# Patient Record
Sex: FEMALE | Race: WHITE | Employment: UNEMPLOYED
[De-identification: names, ages, dates, MRNs, and addresses within clinical notes are randomized per-mention and may not be internally consistent; named-entity substitution may affect disease eponyms.]

---

## 2017-09-05 ENCOUNTER — RECORDS - HEALTHEAST (OUTPATIENT)
Dept: ADMINISTRATIVE | Facility: OTHER | Age: 52
End: 2017-09-05

## 2018-02-05 ENCOUNTER — TRANSFERRED RECORDS (OUTPATIENT)
Dept: HEALTH INFORMATION MANAGEMENT | Facility: CLINIC | Age: 53
End: 2018-02-05

## 2018-03-28 ENCOUNTER — TRANSFERRED RECORDS (OUTPATIENT)
Dept: HEALTH INFORMATION MANAGEMENT | Facility: CLINIC | Age: 53
End: 2018-03-28

## 2018-10-18 ENCOUNTER — TRANSFERRED RECORDS (OUTPATIENT)
Dept: HEALTH INFORMATION MANAGEMENT | Facility: CLINIC | Age: 53
End: 2018-10-18

## 2019-01-07 ENCOUNTER — TRANSFERRED RECORDS (OUTPATIENT)
Dept: HEALTH INFORMATION MANAGEMENT | Facility: CLINIC | Age: 54
End: 2019-01-07

## 2019-01-25 ENCOUNTER — TRANSFERRED RECORDS (OUTPATIENT)
Dept: HEALTH INFORMATION MANAGEMENT | Facility: CLINIC | Age: 54
End: 2019-01-25

## 2019-02-23 ENCOUNTER — TRANSFERRED RECORDS (OUTPATIENT)
Dept: HEALTH INFORMATION MANAGEMENT | Facility: CLINIC | Age: 54
End: 2019-02-23

## 2019-03-04 ENCOUNTER — TRANSFERRED RECORDS (OUTPATIENT)
Dept: HEALTH INFORMATION MANAGEMENT | Facility: CLINIC | Age: 54
End: 2019-03-04

## 2019-03-07 ENCOUNTER — TRANSFERRED RECORDS (OUTPATIENT)
Dept: HEALTH INFORMATION MANAGEMENT | Facility: CLINIC | Age: 54
End: 2019-03-07

## 2019-04-24 ENCOUNTER — TRANSFERRED RECORDS (OUTPATIENT)
Dept: HEALTH INFORMATION MANAGEMENT | Facility: CLINIC | Age: 54
End: 2019-04-24

## 2019-06-04 ENCOUNTER — TRANSFERRED RECORDS (OUTPATIENT)
Dept: HEALTH INFORMATION MANAGEMENT | Facility: CLINIC | Age: 54
End: 2019-06-04

## 2019-06-10 ENCOUNTER — TRANSFERRED RECORDS (OUTPATIENT)
Dept: HEALTH INFORMATION MANAGEMENT | Facility: CLINIC | Age: 54
End: 2019-06-10

## 2019-08-05 ENCOUNTER — TRANSFERRED RECORDS (OUTPATIENT)
Dept: HEALTH INFORMATION MANAGEMENT | Facility: CLINIC | Age: 54
End: 2019-08-05

## 2019-09-09 ENCOUNTER — TRANSFERRED RECORDS (OUTPATIENT)
Dept: HEALTH INFORMATION MANAGEMENT | Facility: CLINIC | Age: 54
End: 2019-09-09

## 2019-10-04 ENCOUNTER — TRANSFERRED RECORDS (OUTPATIENT)
Dept: HEALTH INFORMATION MANAGEMENT | Facility: CLINIC | Age: 54
End: 2019-10-04

## 2019-10-10 ENCOUNTER — TRANSFERRED RECORDS (OUTPATIENT)
Dept: HEALTH INFORMATION MANAGEMENT | Facility: CLINIC | Age: 54
End: 2019-10-10

## 2019-10-10 ENCOUNTER — MEDICAL CORRESPONDENCE (OUTPATIENT)
Dept: HEALTH INFORMATION MANAGEMENT | Facility: CLINIC | Age: 54
End: 2019-10-10

## 2019-10-11 ENCOUNTER — TRANSFERRED RECORDS (OUTPATIENT)
Dept: HEALTH INFORMATION MANAGEMENT | Facility: CLINIC | Age: 54
End: 2019-10-11

## 2019-10-17 ENCOUNTER — TRANSFERRED RECORDS (OUTPATIENT)
Dept: HEALTH INFORMATION MANAGEMENT | Facility: CLINIC | Age: 54
End: 2019-10-17

## 2019-10-17 LAB
ALT SERPL-CCNC: 69 U/L (ref 0–55)
AST SERPL-CCNC: 54 U/L (ref 10–40)
CREAT SERPL-MCNC: 0.59 MG/DL (ref 0.55–1.02)
GFR SERPL CREATININE-BSD FRML MDRD: >60 ML/MIN/1.73M2
GLUCOSE SERPL-MCNC: 261 MG/DL (ref 70–100)
POTASSIUM SERPL-SCNC: 4 MMOL/L (ref 3.5–5.1)

## 2019-10-18 ENCOUNTER — TRANSFERRED RECORDS (OUTPATIENT)
Dept: HEALTH INFORMATION MANAGEMENT | Facility: CLINIC | Age: 54
End: 2019-10-18

## 2019-10-18 LAB
ALT SERPL-CCNC: 56 U/L (ref 0–55)
AST SERPL-CCNC: 57 U/L (ref 10–40)

## 2019-10-22 ENCOUNTER — REFERRAL (OUTPATIENT)
Dept: TRANSPLANT | Facility: CLINIC | Age: 54
End: 2019-10-22

## 2019-10-22 DIAGNOSIS — C22.0 HEPATOCELLULAR CARCINOMA (H): ICD-10-CM

## 2019-10-22 DIAGNOSIS — K74.60 CIRRHOSIS (H): ICD-10-CM

## 2019-10-22 NOTE — LETTER
November 18, 2019    Dania Albert  [address] [place] [id number]      Dear Dania,    Thank you for your interest in the Transplant Center at [facility], [facility]. We look forward to being a part of your care team and assisting you through the transplant process.    As we discussed, your transplant coordinator is Huy Anders Jr. (Liver).  You may call your coordinator at any time with questions or concerns call [phone].    Please complete the following.    1. Fill out and return the enclosed forms    Authorization for Electronic Communication    Authorization to Discuss Protected Health Information    Authorization for Release of Protected Health Information    2. Sign up for:    [facility], access to your electronic medical record (see enclosed pamphlet)    [employer], a transplant education website    You can use these tools to learn more about your transplant, communicate with your care team, and track your medical details    Sincerely,  Solid Organ Transplant  [facility], [facility]    cc: Micaela Gould (PCP); Robles Hernandez MD

## 2019-10-22 NOTE — LETTER
LIVER TRANSPLANT  EVALUATION SCHEDULE    Patient:   Dania Albert  MR#:    [id number]  Coordinator:  Luís       [phone]  :     Kayli                 [phone]  Location:    [facility]  Date(s):    December 9, 2019 & December 10, 2019      December 11, 2019 & December 17, 2019 January 31, 2020  This is your evaluation schedule, please follow dates and times.  You will   receive reminder phone calls for other tests, but please follow this schedule  only!  If you have any questions about dates and times, please call us on  number listed above.  Thank you, Transplant Services   *NO FOOD or DRINK AFTER MIDNIGHT*  (You may only have small amounts of water)    Day/Date:    Monday, December 9, 2019  Time Location Activity   7:30 AM Imaging and Lab testing  ([facility] floor [facility] [place] ) Blood tests    7:45 AM Imaging and Lab testing  ([address] [place] [address] and [facility] [place] ) Chest X-ray    8:00 AM Imaging and Lab testing  ([address] [place] [address] and [facility] [place] ) Liver Ultrasound with dopplers=NO FOOD or DRINK 8 HOURS BEFORE TEST!!!   9:30 AM-11:30 AM Transplant Services  ([facility] floor [facility] [place]) Pre-transplant class   12:00 PM Imaging and Lab Testing  ([address] [place] [address] and [facility] [place] ) Dexa Scan HOLD Calcium pills & supplements 48 hours before test!   1:00 PM [facility]   ([address] [place] [address] and [facility] [place]) Pulmonary Function Tests Please do not wear any deodorant, perfume, or scented products.             Day/Date:    Tuesday, December 10, 2019  Time Location Activity   9:00 AM Imaging and Lab testing  ([facility] floor [facility] [place] ) EKG   9:30 AM Transplant Services  ([address] [place] [address] and [facility] [place]) Appointment with Dr. Mejía,  Transplant Surgeon   10:00 AM Medicine Specialties  ([address] [place] [address] and [facility] [place]) Appointment with Dr. Bergman,  Hepatologist   11:00 AM Transplant Services  ([address] [place] [address]  and Surgery Center) Appointment with Jaimee Murrell  Registered Dietitian   12:00 AM Transplant Services  (3rd floor [facility]) Appointment with Trish Knight             Day/Date:    Wednesday, December 11, 2019  Time Location Activity   7:00 AM [facility] Waiting Room  (2nd [facility]) Bone Scan Injection   8:00 AM [facility] Waiting Room  ([address] [place] [address]) Dobutamine Stress Echo = SEE ENCLOSED INSTRUCTIONS for TEST!   10:00 AM [facility] Waiting Room  (2nd [facility]) Bone Scan         Day/Date:    Tuesday, December 17, 2019  Time Location Activity   9:00 AM [facility]  (3rd floor [facility]) Appointment with Dr. Taylor,  Cardiology           Day/Date:    Friday, January 31, 2020  Time Location Activity   1:00 PM [facility]   (3rd floor [facility]) Appointment with Dr. Hicks,  Pulmonary                 Day/Date:    Every Thursday *OPTIONAL*  Time Location Activity   12:00 PM-1:30 PM Liver Transplant Support Group  Hospital Station 7B  Room 7-120 Every Thursday *OPTIONAL*     *IF ON LINE CHECK IN IS NOT DONE, YOU WILL NEED TO CHECK IN 15 MINUTES EARLIER THEN THE FIRST SCHEDULED APPOINTMENT*

## 2019-10-23 PROBLEM — B18.2 CHRONIC HEPATITIS C WITHOUT HEPATIC COMA (H): Status: ACTIVE | Noted: 2019-10-23

## 2019-10-23 PROBLEM — C22.0 HCC (HEPATOCELLULAR CARCINOMA) (H): Status: ACTIVE | Noted: 2019-10-23

## 2019-10-23 PROBLEM — K74.60 CIRRHOSIS OF LIVER WITH ASCITES (H): Status: ACTIVE | Noted: 2019-10-23

## 2019-10-23 PROBLEM — R18.8 CIRRHOSIS OF LIVER WITH ASCITES (H): Status: ACTIVE | Noted: 2019-10-23

## 2019-10-25 ENCOUNTER — TRANSFERRED RECORDS (OUTPATIENT)
Dept: HEALTH INFORMATION MANAGEMENT | Facility: CLINIC | Age: 54
End: 2019-10-25

## 2019-10-25 LAB
ALT SERPL-CCNC: 23 U/L (ref 0–55)
AST SERPL-CCNC: 29 U/L (ref 10–40)
CREAT SERPL-MCNC: 0.53 MG/DL (ref 0.55–1.02)
GFR SERPL CREATININE-BSD FRML MDRD: >60 ML/MIN/1.73M2
GLUCOSE SERPL-MCNC: 209 MG/DL (ref 70–100)
INR PPP: 1.2 (ref 0.9–1.1)

## 2019-10-26 ENCOUNTER — TRANSFERRED RECORDS (OUTPATIENT)
Dept: HEALTH INFORMATION MANAGEMENT | Facility: CLINIC | Age: 54
End: 2019-10-26

## 2019-11-11 ENCOUNTER — TRANSFERRED RECORDS (OUTPATIENT)
Dept: HEALTH INFORMATION MANAGEMENT | Facility: CLINIC | Age: 54
End: 2019-11-11

## 2019-11-13 VITALS — WEIGHT: 176 LBS | HEIGHT: 62 IN | BODY MASS INDEX: 32.39 KG/M2

## 2019-11-13 ASSESSMENT — MIFFLIN-ST. JEOR: SCORE: 1346.58

## 2019-11-13 NOTE — TELEPHONE ENCOUNTER
Patient was asked the following questions during liver intake call.     Referring Provider: Dr. Robles Metz  Referring Diagnosis: Chronic Liver Disease/HCV/Liver Cancer   PCP: Dr. Micaela Gould     1)Do you know why you have liver disease: Yes             If Alcoholic Cirrhosis is present when was your last drink: No             Have you ever been through treatment for alcohol: No  2) Presence of Ascites: Yes Paracentesis: No  3) Presence of Hepatic Encephalopathy: No Medications:No  4) History of GI Bleeding: Yes, History of Esophageal Varices Banding.   5) EGD: Yes at [facility] in [facility]   6) Colonoscopy: Yes at [facility].   7) MELD Score:  No Current INR  8) Insurance information: [place] Medicaid       Policy gutierres: Self      Subscriber/policy/ID number: [id number]      Group Number:     Referral intake process completed.  Patient is aware that after financial approval is received, medical records will be requested.   Patient confirmed for a callback from transplant coordinator on 11/20/2019.  Tentative evaluation date TBD.    Confirmed coordinator will discuss evaluation process in more detail at the time of their call.   Patient is aware of the need to arrange age appropriate cancer screening, vaccinations, and dental care.  Reminded patient to complete questionnaire, complete medical records release, and review packet prior to evaluation visit .  Assessed patient for special needs (ie--wheelchair, assistance, guardian, and ):  No  Patient instructed to call [phone] with questions.     MELANIA Morales, LPN   Solid Organ Transplant

## 2019-11-22 ENCOUNTER — TRANSFERRED RECORDS (OUTPATIENT)
Dept: HEALTH INFORMATION MANAGEMENT | Facility: CLINIC | Age: 54
End: 2019-11-22

## 2019-11-22 ENCOUNTER — DOCUMENTATION ONLY (OUTPATIENT)
Dept: TRANSPLANT | Facility: CLINIC | Age: 54
End: 2019-11-22

## 2019-11-25 NOTE — TELEPHONE ENCOUNTER
54yo with hx HCV cirrhosis and HCC referred for transplant evaluation by Dr. Metz at [facility]. Pt previously followed by Yadira Graf NP at [facility], but, in order to be closer to home, transferred management of her liver disease to Dr. Cast in [place], [place] (notes in CE). Pt's HCV treated successfully with Sovaldi in 2/2017. HCC dx 6/2019 and treated with chemoembolization and microwave ablation in 10/2019 at [facility]. Alcohol abuse noted in [facility] medical history however pt denies any hx of alcohol abuse. Referral indicates occasional drinking, pt reports last drink spring 2019.  Pt denies any current or hx drug use. Pt does smoke (~7cigaretts/day) and has a known hx COPD (seen by pulmonology in 4/2018, note in CE). Pt lives with her  who is supportive and involved. She has been unable to work for many months due to her health.     MELD: 7, labs done 10/4/19 at [facility]  Imaging: done at [facility], see CE, reviewed at tumor conference 11/22/19, pt to follow up with [facility] regarding f/u imaging  Ascites: none, no diuretics   HE: none, not on lactulose or Xifaxan  GIB: no hx  EGD: hx EV, last EGD 10/28/19 at [facility], see CE  Colonoscopy: last 10/2018 at [facility], see CE    Medical and Surgical Hx: Snap Shot updated  *Pt to schedule mammogram and pap smear with PCP    Plan:   Transplant evaluation with Dr. Bergman week of 12/9/19. Pt instructed to bring support person to all visits.

## 2019-11-27 NOTE — TELEPHONE ENCOUNTER
RECORDS RECEIVED FROM: Internal   DATE RECEIVED: 1.31.2020   NOTES STATUS DETAILS   OFFICE NOTE from referring provider Internal 11.25.19 Dr. Bergman   OFFICE NOTE from other specialist Internal 1.25.19 Dr. Gould   DISCHARGE SUMMARY from hospital N/A    DISCHARGE REPORT from the ER Care Everywhere 1.25.19 [facility]   MEDICATION LIST Internal    IMAGING  (NEED IMAGES AND REPORTS)     CT SCAN In process 10.25.19 CT chest- HP  8.5.19 ct chest -hp  3.28.18 ct chest-hp   CHEST XRAY (CXR) In process 3.7.19- xr chest -hp  1.30.18 [facility]   TESTS     PULMONARY FUNCTION TESTING (PFT) In process 2.5.18 [facility]      11.27.19 MJ 6:33 AM  Called [facility] for images to be sent. Requested PFT from [facility].    Action 1.24.20 sv   Action Taken Received CT 10.25.19  Request sent to [facility] [phone] for  CT- 8/5/19, 3/28/18  CXR- 2.23.19  Request sent to [facility] for  CXR- 3/7/19  Request sent to [facility] for  PFT- 2.5.18        Action 1.24.20 sv   Action Taken Received PFT from [facility]   Received CXR 3.7.19 from [facility]         Action 1.29.20 sv   Action Taken Record received from [facility]

## 2019-11-29 NOTE — TELEPHONE ENCOUNTER
RECORDS RECEIVED FROM: Internal   DATE RECEIVED: 12-17-19   NOTES STATUS DETAILS   OFFICE NOTE from referring provider    Internal SOT   OFFICE NOTE from other cardiologist    N/A    DISCHARGE SUMMARY from hospital    N/A    DISCHARGE REPORT from the ER   Care Everywhere 10-25-19 [facility]   OPERATIVE REPORT    N/A    MEDICATION LIST   Internal    LABS     BMP   Care Everywhere 10-25-19   CBC   Care Everywhere 2-23-19   CMP   Care Everywhere 10-4-19   Lipids   Care Everywhere 3-4-19   TSH   Care Everywhere 3-4-19   DIAGNOSTIC PROCEDURES     EKG   In process 10-26-19   Monitor Reports   N/A    IMAGING (DISC & REPORT)      Echo   In process Scheduled prior to appt 12-17-19   Stress Tests   In process Scheduled prior to appt 12-17-19   Cath   N/A    MRI/MRA   N/A    CT/CTA   N/A      Action    Action Taken 11-29: Requested from :    EKG Strips   10-26-19, 10-10-19, 10-8-19, 10-18-18      11-29: Received and sent to scanning

## 2019-12-06 ENCOUNTER — TRANSFERRED RECORDS (OUTPATIENT)
Dept: HEALTH INFORMATION MANAGEMENT | Facility: CLINIC | Age: 54
End: 2019-12-06

## 2019-12-08 NOTE — PROGRESS NOTES
[facility] Liver Transplant Evaluation    Requesting Provider and Health System: Yadira Graf NP , Rodolfo Cast MD [facility]  Liver Disease: HV    A/P  54 year old female with HCV cirrhosis and HCC. MELD 7ABO O  CIRRHOSIS.2/2 HCV. Excellent synthetic function  HCV treated and obtained SVR 2017    THROMBOCYTOPENIA. 2/2 cirrhosis. Plt ~ 50  HCC. S/p MWA with recent MRI showing no residual disease  VARICEAL SCREENING. UTD EGD 10/29/19 grade 1 varices    DIARRHEA Chronic Suggested Imodium. PRescribed Lomotil    DM On insulin    SOCIAL SUPPORT.  here today and seems very supportive, engaged.  FUNCTIONAL STATUS.Good  LIVER TRANSPLANT CANDIDACY.   She is a good candidate. Issues  DM  Level of understanding.        Kayli Bergman MD  Hepatology/Liver Transplantation  Medical Director, Liver Transplantation  [facility]  ========================================================================    Subjective:  53 y M with HCV cirrhosis and HCC. First diagnosed with liver disease in a couple years ago. She saw her doctor and had some testing done. She was diagnosed with Hepatitis C at that time.     She feels ok. She has chronic diarrhea for years. Never has taken anything for it. Goes away when she has taken narcotic pain medication. Sometimes loses continence    Has occasional twinges of pain in her RUQ. They don't last. No NV.    HCV  S/p treatment with Sovaldi 2017    HCC  Dx 6/2019 3.1x1.9x2.8 cm  CE and MWA at [facility] 10/2019  MRI 11/22/19 no active HCC    Ascites no  HE no    EGD 10/28/19 grade 1 varices  COLONOSCOPY 12/8/16 due 2026  KIDNEY FUNCTION normal  BONE DENSITY DEXA pending    Portal vein assessment: patent on US   Diabetes: yes  Abdominal surgery: yes    HBV neg core ancelmo and s Ag    MELD-Na score: 7 at 12/9/2019  9:12 AM  MELD score: 7 at 12/9/2019  9:12 AM  Calculated from:  Serum Creatinine: 0.62 mg/dL (Rounded to 1 mg/dL) at 12/9/2019  9:12 AM  Serum Sodium:  137 mmol/L at 12/9/2019  9:12 AM  Total Bilirubin: 0.9 mg/dL (Rounded to 1 mg/dL) at 12/9/2019  9:12 AM  INR(ratio): 1.12 at 12/9/2019  9:12 AM  Age: 54 years  Lab Test 12/09/19  0912   WBC 2.8*   RBC 3.83   HGB 13.2   HCT 39.0   *   MCH 34.5*   MCHC 33.8   RDW 14.2   PLT 49*   AFP 3.9    PAST MEDICAL HISTORY  Asuncion  R hernia repair  ORIF L and R wrists  Bowel obstruction no details known   Chronic diarrhea not on any medication  Botox shot in kidney for urinary frequency about 2018  SOCIAL HISTORY    Lives with   Currently is not working. 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 Worked in medical equipment assembly.  Smoking: Smokes 8-10 cig/d for 25 years  Alcohol  FAMILY HISTORY  M CAD, glaucoma, DM  F CAD  Bro kidney disease  DM in multiple sisters    ROS 10 point ROS neg other than the symptoms noted above in the HPI.    EXAM  140/84 16 82 96% 176 pounds BMI 32  Gen: No acute distress  Head: Normocephalic atraumatic  Eyes: Sclera anicteric  Neck: No cervical lymphadenopathy  Respiratory: Clear to auscultation bilaterally, no overt wheezing or rales  CV: RRR   Abdomen:  Soft, nontender, nondistended, normal bowel sounds  Extrem: No edema  Skin: No jaundice, spider angiomata or palmar erythema.  No rashes.   Neuro: Alert and oriented. No asterixis.    MSK: Grossly Intact  Psych: Normal speech, normal affect    Current Outpatient Medications   Medication     acetaminophen (TYLENOL) 500 MG tablet     albuterol (PROAIR HFA) 108 (90 Base) MCG/ACT inhaler     benzonatate (TESSALON) 100 MG capsule     blood glucose monitoring (ONE TOUCH ULTRA MINI) meter device kit     HYDROXYZINE HCL PO     insulin glargine (LANTUS SOLOSTAR) 100 UNIT/ML pen     Omega-3 Fatty Acids (OMEGA-3 FISH OIL PO)     OneTouch Delica Lancets 33G MISC     order for DME     OXYCODONE HCL PO     pantoprazole (PROTONIX) 40 MG EC tablet     QUEtiapine (SEROQUEL) 50 MG tablet     sertraline (ZOLOFT) 100 MG tablet      TRULICITY 0.75 MG/0.5ML pen     vitamin D2 (ERGOCALCIFEROL) 38816 units (1250 mcg) capsule     No current facility-administered medications for this visit.

## 2019-12-09 ENCOUNTER — TELEPHONE (OUTPATIENT)
Dept: GASTROENTEROLOGY | Facility: CLINIC | Age: 54
End: 2019-12-09

## 2019-12-09 ENCOUNTER — OFFICE VISIT (OUTPATIENT)
Dept: TRANSPLANT | Facility: CLINIC | Age: 54
End: 2019-12-09
Attending: INTERNAL MEDICINE
Payer: MEDICAID

## 2019-12-09 ENCOUNTER — ANCILLARY PROCEDURE (OUTPATIENT)
Dept: BONE DENSITY | Facility: CLINIC | Age: 54
End: 2019-12-09
Attending: INTERNAL MEDICINE
Payer: MEDICAID

## 2019-12-09 ENCOUNTER — ANCILLARY PROCEDURE (OUTPATIENT)
Dept: GENERAL RADIOLOGY | Facility: CLINIC | Age: 54
End: 2019-12-09
Attending: INTERNAL MEDICINE
Payer: MEDICAID

## 2019-12-09 ENCOUNTER — DOCUMENTATION ONLY (OUTPATIENT)
Dept: CARE COORDINATION | Facility: CLINIC | Age: 54
End: 2019-12-09

## 2019-12-09 DIAGNOSIS — C22.0 HEPATOCELLULAR CARCINOMA (H): ICD-10-CM

## 2019-12-09 DIAGNOSIS — K74.60 CIRRHOSIS OF LIVER (H): Primary | ICD-10-CM

## 2019-12-09 DIAGNOSIS — K74.60 CIRRHOSIS OF LIVER WITH ASCITES (H): Primary | ICD-10-CM

## 2019-12-09 DIAGNOSIS — K70.31 ALCOHOLIC CIRRHOSIS OF LIVER WITH ASCITES (H): ICD-10-CM

## 2019-12-09 DIAGNOSIS — K74.60 CIRRHOSIS (H): ICD-10-CM

## 2019-12-09 DIAGNOSIS — R18.8 CIRRHOSIS OF LIVER WITH ASCITES (H): Primary | ICD-10-CM

## 2019-12-09 LAB
ABO + RH BLD: NORMAL
AFP SERPL-MCNC: 4 UG/L (ref 0–8)
ALBUMIN SERPL-MCNC: 3.5 G/DL (ref 3.4–5)
ALBUMIN UR-MCNC: NEGATIVE MG/DL
ALP SERPL-CCNC: 172 U/L (ref 40–150)
ALT SERPL W P-5'-P-CCNC: 20 U/L (ref 0–50)
ANION GAP SERPL CALCULATED.3IONS-SCNC: 6 MMOL/L (ref 3–14)
APPEARANCE UR: CLEAR
AST SERPL W P-5'-P-CCNC: 16 U/L (ref 0–45)
BILIRUB DIRECT SERPL-MCNC: 0.2 MG/DL (ref 0–0.2)
BILIRUB SERPL-MCNC: 0.9 MG/DL (ref 0.2–1.3)
BILIRUB UR QL STRIP: NEGATIVE
BLD GP AB SCN SERPL QL: NORMAL
BLOOD BANK CMNT PATIENT-IMP: NORMAL
BUN SERPL-MCNC: 6 MG/DL (ref 7–30)
CALCIUM SERPL-MCNC: 9 MG/DL (ref 8.5–10.1)
CHLORIDE SERPL-SCNC: 104 MMOL/L (ref 94–109)
CMV IGG SERPL QL IA: >8 AI (ref 0–0.8)
CO2 SERPL-SCNC: 28 MMOL/L (ref 20–32)
COLOR UR AUTO: YELLOW
CREAT SERPL-MCNC: 0.62 MG/DL (ref 0.52–1.04)
DEPRECATED CALCIDIOL+CALCIFEROL SERPL-MC: 20 UG/L (ref 20–75)
EBV VCA IGG SER QL IA: 6.3 AI (ref 0–0.8)
ERYTHROCYTE [DISTWIDTH] IN BLOOD BY AUTOMATED COUNT: 14.2 % (ref 10–15)
GFR SERPL CREATININE-BSD FRML MDRD: >90 ML/MIN/{1.73_M2}
GLUCOSE SERPL-MCNC: 149 MG/DL (ref 70–99)
GLUCOSE UR STRIP-MCNC: NEGATIVE MG/DL
HBA1C MFR BLD: 7.5 % (ref 0–5.6)
HBV CORE AB SERPL QL IA: NONREACTIVE
HBV SURFACE AG SERPL QL IA: NONREACTIVE
HCG SERPL QL: NEGATIVE
HCT VFR BLD AUTO: 39 % (ref 35–47)
HGB BLD-MCNC: 13.2 G/DL (ref 11.7–15.7)
HGB UR QL STRIP: NEGATIVE
HIV 1+2 AB+HIV1 P24 AG SERPL QL IA: NONREACTIVE
INR PPP: 1.12 (ref 0.86–1.14)
KETONES UR STRIP-MCNC: NEGATIVE MG/DL
LEUKOCYTE ESTERASE UR QL STRIP: NEGATIVE
MCH RBC QN AUTO: 34.5 PG (ref 26.5–33)
MCHC RBC AUTO-ENTMCNC: 33.8 G/DL (ref 31.5–36.5)
MCV RBC AUTO: 102 FL (ref 78–100)
NITRATE UR QL: NEGATIVE
PH UR STRIP: 7 PH (ref 5–7)
PLATELET # BLD AUTO: 49 10E9/L (ref 150–450)
POTASSIUM SERPL-SCNC: 3.6 MMOL/L (ref 3.4–5.3)
PROT SERPL-MCNC: 8 G/DL (ref 6.8–8.8)
RBC # BLD AUTO: 3.83 10E12/L (ref 3.8–5.2)
SODIUM SERPL-SCNC: 137 MMOL/L (ref 133–144)
SOURCE: ABNORMAL
SP GR UR STRIP: 1.01 (ref 1–1.03)
SPECIMEN EXP DATE BLD: NORMAL
SPECIMEN EXP DATE BLD: NORMAL
T PALLIDUM AB SER QL: NONREACTIVE
TSH SERPL DL<=0.005 MIU/L-ACNC: 3.41 MU/L (ref 0.4–4)
UROBILINOGEN UR STRIP-MCNC: 4 MG/DL (ref 0–2)
WBC # BLD AUTO: 2.8 10E9/L (ref 4–11)

## 2019-12-09 PROCEDURE — 87340 HEPATITIS B SURFACE AG IA: CPT | Performed by: INTERNAL MEDICINE

## 2019-12-09 PROCEDURE — 80321 ALCOHOLS BIOMARKERS 1OR 2: CPT | Performed by: INTERNAL MEDICINE

## 2019-12-09 PROCEDURE — 86850 RBC ANTIBODY SCREEN: CPT | Performed by: INTERNAL MEDICINE

## 2019-12-09 PROCEDURE — 86481 TB AG RESPONSE T-CELL SUSP: CPT | Performed by: INTERNAL MEDICINE

## 2019-12-09 PROCEDURE — 86704 HEP B CORE ANTIBODY TOTAL: CPT | Performed by: INTERNAL MEDICINE

## 2019-12-09 PROCEDURE — 84443 ASSAY THYROID STIM HORMONE: CPT | Performed by: INTERNAL MEDICINE

## 2019-12-09 PROCEDURE — 86665 EPSTEIN-BARR CAPSID VCA: CPT | Performed by: INTERNAL MEDICINE

## 2019-12-09 PROCEDURE — 85610 PROTHROMBIN TIME: CPT | Performed by: INTERNAL MEDICINE

## 2019-12-09 PROCEDURE — 86900 BLOOD TYPING SEROLOGIC ABO: CPT | Performed by: INTERNAL MEDICINE

## 2019-12-09 PROCEDURE — 36415 COLL VENOUS BLD VENIPUNCTURE: CPT | Performed by: INTERNAL MEDICINE

## 2019-12-09 PROCEDURE — 80307 DRUG TEST PRSMV CHEM ANLYZR: CPT | Performed by: INTERNAL MEDICINE

## 2019-12-09 PROCEDURE — 86901 BLOOD TYPING SEROLOGIC RH(D): CPT | Performed by: INTERNAL MEDICINE

## 2019-12-09 PROCEDURE — 86886 COOMBS TEST INDIRECT TITER: CPT | Performed by: INTERNAL MEDICINE

## 2019-12-09 PROCEDURE — 84703 CHORIONIC GONADOTROPIN ASSAY: CPT | Performed by: INTERNAL MEDICINE

## 2019-12-09 PROCEDURE — 86644 CMV ANTIBODY: CPT | Performed by: INTERNAL MEDICINE

## 2019-12-09 PROCEDURE — 81003 URINALYSIS AUTO W/O SCOPE: CPT | Performed by: INTERNAL MEDICINE

## 2019-12-09 PROCEDURE — 80048 BASIC METABOLIC PNL TOTAL CA: CPT | Performed by: INTERNAL MEDICINE

## 2019-12-09 PROCEDURE — 82306 VITAMIN D 25 HYDROXY: CPT | Performed by: INTERNAL MEDICINE

## 2019-12-09 PROCEDURE — 85027 COMPLETE CBC AUTOMATED: CPT | Performed by: INTERNAL MEDICINE

## 2019-12-09 PROCEDURE — 80076 HEPATIC FUNCTION PANEL: CPT | Performed by: INTERNAL MEDICINE

## 2019-12-09 PROCEDURE — 86780 TREPONEMA PALLIDUM: CPT | Performed by: INTERNAL MEDICINE

## 2019-12-09 PROCEDURE — 40000866 ZZHCL STATISTIC HIV 1/2 ANTIGEN/ANTIBODY PRETRANSPLANT ONLY: Performed by: INTERNAL MEDICINE

## 2019-12-09 PROCEDURE — 82105 ALPHA-FETOPROTEIN SERUM: CPT | Performed by: INTERNAL MEDICINE

## 2019-12-09 PROCEDURE — 83036 HEMOGLOBIN GLYCOSYLATED A1C: CPT | Performed by: INTERNAL MEDICINE

## 2019-12-09 NOTE — TELEPHONE ENCOUNTER
DATE:  12/9/2019   TIME OF RECEIPT FROM LAB:  1050  LAB TEST:  Platelets  LAB VALUE:  49  RESULTS GIVEN WITH READ-BACK TO (PROVIDER):  Samina Dawn LPN for Dr. eBrgman    TIME LAB VALUE REPORTED TO PROVIDER:   1050

## 2019-12-09 NOTE — LETTER
12/9/2019       RE: Dania Albert  [address] [address]     Dear Colleague,    Thank you for referring your patient, Dania Albert, to the [facility] SOLID ORGAN TRANSPLANT at [facility]. Please see a copy of my visit note below.    Liver Transplant Evaluation/Teaching    TEACHING TOPICS: Evaluation Process, Evaluation Items, Diagnostic Studies, Consultation, Chemical Dependency Policy, CD Eval, Donor Source, Liver Allocation, MELD System, UNOS, Waiting List, Follow up while on transplant list, Follow up after transplantation, Infection and Rejection, Immunosuppression , Medication Teaching, Lab Recording after transplant, Laboratory Frequency after transplant , Consent for evaluation and One year survival rates  INSTRUCTIONAL MATERIAL USED/GIVEN: Liver Transplant Handbook, MELD Booklet, Donor Booklet, Web Sites Options, Verbal instructions, Multiple Listing Brochure , Consent for evaluation signed, One year survival rates and SRTR (Scientific Registry) Data  Person(s) involved in teaching: patient her    Asks Questions: YES  Eager to Learn: YES  Cooperative: YES  Receptive (willing/able to accept information): YES  Reason for the appointment, diagnosis and treatment plan:YES  Knowledge of proper use of medications and conditions for which they are ordered (with special attention to potential side effects or drug interactions): YES  Which situations necessitate calling provider and whom to contact:YES  Other: NA  Teaching Concerns Addressed   Comments: Patient and her  attended transplant class. Asked excellent questions. Eval consent signed.   Proper use and care of (medical equip, care aids, etc.):YES  Nutritional needs and diet plan: YES  Pain management techniques: YES  Wound Care: YES  How and/when to access community resources: YES  Patient is aware of and agrees to required commitment to post-op care and long term follow-up: YES  Patient has name  and phone number of transplant coordinator.   Time Spent face-to-face teachin minutes.  Yancy Melton RN  Liver Transplant Coordinator         Again, thank you for allowing me to participate in the care of your patient.      Sincerely,    Transplant Class

## 2019-12-09 NOTE — PROGRESS NOTES
Liver Transplant Evaluation/Teaching    TEACHING TOPICS: Evaluation Process, Evaluation Items, Diagnostic Studies, Consultation, Chemical Dependency Policy, CD Eval, Donor Source, Liver Allocation, MELD System, UNOS, Waiting List, Follow up while on transplant list, Follow up after transplantation, Infection and Rejection, Immunosuppression , Medication Teaching, Lab Recording after transplant, Laboratory Frequency after transplant , Consent for evaluation and One year survival rates  INSTRUCTIONAL MATERIAL USED/GIVEN: Liver Transplant Handbook, MELD Booklet, Donor Booklet, Web Sites Options, Verbal instructions, Multiple Listing Brochure , Consent for evaluation signed, One year survival rates and SRTR (Scientific Registry) Data  Person(s) involved in teaching: patient her    Asks Questions: YES  Eager to Learn: YES  Cooperative: YES  Receptive (willing/able to accept information): YES  Reason for the appointment, diagnosis and treatment plan:YES  Knowledge of proper use of medications and conditions for which they are ordered (with special attention to potential side effects or drug interactions): YES  Which situations necessitate calling provider and whom to contact:YES  Other: NA  Teaching Concerns Addressed   Comments: Patient and her  attended transplant class. Asked excellent questions. Eval consent signed.   Proper use and care of (medical equip, care aids, etc.):YES  Nutritional needs and diet plan: YES  Pain management techniques: YES  Wound Care: YES  How and/when to access community resources: YES  Patient is aware of and agrees to required commitment to post-op care and long term follow-up: YES  Patient has name and phone number of transplant coordinator.   Time Spent face-to-face teachin minutes.  Yancy Melton RN  Liver Transplant Coordinator

## 2019-12-10 ENCOUNTER — COMMITTEE REVIEW (OUTPATIENT)
Dept: TRANSPLANT | Facility: CLINIC | Age: 54
End: 2019-12-10

## 2019-12-10 ENCOUNTER — OFFICE VISIT (OUTPATIENT)
Dept: GASTROENTEROLOGY | Facility: CLINIC | Age: 54
End: 2019-12-10
Attending: INTERNAL MEDICINE
Payer: MEDICAID

## 2019-12-10 ENCOUNTER — ALLIED HEALTH/NURSE VISIT (OUTPATIENT)
Dept: TRANSPLANT | Facility: CLINIC | Age: 54
End: 2019-12-10
Attending: INTERNAL MEDICINE
Payer: MEDICAID

## 2019-12-10 ENCOUNTER — OFFICE VISIT (OUTPATIENT)
Dept: TRANSPLANT | Facility: CLINIC | Age: 54
End: 2019-12-10
Attending: TRANSPLANT SURGERY
Payer: MEDICAID

## 2019-12-10 VITALS
TEMPERATURE: 97.6 F | RESPIRATION RATE: 16 BRPM | BODY MASS INDEX: 33.36 KG/M2 | DIASTOLIC BLOOD PRESSURE: 84 MMHG | WEIGHT: 176.7 LBS | HEIGHT: 61 IN | SYSTOLIC BLOOD PRESSURE: 140 MMHG | HEART RATE: 82 BPM

## 2019-12-10 VITALS — HEART RATE: 82 BPM | SYSTOLIC BLOOD PRESSURE: 140 MMHG | DIASTOLIC BLOOD PRESSURE: 84 MMHG

## 2019-12-10 DIAGNOSIS — K74.60 CIRRHOSIS (H): ICD-10-CM

## 2019-12-10 DIAGNOSIS — B18.2 HEPATITIS C VIRUS CARRIER STATE (H): ICD-10-CM

## 2019-12-10 DIAGNOSIS — C22.0 HEPATOCELLULAR CARCINOMA (H): ICD-10-CM

## 2019-12-10 DIAGNOSIS — K76.6 PORTAL HYPERTENSION (H): ICD-10-CM

## 2019-12-10 DIAGNOSIS — K59.1 FUNCTIONAL DIARRHEA: Primary | ICD-10-CM

## 2019-12-10 DIAGNOSIS — K74.60 CIRRHOSIS OF LIVER WITH ASCITES, UNSPECIFIED HEPATIC CIRRHOSIS TYPE (H): ICD-10-CM

## 2019-12-10 DIAGNOSIS — K74.60 CIRRHOSIS OF LIVER (H): Primary | ICD-10-CM

## 2019-12-10 DIAGNOSIS — R18.8 CIRRHOSIS OF LIVER WITH ASCITES, UNSPECIFIED HEPATIC CIRRHOSIS TYPE (H): ICD-10-CM

## 2019-12-10 DIAGNOSIS — Z76.82 AWAITING ORGAN TRANSPLANT STATUS: Primary | ICD-10-CM

## 2019-12-10 LAB
DLCOCOR-%PRED-PRE: 67 %
DLCOCOR-PRE: 12.96 ML/MIN/MMHG
DLCOUNC-%PRED-PRE: 67 %
DLCOUNC-PRE: 12.87 ML/MIN/MMHG
DLCOUNC-PRED: 19.18 ML/MIN/MMHG
ERV-%PRED-PRE: 102 %
ERV-PRE: 0.55 L
ERV-PRED: 0.53 L
ETHYL GLUCURONIDE UR QL: NEGATIVE
EXPTIME-PRE: 5.83 SEC
FEF2575-%PRED-PRE: 138 %
FEF2575-PRE: 3.25 L/SEC
FEF2575-PRED: 2.35 L/SEC
FEFMAX-%PRED-PRE: 99 %
FEFMAX-PRE: 6.16 L/SEC
FEFMAX-PRED: 6.19 L/SEC
FEV1-%PRED-PRE: 92 %
FEV1-PRE: 2.26 L
FEV1FEV6-PRE: 88 %
FEV1FEV6-PRED: 81 %
FEV1FVC-PRE: 88 %
FEV1FVC-PRED: 80 %
FEV1SVC-PRE: 88 %
FEV1SVC-PRED: 79 %
FIFMAX-PRE: 3.14 L/SEC
FRCPLETH-%PRED-PRE: 72 %
FRCPLETH-PRE: 1.87 L
FRCPLETH-PRED: 2.58 L
FVC-%PRED-PRE: 83 %
FVC-PRE: 2.55 L
FVC-PRED: 3.06 L
GAMMA INTERFERON BACKGROUND BLD IA-ACNC: 0.05 IU/ML
IC-%PRED-PRE: 77 %
IC-PRE: 2 L
IC-PRED: 2.57 L
M TB IFN-G BLD-IMP: NEGATIVE
M TB IFN-G CD4+ BCKGRND COR BLD-ACNC: 9.19 IU/ML
MITOGEN IGNF BCKGRD COR BLD-ACNC: 0 IU/ML
MITOGEN IGNF BCKGRD COR BLD-ACNC: 0 IU/ML
RVPLETH-%PRED-PRE: 75 %
RVPLETH-PRE: 1.32 L
RVPLETH-PRED: 1.75 L
TLCPLETH-%PRED-PRE: 84 %
TLCPLETH-PRE: 3.87 L
TLCPLETH-PRED: 4.6 L
VA-%PRED-PRE: 82 %
VA-PRE: 3.65 L
VC-%PRED-PRE: 82 %
VC-PRE: 2.55 L
VC-PRED: 3.1 L

## 2019-12-10 PROCEDURE — G0463 HOSPITAL OUTPT CLINIC VISIT: HCPCS | Mod: ZF

## 2019-12-10 PROCEDURE — G0463 HOSPITAL OUTPT CLINIC VISIT: HCPCS | Mod: 27

## 2019-12-10 RX ORDER — ACETAMINOPHEN 500 MG
500 TABLET ORAL EVERY 8 HOURS PRN
COMMUNITY
Start: 2019-10-12

## 2019-12-10 RX ORDER — ALBUTEROL SULFATE 90 UG/1
2 AEROSOL, METERED RESPIRATORY (INHALATION) EVERY 6 HOURS PRN
COMMUNITY
Start: 2018-01-29

## 2019-12-10 RX ORDER — LANCETS 33 GAUGE
EACH MISCELLANEOUS
COMMUNITY
Start: 2017-08-15

## 2019-12-10 RX ORDER — SERTRALINE HYDROCHLORIDE 100 MG/1
200 TABLET, FILM COATED ORAL DAILY
COMMUNITY
Start: 2018-01-29

## 2019-12-10 RX ORDER — BENZONATATE 100 MG/1
CAPSULE ORAL
Refills: 2 | Status: ON HOLD | COMMUNITY
Start: 2019-10-06 | End: 2020-01-01

## 2019-12-10 RX ORDER — DIPHENOXYLATE HCL/ATROPINE 2.5-.025MG
1 TABLET ORAL 4 TIMES DAILY PRN
Qty: 120 TABLET | Refills: 0 | Status: ON HOLD | OUTPATIENT
Start: 2019-12-10 | End: 2020-01-01

## 2019-12-10 RX ORDER — PANTOPRAZOLE SODIUM 40 MG/1
TABLET, DELAYED RELEASE ORAL
COMMUNITY
Start: 2018-01-29

## 2019-12-10 RX ORDER — QUETIAPINE FUMARATE 50 MG/1
50 TABLET, FILM COATED ORAL AT BEDTIME
Refills: 0 | COMMUNITY
Start: 2019-10-29

## 2019-12-10 RX ORDER — BLOOD-GLUCOSE METER
KIT MISCELLANEOUS
COMMUNITY
Start: 2019-08-26

## 2019-12-10 RX ORDER — ERGOCALCIFEROL 1.25 MG/1
CAPSULE, LIQUID FILLED ORAL
Refills: 0 | Status: ON HOLD | COMMUNITY
Start: 2019-11-11 | End: 2020-01-01

## 2019-12-10 RX ORDER — DULAGLUTIDE 0.75 MG/.5ML
INJECTION, SOLUTION SUBCUTANEOUS
Refills: 2 | COMMUNITY
Start: 2019-10-30

## 2019-12-10 ASSESSMENT — MIFFLIN-ST. JEOR: SCORE: 1333.33

## 2019-12-10 ASSESSMENT — PAIN SCALES - GENERAL: PAINLEVEL: NO PAIN (0)

## 2019-12-10 NOTE — LETTER
12/10/2019       RE: Dania Albert  [address] [facility] [address]     Dear Colleague,    Thank you for referring your patient, Dania Albert, to the [facility] HEPATOLOGY at [facility]. Please see a copy of my visit note below.    [facility] Liver Transplant Evaluation    Requesting Provider and Health System: Yadira Graf NP , Rodolfo Cast MD [facility]  Liver Disease: HV    A/P  54 year old female with HCV cirrhosis and HCC. MELD 7ABO O  CIRRHOSIS.2/2 HCV. Excellent synthetic function  HCV treated and obtained SVR 2017    THROMBOCYTOPENIA. 2/2 cirrhosis. Plt ~ 50  HCC. S/p MWA with recent MRI showing no residual disease  VARICEAL SCREENING. UTD EGD 10/29/19 grade 1 varices    DIARRHEA Chronic Suggested Imodium. PRescribed Lomotil    DM On insulin    SOCIAL SUPPORT.  here today and seems very supportive, engaged.  FUNCTIONAL STATUS.Good  LIVER TRANSPLANT CANDIDACY.   She is a good candidate. Issues  DM  Level of understanding.        Kayli Bergman MD  Hepatology/Liver Transplantation  Medical Director, Liver Transplantation  [facility]  ========================================================================    Subjective:  53 y M with HCV cirrhosis and HCC. First diagnosed with liver disease in a couple years ago. She saw her doctor and had some testing done. She was diagnosed with Hepatitis C at that time.     She feels ok. She has chronic diarrhea for years. Never has taken anything for it. Goes away when she has taken narcotic pain medication. Sometimes loses continence    Has occasional twinges of pain in her RUQ. They don't last. No NV.    HCV  S/p treatment with Sovaldi 2017    HCC  Dx 6/2019 3.1x1.9x2.8 cm  CE and MWA at [facility] 10/2019  MRI 11/22/19 no active HCC    Ascites no  HE no    EGD 10/28/19 grade 1 varices  COLONOSCOPY 12/8/16 due 2026  KIDNEY FUNCTION normal  BONE DENSITY DEXA pending    Portal vein assessment:  patent on US   Diabetes: yes  Abdominal surgery: yes    HBV neg core ancelmo and s Ag    MELD-Na score: 7 at 12/9/2019  9:12 AM  MELD score: 7 at 12/9/2019  9:12 AM  Calculated from:  Serum Creatinine: 0.62 mg/dL (Rounded to 1 mg/dL) at 12/9/2019  9:12 AM  Serum Sodium: 137 mmol/L at 12/9/2019  9:12 AM  Total Bilirubin: 0.9 mg/dL (Rounded to 1 mg/dL) at 12/9/2019  9:12 AM  INR(ratio): 1.12 at 12/9/2019  9:12 AM  Age: 54 years  Lab Test 12/09/19  0912   WBC 2.8*   RBC 3.83   HGB 13.2   HCT 39.0   *   MCH 34.5*   MCHC 33.8   RDW 14.2   PLT 49*   AFP 3.9    PAST MEDICAL HISTORY  Asuncion  R hernia repair  ORIF L and R wrists  Bowel obstruction no details known   Chronic diarrhea not on any medication  Botox shot in kidney for urinary frequency about 2018  SOCIAL HISTORY    Lives with   Currently is not working. 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 Worked in medical equipment assembly.  Smoking: Smokes 8-10 cig/d for 25 years  Alcohol  FAMILY HISTORY  M CAD, glaucoma, DM  F CAD  Bro kidney disease  DM in multiple sisters    ROS 10 point ROS neg other than the symptoms noted above in the HPI.    EXAM  140/84 16 82 96% 176 pounds BMI 32  Gen: No acute distress  Head: Normocephalic atraumatic  Eyes: Sclera anicteric  Neck: No cervical lymphadenopathy  Respiratory: Clear to auscultation bilaterally, no overt wheezing or rales  CV: RRR   Abdomen:  Soft, nontender, nondistended, normal bowel sounds  Extrem: No edema  Skin: No jaundice, spider angiomata or palmar erythema.  No rashes.   Neuro: Alert and oriented. No asterixis.    MSK: Grossly Intact  Psych: Normal speech, normal affect    Current Outpatient Medications   Medication     acetaminophen (TYLENOL) 500 MG tablet     albuterol (PROAIR HFA) 108 (90 Base) MCG/ACT inhaler     benzonatate (TESSALON) 100 MG capsule     blood glucose monitoring (ONE TOUCH ULTRA MINI) meter device kit     HYDROXYZINE HCL PO     insulin glargine (LANTUS SOLOSTAR) 100  UNIT/ML pen     Omega-3 Fatty Acids (OMEGA-3 FISH OIL PO)     OneTouch Delica Lancets 33G MISC     order for DME     OXYCODONE HCL PO     pantoprazole (PROTONIX) 40 MG EC tablet     QUEtiapine (SEROQUEL) 50 MG tablet     sertraline (ZOLOFT) 100 MG tablet     TRULICITY 0.75 MG/0.5ML pen     vitamin D2 (ERGOCALCIFEROL) 38591 units (1250 mcg) capsule     No current facility-administered medications for this visit.              Again, thank you for allowing me to participate in the care of your patient.      Sincerely,    Kayli Bergman MD

## 2019-12-10 NOTE — PROGRESS NOTES
HPI      ROS      Physical Exam    Assessment and Plan:  1. liver transplant evaluation - patient is a good candidate overall. Benefits and surgical risks of a liver transplantation were discussed.  2.  End stage liver disease due to  Hepatitis C    Surgical evaluation:  1. Portal Vein:Patent  2. Hepatic Artery: Open previous chemoembolization  3. TIPS: absent  4. Previous Abdominal Surgery: Yes ? Cholecystectomy  5. Hepatocellular Carcinoma: Yes- chemoembolized   6. Ascites: Present - minimal  7. Costal Angle: narrow  8. Portopulmonary Hypertension: absent  9. Hepatopulmonary Syndrome: absent  10. Cardiac Evaluation: needs stress echocardiogram  11. Nutritional Status: Good  12. Diabetes: yes, on insulin  13.Hypertension ok  14. Smoker:yes, needs to stop  115: Fraility index: no      Recommendations:   1. Complete evaluation and list with out delay  2.  Needs a full cardiac evaluation  3.  Needs pulmonary function tests in view of smoking history as well as previous pulmonary history. HAS impressive clubbing        Patients overall evaluation will be discussed at the Liver Transplant selection committee meeting with a final recommendation on the patients suitability for transplant to be made at that time.    Consult Full  Details:  Dania Albert was seen in consultation at the request of Dr. Yadira Graf  for evaluation as a potential liver transplant recipient.    Reason for Visit:  Dania Albert is a 54 year old year old female with cirrohsis of the liver due to hepatitis C, who presents for liver transplant evaluation.    HPI:  Presenting complaint: Liver cancer    Patient was diagnosed to have hepatitis C several years ago and received full treatment for that and has SVR.  Recently she was noted to have hepatocellular carcinoma.  She has received liver directed ablative treatment.  She has no variceal bleeding or ascites but does give history of encephalopathy  She is diabetic.  Does not give any history  of heart disease.  She does give history of pulmonary disease and will need further evaluation for that.        Past Medical History:   Diagnosis Date     Alcohol abuse      Chronic hepatitis C without hepatic coma (H) 10/23/2019    SVR     Cirrhosis of liver with ascites (H) 10/23/2019     COPD (chronic obstructive pulmonary disease) (H)      Depressive disorder      Diabetes (H)     Type 1 DM, Takes Insulin      Emphysema lung (H)      H/O esophageal varices      HCC (hepatocellular carcinoma) (H) 10/23/2019     History of blood transfusion     [facility] in 1983     ERICKA (obstructive sleep apnea)      Past Surgical History:   Procedure Laterality Date     ABDOMEN SURGERY      Gallbladder Removed      COLONOSCOPY      [facility] [place]     CYSTOSCOPY, INJECT BOTOX      detrusor injection     GI SURGERY      Upper Endoscopy at [facility]      HERNIA REPAIR      Patient doesnt remember if mesh was used. [facility]      OPEN REDUCTION INTERNAL FIXATION WRIST Bilateral      Past Surgical History:   Procedure Laterality Date     ABDOMEN SURGERY      Gallbladder Removed      COLONOSCOPY      [facility] [place]     CYSTOSCOPY, INJECT BOTOX      detrusor injection     GI SURGERY      Upper Endoscopy at [facility]      HERNIA REPAIR      Patient doesnt remember if mesh was used. [facility]      OPEN REDUCTION INTERNAL FIXATION WRIST Bilateral      No family history on file.  Allergies   Allergen Reactions     Bee Venom Other (See Comments) and Swelling     Hand swelled up badly.       Hydrocodone Other (See Comments)     nausea     Nickel Rash     Wool Fiber Hives and Rash     Prior to Admission medications    Medication Sig Start Date End Date Taking? Authorizing Provider   acetaminophen (TYLENOL) 500 MG tablet Take 1,000 mg by mouth 10/12/19  Yes Reported, Patient   albuterol (PROAIR HFA) 108 (90 Base) MCG/ACT inhaler INHALE 2 PUFFS 4 TIMES DAILY 1/29/18  Yes Reported, Patient   benzonatate (TESSALON) 100 MG capsule  TAKE 1 CAPSULE BY MOUTH 3 TIMES DAILY AS NEEDED FOR COUGH 10/6/19  Yes Reported, Patient   blood glucose monitoring (ONE TOUCH ULTRA MINI) meter device kit Use as directed to test glucose three times daily. Pharmacy dispense brand based on insurance.  Indications: Diabetes 8/26/19  Yes Reported, Patient   insulin glargine (LANTUS SOLOSTAR) 100 UNIT/ML pen Inject 38 Units Subcutaneous 1/29/18  Yes Reported, Patient   OneTouch Delica Lancets 33G MISC Change lancet one time daily as directed. 8/15/17  Yes Reported, Patient   pantoprazole (PROTONIX) 40 MG EC tablet TAKE ONE TABLET BY MOUTH EVERY DAY 1/29/18  Yes Reported, Patient   QUEtiapine (SEROQUEL) 50 MG tablet Take 50 mg by mouth At Bedtime 10/29/19  Yes Reported, Patient   sertraline (ZOLOFT) 100 MG tablet TAKE 2 TABLETS BY MOUTH EVERY DAY 1/29/18  Yes Reported, Patient   TRULICITY 0.75 MG/0.5ML pen INJECT 0.5 ml's SUBCUTANEOUSLY ONCE WEEKLY 10/30/19  Yes Reported, Patient   vitamin D2 (ERGOCALCIFEROL) 46153 units (1250 mcg) capsule TAKE ONE CAPSULE BY MOUTH ONCE every WEEK 11/11/19  Yes Reported, Patient   diphenoxylate-atropine (LOMOTIL) 2.5-0.025 MG tablet Take 1 tablet by mouth 4 times daily as needed for diarrhea 12/10/19   Kayli Bergman MD   HYDROXYZINE HCL PO Take 10 mg by mouth every 4 hours as needed for itching    Reported, Patient   Omega-3 Fatty Acids (OMEGA-3 FISH OIL PO)     Reported, Patient   order for DME Abdominal Binder - small  Patient not taking: Reported on 12/10/2019 11/11/15   Domenic Faye MD   OXYCODONE HCL PO Take 5 mg by mouth every 4 hours as needed    Reported, Patient       Previous Transplant Hx: No    Cardiovascular Hx:       h/o Cardiac Issues: No       Exercise Tolerance: shortness of breath with exertion.    Potential Donor(s): No    ROS:    REVIEW OF SYSTEMS (check box if normal)  [x]                GENERAL  [x]                  PULMONARY [x]                 GENITOURINARY  [x]                 CNS         "         [x]                  CARDIAC  [x]                  ENDOCRINE  [x]                 EARS,NOSE,THROAT [x]                  GASTROINTESTINAL [x]                  NEUROLOGIC    [x]                 MUSCLOSKELTAL  [x]                   HEMATOLOGY    Examination:     Vitals:  BP (!) 140/84   Pulse 82   Temp 97.6  F (36.4  C) (Oral)   Resp 16   Ht 1.557 m (5' 1.28\")   Wt 80.2 kg (176 lb 11.2 oz)   BMI 33.08 kg/m      GENERAL APPEARANCE: alert and no distress; clubbing present   EYES: PERRL  HENT: mouth without ulcers or lesions  NECK: supple, no adenopathy  RESP: lungs clear to auscultation - no rales, rhonchi or wheezes  CV: regular rhythm, normal rate, no rub   ABDOMEN:  soft, nontender, the costal angle is narrow there is a scar on the right subcostal area which is about 4 cm the patient thinks is from a previous cholecystectomy no HSM or masses and bowel sounds normal  MS: extremities normal- no gross deformities noted, no evidence of inflammation in joints, no muscle tenderness  SKIN: no rash  NEURO: Normal strength and tone, sensory exam grossly normal, mentation intact and speech normal  PSYCH: mentation appears normal. and affect normal/bright      Results:   Recent Results (from the past 168 hour(s))   UA reflex to Microscopic and Culture    Collection Time: 12/09/19  9:08 AM   Result Value Ref Range    Color Urine Yellow     Appearance Urine Clear     Glucose Urine Negative NEG^Negative mg/dL    Bilirubin Urine Negative NEG^Negative    Ketones Urine Negative NEG^Negative mg/dL    Specific Gravity Urine 1.015 1.003 - 1.035    Blood Urine Negative NEG^Negative    pH Urine 7.0 5.0 - 7.0 pH    Protein Albumin Urine Negative NEG^Negative mg/dL    Urobilinogen mg/dL 4.0 (H) 0.0 - 2.0 mg/dL    Nitrite Urine Negative NEG^Negative    Leukocyte Esterase Urine Negative NEG^Negative    Source Midstream Urine    Hemoglobin A1c    Collection Time: 12/09/19  9:12 AM   Result Value Ref Range    Hemoglobin A1C 7.5 " (H) 0 - 5.6 %   Treponema Abs w Reflex to RPR and Titer    Collection Time: 12/09/19  9:12 AM   Result Value Ref Range    Treponema Antibodies Nonreactive NR^Nonreactive   HIV Antigen Antibody Combo Pretransplant    Collection Time: 12/09/19  9:12 AM   Result Value Ref Range    HIV Antigen Antibody Combo Pretransplant Nonreactive NR^Nonreactive   Hepatitis B surface antigen    Collection Time: 12/09/19  9:12 AM   Result Value Ref Range    Hep B Surface Agn Nonreactive NR^Nonreactive   Hepatitis B core antibody    Collection Time: 12/09/19  9:12 AM   Result Value Ref Range    Hepatitis B Core Joaquina Nonreactive NR^Nonreactive   EBV Capsid Antibody IgG    Collection Time: 12/09/19  9:12 AM   Result Value Ref Range    EBV Capsid Antibody IgG 6.3 (H) 0.0 - 0.8 AI   CMV Antibody IgG    Collection Time: 12/09/19  9:12 AM   Result Value Ref Range    CMV Antibody IgG >8.0 (H) 0.0 - 0.8 AI   HCG qualitative (female only)    Collection Time: 12/09/19  9:12 AM   Result Value Ref Range    HCG Qualitative Serum Negative NEG^Negative   CBC with platelets    Collection Time: 12/09/19  9:12 AM   Result Value Ref Range    WBC 2.8 (L) 4.0 - 11.0 10e9/L    RBC Count 3.83 3.8 - 5.2 10e12/L    Hemoglobin 13.2 11.7 - 15.7 g/dL    Hematocrit 39.0 35.0 - 47.0 %     (H) 78 - 100 fl    MCH 34.5 (H) 26.5 - 33.0 pg    MCHC 33.8 31.5 - 36.5 g/dL    RDW 14.2 10.0 - 15.0 %    Platelet Count 49 (LL) 150 - 450 10e9/L   INR    Collection Time: 12/09/19  9:12 AM   Result Value Ref Range    INR 1.12 0.86 - 1.14   Vitamin D Deficiency    Collection Time: 12/09/19  9:12 AM   Result Value Ref Range    Vitamin D Deficiency screening 20 20 - 75 ug/L   TSH with free T4 reflex    Collection Time: 12/09/19  9:12 AM   Result Value Ref Range    TSH 3.41 0.40 - 4.00 mU/L   AFP tumor marker    Collection Time: 12/09/19  9:12 AM   Result Value Ref Range    Alpha Fetoprotein 4.0 0 - 8 ug/L   Hepatic panel    Collection Time: 12/09/19  9:12 AM   Result Value Ref  Range    Bilirubin Direct 0.2 0.0 - 0.2 mg/dL    Bilirubin Total 0.9 0.2 - 1.3 mg/dL    Albumin 3.5 3.4 - 5.0 g/dL    Protein Total 8.0 6.8 - 8.8 g/dL    Alkaline Phosphatase 172 (H) 40 - 150 U/L    ALT 20 0 - 50 U/L    AST 16 0 - 45 U/L   Basic metabolic panel    Collection Time: 12/09/19  9:12 AM   Result Value Ref Range    Sodium 137 133 - 144 mmol/L    Potassium 3.6 3.4 - 5.3 mmol/L    Chloride 104 94 - 109 mmol/L    Carbon Dioxide 28 20 - 32 mmol/L    Anion Gap 6 3 - 14 mmol/L    Glucose 149 (H) 70 - 99 mg/dL    Urea Nitrogen 6 (L) 7 - 30 mg/dL    Creatinine 0.62 0.52 - 1.04 mg/dL    GFR Estimate >90 >60 mL/min/[1.73_m2]    GFR Estimate If Black >90 >60 mL/min/[1.73_m2]    Calcium 9.0 8.5 - 10.1 mg/dL   ABO/Rh type and screen    Collection Time: 12/09/19  9:14 AM   Result Value Ref Range    ABO O     RH(D) Pos     Antibody Screen Neg     Test Valid Only At          [facility]    Specimen Expires 12/12/2019    ABO type [[id number]]    Collection Time: 12/09/19  9:17 AM   Result Value Ref Range    ABO O     RH(D) Pos     Specimen Expires 12/12/2019    General PFT Lab (Please always keep checked)    Collection Time: 12/09/19 11:28 AM   Result Value Ref Range    FVC-Pred 3.06 L    FVC-Pre 2.55 L    FVC-%Pred-Pre 83 %    FEV1-Pre 2.26 L    FEV1-%Pred-Pre 92 %    FEV1FVC-Pred 80 %    FEV1FVC-Pre 88 %    FEFMax-Pred 6.19 L/sec    FEFMax-Pre 6.16 L/sec    FEFMax-%Pred-Pre 99 %    FEF2575-Pred 2.35 L/sec    FEF2575-Pre 3.25 L/sec    ADJ8537-%Pred-Pre 138 %    ExpTime-Pre 5.83 sec    FIFMax-Pre 3.14 L/sec    VC-Pred 3.10 L    VC-Pre 2.55 L    VC-%Pred-Pre 82 %    IC-Pred 2.57 L    IC-Pre 2.00 L    IC-%Pred-Pre 77 %    ERV-Pred 0.53 L    ERV-Pre 0.55 L    ERV-%Pred-Pre 102 %    FEV1FEV6-Pred 81 %    FEV1FEV6-Pre 88 %    FRCPleth-Pred 2.58 L    FRCPleth-Pre 1.87 L    FRCPleth-%Pred-Pre 72 %    RVPleth-Pred 1.75 L    RVPleth-Pre 1.32 L    RVPleth-%Pred-Pre 75 %    TLCPleth-Pred  4.60 L    TLCPleth-Pre 3.87 L    TLCPleth-%Pred-Pre 84 %    DLCOunc-Pred 19.18 ml/min/mmHg    DLCOunc-Pre 12.87 ml/min/mmHg    DLCOunc-%Pred-Pre 67 %    DLCOcor-Pre 12.96 ml/min/mmHg    DLCOcor-%Pred-Pre 67 %    VA-Pre 3.65 L    VA-%Pred-Pre 82 %    FEV1SVC-Pred 79 %    FEV1SVC-Pre 88 %   EKG 12-lead, tracing only [EKG1]    Collection Time: 12/10/19  9:20 AM   Result Value Ref Range    Interpretation ECG Click View Image link to view waveform and result      I had a long discussion with the patient regarding liver transplantation which included but was not limited to  the following points:    1. Liver transplant selection committee process.  2. The federal rules for cadaveric waiting list, the size and blood type matching of the organ. The availability of living-related donor transplantation.  3. The types of donors: brain death donors, non-heart beating donors, partial liver grafts: splits and living donor grafts  4. Extended criteria  Donors (older age, steasosis) and the increased  risk of primary non-function using the extended criteria donors  5. The CDC high risk donors,  Risk of donor transmitted infections and donor transmitted malignancy  6. The liver transplant operation and the associated risks and technical complications which can include intraoperative death, post operative death,  Primary non-function, bleeding requiring re-operations, arterial and biliary complications, bowel perforations, and intra abdominal abscess. Some of these complicaitons may require a second operation.  7. The postoperative course, the ICU stay and risk of postoperative complications which can include sepsis, MI, stroke, brain injury, pneumonia, pleural effusions, and renal dysfunction.  8. The current 1 year and 5 year graft and patient survivals.  9. The need for life long immunosuppressive therapy and the side effects of these medications, including the possibility of toxicity, opportunistic infections, risk of cancer  including lymphoma, and the possibility of rejection even if the patient is taking the medication exactly as prescribed.  10. The need for compliance with medications and follow-up visits in the clinic and thereafter.  11. The patient and family understand these risks and wish to proceed to transplantation       I spent ..60.....minutes with the patient and more than 50% of the time was spend in direct face to face counseling.  \CC  ARMANDO ARANGO A    Copy to patient     [id number] [facility] [id number]

## 2019-12-10 NOTE — COMMITTEE REVIEW
Abdominal Committee Review Note     Evaluation Date: 12/9/2019  Committee Review Date: 12/10/2019    Organ being evaluated for: Liver    Transplant Phase: Evaluation  Transplant Status: Active    Transplant Coordinator: Huy Anders Jr.  Transplant Surgeon:   Fly Mejía    Referring Physician: Robles Metz    Primary Diagnosis: Primary Liver Malignancy: Hepatoma (HCC) and Cirrhosis  Secondary Diagnosis: Cirrhosis: Type C    Committee Review Members:  Hepatology Kash Ramírez MD   Nutrition Jaimee Murrell, RD   Pharmacy Mike Gallegos, [facility]    - Clinical Trish Knight, [facility]   Transplant Samina Wells, ANJANA, Ernst Tavares MD, Jr Huy Anders, ANJANA, Kayli Bergman MD, Erlinda Issa, APRN CNP, Eduarda Owens, ANJANA, Danyelle Guerrero MD, Yancy Melton, ANJANA, Warren Jamison MD, Conrad Hartmann MD   Transplant Hepatology  Ernst Tavares MD       Transplant Eligibility: Cirrhosis with MELD, ETOH, HCV, HCC    Committee Review Decision: Needs Re-presentation    Relative Contraindications: Other, re-discuss pending evaluation completion    Absolute Contraindications: None    Committee Chair aKyli Bergman MD verbally attested to the committee's decision.    Committee Discussion Details:     - neuropsych testing  - oncology  - re-discuss pending evaluation

## 2019-12-10 NOTE — LETTER
12/10/2019      RE: Dania Albert  [address]  [facility] [address]       HPI      ROS      Physical Exam    Assessment and Plan:  1. liver transplant evaluation - patient is a good candidate overall. Benefits and surgical risks of a liver transplantation were discussed.  2.  End stage liver disease due to  Hepatitis C    Surgical evaluation:  1. Portal Vein:Patent  2. Hepatic Artery: Open previous chemoembolization  3. TIPS: absent  4. Previous Abdominal Surgery: Yes ? Cholecystectomy  5. Hepatocellular Carcinoma: Yes- chemoembolized   6. Ascites: Present - minimal  7. Costal Angle: narrow  8. Portopulmonary Hypertension: absent  9. Hepatopulmonary Syndrome: absent  10. Cardiac Evaluation: needs stress echocardiogram  11. Nutritional Status: Good  12. Diabetes: yes, on insulin  13.Hypertension ok  14. Smoker:yes, needs to stop  115: Fraility index: no      Recommendations:   1. Complete evaluation and list with out delay  2.  Needs a full cardiac evaluation  3.  Needs pulmonary function tests in view of smoking history as well as previous pulmonary history. HAS impressive clubbing        Patients overall evaluation will be discussed at the Liver Transplant selection committee meeting with a final recommendation on the patients suitability for transplant to be made at that time.    Consult Full  Details:  Dania Albert was seen in consultation at the request of Dr. Yadira Graf  for evaluation as a potential liver transplant recipient.    Reason for Visit:  Dania Albert is a 54 year old year old female with cirrohsis of the liver due to hepatitis C, who presents for liver transplant evaluation.    HPI:  Presenting complaint: Liver cancer    Patient was diagnosed to have hepatitis C several years ago and received full treatment for that and has SVR.  Recently she was noted to have hepatocellular carcinoma.  She has received liver directed ablative treatment.  She has no variceal bleeding or ascites but does  give history of encephalopathy  She is diabetic.  Does not give any history of heart disease.  She does give history of pulmonary disease and will need further evaluation for that.        Past Medical History:   Diagnosis Date     Alcohol abuse      Chronic hepatitis C without hepatic coma (H) 10/23/2019    SVR     Cirrhosis of liver with ascites (H) 10/23/2019     COPD (chronic obstructive pulmonary disease) (H)      Depressive disorder      Diabetes (H)     Type 1 DM, Takes Insulin      Emphysema lung (H)      H/O esophageal varices      HCC (hepatocellular carcinoma) (H) 10/23/2019     History of blood transfusion     [facility]     ERICKA (obstructive sleep apnea)      Past Surgical History:   Procedure Laterality Date     ABDOMEN SURGERY      Gallbladder Removed      COLONOSCOPY      [place]     CYSTOSCOPY, INJECT BOTOX      detrusor injection     GI SURGERY      Upper Endoscopy at [facility]      HERNIA REPAIR      Patient doesnt remember if mesh was used. [facility]      OPEN REDUCTION INTERNAL FIXATION WRIST Bilateral      Past Surgical History:   Procedure Laterality Date     ABDOMEN SURGERY      Gallbladder Removed      COLONOSCOPY      [place]     CYSTOSCOPY, INJECT BOTOX      detrusor injection     GI SURGERY      Upper Endoscopy at [facility]      HERNIA REPAIR      Patient doesnt remember if mesh was used. [facility]      OPEN REDUCTION INTERNAL FIXATION WRIST Bilateral      No family history on file.  Allergies   Allergen Reactions     Bee Venom Other (See Comments) and Swelling     Hand swelled up badly.       Hydrocodone Other (See Comments)     nausea     Nickel Rash     Wool Fiber Hives and Rash     Prior to Admission medications    Medication Sig Start Date End Date Taking? Authorizing Provider   acetaminophen (TYLENOL) 500 MG tablet Take 1,000 mg by mouth 10/12/19  Yes Reported, Patient   albuterol (PROAIR HFA) 108 (90 Base) MCG/ACT inhaler INHALE 2 PUFFS 4 TIMES  DAILY 1/29/18  Yes Reported, Patient   benzonatate (TESSALON) 100 MG capsule TAKE 1 CAPSULE BY MOUTH 3 TIMES DAILY AS NEEDED FOR COUGH 10/6/19  Yes Reported, Patient   blood glucose monitoring (ONE TOUCH ULTRA MINI) meter device kit Use as directed to test glucose three times daily. Pharmacy dispense brand based on insurance.  Indications: Diabetes 8/26/19  Yes Reported, Patient   insulin glargine (LANTUS SOLOSTAR) 100 UNIT/ML pen Inject 38 Units Subcutaneous 1/29/18  Yes Reported, Patient   OneTouch Delica Lancets 33G MISC Change lancet one time daily as directed. 8/15/17  Yes Reported, Patient   pantoprazole (PROTONIX) 40 MG EC tablet TAKE ONE TABLET BY MOUTH EVERY DAY 1/29/18  Yes Reported, Patient   QUEtiapine (SEROQUEL) 50 MG tablet Take 50 mg by mouth At Bedtime 10/29/19  Yes Reported, Patient   sertraline (ZOLOFT) 100 MG tablet TAKE 2 TABLETS BY MOUTH EVERY DAY 1/29/18  Yes Reported, Patient   TRULICITY 0.75 MG/0.5ML pen INJECT 0.5 ml's SUBCUTANEOUSLY ONCE WEEKLY 10/30/19  Yes Reported, Patient   vitamin D2 (ERGOCALCIFEROL) 14741 units (1250 mcg) capsule TAKE ONE CAPSULE BY MOUTH ONCE every WEEK 11/11/19  Yes Reported, Patient   diphenoxylate-atropine (LOMOTIL) 2.5-0.025 MG tablet Take 1 tablet by mouth 4 times daily as needed for diarrhea 12/10/19   Kayli Bergman MD   HYDROXYZINE HCL PO Take 10 mg by mouth every 4 hours as needed for itching    Reported, Patient   Omega-3 Fatty Acids (OMEGA-3 FISH OIL PO)     Reported, Patient   order for DME Abdominal Binder - small  Patient not taking: Reported on 12/10/2019 11/11/15   Domenic Fyae MD   OXYCODONE HCL PO Take 5 mg by mouth every 4 hours as needed    Reported, Patient       Previous Transplant Hx: No    Cardiovascular Hx:       h/o Cardiac Issues: No       Exercise Tolerance: shortness of breath with exertion.    Potential Donor(s): No    ROS:    REVIEW OF SYSTEMS (check box if normal)  [x]                GENERAL  [x]                  " PULMONARY [x]                 GENITOURINARY  [x]                 CNS                 [x]                  CARDIAC  [x]                  ENDOCRINE  [x]                 EARS,NOSE,THROAT [x]                  GASTROINTESTINAL [x]                  NEUROLOGIC    [x]                 MUSCLOSKELTAL  [x]                   HEMATOLOGY    Examination:     Vitals:  BP (!) 140/84   Pulse 82   Temp 97.6  F (36.4  C) (Oral)   Resp 16   Ht 1.557 m (5' 1.28\")   Wt 80.2 kg (176 lb 11.2 oz)   BMI 33.08 kg/m       GENERAL APPEARANCE: alert and no distress; clubbing present   EYES: PERRL  HENT: mouth without ulcers or lesions  NECK: supple, no adenopathy  RESP: lungs clear to auscultation - no rales, rhonchi or wheezes  CV: regular rhythm, normal rate, no rub   ABDOMEN:  soft, nontender, the costal angle is narrow there is a scar on the right subcostal area which is about 4 cm the patient thinks is from a previous cholecystectomy no HSM or masses and bowel sounds normal  MS: extremities normal- no gross deformities noted, no evidence of inflammation in joints, no muscle tenderness  SKIN: no rash  NEURO: Normal strength and tone, sensory exam grossly normal, mentation intact and speech normal  PSYCH: mentation appears normal. and affect normal/bright      Results:   Recent Results (from the past 168 hour(s))   UA reflex to Microscopic and Culture    Collection Time: 12/09/19  9:08 AM   Result Value Ref Range    Color Urine Yellow     Appearance Urine Clear     Glucose Urine Negative NEG^Negative mg/dL    Bilirubin Urine Negative NEG^Negative    Ketones Urine Negative NEG^Negative mg/dL    Specific Gravity Urine 1.015 1.003 - 1.035    Blood Urine Negative NEG^Negative    pH Urine 7.0 5.0 - 7.0 pH    Protein Albumin Urine Negative NEG^Negative mg/dL    Urobilinogen mg/dL 4.0 (H) 0.0 - 2.0 mg/dL    Nitrite Urine Negative NEG^Negative    Leukocyte Esterase Urine Negative NEG^Negative    Source Midstream Urine    Hemoglobin A1c    " Collection Time: 12/09/19  9:12 AM   Result Value Ref Range    Hemoglobin A1C 7.5 (H) 0 - 5.6 %   Treponema Abs w Reflex to RPR and Titer    Collection Time: 12/09/19  9:12 AM   Result Value Ref Range    Treponema Antibodies Nonreactive NR^Nonreactive   HIV Antigen Antibody Combo Pretransplant    Collection Time: 12/09/19  9:12 AM   Result Value Ref Range    HIV Antigen Antibody Combo Pretransplant Nonreactive NR^Nonreactive   Hepatitis B surface antigen    Collection Time: 12/09/19  9:12 AM   Result Value Ref Range    Hep B Surface Agn Nonreactive NR^Nonreactive   Hepatitis B core antibody    Collection Time: 12/09/19  9:12 AM   Result Value Ref Range    Hepatitis B Core Joaquina Nonreactive NR^Nonreactive   EBV Capsid Antibody IgG    Collection Time: 12/09/19  9:12 AM   Result Value Ref Range    EBV Capsid Antibody IgG 6.3 (H) 0.0 - 0.8 AI   CMV Antibody IgG    Collection Time: 12/09/19  9:12 AM   Result Value Ref Range    CMV Antibody IgG >8.0 (H) 0.0 - 0.8 AI   HCG qualitative (female only)    Collection Time: 12/09/19  9:12 AM   Result Value Ref Range    HCG Qualitative Serum Negative NEG^Negative   CBC with platelets    Collection Time: 12/09/19  9:12 AM   Result Value Ref Range    WBC 2.8 (L) 4.0 - 11.0 10e9/L    RBC Count 3.83 3.8 - 5.2 10e12/L    Hemoglobin 13.2 11.7 - 15.7 g/dL    Hematocrit 39.0 35.0 - 47.0 %     (H) 78 - 100 fl    MCH 34.5 (H) 26.5 - 33.0 pg    MCHC 33.8 31.5 - 36.5 g/dL    RDW 14.2 10.0 - 15.0 %    Platelet Count 49 (LL) 150 - 450 10e9/L   INR    Collection Time: 12/09/19  9:12 AM   Result Value Ref Range    INR 1.12 0.86 - 1.14   Vitamin D Deficiency    Collection Time: 12/09/19  9:12 AM   Result Value Ref Range    Vitamin D Deficiency screening 20 20 - 75 ug/L   TSH with free T4 reflex    Collection Time: 12/09/19  9:12 AM   Result Value Ref Range    TSH 3.41 0.40 - 4.00 mU/L   AFP tumor marker    Collection Time: 12/09/19  9:12 AM   Result Value Ref Range    Alpha Fetoprotein 4.0 0  - 8 ug/L   Hepatic panel    Collection Time: 12/09/19  9:12 AM   Result Value Ref Range    Bilirubin Direct 0.2 0.0 - 0.2 mg/dL    Bilirubin Total 0.9 0.2 - 1.3 mg/dL    Albumin 3.5 3.4 - 5.0 g/dL    Protein Total 8.0 6.8 - 8.8 g/dL    Alkaline Phosphatase 172 (H) 40 - 150 U/L    ALT 20 0 - 50 U/L    AST 16 0 - 45 U/L   Basic metabolic panel    Collection Time: 12/09/19  9:12 AM   Result Value Ref Range    Sodium 137 133 - 144 mmol/L    Potassium 3.6 3.4 - 5.3 mmol/L    Chloride 104 94 - 109 mmol/L    Carbon Dioxide 28 20 - 32 mmol/L    Anion Gap 6 3 - 14 mmol/L    Glucose 149 (H) 70 - 99 mg/dL    Urea Nitrogen 6 (L) 7 - 30 mg/dL    Creatinine 0.62 0.52 - 1.04 mg/dL    GFR Estimate >90 >60 mL/min/[1.73_m2]    GFR Estimate If Black >90 >60 mL/min/[1.73_m2]    Calcium 9.0 8.5 - 10.1 mg/dL   ABO/Rh type and screen    Collection Time: 12/09/19  9:14 AM   Result Value Ref Range    ABO O     RH(D) Pos     Antibody Screen Neg     Test Valid Only At          [facility]    Specimen Expires 12/12/2019    ABO type [SIL2618]    Collection Time: 12/09/19  9:17 AM   Result Value Ref Range    ABO O     RH(D) Pos     Specimen Expires 12/12/2019    General PFT Lab (Please always keep checked)    Collection Time: 12/09/19 11:28 AM   Result Value Ref Range    FVC-Pred 3.06 L    FVC-Pre 2.55 L    FVC-%Pred-Pre 83 %    FEV1-Pre 2.26 L    FEV1-%Pred-Pre 92 %    FEV1FVC-Pred 80 %    FEV1FVC-Pre 88 %    FEFMax-Pred 6.19 L/sec    FEFMax-Pre 6.16 L/sec    FEFMax-%Pred-Pre 99 %    FEF2575-Pred 2.35 L/sec    FEF2575-Pre 3.25 L/sec    AEH8176-%Pred-Pre 138 %    ExpTime-Pre 5.83 sec    FIFMax-Pre 3.14 L/sec    VC-Pred 3.10 L    VC-Pre 2.55 L    VC-%Pred-Pre 82 %    IC-Pred 2.57 L    IC-Pre 2.00 L    IC-%Pred-Pre 77 %    ERV-Pred 0.53 L    ERV-Pre 0.55 L    ERV-%Pred-Pre 102 %    FEV1FEV6-Pred 81 %    FEV1FEV6-Pre 88 %    FRCPleth-Pred 2.58 L    FRCPleth-Pre 1.87 L    FRCPleth-%Pred-Pre 72 %     RVPleth-Pred 1.75 L    RVPleth-Pre 1.32 L    RVPleth-%Pred-Pre 75 %    TLCPleth-Pred 4.60 L    TLCPleth-Pre 3.87 L    TLCPleth-%Pred-Pre 84 %    DLCOunc-Pred 19.18 ml/min/mmHg    DLCOunc-Pre 12.87 ml/min/mmHg    DLCOunc-%Pred-Pre 67 %    DLCOcor-Pre 12.96 ml/min/mmHg    DLCOcor-%Pred-Pre 67 %    VA-Pre 3.65 L    VA-%Pred-Pre 82 %    FEV1SVC-Pred 79 %    FEV1SVC-Pre 88 %   EKG 12-lead, tracing only [EKG1]    Collection Time: 12/10/19  9:20 AM   Result Value Ref Range    Interpretation ECG Click View Image link to view waveform and result      I had a long discussion with the patient regarding liver transplantation which included but was not limited to  the following points:    1. Liver transplant selection committee process.  2. The federal rules for cadaveric waiting list, the size and blood type matching of the organ. The availability of living-related donor transplantation.  3. The types of donors: brain death donors, non-heart beating donors, partial liver grafts: splits and living donor grafts  4. Extended criteria  Donors (older age, steasosis) and the increased  risk of primary non-function using the extended criteria donors  5. The CDC high risk donors,  Risk of donor transmitted infections and donor transmitted malignancy  6. The liver transplant operation and the associated risks and technical complications which can include intraoperative death, post operative death,  Primary non-function, bleeding requiring re-operations, arterial and biliary complications, bowel perforations, and intra abdominal abscess. Some of these complicaitons may require a second operation.  7. The postoperative course, the ICU stay and risk of postoperative complications which can include sepsis, MI, stroke, brain injury, pneumonia, pleural effusions, and renal dysfunction.  8. The current 1 year and 5 year graft and patient survivals.  9. The need for life long immunosuppressive therapy and the side effects of these medications,  including the possibility of toxicity, opportunistic infections, risk of cancer including lymphoma, and the possibility of rejection even if the patient is taking the medication exactly as prescribed.  10. The need for compliance with medications and follow-up visits in the clinic and thereafter.  11. The patient and family understand these risks and wish to proceed to transplantation       I spent ..60.....minutes with the patient and more than 50% of the time was spend in direct face to face counseling.    Fly Mejía MD        ARMANDO ARANGO A    Copy to patient     [address] [facility] [address]

## 2019-12-10 NOTE — NURSING NOTE
Chief Complaint   Patient presents with     Transplant Evaluation     Liver eval     Blood pressure (!) 140/84, pulse 82.    Cuco Damon CMA on 12/10/2019 at 9:46 AM

## 2019-12-10 NOTE — LETTER
12/10/2019       RE: Dania Albert  [address] [facility] [address]     Dear Colleague,    Thank you for referring your patient, Dania Albert, to the [facility] SOLID ORGAN TRANSPLANT at [facility]. Please see a copy of my visit note below.    HPI      ROS      Physical Exam    Assessment and Plan:  1. liver transplant evaluation - patient is a good candidate overall. Benefits and surgical risks of a liver transplantation were discussed.  2.  End stage liver disease due to  Hepatitis C    Surgical evaluation:  1. Portal Vein:Patent  2. Hepatic Artery: Open previous chemoembolization  3. TIPS: absent  4. Previous Abdominal Surgery: Yes ? Cholecystectomy  5. Hepatocellular Carcinoma: Yes- chemoembolized   6. Ascites: Present - minimal  7. Costal Angle: narrow  8. Portopulmonary Hypertension: absent  9. Hepatopulmonary Syndrome: absent  10. Cardiac Evaluation: needs stress echocardiogram  11. Nutritional Status: Good  12. Diabetes: yes, on insulin  13.Hypertension ok  14. Smoker:yes, needs to stop  115: Fraility index: no      Recommendations:   1. Complete evaluation and list with out delay  2.  Needs a full cardiac evaluation  3.  Needs pulmonary function tests in view of smoking history as well as previous pulmonary history. HAS impressive clubbing        Patients overall evaluation will be discussed at the Liver Transplant selection committee meeting with a final recommendation on the patients suitability for transplant to be made at that time.    Consult Full  Details:  Dania Albert was seen in consultation at the request of Dr. Yadira Graf  for evaluation as a potential liver transplant recipient.    Reason for Visit:  Dania Albert is a 54 year old year old female with cirrohsis of the liver due to hepatitis C, who presents for liver transplant evaluation.    HPI:  Presenting complaint: Liver cancer    Patient was diagnosed to have hepatitis C several years ago and  received full treatment for that and has SVR.  Recently she was noted to have hepatocellular carcinoma.  She has received liver directed ablative treatment.  She has no variceal bleeding or ascites but does give history of encephalopathy  She is diabetic.  Does not give any history of heart disease.  She does give history of pulmonary disease and will need further evaluation for that.        Past Medical History:   Diagnosis Date     Alcohol abuse      Chronic hepatitis C without hepatic coma (H) 10/23/2019    SVR     Cirrhosis of liver with ascites (H) 10/23/2019     COPD (chronic obstructive pulmonary disease) (H)      Depressive disorder      Diabetes (H)     Type 1 DM, Takes Insulin      Emphysema lung (H)      H/O esophageal varices      HCC (hepatocellular carcinoma) (H) 10/23/2019     History of blood transfusion     [facility] in 1983     ERICKA (obstructive sleep apnea)      Past Surgical History:   Procedure Laterality Date     ABDOMEN SURGERY      Gallbladder Removed      COLONOSCOPY      [facility] [place]     CYSTOSCOPY, INJECT BOTOX      detrusor injection     GI SURGERY      Upper Endoscopy at [facility]      HERNIA REPAIR      Patient doesnt remember if mesh was used. [facility]      OPEN REDUCTION INTERNAL FIXATION WRIST Bilateral      Past Surgical History:   Procedure Laterality Date     ABDOMEN SURGERY      Gallbladder Removed      COLONOSCOPY      [facility] [place]     CYSTOSCOPY, INJECT BOTOX      detrusor injection     GI SURGERY      Upper Endoscopy at [facility]      HERNIA REPAIR      Patient doesnt remember if mesh was used. [facility]      OPEN REDUCTION INTERNAL FIXATION WRIST Bilateral      No family history on file.  Allergies   Allergen Reactions     Bee Venom Other (See Comments) and Swelling     Hand swelled up badly.       Hydrocodone Other (See Comments)     nausea     Nickel Rash     Wool Fiber Hives and Rash     Prior to Admission medications    Medication Sig Start Date End  Date Taking? Authorizing Provider   acetaminophen (TYLENOL) 500 MG tablet Take 1,000 mg by mouth 10/12/19  Yes Reported, Patient   albuterol (PROAIR HFA) 108 (90 Base) MCG/ACT inhaler INHALE 2 PUFFS 4 TIMES DAILY 1/29/18  Yes Reported, Patient   benzonatate (TESSALON) 100 MG capsule TAKE 1 CAPSULE BY MOUTH 3 TIMES DAILY AS NEEDED FOR COUGH 10/6/19  Yes Reported, Patient   blood glucose monitoring (ONE TOUCH ULTRA MINI) meter device kit Use as directed to test glucose three times daily. Pharmacy dispense brand based on insurance.  Indications: Diabetes 8/26/19  Yes Reported, Patient   insulin glargine (LANTUS SOLOSTAR) 100 UNIT/ML pen Inject 38 Units Subcutaneous 1/29/18  Yes Reported, Patient   OneTouch Delica Lancets 33G MISC Change lancet one time daily as directed. 8/15/17  Yes Reported, Patient   pantoprazole (PROTONIX) 40 MG EC tablet TAKE ONE TABLET BY MOUTH EVERY DAY 1/29/18  Yes Reported, Patient   QUEtiapine (SEROQUEL) 50 MG tablet Take 50 mg by mouth At Bedtime 10/29/19  Yes Reported, Patient   sertraline (ZOLOFT) 100 MG tablet TAKE 2 TABLETS BY MOUTH EVERY DAY 1/29/18  Yes Reported, Patient   TRULICITY 0.75 MG/0.5ML pen INJECT 0.5 ml's SUBCUTANEOUSLY ONCE WEEKLY 10/30/19  Yes Reported, Patient   vitamin D2 (ERGOCALCIFEROL) 41414 units (1250 mcg) capsule TAKE ONE CAPSULE BY MOUTH ONCE every WEEK 11/11/19  Yes Reported, Patient   diphenoxylate-atropine (LOMOTIL) 2.5-0.025 MG tablet Take 1 tablet by mouth 4 times daily as needed for diarrhea 12/10/19   Kayli Bergman MD   HYDROXYZINE HCL PO Take 10 mg by mouth every 4 hours as needed for itching    Reported, Patient   Omega-3 Fatty Acids (OMEGA-3 FISH OIL PO)     Reported, Patient   order for DME Abdominal Binder - small  Patient not taking: Reported on 12/10/2019 11/11/15   Domenic Faye MD   OXYCODONE HCL PO Take 5 mg by mouth every 4 hours as needed    Reported, Patient       Previous Transplant Hx: No    Cardiovascular Hx:        "h/o Cardiac Issues: No       Exercise Tolerance: shortness of breath with exertion.    Potential Donor(s): No    ROS:    REVIEW OF SYSTEMS (check box if normal)  [x]                GENERAL  [x]                  PULMONARY [x]                 GENITOURINARY  [x]                 CNS                 [x]                  CARDIAC  [x]                  ENDOCRINE  [x]                 EARS,NOSE,THROAT [x]                  GASTROINTESTINAL [x]                  NEUROLOGIC    [x]                 MUSCLOSKELTAL  [x]                   HEMATOLOGY    Examination:     Vitals:  BP (!) 140/84   Pulse 82   Temp 97.6  F (36.4  C) (Oral)   Resp 16   Ht 1.557 m (5' 1.28\")   Wt 80.2 kg (176 lb 11.2 oz)   BMI 33.08 kg/m       GENERAL APPEARANCE: alert and no distress; clubbing present   EYES: PERRL  HENT: mouth without ulcers or lesions  NECK: supple, no adenopathy  RESP: lungs clear to auscultation - no rales, rhonchi or wheezes  CV: regular rhythm, normal rate, no rub   ABDOMEN:  soft, nontender, the costal angle is narrow there is a scar on the right subcostal area which is about 4 cm the patient thinks is from a previous cholecystectomy no HSM or masses and bowel sounds normal  MS: extremities normal- no gross deformities noted, no evidence of inflammation in joints, no muscle tenderness  SKIN: no rash  NEURO: Normal strength and tone, sensory exam grossly normal, mentation intact and speech normal  PSYCH: mentation appears normal. and affect normal/bright      Results:   Recent Results (from the past 168 hour(s))   UA reflex to Microscopic and Culture    Collection Time: 12/09/19  9:08 AM   Result Value Ref Range    Color Urine Yellow     Appearance Urine Clear     Glucose Urine Negative NEG^Negative mg/dL    Bilirubin Urine Negative NEG^Negative    Ketones Urine Negative NEG^Negative mg/dL    Specific Gravity Urine 1.015 1.003 - 1.035    Blood Urine Negative NEG^Negative    pH Urine 7.0 5.0 - 7.0 pH    Protein Albumin Urine " Negative NEG^Negative mg/dL    Urobilinogen mg/dL 4.0 (H) 0.0 - 2.0 mg/dL    Nitrite Urine Negative NEG^Negative    Leukocyte Esterase Urine Negative NEG^Negative    Source Midstream Urine    Hemoglobin A1c    Collection Time: 12/09/19  9:12 AM   Result Value Ref Range    Hemoglobin A1C 7.5 (H) 0 - 5.6 %   Treponema Abs w Reflex to RPR and Titer    Collection Time: 12/09/19  9:12 AM   Result Value Ref Range    Treponema Antibodies Nonreactive NR^Nonreactive   HIV Antigen Antibody Combo Pretransplant    Collection Time: 12/09/19  9:12 AM   Result Value Ref Range    HIV Antigen Antibody Combo Pretransplant Nonreactive NR^Nonreactive   Hepatitis B surface antigen    Collection Time: 12/09/19  9:12 AM   Result Value Ref Range    Hep B Surface Agn Nonreactive NR^Nonreactive   Hepatitis B core antibody    Collection Time: 12/09/19  9:12 AM   Result Value Ref Range    Hepatitis B Core Joaquina Nonreactive NR^Nonreactive   EBV Capsid Antibody IgG    Collection Time: 12/09/19  9:12 AM   Result Value Ref Range    EBV Capsid Antibody IgG 6.3 (H) 0.0 - 0.8 AI   CMV Antibody IgG    Collection Time: 12/09/19  9:12 AM   Result Value Ref Range    CMV Antibody IgG >8.0 (H) 0.0 - 0.8 AI   HCG qualitative (female only)    Collection Time: 12/09/19  9:12 AM   Result Value Ref Range    HCG Qualitative Serum Negative NEG^Negative   CBC with platelets    Collection Time: 12/09/19  9:12 AM   Result Value Ref Range    WBC 2.8 (L) 4.0 - 11.0 10e9/L    RBC Count 3.83 3.8 - 5.2 10e12/L    Hemoglobin 13.2 11.7 - 15.7 g/dL    Hematocrit 39.0 35.0 - 47.0 %     (H) 78 - 100 fl    MCH 34.5 (H) 26.5 - 33.0 pg    MCHC 33.8 31.5 - 36.5 g/dL    RDW 14.2 10.0 - 15.0 %    Platelet Count 49 (LL) 150 - 450 10e9/L   INR    Collection Time: 12/09/19  9:12 AM   Result Value Ref Range    INR 1.12 0.86 - 1.14   Vitamin D Deficiency    Collection Time: 12/09/19  9:12 AM   Result Value Ref Range    Vitamin D Deficiency screening 20 20 - 75 ug/L   TSH with free  T4 reflex    Collection Time: 12/09/19  9:12 AM   Result Value Ref Range    TSH 3.41 0.40 - 4.00 mU/L   AFP tumor marker    Collection Time: 12/09/19  9:12 AM   Result Value Ref Range    Alpha Fetoprotein 4.0 0 - 8 ug/L   Hepatic panel    Collection Time: 12/09/19  9:12 AM   Result Value Ref Range    Bilirubin Direct 0.2 0.0 - 0.2 mg/dL    Bilirubin Total 0.9 0.2 - 1.3 mg/dL    Albumin 3.5 3.4 - 5.0 g/dL    Protein Total 8.0 6.8 - 8.8 g/dL    Alkaline Phosphatase 172 (H) 40 - 150 U/L    ALT 20 0 - 50 U/L    AST 16 0 - 45 U/L   Basic metabolic panel    Collection Time: 12/09/19  9:12 AM   Result Value Ref Range    Sodium 137 133 - 144 mmol/L    Potassium 3.6 3.4 - 5.3 mmol/L    Chloride 104 94 - 109 mmol/L    Carbon Dioxide 28 20 - 32 mmol/L    Anion Gap 6 3 - 14 mmol/L    Glucose 149 (H) 70 - 99 mg/dL    Urea Nitrogen 6 (L) 7 - 30 mg/dL    Creatinine 0.62 0.52 - 1.04 mg/dL    GFR Estimate >90 >60 mL/min/[1.73_m2]    GFR Estimate If Black >90 >60 mL/min/[1.73_m2]    Calcium 9.0 8.5 - 10.1 mg/dL   ABO/Rh type and screen    Collection Time: 12/09/19  9:14 AM   Result Value Ref Range    ABO O     RH(D) Pos     Antibody Screen Neg     Test Valid Only At          [facility]    Specimen Expires 12/12/2019    ABO type [CPV5334]    Collection Time: 12/09/19  9:17 AM   Result Value Ref Range    ABO O     RH(D) Pos     Specimen Expires 12/12/2019    General PFT Lab (Please always keep checked)    Collection Time: 12/09/19 11:28 AM   Result Value Ref Range    FVC-Pred 3.06 L    FVC-Pre 2.55 L    FVC-%Pred-Pre 83 %    FEV1-Pre 2.26 L    FEV1-%Pred-Pre 92 %    FEV1FVC-Pred 80 %    FEV1FVC-Pre 88 %    FEFMax-Pred 6.19 L/sec    FEFMax-Pre 6.16 L/sec    FEFMax-%Pred-Pre 99 %    FEF2575-Pred 2.35 L/sec    FEF2575-Pre 3.25 L/sec    ENJ9638-%Pred-Pre 138 %    ExpTime-Pre 5.83 sec    FIFMax-Pre 3.14 L/sec    VC-Pred 3.10 L    VC-Pre 2.55 L    VC-%Pred-Pre 82 %    IC-Pred 2.57 L    IC-Pre  2.00 L    IC-%Pred-Pre 77 %    ERV-Pred 0.53 L    ERV-Pre 0.55 L    ERV-%Pred-Pre 102 %    FEV1FEV6-Pred 81 %    FEV1FEV6-Pre 88 %    FRCPleth-Pred 2.58 L    FRCPleth-Pre 1.87 L    FRCPleth-%Pred-Pre 72 %    RVPleth-Pred 1.75 L    RVPleth-Pre 1.32 L    RVPleth-%Pred-Pre 75 %    TLCPleth-Pred 4.60 L    TLCPleth-Pre 3.87 L    TLCPleth-%Pred-Pre 84 %    DLCOunc-Pred 19.18 ml/min/mmHg    DLCOunc-Pre 12.87 ml/min/mmHg    DLCOunc-%Pred-Pre 67 %    DLCOcor-Pre 12.96 ml/min/mmHg    DLCOcor-%Pred-Pre 67 %    VA-Pre 3.65 L    VA-%Pred-Pre 82 %    FEV1SVC-Pred 79 %    FEV1SVC-Pre 88 %   EKG 12-lead, tracing only [EKG1]    Collection Time: 12/10/19  9:20 AM   Result Value Ref Range    Interpretation ECG Click View Image link to view waveform and result      I had a long discussion with the patient regarding liver transplantation which included but was not limited to  the following points:    1. Liver transplant selection committee process.  2. The federal rules for cadaveric waiting list, the size and blood type matching of the organ. The availability of living-related donor transplantation.  3. The types of donors: brain death donors, non-heart beating donors, partial liver grafts: splits and living donor grafts  4. Extended criteria  Donors (older age, steasosis) and the increased  risk of primary non-function using the extended criteria donors  5. The CDC high risk donors,  Risk of donor transmitted infections and donor transmitted malignancy  6. The liver transplant operation and the associated risks and technical complications which can include intraoperative death, post operative death,  Primary non-function, bleeding requiring re-operations, arterial and biliary complications, bowel perforations, and intra abdominal abscess. Some of these complicaitons may require a second operation.  7. The postoperative course, the ICU stay and risk of postoperative complications which can include sepsis, MI, stroke, brain injury,  pneumonia, pleural effusions, and renal dysfunction.  8. The current 1 year and 5 year graft and patient survivals.  9. The need for life long immunosuppressive therapy and the side effects of these medications, including the possibility of toxicity, opportunistic infections, risk of cancer including lymphoma, and the possibility of rejection even if the patient is taking the medication exactly as prescribed.  10. The need for compliance with medications and follow-up visits in the clinic and thereafter.  11. The patient and family understand these risks and wish to proceed to transplantation       I spent ..60.....minutes with the patient and more than 50% of the time was spend in direct face to face counseling.  \CC  ARMANDO ARANGO A    Copy to patient     [address] [place] [id number]          Again, thank you for allowing me to participate in the care of your patient.      Sincerely,    Fly Mejía MD

## 2019-12-10 NOTE — PROGRESS NOTES
Psychosocial Assessment for Liver Transplant Evaluation  Dania Albert was seen in the Transplant Center as part of her evaluation as a potential liver transplant recipient.   She was accompanied by her  Zena.  Living Situation: Dania and her  Zena live together in a house in [place], [place].  Dania is managing her own medications but does report missing occasional doses.  She is independent with her personal cares, ambulation and driving, but reports balance issues and falls resulting in wrist fractures in the past year.  [place], [place] is approximately two hours away from the transplant center.  We discussed the recommendation to stay locally for up to thirty days post discharge from the hospital at the time of transplantation, and the need for a caregiver to accompany patient.  We also discussed local lodging options, including the [address] and [facility].   The Caregiver Agreement for Liver Transplant was presented and discussed with patient and her .  They are driving back and forth for Dania's appointments this week.  Education/Employment:  Dania completed the eleventh grade.  She has not worked since the end of 2016 due to her health.  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    Financial /Income: Dania does not currently have a source of income.  Her  worked full-time as a .  He does not have PTO, and missing work is a financial hardship.  We did discuss applying for SSDI benefits, though if approved this could affect their eligibility for [place] Medicaid.  I encouraged Dania and her  to discuss this further with their county worker.  Health Insurance:  [place] Medicaid.  We reviewed income and asset guidelines for this program.  This writer talked with Dania and her  about the financial risks of transplant, particularly about the high cost of transplant related medications and  "the importance of maintaining adequate health insurance coverage.  Family/Social Support: Dania and her  Zena have been  for twenty-five years, and their marriage is stable.  Zena is demonstrating his support of Dania today.  Dania and Zena have two adult children.  Jhony lives in [place], [place] and Mary lives in [place], [place].    Dania's parents are .  She has two living sisters, Dee and Samina, who both live in [place], [place].  This writer stressed the importance of having a stable and involved support network before and after transplant.  Provided Dania and her   with education about the relationship between a stable support system and better surgical and post-transplant outcomes compared to patients with a limited support system.    Chemical Dependency:  Dania currently smokes four to five cigarettes per day, down from one pack per day.  She understands our team's recommendation to quit smoking.  Dania denies any history of pain medication abuse, or illicit drug use.  She does not currently consume alcohol, but does report drinking a glass of wine three months ago to \"bring her red blood cells up.\"  I reminded her of our team's recommendation for complete abstinence.  Dania denies any history of heavy alcohol consumption, and her  denies ever being concerned about her consumption.   She has no history of treatment for drugs or alcohol, and no legal history associated with alcohol use.  Mental Health: Dania reports having a diagnosis of depression for which she is prescribed zoloft by her primary care provider.  She denies any history of suicidal ideation, or hospitalization for mental health treatment.   PHQ-9 score today: 20 (denies suicidal ideation)  CHUCK-7 score today: 20  Adjustment to Illness:  Dania has cirrhosis caused by hepatitis C.  She also has liver cancer.  Dania is diabetic and reports only testing her blood sugars once weekly, even though " medical records recommend she test twice daily.  She has experienced a decline in her health, and has lost two of her sisters in the couple of years.     This writer provided Dania and her  with supportive counseling throughout this interview.  This writer also encouraged Dania and her  to attend the liver transplant support group for additional support and encouragement.   Impression/Recommendations:   Dania and her  Zena verbalize understanding the psychosocial risks of transplant and teaching provided during this evaluation.  I am concerned about Dania's comprehension and memory.  I would recommend neuropsychological testing as part of her liver transplant evaluation.    Dania scored high on both the PHQ-9 and CHUCK-7 today.  I will follow up with her to discuss scheduling establishing care with a mental health provider.   Dania and her  have limited finances.  Dania's health insurance is adequatefor transplant.  The Caregiver Agreement for Liver Transplantation was discussed with Dania and her .  Zena would likely be her primary care giver, though he does not have paid time off available to him.    This writer will remain available to assist patient throughout the evaluation process and will follow patient through transplant if she is listed.  It was a pleasure to evaluate this patient for liver transplant.   Teaching completed during assessment:  1.     Housing and relocation needs post transplant.  2.     Caregiver needs post transplant.  3.     Financial issues related to transplant.  4.     Risks of alcohol use post transplant.  5.     Common psychosocial stressors pre/post transplant.          6.     Liver Transplant support group availability.          7.     Advanced Health Care Directive--[place] form and education provided             Psychosocial Risks of Transplant Reviewed:  1.     Increased stress related to your emotional, family, social, employment, or    financial situation.  2.     Affect on work and/or disability benefits.  3.     Affect on future health and life insurance.  4.     Transplant outcome expectations may not be met.  5.     Mental Health risks: anxiety, depression, PTSD, guilt, grief and chronic fatigue.     INOCENTE Rivera

## 2019-12-10 NOTE — NURSING NOTE
"Chief Complaint   Patient presents with     Transplant Evaluation     liver eval       Blood pressure (!) 140/84, pulse 82, temperature 97.6  F (36.4  C), temperature source Oral, resp. rate 16, height 1.557 m (5' 1.28\"), weight 80.2 kg (176 lb 11.2 oz).    Cuco Damon CMA on 12/10/2019 at 9:41 AM    "

## 2019-12-10 NOTE — PROGRESS NOTES
Outpatient MNT: Liver Transplant Evaluation    Current BMI: 33.1 (HT 61.25 in,  lbs/80 kg)  BMI is within criteria of <40 for liver transplant     Time Spent: 15 minutes  Visit Type: Initial  Referring Physician: Kym  Pt accompanied by: her      Medical dx associated with RD referral  - Cirrhosis     History of previous txp: none     Nutrition Assessment  Pt, , or pt's step daughter cook at home. She reports chronic poor appetite, typically eating 1 meal/day.     Vitamins, Supplements, Pertinent Meds: vit D, probiotic   Herbal Medicines/Supplements: none     Diet Recall  Breakfast None    Lunch May snack on cereal, occasional chips, fruit   Dinner Spaghetti, baked pork chops with potato, out to eat 3-4x/week (Citizen of [place] food, burgers, pasta, etc)   Snacks Cereal    Beverages Coffee, tea, 2-3 diet soda/day, water    Dining out 3-4x/week (more lately than typical)     Physical Activity  No routine activity      Anthropometrics  Height:   61.25 in   BMI:    33.1    Weight Status:Obesity Grade I BMI 30-34.9   Weight:  176 lbs            IBW (lb): 106  % IBW: 166    Wt Hx: Pt reports ankle/leg edema in addition to ascites. Overall stable weight, however.     Adj/dosing BW: 124 lbs/56 kg       Malnutrition  % Intake: No decreased intake noted  % Weight Loss: None noted  Subcutaneous Fat Loss: None  Muscle Loss: None  Fluid Accumulation/Edema: Mild/Moderate  Malnutrition Diagnosis: Patient does not meet two of the above criteria necessary for diagnosing malnutrition     Estimated Nutrition Needs  Energy  3317-4701     (25-30 kcal/kg for maintenance)   Protein  45-56+    (0.8-1+ g/kg for maintenance)           Fluid  1 ml/kcal or per MD        Micronutrient   Na+: <2000 mg/day            Nutrition Diagnosis  Excessive Na+ intake r/t food and nutrition related knowledge deficit AEB diet recall reveals high Na+ foods.    Food and nutrition related knowledge deficit r/t pre liver transplant eval AEB  pt verbalized not hearing pre/post transplant diet guidelines.    Nutrition Intervention  Nutrition education provided:  Discussed sodium intake (low sodium foods and drinks, seasoning food without salt and tips for low sodium diet). Reviewed daily goal <2000 mg/day and although she doesn't add salt to foods, eating out contributes to higher sodium intake than she may realize.     Reviewed adequate protein intake. Encouraged receiving protein from both animal and plant based sources.     Reviewed post txp diet guidelines in brief (will review in further detail post txp):  (1) Review of proper food safety measures d/t immunosuppressant therapy post-op and increased risk for food-borne illness    (2) Avoid the following post txp d/t risk for rejection, unknown effects on the organs, and/or potential interactions with immunosuppressants:  - Herbal, Chinese, holistic, chiropractic, natural, alternative medicines and supplements  - Detoxes and cleanses  - Weight loss pills  - Protein powders or other products with extracts or herbs (ie green tea extract)    (3) Med regimen and possible side effects    Patient Understanding: Pt verbalized understanding of education provided.  Expected Compliance: Good  Follow-Up Plans: PRN     Nutrition Goals  1. Limit Na+ <2000mg/day  2. Pt to verbalize understanding of 3 aspects of post txp education provided    Provided pt with contact info.   Jaimee Murrell RD, LD  Pgr [phone]

## 2019-12-11 ENCOUNTER — HOSPITAL ENCOUNTER (OUTPATIENT)
Dept: NUCLEAR MEDICINE | Facility: CLINIC | Age: 54
Setting detail: NUCLEAR MEDICINE
End: 2019-12-11
Attending: INTERNAL MEDICINE
Payer: MEDICAID

## 2019-12-11 ENCOUNTER — HOSPITAL ENCOUNTER (OUTPATIENT)
Dept: CARDIOLOGY | Facility: CLINIC | Age: 54
Discharge: HOME | End: 2019-12-11
Attending: INTERNAL MEDICINE | Admitting: INTERNAL MEDICINE
Payer: MEDICAID

## 2019-12-11 DIAGNOSIS — K74.60 CIRRHOSIS (H): ICD-10-CM

## 2019-12-11 DIAGNOSIS — C22.0 HEPATOCELLULAR CARCINOMA (H): ICD-10-CM

## 2019-12-11 LAB — BLD GP AB SCN TITR SERPL: NORMAL {TITER}

## 2019-12-11 PROCEDURE — 93016 CV STRESS TEST SUPVJ ONLY: CPT | Performed by: INTERNAL MEDICINE

## 2019-12-11 PROCEDURE — 93321 DOPPLER ECHO F-UP/LMTD STD: CPT | Mod: 26 | Performed by: INTERNAL MEDICINE

## 2019-12-11 PROCEDURE — 93350 STRESS TTE ONLY: CPT | Mod: 26 | Performed by: INTERNAL MEDICINE

## 2019-12-11 PROCEDURE — 25500064 ZZH RX 255 OP 636: Performed by: INTERNAL MEDICINE

## 2019-12-11 PROCEDURE — 78306 BONE IMAGING WHOLE BODY: CPT

## 2019-12-11 PROCEDURE — 93325 DOPPLER ECHO COLOR FLOW MAPG: CPT | Mod: 26 | Performed by: INTERNAL MEDICINE

## 2019-12-11 PROCEDURE — 40000264 ECHO STRESS ECHOCARDIOGRAM

## 2019-12-11 PROCEDURE — 25000125 ZZHC RX 250: Performed by: INTERNAL MEDICINE

## 2019-12-11 PROCEDURE — 93018 CV STRESS TEST I&R ONLY: CPT | Performed by: INTERNAL MEDICINE

## 2019-12-11 PROCEDURE — 78399 UNLISTED MUSCSKEL PX DX NUC: CPT

## 2019-12-11 PROCEDURE — 34300033 ZZH RX 343: Performed by: INTERNAL MEDICINE

## 2019-12-11 PROCEDURE — 25000128 H RX IP 250 OP 636: Performed by: INTERNAL MEDICINE

## 2019-12-11 PROCEDURE — A9503 TC99M MEDRONATE: HCPCS | Performed by: INTERNAL MEDICINE

## 2019-12-11 RX ORDER — TC 99M MEDRONATE 20 MG/10ML
25 INJECTION, POWDER, LYOPHILIZED, FOR SOLUTION INTRAVENOUS ONCE
Status: COMPLETED | OUTPATIENT
Start: 2019-12-11 | End: 2019-12-11

## 2019-12-11 RX ORDER — METOPROLOL TARTRATE 1 MG/ML
1-20 INJECTION, SOLUTION INTRAVENOUS
Status: DISCONTINUED | OUTPATIENT
Start: 2019-12-11 | End: 2019-12-11 | Stop reason: HOSPADM

## 2019-12-11 RX ORDER — DOBUTAMINE HYDROCHLORIDE 200 MG/100ML
10-50 INJECTION INTRAVENOUS CONTINUOUS
Status: DISCONTINUED | OUTPATIENT
Start: 2019-12-11 | End: 2019-12-11 | Stop reason: HOSPADM

## 2019-12-11 RX ORDER — ATROPINE SULFATE 0.4 MG/ML
.2-2 AMPUL (ML) INJECTION
Status: DISCONTINUED | OUTPATIENT
Start: 2019-12-11 | End: 2019-12-12 | Stop reason: HOSPADM

## 2019-12-11 RX ADMIN — HUMAN ALBUMIN MICROSPHERES AND PERFLUTREN 9 ML: 10; .22 INJECTION, SOLUTION INTRAVENOUS at 08:36

## 2019-12-11 RX ADMIN — DOBUTAMINE HYDROCHLORIDE 10 MCG/KG/MIN: 200 INJECTION INTRAVENOUS at 08:24

## 2019-12-11 RX ADMIN — TC 99M MEDRONATE 22.3 MCI.: 20 INJECTION, POWDER, LYOPHILIZED, FOR SOLUTION INTRAVENOUS at 07:51

## 2019-12-11 RX ADMIN — METOPROLOL TARTRATE 2 MG: 1 INJECTION, SOLUTION INTRAVENOUS at 08:36

## 2019-12-11 NOTE — PROGRESS NOTES
Pt here for dobutamine stress test.  Test, meds and side effects reviewed with patient.  Achieved target HR at 30 mcg Dobutamine.  Gave a total of 2 mg IV Metoprolol to bring HR back to baseline.  Post monitoring complete and VSS.  Pt escorted out to the gold waiting room - left IV in for NM bone scan.

## 2019-12-12 LAB
INTERPRETATION ECG - MUSE: NORMAL
PETH BLD-MCNC: NEGATIVE NG/ML

## 2019-12-16 NOTE — TELEPHONE ENCOUNTER
ONCOLOGY INTAKE: Records Information      APPT INFORMATION:  Referring provider:  Dr. Kayli Bergman  Referring provider s clinic:  [facility]  Reason for visit/diagnosis:  Hepatitis C virus carrier state (H) [B18.2];Cirrhosis of liver with ascites, unspecified hepatic cirrhosis type (H) [K74.60, R18.8];Portal hypertension (H) [K76.6];Hepatocellular carcinoma (H) [C22.0]  Has patient been notified of appointment date and time?: Yes    RECORDS INFORMATION:  Were the records received with the referral (via [employer])? No,Internal Referral    Has patient been seen for any external appt for this diagnosis? No    If yes, where? NA    Has patient had any imaging or procedures outside of [place]  [facility] for this condition? No      If Yes, where? NA    ADDITIONAL INFORMATION:  Patient did not want to be added to the waitlist.

## 2019-12-17 ENCOUNTER — OFFICE VISIT (OUTPATIENT)
Dept: CARDIOLOGY | Facility: CLINIC | Age: 54
End: 2019-12-17
Attending: INTERNAL MEDICINE
Payer: MEDICAID

## 2019-12-17 ENCOUNTER — PRE VISIT (OUTPATIENT)
Dept: CARDIOLOGY | Facility: CLINIC | Age: 54
End: 2019-12-17

## 2019-12-17 VITALS
HEART RATE: 83 BPM | WEIGHT: 174 LBS | DIASTOLIC BLOOD PRESSURE: 72 MMHG | OXYGEN SATURATION: 94 % | BODY MASS INDEX: 32.85 KG/M2 | HEIGHT: 61 IN | SYSTOLIC BLOOD PRESSURE: 106 MMHG

## 2019-12-17 DIAGNOSIS — Z01.818 PREOPERATIVE EXAMINATION: Primary | ICD-10-CM

## 2019-12-17 DIAGNOSIS — B18.2 CHRONIC HEPATITIS C WITHOUT HEPATIC COMA (H): ICD-10-CM

## 2019-12-17 DIAGNOSIS — C22.0 HCC (HEPATOCELLULAR CARCINOMA) (H): ICD-10-CM

## 2019-12-17 DIAGNOSIS — Z79.4 TYPE 2 DIABETES MELLITUS WITHOUT COMPLICATION, WITH LONG-TERM CURRENT USE OF INSULIN (H): ICD-10-CM

## 2019-12-17 DIAGNOSIS — E11.9 TYPE 2 DIABETES MELLITUS WITHOUT COMPLICATION, WITH LONG-TERM CURRENT USE OF INSULIN (H): ICD-10-CM

## 2019-12-17 PROCEDURE — 99204 OFFICE O/P NEW MOD 45 MIN: CPT | Mod: ZP | Performed by: INTERNAL MEDICINE

## 2019-12-17 PROCEDURE — G0463 HOSPITAL OUTPT CLINIC VISIT: HCPCS | Mod: ZF

## 2019-12-17 ASSESSMENT — PAIN SCALES - GENERAL: PAINLEVEL: NO PAIN (0)

## 2019-12-17 ASSESSMENT — MIFFLIN-ST. JEOR: SCORE: 1316.64

## 2019-12-17 NOTE — PATIENT INSTRUCTIONS
"You were seen today in the Cardiovascular Clinic at the [facility].     Cardiology Providers you saw during your visit: Dalton Taylor MD     Diagnosis:   Encounter Diagnosis   Name Primary?     Cirrhosis (H)         Results: Discussed with you today Stress test     Please feel free to call me with any questions or concerns.       Loni Mensah LPN, LPN     Questions: [phone].   First press #1 for the [facility] for \"Medical Questions\" to reach us Cardiology Nurses.     [phone].   First press #1 for the [facility] and then press #1     On Call Cardiologist for after hours or on weekends: [phone]   option #4 and ask to speak to the on-call Cardiologist.          If you need a medication refill please contact your pharmacy.  Please allow 3 business days for your refill to be completed.  ________________________________________________________________________________________________________________________________        "

## 2019-12-17 NOTE — NURSING NOTE
Chief Complaint   Patient presents with     New Patient     present for cardiac clearance for pre-liver     Vitals were taken and medications were reconciled.     Elo Cool CMA    9:19 AM

## 2019-12-17 NOTE — LETTER
12/17/2019      RE: Dania Albert  [address] [facility] [address]       Dear Colleague,    Thank you for the opportunity to participate in the care of your patient, Dania Albert, at the [facility]. Please see a copy of my visit note below.    I am delighted to see Dania Albert in consultation.The encounter diagnosis was Cirrhosis (H).   As you know, the patient is a 54 year old  female. She   has a past medical history of Alcohol abuse, Chronic hepatitis C without hepatic coma (H) (10/23/2019), Cirrhosis of liver with ascites (H) (10/23/2019), COPD (chronic obstructive pulmonary disease) (H), Depressive disorder, Diabetes (H), Emphysema lung (H), H/O esophageal varices, HCC (hepatocellular carcinoma) (H) (10/23/2019), History of blood transfusion, and ERICKA (obstructive sleep apnea)..    On this visit, the patient states that she has good exercise tolerance.  The patient denies chest pressure/discomfort, palpitations, near-syncope, syncope, orthopnea, paroxysmal nocturnal dyspnea, lower extermity edema and shortness of breath.    The patient's cardiovascular risk factors include smoker, positive family history and diabetes mellitus.    The following portions of the patient's history were reviewed and updated as appropriate: allergies, current medications, past family history, past medical history, past social history, past surgical history, and the problem list.    PMH: The patient's past medical history includes:    Past Medical History:   Diagnosis Date     Alcohol abuse      Chronic hepatitis C without hepatic coma (H) 10/23/2019    SVR     Cirrhosis of liver with ascites (H) 10/23/2019     COPD (chronic obstructive pulmonary disease) (H)      Depressive disorder      Diabetes (H)     Type 1 DM, Takes Insulin      Emphysema lung (H)      H/O esophageal varices      HCC (hepatocellular carcinoma) (H) 10/23/2019     History of blood  transfusion     [facility] in 1983     ERICKA (obstructive sleep apnea)       Past Surgical History:   Procedure Laterality Date     ABDOMEN SURGERY      Gallbladder Removed      COLONOSCOPY      [place]     CYSTOSCOPY, INJECT BOTOX      detrusor injection     GI SURGERY      Upper Endoscopy at [facility]      HERNIA REPAIR      Patient doesnt remember if mesh was used. [facility]      OPEN REDUCTION INTERNAL FIXATION WRIST Bilateral        The patient's medications as of the current encounter are:     Current Outpatient Medications   Medication Sig Dispense Refill     acetaminophen (TYLENOL) 500 MG tablet Take 1,000 mg by mouth       albuterol (PROAIR HFA) 108 (90 Base) MCG/ACT inhaler INHALE 2 PUFFS 4 TIMES DAILY       blood glucose monitoring (ONE TOUCH ULTRA MINI) meter device kit Use as directed to test glucose three times daily. Pharmacy dispense brand based on insurance.  Indications: Diabetes       insulin glargine (LANTUS SOLOSTAR) 100 UNIT/ML pen Inject 38 Units Subcutaneous       pantoprazole (PROTONIX) 40 MG EC tablet TAKE ONE TABLET BY MOUTH EVERY DAY       QUEtiapine (SEROQUEL) 50 MG tablet Take 50 mg by mouth At Bedtime  0     sertraline (ZOLOFT) 100 MG tablet TAKE 2 TABLETS BY MOUTH EVERY DAY       TRULICITY 0.75 MG/0.5ML pen INJECT 0.5 ml's SUBCUTANEOUSLY ONCE WEEKLY  2     benzonatate (TESSALON) 100 MG capsule TAKE 1 CAPSULE BY MOUTH 3 TIMES DAILY AS NEEDED FOR COUGH  2     diphenoxylate-atropine (LOMOTIL) 2.5-0.025 MG tablet Take 1 tablet by mouth 4 times daily as needed for diarrhea (Patient not taking: Reported on 12/17/2019) 120 tablet 0     HYDROXYZINE HCL PO Take 10 mg by mouth every 4 hours as needed for itching       Omega-3 Fatty Acids (OMEGA-3 FISH OIL PO)        OneTouch Delica Lancets 33G MISC Change lancet one time daily as directed.       order for DME Abdominal Binder - small (Patient not taking: Reported on 12/10/2019) 1 each 1     OXYCODONE HCL PO Take 5 mg by mouth  every 4 hours as needed       vitamin D2 (ERGOCALCIFEROL) 63408 units (1250 mcg) capsule TAKE ONE CAPSULE BY MOUTH ONCE every WEEK  0       Labs:     Orders Only on 12/10/2019   Component Date Value Ref Range Status     Interpretation ECG 12/10/2019 Click View Image link to view waveform and result   Final       Allergies:    Allergies   Allergen Reactions     Bee Venom Other (See Comments) and Swelling     Hand swelled up badly.       Hydrocodone Other (See Comments)     nausea     Nickel Rash     Wool Fiber Hives and Rash       Family History:   Family History   Problem Relation Age of Onset     Coronary Artery Disease Mother      Coronary Artery Disease Father      No Known Problems Brother      Meniere's disease Sister      Diabetes Sister      No Known Problems Son      No Known Problems Daughter      Diabetes Sister      Liver Disease Sister      Chronic Obstructive Pulmonary Disease Sister        Psychosocial history:  reports that she has been smoking cigarettes. She has a 12.00 pack-year smoking history. She has never used smokeless tobacco. She reports previous alcohol use. She reports previous drug use.    Review of systems: negative for, irregular heart beat, exertional chest pain or pressure, paroxysmal nocturnal dyspnea, dyspnea on exertion, orthopnea, lower extremity edema, syncope or near-syncope, claudication and exercise intolerance    In addition,   General: No change in weight, sleep or appetite.  Normal energy.  No fever or chills  Eyes: Negative for vision changes or eye problems  ENT: No problems with ears, nose or throat.  No difficulty swallowing.  Resp: No coughing, wheezing or shortness of breath  GI: No nausea, vomiting,  heartburn, abdominal pain, diarrhea, constipation or change in bowel habits. Liver cirrhosis and hepatocellular carcinoma. Occasional ascites  : No urinary frequency or dysuria, bladder or kidney problems  Musculoskeletal: No significant muscle or joint  "pains  Neurologic: No headaches, numbness, tingling, weakness, problems with balance or coordination  Psychiatric: No problems with anxiety, depression or mental health  Heme/immune/allergy: No history of bleeding or clotting problems.    Endocrine: No history of thyroid disease, diabetes or other endocrine disorders  Skin: No rashes,worrisome lesions or skin problems  Vascular:  No claudication, lifestyle limiting or otherwise; no ischemic rest pain; no non-healing ulcers. No weakness, No loss of sensation        Physical examination  Vitals: /72 (BP Location: Left arm, Patient Position: Chair, Cuff Size: Adult Regular)   Pulse 83   Ht 1.549 m (5' 1\")   Wt 78.9 kg (174 lb)   SpO2 94%   BMI 32.88 kg/m     BMI= Body mass index is 32.88 kg/m .    In general, the patient is a pleasant female in no apparent distress.    HEENT: Normiocephalic and atraumatic.  PERRLA.  EOMI.  Nares clear.  Pharynx without erythema or exudate.  Dentition intact.    Neck: No adenopathy.  No thyromegaly. Carotids +2/2 bilaterally without bruits.  No jugular venous distension.   Heart:  The PMI is in the 5th ICS in the midclavicular line. There is no heave. Regular rate and rhythm. Normal S1, S2 splits physiologically. No murmur, rub, click, or gallop.    Lungs: Clear to asculation.  No ronchi, wheezes, rales.  No dullness to percussion.   Abdomen: Soft, nontender, nondistended. No organomegaly. No AAA.  No bruits.   Extremities: No clubbing, cyanosis, or edema. The pulses were intact bilaterally.   Neurological: The neurological examination reveal a patient who was oriented to person, place, and time.  The remainder of the examination was nonfocal. No asterixis    Cardiac tests include:    1. Dobutamine echo - normal EF, no induced wall motion defects, no pulmonary hypertension    Assessment and Plan    1. preop liver cirrhosis - normal EF and normal stress test  - patient in a low risk group for cardiac events from surgery  - no " further testing indicated    2. DM - on insulin    3. Smoking - recommended cessation      The patient is to return prn. The patient understood the treatment plan as outlined above.  There were no barriers to learning. Patient accompanied by sister.    Please do not hesitate to contact me if you have any questions/concerns.     Sincerely,     Boilvar Taylor MD

## 2019-12-17 NOTE — PROGRESS NOTES
I am delighted to see Dania Albert in consultation.The encounter diagnosis was Cirrhosis (H).   As you know, the patient is a 54 year old  female. She   has a past medical history of Alcohol abuse, Chronic hepatitis C without hepatic coma (H) (10/23/2019), Cirrhosis of liver with ascites (H) (10/23/2019), COPD (chronic obstructive pulmonary disease) (H), Depressive disorder, Diabetes (H), Emphysema lung (H), H/O esophageal varices, HCC (hepatocellular carcinoma) (H) (10/23/2019), History of blood transfusion, and ERICKA (obstructive sleep apnea)..    On this visit, the patient states that she has good exercise tolerance.  The patient denies chest pressure/discomfort, palpitations, near-syncope, syncope, orthopnea, paroxysmal nocturnal dyspnea, lower extermity edema and shortness of breath.    The patient's cardiovascular risk factors include smoker, positive family history and diabetes mellitus.    The following portions of the patient's history were reviewed and updated as appropriate: allergies, current medications, past family history, past medical history, past social history, past surgical history, and the problem list.    PMH: The patient's past medical history includes:    Past Medical History:   Diagnosis Date     Alcohol abuse      Chronic hepatitis C without hepatic coma (H) 10/23/2019    SVR     Cirrhosis of liver with ascites (H) 10/23/2019     COPD (chronic obstructive pulmonary disease) (H)      Depressive disorder      Diabetes (H)     Type 1 DM, Takes Insulin      Emphysema lung (H)      H/O esophageal varices      HCC (hepatocellular carcinoma) (H) 10/23/2019     History of blood transfusion     [facility] in 1983     ERICKA (obstructive sleep apnea)       Past Surgical History:   Procedure Laterality Date     ABDOMEN SURGERY      Gallbladder Removed      COLONOSCOPY      [place]     CYSTOSCOPY, INJECT BOTOX      detrusor injection     GI SURGERY      Upper Endoscopy at [facility]       HERNIA REPAIR      Patient doesnt remember if mesh was used. [facility]      OPEN REDUCTION INTERNAL FIXATION WRIST Bilateral        The patient's medications as of the current encounter are:     Current Outpatient Medications   Medication Sig Dispense Refill     acetaminophen (TYLENOL) 500 MG tablet Take 1,000 mg by mouth       albuterol (PROAIR HFA) 108 (90 Base) MCG/ACT inhaler INHALE 2 PUFFS 4 TIMES DAILY       blood glucose monitoring (ONE TOUCH ULTRA MINI) meter device kit Use as directed to test glucose three times daily. Pharmacy dispense brand based on insurance.  Indications: Diabetes       insulin glargine (LANTUS SOLOSTAR) 100 UNIT/ML pen Inject 38 Units Subcutaneous       pantoprazole (PROTONIX) 40 MG EC tablet TAKE ONE TABLET BY MOUTH EVERY DAY       QUEtiapine (SEROQUEL) 50 MG tablet Take 50 mg by mouth At Bedtime  0     sertraline (ZOLOFT) 100 MG tablet TAKE 2 TABLETS BY MOUTH EVERY DAY       TRULICITY 0.75 MG/0.5ML pen INJECT 0.5 ml's SUBCUTANEOUSLY ONCE WEEKLY  2     benzonatate (TESSALON) 100 MG capsule TAKE 1 CAPSULE BY MOUTH 3 TIMES DAILY AS NEEDED FOR COUGH  2     diphenoxylate-atropine (LOMOTIL) 2.5-0.025 MG tablet Take 1 tablet by mouth 4 times daily as needed for diarrhea (Patient not taking: Reported on 12/17/2019) 120 tablet 0     HYDROXYZINE HCL PO Take 10 mg by mouth every 4 hours as needed for itching       Omega-3 Fatty Acids (OMEGA-3 FISH OIL PO)        OneTouch Delica Lancets 33G MISC Change lancet one time daily as directed.       order for DME Abdominal Binder - small (Patient not taking: Reported on 12/10/2019) 1 each 1     OXYCODONE HCL PO Take 5 mg by mouth every 4 hours as needed       vitamin D2 (ERGOCALCIFEROL) 14830 units (1250 mcg) capsule TAKE ONE CAPSULE BY MOUTH ONCE every WEEK  0       Labs:     Orders Only on 12/10/2019   Component Date Value Ref Range Status     Interpretation ECG 12/10/2019 Click View Image link to view waveform and result   Final        Allergies:    Allergies   Allergen Reactions     Bee Venom Other (See Comments) and Swelling     Hand swelled up badly.       Hydrocodone Other (See Comments)     nausea     Nickel Rash     Wool Fiber Hives and Rash       Family History:   Family History   Problem Relation Age of Onset     Coronary Artery Disease Mother      Coronary Artery Disease Father      No Known Problems Brother      Meniere's disease Sister      Diabetes Sister      No Known Problems Son      No Known Problems Daughter      Diabetes Sister      Liver Disease Sister      Chronic Obstructive Pulmonary Disease Sister        Psychosocial history:  reports that she has been smoking cigarettes. She has a 12.00 pack-year smoking history. She has never used smokeless tobacco. She reports previous alcohol use. She reports previous drug use.    Review of systems: negative for, irregular heart beat, exertional chest pain or pressure, paroxysmal nocturnal dyspnea, dyspnea on exertion, orthopnea, lower extremity edema, syncope or near-syncope, claudication and exercise intolerance    In addition,   General: No change in weight, sleep or appetite.  Normal energy.  No fever or chills  Eyes: Negative for vision changes or eye problems  ENT: No problems with ears, nose or throat.  No difficulty swallowing.  Resp: No coughing, wheezing or shortness of breath  GI: No nausea, vomiting,  heartburn, abdominal pain, diarrhea, constipation or change in bowel habits. Liver cirrhosis and hepatocellular carcinoma. Occasional ascites  : No urinary frequency or dysuria, bladder or kidney problems  Musculoskeletal: No significant muscle or joint pains  Neurologic: No headaches, numbness, tingling, weakness, problems with balance or coordination  Psychiatric: No problems with anxiety, depression or mental health  Heme/immune/allergy: No history of bleeding or clotting problems.    Endocrine: No history of thyroid disease, diabetes or other endocrine  "disorders  Skin: No rashes,worrisome lesions or skin problems  Vascular:  No claudication, lifestyle limiting or otherwise; no ischemic rest pain; no non-healing ulcers. No weakness, No loss of sensation        Physical examination  Vitals: /72 (BP Location: Left arm, Patient Position: Chair, Cuff Size: Adult Regular)   Pulse 83   Ht 1.549 m (5' 1\")   Wt 78.9 kg (174 lb)   SpO2 94%   BMI 32.88 kg/m    BMI= Body mass index is 32.88 kg/m .    In general, the patient is a pleasant female in no apparent distress.    HEENT: Normiocephalic and atraumatic.  PERRLA.  EOMI.  Nares clear.  Pharynx without erythema or exudate.  Dentition intact.    Neck: No adenopathy.  No thyromegaly. Carotids +2/2 bilaterally without bruits.  No jugular venous distension.   Heart:  The PMI is in the 5th ICS in the midclavicular line. There is no heave. Regular rate and rhythm. Normal S1, S2 splits physiologically. No murmur, rub, click, or gallop.    Lungs: Clear to asculation.  No ronchi, wheezes, rales.  No dullness to percussion.   Abdomen: Soft, nontender, nondistended. No organomegaly. No AAA.  No bruits.   Extremities: No clubbing, cyanosis, or edema. The pulses were intact bilaterally.   Neurological: The neurological examination reveal a patient who was oriented to person, place, and time.  The remainder of the examination was nonfocal. No asterixis    Cardiac tests include:    1. Dobutamine echo - normal EF, no induced wall motion defects, no pulmonary hypertension    Assessment and Plan    1. preop liver cirrhosis - normal EF and normal stress test  - patient in a low risk group for cardiac events from surgery  - no further testing indicated    2. DM - on insulin    3. Smoking - recommended cessation      The patient is to return prn. The patient understood the treatment plan as outlined above.  There were no barriers to learning. Patient accompanied by sister.      Bolivar Taylor MD     "

## 2019-12-17 NOTE — TELEPHONE ENCOUNTER
RECORDS STATUS - ALL OTHER DIAGNOSIS      RECORDS RECEIVED FROM: [facility] - Internal Referral   DATE RECEIVED: 12/17/19   NOTES STATUS DETAILS   OFFICE NOTE from referring provider Kayli Mcelroy MD   OFFICE NOTE from medical oncologist     DISCHARGE SUMMARY from hospital CE 10/10/19, 1/6/17   DISCHARGE REPORT from the ER CE 10/25/19, 10/17/19, 2/23/19, 8/18/18, 1/5/18   OPERATIVE REPORT  10/28/19: EGD   MEDICATION LIST [facility] 12/10/19   CLINICAL TRIAL TREATMENTS TO DATE     LABS     PATHOLOGY REPORTS [facility] 3/7/19   ANYTHING RELATED TO DIAGNOSIS Epic 12/11/19   GENONOMIC TESTING     TYPE:     IMAGING (NEED IMAGES & REPORT)     XR PACS 12/9/19, 10/10/19   NM Bone Scan PACS 12/11/19   CT SCANS PACS 10/25/19, 10/17/19, 10/11/19   MRI PACS 11/22/19, 6/10/19   MAMMO     ULTRASOUND PACS 10/10/19   PET

## 2019-12-17 NOTE — TELEPHONE ENCOUNTER
Rescheduled to 1/17/2020 to Dr. Atkins from 1/9/2020 with Dr. Childress per Pt request.    Please see pre-visit on 1/9/2020 with Dr. Atkins

## 2019-12-23 ENCOUNTER — TRANSFERRED RECORDS (OUTPATIENT)
Dept: HEALTH INFORMATION MANAGEMENT | Facility: CLINIC | Age: 54
End: 2019-12-23

## 2019-12-24 ENCOUNTER — TELEPHONE (OUTPATIENT)
Dept: ONCOLOGY | Facility: CLINIC | Age: 54
End: 2019-12-24

## 2019-12-24 DIAGNOSIS — C22.0 HCC (HEPATOCELLULAR CARCINOMA) (H): Primary | ICD-10-CM

## 2019-12-24 DIAGNOSIS — M85.9 LOW BONE DENSITY: ICD-10-CM

## 2019-12-24 NOTE — TELEPHONE ENCOUNTER
Tobacco Treatment Program at the [facility] attempted to reach Ms. Albert on 12/24/2019 regarding the tobacco cessation program to help Ms. Albert to quit smoking. We will attempt to reach Ms. Albert another time.     [facility] Morales [facility]  Tobacco Treatment Specialist  PH: [phone]

## 2020-01-01 ENCOUNTER — APPOINTMENT (OUTPATIENT)
Dept: CT IMAGING | Facility: CLINIC | Age: 55
End: 2020-01-01
Attending: EMERGENCY MEDICINE
Payer: MEDICAID

## 2020-01-01 ENCOUNTER — VIRTUAL VISIT (OUTPATIENT)
Dept: ONCOLOGY | Facility: CLINIC | Age: 55
End: 2020-01-01
Payer: MEDICAID

## 2020-01-01 ENCOUNTER — TELEPHONE (OUTPATIENT)
Dept: ONCOLOGY | Facility: CLINIC | Age: 55
End: 2020-01-01

## 2020-01-01 ENCOUNTER — DOCUMENTATION ONLY (OUTPATIENT)
Dept: TRANSPLANT | Facility: CLINIC | Age: 55
End: 2020-01-01

## 2020-01-01 ENCOUNTER — APPOINTMENT (OUTPATIENT)
Dept: INTERVENTIONAL RADIOLOGY/VASCULAR | Facility: CLINIC | Age: 55
End: 2020-01-01
Attending: RADIOLOGY
Payer: MEDICAID

## 2020-01-01 ENCOUNTER — PATIENT OUTREACH (OUTPATIENT)
Dept: CARE COORDINATION | Facility: CLINIC | Age: 55
End: 2020-01-01

## 2020-01-01 ENCOUNTER — ANESTHESIA EVENT (OUTPATIENT)
Dept: SURGERY | Facility: CLINIC | Age: 55
End: 2020-01-01
Payer: MEDICAID

## 2020-01-01 ENCOUNTER — DOCUMENTATION ONLY (OUTPATIENT)
Dept: OTHER | Facility: CLINIC | Age: 55
End: 2020-01-01

## 2020-01-01 ENCOUNTER — PATIENT OUTREACH (OUTPATIENT)
Dept: ONCOLOGY | Facility: CLINIC | Age: 55
End: 2020-01-01

## 2020-01-01 ENCOUNTER — INFUSION THERAPY VISIT (OUTPATIENT)
Dept: ONCOLOGY | Facility: CLINIC | Age: 55
End: 2020-01-01
Attending: INTERNAL MEDICINE
Payer: MEDICAID

## 2020-01-01 ENCOUNTER — APPOINTMENT (OUTPATIENT)
Dept: LAB | Facility: CLINIC | Age: 55
End: 2020-01-01
Attending: PHYSICIAN ASSISTANT
Payer: MEDICAID

## 2020-01-01 ENCOUNTER — ONCOLOGY VISIT (OUTPATIENT)
Dept: ONCOLOGY | Facility: CLINIC | Age: 55
End: 2020-01-01
Attending: NURSE PRACTITIONER
Payer: MEDICAID

## 2020-01-01 ENCOUNTER — APPOINTMENT (OUTPATIENT)
Dept: CT IMAGING | Facility: CLINIC | Age: 55
End: 2020-01-01
Attending: RADIOLOGY
Payer: MEDICAID

## 2020-01-01 ENCOUNTER — HOSPITAL ENCOUNTER (INPATIENT)
Facility: CLINIC | Age: 55
LOS: 6 days | Discharge: HOME | End: 2020-11-04
Attending: EMERGENCY MEDICINE | Admitting: PEDIATRICS
Payer: MEDICAID

## 2020-01-01 ENCOUNTER — HOSPITAL ENCOUNTER (OUTPATIENT)
Dept: NUCLEAR MEDICINE | Facility: CLINIC | Age: 55
Setting detail: NUCLEAR MEDICINE
End: 2020-09-16
Attending: NURSE PRACTITIONER
Payer: MEDICAID

## 2020-01-01 ENCOUNTER — APPOINTMENT (OUTPATIENT)
Dept: GENERAL RADIOLOGY | Facility: CLINIC | Age: 55
End: 2020-01-01
Attending: NURSE PRACTITIONER
Payer: MEDICAID

## 2020-01-01 ENCOUNTER — DOCUMENTATION ONLY (OUTPATIENT)
Dept: ONCOLOGY | Facility: CLINIC | Age: 55
End: 2020-01-01

## 2020-01-01 ENCOUNTER — TELEPHONE (OUTPATIENT)
Dept: TRANSPLANT | Facility: CLINIC | Age: 55
End: 2020-01-01

## 2020-01-01 ENCOUNTER — PRE VISIT (OUTPATIENT)
Dept: PULMONOLOGY | Facility: CLINIC | Age: 55
End: 2020-01-01

## 2020-01-01 ENCOUNTER — OFFICE VISIT (OUTPATIENT)
Dept: GASTROENTEROLOGY | Facility: CLINIC | Age: 55
End: 2020-01-01
Attending: INTERNAL MEDICINE
Payer: MEDICAID

## 2020-01-01 ENCOUNTER — HOSPITAL ENCOUNTER (OUTPATIENT)
Dept: CT IMAGING | Facility: CLINIC | Age: 55
Discharge: HOME | End: 2020-11-17
Attending: INTERNAL MEDICINE | Admitting: INTERNAL MEDICINE
Payer: MEDICAID

## 2020-01-01 ENCOUNTER — TELEPHONE (OUTPATIENT)
Dept: INTERVENTIONAL RADIOLOGY/VASCULAR | Facility: CLINIC | Age: 55
End: 2020-01-01

## 2020-01-01 ENCOUNTER — ONCOLOGY VISIT (OUTPATIENT)
Dept: ONCOLOGY | Facility: CLINIC | Age: 55
End: 2020-01-01
Attending: PHYSICIAN ASSISTANT
Payer: MEDICAID

## 2020-01-01 ENCOUNTER — TRANSFERRED RECORDS (OUTPATIENT)
Dept: HEALTH INFORMATION MANAGEMENT | Facility: CLINIC | Age: 55
End: 2020-01-01

## 2020-01-01 ENCOUNTER — APPOINTMENT (OUTPATIENT)
Dept: CT IMAGING | Facility: CLINIC | Age: 55
End: 2020-01-01
Attending: NURSE PRACTITIONER
Payer: MEDICAID

## 2020-01-01 ENCOUNTER — HOSPITAL ENCOUNTER (OUTPATIENT)
Facility: CLINIC | Age: 55
Discharge: HOME | End: 2020-05-21
Attending: RADIOLOGY | Admitting: RADIOLOGY
Payer: MEDICAID

## 2020-01-01 ENCOUNTER — APPOINTMENT (OUTPATIENT)
Dept: LAB | Facility: CLINIC | Age: 55
End: 2020-01-01
Attending: INTERNAL MEDICINE
Payer: MEDICAID

## 2020-01-01 ENCOUNTER — PRE VISIT (OUTPATIENT)
Dept: ONCOLOGY | Facility: CLINIC | Age: 55
End: 2020-01-01

## 2020-01-01 ENCOUNTER — APPOINTMENT (OUTPATIENT)
Dept: INTERVENTIONAL RADIOLOGY/VASCULAR | Facility: CLINIC | Age: 55
End: 2020-01-01
Attending: NURSE PRACTITIONER
Payer: MEDICAID

## 2020-01-01 ENCOUNTER — HOSPITAL ENCOUNTER (INPATIENT)
Facility: CLINIC | Age: 55
LOS: 3 days | Discharge: HOME | End: 2020-07-20
Attending: EMERGENCY MEDICINE | Admitting: INTERNAL MEDICINE
Payer: MEDICAID

## 2020-01-01 ENCOUNTER — VIRTUAL VISIT (OUTPATIENT)
Dept: PALLIATIVE CARE | Facility: CLINIC | Age: 55
End: 2020-01-01
Attending: INTERNAL MEDICINE
Payer: MEDICAID

## 2020-01-01 ENCOUNTER — TELEPHONE (OUTPATIENT)
Dept: VASCULAR SURGERY | Facility: CLINIC | Age: 55
End: 2020-01-01

## 2020-01-01 ENCOUNTER — ONCOLOGY VISIT (OUTPATIENT)
Dept: ONCOLOGY | Facility: CLINIC | Age: 55
End: 2020-01-01
Payer: MEDICAID

## 2020-01-01 ENCOUNTER — ANCILLARY PROCEDURE (OUTPATIENT)
Dept: ULTRASOUND IMAGING | Facility: CLINIC | Age: 55
End: 2020-01-01
Attending: PEDIATRICS
Payer: MEDICAID

## 2020-01-01 ENCOUNTER — TELEPHONE (OUTPATIENT)
Dept: GASTROENTEROLOGY | Facility: CLINIC | Age: 55
End: 2020-01-01

## 2020-01-01 ENCOUNTER — APPOINTMENT (OUTPATIENT)
Dept: ULTRASOUND IMAGING | Facility: CLINIC | Age: 55
End: 2020-01-01
Attending: INTERNAL MEDICINE
Payer: MEDICAID

## 2020-01-01 ENCOUNTER — COMMITTEE REVIEW (OUTPATIENT)
Dept: TRANSPLANT | Facility: CLINIC | Age: 55
End: 2020-01-01

## 2020-01-01 ENCOUNTER — DOCUMENTATION ONLY (OUTPATIENT)
Dept: PHARMACY | Facility: CLINIC | Age: 55
End: 2020-01-01

## 2020-01-01 ENCOUNTER — ALLIED HEALTH/NURSE VISIT (OUTPATIENT)
Dept: TRANSPLANT | Facility: CLINIC | Age: 55
End: 2020-01-01
Attending: INTERNAL MEDICINE
Payer: MEDICAID

## 2020-01-01 ENCOUNTER — HOSPITAL ENCOUNTER (EMERGENCY)
Facility: CLINIC | Age: 55
Discharge: HOME | End: 2020-12-09
Attending: EMERGENCY MEDICINE | Admitting: EMERGENCY MEDICINE
Payer: MEDICAID

## 2020-01-01 ENCOUNTER — OFFICE VISIT (OUTPATIENT)
Dept: PULMONOLOGY | Facility: CLINIC | Age: 55
End: 2020-01-01
Attending: INTERNAL MEDICINE
Payer: MEDICAID

## 2020-01-01 ENCOUNTER — NURSE TRIAGE (OUTPATIENT)
Dept: NURSING | Facility: CLINIC | Age: 55
End: 2020-01-01

## 2020-01-01 ENCOUNTER — HOSPITAL ENCOUNTER (OUTPATIENT)
Facility: CLINIC | Age: 55
Discharge: HOME | End: 2020-03-17
Attending: ANESTHESIOLOGY | Admitting: RADIOLOGY
Payer: MEDICAID

## 2020-01-01 ENCOUNTER — HOSPITAL ENCOUNTER (OUTPATIENT)
Dept: NUCLEAR MEDICINE | Facility: CLINIC | Age: 55
Setting detail: NUCLEAR MEDICINE
End: 2020-11-17
Attending: INTERNAL MEDICINE
Payer: MEDICAID

## 2020-01-01 ENCOUNTER — APPOINTMENT (OUTPATIENT)
Dept: MEDSURG UNIT | Facility: CLINIC | Age: 55
End: 2020-01-01
Attending: RADIOLOGY
Payer: MEDICAID

## 2020-01-01 ENCOUNTER — APPOINTMENT (OUTPATIENT)
Dept: CARDIOLOGY | Facility: CLINIC | Age: 55
End: 2020-01-01
Attending: NURSE PRACTITIONER
Payer: MEDICAID

## 2020-01-01 ENCOUNTER — HOSPITAL ENCOUNTER (OUTPATIENT)
Dept: CT IMAGING | Facility: CLINIC | Age: 55
End: 2020-09-16
Attending: NURSE PRACTITIONER
Payer: MEDICAID

## 2020-01-01 ENCOUNTER — APPOINTMENT (OUTPATIENT)
Dept: MRI IMAGING | Facility: CLINIC | Age: 55
End: 2020-01-01
Attending: INTERNAL MEDICINE
Payer: MEDICAID

## 2020-01-01 ENCOUNTER — APPOINTMENT (OUTPATIENT)
Dept: GENERAL RADIOLOGY | Facility: CLINIC | Age: 55
End: 2020-01-01
Attending: EMERGENCY MEDICINE
Payer: MEDICAID

## 2020-01-01 ENCOUNTER — VIRTUAL VISIT (OUTPATIENT)
Dept: ONCOLOGY | Facility: CLINIC | Age: 55
End: 2020-01-01
Attending: DIETITIAN, REGISTERED
Payer: MEDICAID

## 2020-01-01 ENCOUNTER — OFFICE VISIT (OUTPATIENT)
Dept: NEUROPSYCHOLOGY | Facility: CLINIC | Age: 55
End: 2020-01-01
Payer: MEDICAID

## 2020-01-01 ENCOUNTER — ANCILLARY PROCEDURE (OUTPATIENT)
Dept: MRI IMAGING | Facility: CLINIC | Age: 55
End: 2020-01-01
Attending: RADIOLOGY
Payer: MEDICAID

## 2020-01-01 ENCOUNTER — APPOINTMENT (OUTPATIENT)
Dept: OCCUPATIONAL THERAPY | Facility: CLINIC | Age: 55
End: 2020-01-01
Attending: NURSE PRACTITIONER
Payer: MEDICAID

## 2020-01-01 ENCOUNTER — ANCILLARY PROCEDURE (OUTPATIENT)
Dept: ULTRASOUND IMAGING | Facility: CLINIC | Age: 55
End: 2020-01-01
Attending: PHYSICIAN ASSISTANT
Payer: MEDICAID

## 2020-01-01 ENCOUNTER — VIRTUAL VISIT (OUTPATIENT)
Dept: VASCULAR SURGERY | Facility: CLINIC | Age: 55
End: 2020-01-01
Payer: MEDICAID

## 2020-01-01 ENCOUNTER — PATIENT OUTREACH (OUTPATIENT)
Dept: ONCOLOGY | Facility: CLINIC | Age: 55
End: 2020-01-01
Payer: MEDICAID

## 2020-01-01 ENCOUNTER — PRE VISIT (OUTPATIENT)
Dept: GASTROENTEROLOGY | Facility: CLINIC | Age: 55
End: 2020-01-01

## 2020-01-01 ENCOUNTER — HOSPITAL ENCOUNTER (OUTPATIENT)
Dept: MRI IMAGING | Facility: CLINIC | Age: 55
Discharge: HOME | End: 2020-02-07
Attending: INTERNAL MEDICINE | Admitting: INTERNAL MEDICINE
Payer: MEDICAID

## 2020-01-01 ENCOUNTER — HEALTH MAINTENANCE LETTER (OUTPATIENT)
Age: 55
End: 2020-01-01

## 2020-01-01 ENCOUNTER — TELEPHONE (OUTPATIENT)
Dept: PHARMACY | Facility: OTHER | Age: 55
End: 2020-01-01

## 2020-01-01 ENCOUNTER — OFFICE VISIT (OUTPATIENT)
Dept: VASCULAR SURGERY | Facility: CLINIC | Age: 55
End: 2020-01-01
Payer: MEDICAID

## 2020-01-01 ENCOUNTER — AMBULATORY - HEALTHEAST (OUTPATIENT)
Dept: OTHER | Facility: CLINIC | Age: 55
End: 2020-01-01

## 2020-01-01 ENCOUNTER — PRE VISIT (OUTPATIENT)
Dept: VASCULAR SURGERY | Facility: CLINIC | Age: 55
End: 2020-01-01

## 2020-01-01 ENCOUNTER — TELEPHONE (OUTPATIENT)
Dept: RESPIRATORY THERAPY | Facility: CLINIC | Age: 55
End: 2020-01-01

## 2020-01-01 ENCOUNTER — APPOINTMENT (OUTPATIENT)
Dept: OCCUPATIONAL THERAPY | Facility: CLINIC | Age: 55
End: 2020-01-01
Payer: MEDICAID

## 2020-01-01 ENCOUNTER — PATIENT OUTREACH (OUTPATIENT)
Dept: GASTROENTEROLOGY | Facility: CLINIC | Age: 55
End: 2020-01-01

## 2020-01-01 ENCOUNTER — ANESTHESIA (OUTPATIENT)
Dept: SURGERY | Facility: CLINIC | Age: 55
End: 2020-01-01
Payer: MEDICAID

## 2020-01-01 ENCOUNTER — DOCUMENTATION ONLY (OUTPATIENT)
Dept: CARE COORDINATION | Facility: CLINIC | Age: 55
End: 2020-01-01

## 2020-01-01 ENCOUNTER — MYC MEDICAL ADVICE (OUTPATIENT)
Dept: ONCOLOGY | Facility: CLINIC | Age: 55
End: 2020-01-01

## 2020-01-01 VITALS
RESPIRATION RATE: 16 BRPM | OXYGEN SATURATION: 90 % | WEIGHT: 167 LBS | TEMPERATURE: 98.1 F | HEART RATE: 78 BPM | DIASTOLIC BLOOD PRESSURE: 65 MMHG | BODY MASS INDEX: 31.55 KG/M2 | SYSTOLIC BLOOD PRESSURE: 100 MMHG

## 2020-01-01 VITALS
HEART RATE: 85 BPM | BODY MASS INDEX: 32.85 KG/M2 | DIASTOLIC BLOOD PRESSURE: 84 MMHG | SYSTOLIC BLOOD PRESSURE: 134 MMHG | WEIGHT: 174 LBS | HEIGHT: 61 IN | OXYGEN SATURATION: 94 % | RESPIRATION RATE: 18 BRPM

## 2020-01-01 VITALS
TEMPERATURE: 98 F | DIASTOLIC BLOOD PRESSURE: 72 MMHG | WEIGHT: 148.8 LBS | OXYGEN SATURATION: 76 % | HEART RATE: 92 BPM | SYSTOLIC BLOOD PRESSURE: 94 MMHG | RESPIRATION RATE: 20 BRPM | BODY MASS INDEX: 28.12 KG/M2

## 2020-01-01 VITALS
SYSTOLIC BLOOD PRESSURE: 110 MMHG | OXYGEN SATURATION: 94 % | DIASTOLIC BLOOD PRESSURE: 77 MMHG | RESPIRATION RATE: 18 BRPM | TEMPERATURE: 97.6 F | HEART RATE: 75 BPM

## 2020-01-01 VITALS
HEIGHT: 61 IN | SYSTOLIC BLOOD PRESSURE: 131 MMHG | OXYGEN SATURATION: 94 % | BODY MASS INDEX: 33.38 KG/M2 | WEIGHT: 176.8 LBS | DIASTOLIC BLOOD PRESSURE: 81 MMHG | HEART RATE: 87 BPM | TEMPERATURE: 98.5 F

## 2020-01-01 VITALS
DIASTOLIC BLOOD PRESSURE: 76 MMHG | HEART RATE: 73 BPM | SYSTOLIC BLOOD PRESSURE: 125 MMHG | OXYGEN SATURATION: 92 % | BODY MASS INDEX: 30.8 KG/M2 | TEMPERATURE: 98 F | RESPIRATION RATE: 16 BRPM | WEIGHT: 163 LBS

## 2020-01-01 VITALS
OXYGEN SATURATION: 89 % | HEART RATE: 74 BPM | WEIGHT: 160.05 LBS | DIASTOLIC BLOOD PRESSURE: 89 MMHG | BODY MASS INDEX: 30.24 KG/M2 | RESPIRATION RATE: 18 BRPM | TEMPERATURE: 97.8 F | SYSTOLIC BLOOD PRESSURE: 148 MMHG

## 2020-01-01 VITALS
WEIGHT: 151.3 LBS | RESPIRATION RATE: 16 BRPM | HEART RATE: 90 BPM | DIASTOLIC BLOOD PRESSURE: 80 MMHG | SYSTOLIC BLOOD PRESSURE: 118 MMHG | OXYGEN SATURATION: 94 % | TEMPERATURE: 97.8 F | BODY MASS INDEX: 28.59 KG/M2

## 2020-01-01 VITALS
OXYGEN SATURATION: 92 % | HEART RATE: 71 BPM | DIASTOLIC BLOOD PRESSURE: 75 MMHG | WEIGHT: 152.5 LBS | SYSTOLIC BLOOD PRESSURE: 118 MMHG | TEMPERATURE: 98.4 F | BODY MASS INDEX: 28.81 KG/M2

## 2020-01-01 VITALS
BODY MASS INDEX: 31.55 KG/M2 | OXYGEN SATURATION: 91 % | SYSTOLIC BLOOD PRESSURE: 114 MMHG | TEMPERATURE: 98.6 F | WEIGHT: 167 LBS | RESPIRATION RATE: 16 BRPM | HEART RATE: 84 BPM | DIASTOLIC BLOOD PRESSURE: 66 MMHG

## 2020-01-01 VITALS
TEMPERATURE: 97.5 F | DIASTOLIC BLOOD PRESSURE: 65 MMHG | OXYGEN SATURATION: 92 % | HEART RATE: 70 BPM | SYSTOLIC BLOOD PRESSURE: 115 MMHG | HEIGHT: 61 IN | WEIGHT: 173 LBS | BODY MASS INDEX: 32.66 KG/M2 | RESPIRATION RATE: 18 BRPM

## 2020-01-01 VITALS
OXYGEN SATURATION: 95 % | WEIGHT: 149.3 LBS | HEIGHT: 61 IN | HEART RATE: 78 BPM | RESPIRATION RATE: 16 BRPM | TEMPERATURE: 98 F | SYSTOLIC BLOOD PRESSURE: 98 MMHG | BODY MASS INDEX: 28.19 KG/M2 | DIASTOLIC BLOOD PRESSURE: 47 MMHG

## 2020-01-01 VITALS
HEART RATE: 82 BPM | SYSTOLIC BLOOD PRESSURE: 117 MMHG | WEIGHT: 173.5 LBS | BODY MASS INDEX: 32.78 KG/M2 | OXYGEN SATURATION: 96 % | DIASTOLIC BLOOD PRESSURE: 76 MMHG | TEMPERATURE: 98.2 F

## 2020-01-01 VITALS
OXYGEN SATURATION: 92 % | HEART RATE: 87 BPM | WEIGHT: 165.8 LBS | TEMPERATURE: 96.8 F | DIASTOLIC BLOOD PRESSURE: 86 MMHG | SYSTOLIC BLOOD PRESSURE: 129 MMHG | RESPIRATION RATE: 16 BRPM | BODY MASS INDEX: 31.33 KG/M2

## 2020-01-01 VITALS
SYSTOLIC BLOOD PRESSURE: 113 MMHG | HEART RATE: 72 BPM | RESPIRATION RATE: 20 BRPM | DIASTOLIC BLOOD PRESSURE: 58 MMHG | TEMPERATURE: 97.9 F | HEIGHT: 61 IN | WEIGHT: 152 LBS | BODY MASS INDEX: 28.7 KG/M2 | OXYGEN SATURATION: 94 %

## 2020-01-01 VITALS
DIASTOLIC BLOOD PRESSURE: 85 MMHG | SYSTOLIC BLOOD PRESSURE: 136 MMHG | HEART RATE: 78 BPM | WEIGHT: 174.7 LBS | OXYGEN SATURATION: 96 % | TEMPERATURE: 97.3 F | RESPIRATION RATE: 16 BRPM | BODY MASS INDEX: 33.56 KG/M2

## 2020-01-01 VITALS — BODY MASS INDEX: 28.72 KG/M2 | WEIGHT: 152 LBS

## 2020-01-01 VITALS
HEART RATE: 95 BPM | OXYGEN SATURATION: 90 % | WEIGHT: 175.2 LBS | BODY MASS INDEX: 33.08 KG/M2 | TEMPERATURE: 97.2 F | HEIGHT: 61 IN | SYSTOLIC BLOOD PRESSURE: 107 MMHG | RESPIRATION RATE: 18 BRPM | DIASTOLIC BLOOD PRESSURE: 58 MMHG

## 2020-01-01 VITALS — BODY MASS INDEX: 32.66 KG/M2 | HEIGHT: 61 IN | WEIGHT: 173 LBS

## 2020-01-01 VITALS
OXYGEN SATURATION: 87 % | TEMPERATURE: 98.1 F | HEART RATE: 83 BPM | SYSTOLIC BLOOD PRESSURE: 103 MMHG | RESPIRATION RATE: 24 BRPM | DIASTOLIC BLOOD PRESSURE: 68 MMHG

## 2020-01-01 VITALS
BODY MASS INDEX: 30.86 KG/M2 | SYSTOLIC BLOOD PRESSURE: 124 MMHG | OXYGEN SATURATION: 91 % | WEIGHT: 163.3 LBS | TEMPERATURE: 97.4 F | DIASTOLIC BLOOD PRESSURE: 80 MMHG | RESPIRATION RATE: 16 BRPM | HEART RATE: 83 BPM

## 2020-01-01 VITALS — HEART RATE: 82 BPM | SYSTOLIC BLOOD PRESSURE: 117 MMHG | DIASTOLIC BLOOD PRESSURE: 76 MMHG | OXYGEN SATURATION: 96 %

## 2020-01-01 VITALS
HEART RATE: 90 BPM | RESPIRATION RATE: 16 BRPM | SYSTOLIC BLOOD PRESSURE: 124 MMHG | DIASTOLIC BLOOD PRESSURE: 81 MMHG | TEMPERATURE: 98.1 F | WEIGHT: 173.5 LBS | BODY MASS INDEX: 32.76 KG/M2 | HEIGHT: 61 IN | OXYGEN SATURATION: 96 %

## 2020-01-01 DIAGNOSIS — C22.0 HCC (HEPATOCELLULAR CARCINOMA) (H): Primary | ICD-10-CM

## 2020-01-01 DIAGNOSIS — R18.8 CIRRHOSIS OF LIVER WITH ASCITES, UNSPECIFIED HEPATIC CIRRHOSIS TYPE (H): ICD-10-CM

## 2020-01-01 DIAGNOSIS — K76.82 HEPATIC ENCEPHALOPATHY (H): ICD-10-CM

## 2020-01-01 DIAGNOSIS — C22.0 HEPATOCELLULAR CARCINOMA (H): Primary | ICD-10-CM

## 2020-01-01 DIAGNOSIS — J84.10 PULMONARY FIBROSIS (H): ICD-10-CM

## 2020-01-01 DIAGNOSIS — N17.9 ACUTE RENAL FAILURE, UNSPECIFIED ACUTE RENAL FAILURE TYPE (H): ICD-10-CM

## 2020-01-01 DIAGNOSIS — C22.0 HCC (HEPATOCELLULAR CARCINOMA) (H): ICD-10-CM

## 2020-01-01 DIAGNOSIS — K74.60 CIRRHOSIS OF LIVER WITH ASCITES, UNSPECIFIED HEPATIC CIRRHOSIS TYPE (H): ICD-10-CM

## 2020-01-01 DIAGNOSIS — C22.0 HEPATOCELLULAR CARCINOMA (H): ICD-10-CM

## 2020-01-01 DIAGNOSIS — C79.51 BONE METASTASIS: ICD-10-CM

## 2020-01-01 DIAGNOSIS — F17.200 NICOTINE DEPENDENCE, UNCOMPLICATED, UNSPECIFIED NICOTINE PRODUCT TYPE: Primary | ICD-10-CM

## 2020-01-01 DIAGNOSIS — R18.8 CIRRHOSIS OF LIVER WITH ASCITES (H): Primary | ICD-10-CM

## 2020-01-01 DIAGNOSIS — E87.6 HYPOKALEMIA: ICD-10-CM

## 2020-01-01 DIAGNOSIS — R11.0 NAUSEA: ICD-10-CM

## 2020-01-01 DIAGNOSIS — C79.51 BONE METASTASIS: Primary | ICD-10-CM

## 2020-01-01 DIAGNOSIS — K76.6 PORTAL HYPERTENSION (H): ICD-10-CM

## 2020-01-01 DIAGNOSIS — R41.0 CONFUSION: ICD-10-CM

## 2020-01-01 DIAGNOSIS — R41.0 CONFUSION: Primary | ICD-10-CM

## 2020-01-01 DIAGNOSIS — R63.0 ANOREXIA: ICD-10-CM

## 2020-01-01 DIAGNOSIS — R10.84 ABDOMINAL PAIN, GENERALIZED: ICD-10-CM

## 2020-01-01 DIAGNOSIS — Z01.818 PRE-TRANSPLANT EVALUATION FOR CHRONIC LIVER DISEASE: ICD-10-CM

## 2020-01-01 DIAGNOSIS — Z20.828 CONTACT WITH AND (SUSPECTED) EXPOSURE TO OTHER VIRAL COMMUNICABLE DISEASES: ICD-10-CM

## 2020-01-01 DIAGNOSIS — K74.60 CIRRHOSIS (H): ICD-10-CM

## 2020-01-01 DIAGNOSIS — B18.2 CHRONIC HEPATITIS C WITHOUT HEPATIC COMA (H): ICD-10-CM

## 2020-01-01 DIAGNOSIS — R63.4 WEIGHT LOSS: ICD-10-CM

## 2020-01-01 DIAGNOSIS — D69.6 THROMBOCYTOPENIA (H): Primary | ICD-10-CM

## 2020-01-01 DIAGNOSIS — J84.10 PULMONARY FIBROSIS (H): Primary | ICD-10-CM

## 2020-01-01 DIAGNOSIS — R06.00 DYSPNEA: ICD-10-CM

## 2020-01-01 DIAGNOSIS — D75.9 CYTOPENIA: ICD-10-CM

## 2020-01-01 DIAGNOSIS — K74.60 CIRRHOSIS OF LIVER WITH ASCITES, UNSPECIFIED HEPATIC CIRRHOSIS TYPE (H): Primary | ICD-10-CM

## 2020-01-01 DIAGNOSIS — R19.7 DIARRHEA, UNSPECIFIED TYPE: ICD-10-CM

## 2020-01-01 DIAGNOSIS — M79.662 PAIN OF LEFT CALF: ICD-10-CM

## 2020-01-01 DIAGNOSIS — N39.0 URINARY TRACT INFECTION WITHOUT HEMATURIA, SITE UNSPECIFIED: ICD-10-CM

## 2020-01-01 DIAGNOSIS — K74.60 CIRRHOSIS OF LIVER WITHOUT ASCITES, UNSPECIFIED HEPATIC CIRRHOSIS TYPE (H): ICD-10-CM

## 2020-01-01 DIAGNOSIS — K74.60 CIRRHOSIS OF LIVER (H): ICD-10-CM

## 2020-01-01 DIAGNOSIS — B18.2 HEPATITIS C VIRUS CARRIER STATE (H): ICD-10-CM

## 2020-01-01 DIAGNOSIS — K76.9 LIVER LESION: ICD-10-CM

## 2020-01-01 DIAGNOSIS — R94.4 ABNORMAL RENAL FUNCTION TEST: Primary | ICD-10-CM

## 2020-01-01 DIAGNOSIS — J44.89 OBSTRUCTIVE CHRONIC BRONCHITIS WITHOUT EXACERBATION (H): ICD-10-CM

## 2020-01-01 DIAGNOSIS — G89.3 CANCER RELATED PAIN: Primary | ICD-10-CM

## 2020-01-01 DIAGNOSIS — Z72.0 TOBACCO ABUSE DISORDER: Primary | ICD-10-CM

## 2020-01-01 DIAGNOSIS — J18.9 PNEUMONIA DUE TO INFECTIOUS ORGANISM, UNSPECIFIED LATERALITY, UNSPECIFIED PART OF LUNG: ICD-10-CM

## 2020-01-01 DIAGNOSIS — E11.9 TYPE 2 DIABETES MELLITUS WITHOUT COMPLICATION, WITH LONG-TERM CURRENT USE OF INSULIN (H): ICD-10-CM

## 2020-01-01 DIAGNOSIS — Z53.9 NO SHOW: Primary | ICD-10-CM

## 2020-01-01 DIAGNOSIS — E83.42 HYPOMAGNESEMIA: ICD-10-CM

## 2020-01-01 DIAGNOSIS — R18.8 CIRRHOSIS OF LIVER WITH ASCITES, UNSPECIFIED HEPATIC CIRRHOSIS TYPE (H): Primary | ICD-10-CM

## 2020-01-01 DIAGNOSIS — R94.4 ABNORMAL RENAL FUNCTION TEST: ICD-10-CM

## 2020-01-01 DIAGNOSIS — Z79.4 TYPE 2 DIABETES MELLITUS WITHOUT COMPLICATION, WITH LONG-TERM CURRENT USE OF INSULIN (H): ICD-10-CM

## 2020-01-01 DIAGNOSIS — Z76.82 AWAITING TRANSPLANTATION OF LIVER: Primary | ICD-10-CM

## 2020-01-01 DIAGNOSIS — R80.9 PROTEINURIA, UNSPECIFIED TYPE: ICD-10-CM

## 2020-01-01 DIAGNOSIS — G89.3 CANCER ASSOCIATED PAIN: ICD-10-CM

## 2020-01-01 DIAGNOSIS — R19.5 LOOSE STOOLS: ICD-10-CM

## 2020-01-01 DIAGNOSIS — R10.11 RUQ ABDOMINAL PAIN: Primary | ICD-10-CM

## 2020-01-01 DIAGNOSIS — F41.9 ANXIOUSNESS: ICD-10-CM

## 2020-01-01 DIAGNOSIS — Z71.6 ENCOUNTER FOR TOBACCO USE CESSATION COUNSELING: ICD-10-CM

## 2020-01-01 DIAGNOSIS — F17.210 CIGARETTE SMOKER: ICD-10-CM

## 2020-01-01 DIAGNOSIS — R35.0 URINARY FREQUENCY: ICD-10-CM

## 2020-01-01 DIAGNOSIS — Z20.822 COVID-19 RULED OUT BY LABORATORY TESTING: ICD-10-CM

## 2020-01-01 DIAGNOSIS — L29.9 PRURITIC DISORDER: ICD-10-CM

## 2020-01-01 DIAGNOSIS — K74.60 CIRRHOSIS OF LIVER WITH ASCITES (H): Primary | ICD-10-CM

## 2020-01-01 DIAGNOSIS — Z71.89 ADVANCE CARE PLANNING: ICD-10-CM

## 2020-01-01 DIAGNOSIS — K74.60 CIRRHOSIS OF LIVER (H): Primary | ICD-10-CM

## 2020-01-01 DIAGNOSIS — M79.662 PAIN OF LEFT CALF: Primary | ICD-10-CM

## 2020-01-01 DIAGNOSIS — R09.02 HYPOXEMIA: ICD-10-CM

## 2020-01-01 DIAGNOSIS — Z72.0 DECLINED SMOKING CESSATION: ICD-10-CM

## 2020-01-01 DIAGNOSIS — J84.9 ILD (INTERSTITIAL LUNG DISEASE) (H): Primary | ICD-10-CM

## 2020-01-01 DIAGNOSIS — R41.3 COMPLAINTS OF MEMORY DISTURBANCE: Primary | ICD-10-CM

## 2020-01-01 DIAGNOSIS — I81 PORTAL VEIN THROMBOSIS: ICD-10-CM

## 2020-01-01 DIAGNOSIS — E87.6 HYPOKALEMIA: Primary | ICD-10-CM

## 2020-01-01 DIAGNOSIS — K74.60 CIRRHOSIS (H): Primary | ICD-10-CM

## 2020-01-01 DIAGNOSIS — R91.8 GROUND GLASS OPACITY PRESENT ON IMAGING OF LUNG: ICD-10-CM

## 2020-01-01 DIAGNOSIS — F33.1 MAJOR DEPRESSIVE DISORDER, RECURRENT EPISODE, MODERATE (H): ICD-10-CM

## 2020-01-01 DIAGNOSIS — G47.00 INSOMNIA, UNSPECIFIED TYPE: ICD-10-CM

## 2020-01-01 DIAGNOSIS — R53.1 WEAKNESS GENERALIZED: ICD-10-CM

## 2020-01-01 DIAGNOSIS — J43.9 EMPHYSEMA OF LUNG (H): Primary | ICD-10-CM

## 2020-01-01 LAB
A FLAVUS AB SER QL ID: NORMAL
A FUMIGATUS1 AB SER QL ID: NORMAL
A FUMIGATUS2 AB SER QL ID: NORMAL
A FUMIGATUS3 AB SER QL ID: NORMAL
A FUMIGATUS6 AB SER QL ID: NORMAL
A PULLULANS AB SER QL ID: NORMAL
ABO + RH BLD: NORMAL
ABO + RH BLD: NORMAL
AFP SERPL-MCNC: 13.7 UG/L (ref 0–8)
AFP SERPL-MCNC: 16.6 UG/L (ref 0–8)
AFP SERPL-MCNC: 23.6 UG/L (ref 0–8)
AFP SERPL-MCNC: 4.5 UG/L (ref 0–8)
AFP SERPL-MCNC: 4.7 UG/L (ref 0–8)
AFP SERPL-MCNC: 5.6 UG/L (ref 0–8)
AFP SERPL-MCNC: 7.9 UG/L (ref 0–8)
AFP SERPL-MCNC: 8.8 UG/L (ref 0–8)
AFP SERPL-MCNC: 9.2 UG/L (ref 0–8)
ALBUMIN FLD-MCNC: 0.4 G/DL
ALBUMIN SERPL-MCNC: 2.3 G/DL (ref 3.4–5)
ALBUMIN SERPL-MCNC: 2.3 G/DL (ref 3.4–5)
ALBUMIN SERPL-MCNC: 2.4 G/DL (ref 3.4–5)
ALBUMIN SERPL-MCNC: 2.6 G/DL (ref 3.4–5)
ALBUMIN SERPL-MCNC: 2.6 G/DL (ref 3.4–5)
ALBUMIN SERPL-MCNC: 2.7 G/DL (ref 3.4–5)
ALBUMIN SERPL-MCNC: 2.8 G/DL (ref 3.4–5)
ALBUMIN SERPL-MCNC: 2.8 G/DL (ref 3.4–5)
ALBUMIN SERPL-MCNC: 2.9 G/DL (ref 3.4–5)
ALBUMIN SERPL-MCNC: 2.9 G/DL (ref 3.4–5)
ALBUMIN SERPL-MCNC: 3.1 G/DL (ref 3.4–5)
ALBUMIN SERPL-MCNC: 3.2 G/DL (ref 3.4–5)
ALBUMIN SERPL-MCNC: 3.3 G/DL (ref 3.4–5)
ALBUMIN SERPL-MCNC: 3.4 G/DL (ref 3.4–5)
ALBUMIN SERPL-MCNC: 3.5 G/DL (ref 3.4–5)
ALBUMIN UR-MCNC: 10 MG/DL
ALBUMIN UR-MCNC: 10 MG/DL
ALBUMIN UR-MCNC: 30 MG/DL
ALBUMIN UR-MCNC: NEGATIVE MG/DL
ALDOLASE SERPL-CCNC: 5.3 U/L (ref 1.5–8.1)
ALP SERPL-CCNC: 103 U/L (ref 40–150)
ALP SERPL-CCNC: 111 U/L (ref 40–150)
ALP SERPL-CCNC: 111 U/L (ref 40–150)
ALP SERPL-CCNC: 112 U/L (ref 40–150)
ALP SERPL-CCNC: 114 U/L (ref 40–150)
ALP SERPL-CCNC: 115 U/L (ref 40–150)
ALP SERPL-CCNC: 119 U/L (ref 40–150)
ALP SERPL-CCNC: 120 U/L (ref 40–150)
ALP SERPL-CCNC: 120 U/L (ref 40–150)
ALP SERPL-CCNC: 125 U/L (ref 40–150)
ALP SERPL-CCNC: 130 U/L (ref 40–150)
ALP SERPL-CCNC: 149 U/L (ref 40–150)
ALP SERPL-CCNC: 151 U/L (ref 40–150)
ALP SERPL-CCNC: 155 U/L (ref 40–150)
ALP SERPL-CCNC: 157 U/L (ref 40–150)
ALP SERPL-CCNC: 166 U/L (ref 40–150)
ALP SERPL-CCNC: 176 U/L (ref 40–150)
ALP SERPL-CCNC: 177 U/L (ref 40–150)
ALP SERPL-CCNC: 317 U/L (ref 40–150)
ALP SERPL-CCNC: 81 U/L (ref 40–150)
ALP SERPL-CCNC: 83 U/L (ref 40–150)
ALP SERPL-CCNC: 90 U/L (ref 40–150)
ALP SERPL-CCNC: 92 U/L (ref 40–150)
ALP SERPL-CCNC: 93 U/L (ref 40–150)
ALT SERPL W P-5'-P-CCNC: 24 U/L (ref 0–50)
ALT SERPL W P-5'-P-CCNC: 24 U/L (ref 0–50)
ALT SERPL W P-5'-P-CCNC: 25 U/L (ref 0–50)
ALT SERPL W P-5'-P-CCNC: 26 U/L (ref 0–50)
ALT SERPL W P-5'-P-CCNC: 27 U/L (ref 0–50)
ALT SERPL W P-5'-P-CCNC: 28 U/L (ref 0–50)
ALT SERPL W P-5'-P-CCNC: 28 U/L (ref 0–50)
ALT SERPL W P-5'-P-CCNC: 29 U/L (ref 0–50)
ALT SERPL W P-5'-P-CCNC: 30 U/L (ref 0–50)
ALT SERPL W P-5'-P-CCNC: 30 U/L (ref 0–50)
ALT SERPL W P-5'-P-CCNC: 31 U/L (ref 0–50)
ALT SERPL W P-5'-P-CCNC: 31 U/L (ref 0–50)
ALT SERPL W P-5'-P-CCNC: 34 U/L (ref 0–50)
ALT SERPL W P-5'-P-CCNC: 35 U/L (ref 0–50)
ALT SERPL W P-5'-P-CCNC: 36 U/L (ref 0–50)
ALT SERPL W P-5'-P-CCNC: 37 U/L (ref 0–50)
ALT SERPL W P-5'-P-CCNC: 37 U/L (ref 0–50)
ALT SERPL W P-5'-P-CCNC: 38 U/L (ref 0–50)
ALT SERPL W P-5'-P-CCNC: 38 U/L (ref 0–50)
ALT SERPL W P-5'-P-CCNC: 39 U/L (ref 0–50)
ALT SERPL W P-5'-P-CCNC: 40 U/L (ref 0–50)
ALT SERPL W P-5'-P-CCNC: 45 U/L (ref 0–50)
ALT SERPL W P-5'-P-CCNC: 57 U/L (ref 0–50)
ALT SERPL W P-5'-P-CCNC: 94 U/L (ref 0–50)
ALT SERPL-CCNC: 26 U/L (ref 0–55)
AMMONIA PLAS-SCNC: 29 UMOL/L (ref 10–50)
AMMONIA PLAS-SCNC: 43 UMOL/L (ref 10–50)
AMMONIA PLAS-SCNC: 53 UMOL/L (ref 10–50)
AMMONIA PLAS-SCNC: 53 UMOL/L (ref 10–50)
AMMONIA PLAS-SCNC: 71 UMOL/L (ref 10–50)
AMMONIA PLAS-SCNC: 84 UMOL/L (ref 10–50)
AMMONIA PLAS-SCNC: 87 UMOL/L (ref 10–50)
ANA SER QL IF: NEGATIVE
ANCA AB PATTERN SER IF-IMP: NORMAL
ANION GAP SERPL CALCULATED.3IONS-SCNC: 3 MMOL/L (ref 3–14)
ANION GAP SERPL CALCULATED.3IONS-SCNC: 3 MMOL/L (ref 3–14)
ANION GAP SERPL CALCULATED.3IONS-SCNC: 4 MMOL/L (ref 3–14)
ANION GAP SERPL CALCULATED.3IONS-SCNC: 5 MMOL/L (ref 3–14)
ANION GAP SERPL CALCULATED.3IONS-SCNC: 6 MMOL/L (ref 3–14)
ANION GAP SERPL CALCULATED.3IONS-SCNC: 7 MMOL/L (ref 3–14)
ANION GAP SERPL CALCULATED.3IONS-SCNC: 7 MMOL/L (ref 3–14)
ANION GAP SERPL CALCULATED.3IONS-SCNC: 8 MMOL/L (ref 3–14)
APPEARANCE FLD: CLEAR
APPEARANCE UR: ABNORMAL
APPEARANCE UR: ABNORMAL
APPEARANCE UR: CLEAR
APTT PPP: 27 SEC (ref 22–37)
AST SERPL W P-5'-P-CCNC: 26 U/L (ref 0–45)
AST SERPL W P-5'-P-CCNC: 26 U/L (ref 0–45)
AST SERPL W P-5'-P-CCNC: 28 U/L (ref 0–45)
AST SERPL W P-5'-P-CCNC: 29 U/L (ref 0–45)
AST SERPL W P-5'-P-CCNC: 30 U/L (ref 0–45)
AST SERPL W P-5'-P-CCNC: 35 U/L (ref 0–45)
AST SERPL W P-5'-P-CCNC: 36 U/L (ref 0–45)
AST SERPL W P-5'-P-CCNC: 36 U/L (ref 0–45)
AST SERPL W P-5'-P-CCNC: 37 U/L (ref 0–45)
AST SERPL W P-5'-P-CCNC: 37 U/L (ref 0–45)
AST SERPL W P-5'-P-CCNC: 40 U/L (ref 0–45)
AST SERPL W P-5'-P-CCNC: 41 U/L (ref 0–45)
AST SERPL W P-5'-P-CCNC: 42 U/L (ref 0–45)
AST SERPL W P-5'-P-CCNC: 43 U/L (ref 0–45)
AST SERPL W P-5'-P-CCNC: 43 U/L (ref 0–45)
AST SERPL W P-5'-P-CCNC: 45 U/L (ref 0–45)
AST SERPL W P-5'-P-CCNC: 47 U/L (ref 0–45)
AST SERPL W P-5'-P-CCNC: 49 U/L (ref 0–45)
AST SERPL W P-5'-P-CCNC: 52 U/L (ref 0–45)
AST SERPL W P-5'-P-CCNC: 63 U/L (ref 0–45)
AST SERPL W P-5'-P-CCNC: 75 U/L (ref 0–45)
AST SERPL W P-5'-P-CCNC: 84 U/L (ref 0–45)
AST SERPL W P-5'-P-CCNC: ABNORMAL U/L (ref 0–45)
AST SERPL W P-5'-P-CCNC: ABNORMAL U/L (ref 0–45)
AST SERPL-CCNC: 36 U/L (ref 10–40)
B-HCG SERPL-ACNC: 7 IU/L (ref 0–5)
B-HCG SERPL-ACNC: 7 IU/L (ref 0–5)
BACTERIA #/AREA URNS HPF: ABNORMAL /HPF
BACTERIA SPEC CULT: ABNORMAL
BACTERIA SPEC CULT: NO GROWTH
BACTERIA SPEC CULT: NORMAL
BASE EXCESS BLDA CALC-SCNC: 1 MMOL/L
BASE EXCESS BLDV CALC-SCNC: 4.2 MMOL/L
BASOPHILS # BLD AUTO: 0 10E9/L (ref 0–0.2)
BASOPHILS NFR BLD AUTO: 0 %
BASOPHILS NFR BLD AUTO: 0 %
BASOPHILS NFR BLD AUTO: 0.3 %
BASOPHILS NFR BLD AUTO: 0.4 %
BASOPHILS NFR BLD AUTO: 0.5 %
BASOPHILS NFR BLD AUTO: 0.6 %
BASOPHILS NFR BLD AUTO: 0.7 %
BASOPHILS NFR BLD AUTO: 0.7 %
BASOPHILS NFR BLD AUTO: 0.8 %
BASOPHILS NFR BLD AUTO: 0.9 %
BILIRUB DIRECT SERPL-MCNC: 0.3 MG/DL (ref 0–0.2)
BILIRUB DIRECT SERPL-MCNC: 0.3 MG/DL (ref 0–0.2)
BILIRUB DIRECT SERPL-MCNC: 0.4 MG/DL (ref 0–0.2)
BILIRUB SERPL-MCNC: 0.6 MG/DL (ref 0.2–1.3)
BILIRUB SERPL-MCNC: 0.8 MG/DL (ref 0.2–1.3)
BILIRUB SERPL-MCNC: 0.9 MG/DL (ref 0.2–1.3)
BILIRUB SERPL-MCNC: 1 MG/DL (ref 0.2–1.3)
BILIRUB SERPL-MCNC: 1.1 MG/DL (ref 0.2–1.3)
BILIRUB SERPL-MCNC: 1.1 MG/DL (ref 0.2–1.3)
BILIRUB SERPL-MCNC: 1.2 MG/DL (ref 0.2–1.3)
BILIRUB SERPL-MCNC: 1.2 MG/DL (ref 0.2–1.3)
BILIRUB SERPL-MCNC: 1.3 MG/DL (ref 0.2–1.3)
BILIRUB SERPL-MCNC: 1.3 MG/DL (ref 0.2–1.3)
BILIRUB SERPL-MCNC: 1.4 MG/DL (ref 0.2–1.3)
BILIRUB SERPL-MCNC: 1.5 MG/DL (ref 0.2–1.3)
BILIRUB SERPL-MCNC: 1.5 MG/DL (ref 0.2–1.3)
BILIRUB SERPL-MCNC: 1.6 MG/DL (ref 0.2–1.3)
BILIRUB SERPL-MCNC: 2.2 MG/DL (ref 0.2–1.3)
BILIRUB SERPL-MCNC: 2.2 MG/DL (ref 0.2–1.3)
BILIRUB SERPL-MCNC: 2.4 MG/DL (ref 0.2–1.3)
BILIRUB SERPL-MCNC: 3.2 MG/DL (ref 0.2–1.3)
BILIRUB UR QL STRIP: ABNORMAL
BILIRUB UR QL STRIP: NEGATIVE
BLD GP AB SCN SERPL QL: NORMAL
BLD PROD TYP BPU: NORMAL
BLD PROD TYP BPU: NORMAL
BLD UNIT ID BPU: 0
BLOOD BANK CMNT PATIENT-IMP: NORMAL
BLOOD PRODUCT CODE: NORMAL
BPU ID: NORMAL
BUN SERPL-MCNC: 10 MG/DL (ref 7–30)
BUN SERPL-MCNC: 11 MG/DL (ref 7–30)
BUN SERPL-MCNC: 12 MG/DL (ref 7–30)
BUN SERPL-MCNC: 14 MG/DL (ref 7–30)
BUN SERPL-MCNC: 4 MG/DL (ref 7–30)
BUN SERPL-MCNC: 5 MG/DL (ref 7–30)
BUN SERPL-MCNC: 5 MG/DL (ref 7–30)
BUN SERPL-MCNC: 6 MG/DL (ref 7–30)
BUN SERPL-MCNC: 7 MG/DL (ref 7–30)
BUN SERPL-MCNC: 8 MG/DL (ref 7–30)
BUN SERPL-MCNC: 9 MG/DL (ref 7–30)
C COLI+JEJUNI+LARI FUSA STL QL NAA+PROBE: NOT DETECTED
C DIFF TOX B STL QL: NEGATIVE
C DIFF TOX B STL QL: NEGATIVE
C PNEUM DNA SPEC QL NAA+PROBE: NOT DETECTED
C-ANCA TITR SER IF: NORMAL {TITER}
CALCIUM SERPL-MCNC: 7.5 MG/DL (ref 8.5–10.1)
CALCIUM SERPL-MCNC: 7.8 MG/DL (ref 8.5–10.1)
CALCIUM SERPL-MCNC: 7.9 MG/DL (ref 8.5–10.1)
CALCIUM SERPL-MCNC: 7.9 MG/DL (ref 8.5–10.1)
CALCIUM SERPL-MCNC: 8.2 MG/DL (ref 8.5–10.1)
CALCIUM SERPL-MCNC: 8.3 MG/DL (ref 8.5–10.1)
CALCIUM SERPL-MCNC: 8.3 MG/DL (ref 8.5–10.1)
CALCIUM SERPL-MCNC: 8.4 MG/DL (ref 8.5–10.1)
CALCIUM SERPL-MCNC: 8.5 MG/DL (ref 8.5–10.1)
CALCIUM SERPL-MCNC: 8.6 MG/DL (ref 8.5–10.1)
CALCIUM SERPL-MCNC: 8.6 MG/DL (ref 8.5–10.1)
CALCIUM SERPL-MCNC: 8.7 MG/DL (ref 8.5–10.1)
CALCIUM SERPL-MCNC: 8.7 MG/DL (ref 8.5–10.1)
CALCIUM SERPL-MCNC: 8.9 MG/DL (ref 8.5–10.1)
CALCIUM SERPL-MCNC: 9 MG/DL (ref 8.5–10.1)
CALCIUM SERPL-MCNC: 9.1 MG/DL (ref 8.5–10.1)
CALCIUM SERPL-MCNC: 9.2 MG/DL (ref 8.5–10.1)
CCP AB SER IA-ACNC: 2 U/ML
CHLORIDE SERPL-SCNC: 100 MMOL/L (ref 94–109)
CHLORIDE SERPL-SCNC: 100 MMOL/L (ref 94–109)
CHLORIDE SERPL-SCNC: 101 MMOL/L (ref 94–109)
CHLORIDE SERPL-SCNC: 101 MMOL/L (ref 94–109)
CHLORIDE SERPL-SCNC: 102 MMOL/L (ref 94–109)
CHLORIDE SERPL-SCNC: 103 MMOL/L (ref 94–109)
CHLORIDE SERPL-SCNC: 105 MMOL/L (ref 94–109)
CHLORIDE SERPL-SCNC: 106 MMOL/L (ref 94–109)
CHLORIDE SERPL-SCNC: 107 MMOL/L (ref 94–109)
CHLORIDE SERPL-SCNC: 107 MMOL/L (ref 94–109)
CHLORIDE SERPL-SCNC: 108 MMOL/L (ref 94–109)
CHLORIDE SERPL-SCNC: 109 MMOL/L (ref 94–109)
CHLORIDE SERPL-SCNC: 110 MMOL/L (ref 94–109)
CHLORIDE SERPL-SCNC: 111 MMOL/L (ref 94–109)
CHLORIDE SERPL-SCNC: 114 MMOL/L (ref 94–109)
CHLORIDE SERPL-SCNC: 99 MMOL/L (ref 94–109)
CK SERPL-CCNC: 60 U/L (ref 30–225)
CO2 SERPL-SCNC: 24 MMOL/L (ref 20–32)
CO2 SERPL-SCNC: 25 MMOL/L (ref 20–32)
CO2 SERPL-SCNC: 26 MMOL/L (ref 20–32)
CO2 SERPL-SCNC: 27 MMOL/L (ref 20–32)
CO2 SERPL-SCNC: 28 MMOL/L (ref 20–32)
CO2 SERPL-SCNC: 28 MMOL/L (ref 20–32)
CO2 SERPL-SCNC: 29 MMOL/L (ref 20–32)
CO2 SERPL-SCNC: 30 MMOL/L (ref 20–32)
COHGB MFR BLD: 3.8 % (ref 0–2)
COLOR FLD: YELLOW
COLOR UR AUTO: YELLOW
COPATH REPORT: NORMAL
COPATH REPORT: NORMAL
CREAT SERPL-MCNC: 0.48 MG/DL (ref 0.52–1.04)
CREAT SERPL-MCNC: 0.49 MG/DL (ref 0.52–1.04)
CREAT SERPL-MCNC: 0.49 MG/DL (ref 0.52–1.04)
CREAT SERPL-MCNC: 0.5 MG/DL (ref 0.52–1.04)
CREAT SERPL-MCNC: 0.5 MG/DL (ref 0.52–1.04)
CREAT SERPL-MCNC: 0.51 MG/DL (ref 0.52–1.04)
CREAT SERPL-MCNC: 0.52 MG/DL (ref 0.52–1.04)
CREAT SERPL-MCNC: 0.52 MG/DL (ref 0.52–1.04)
CREAT SERPL-MCNC: 0.53 MG/DL (ref 0.52–1.04)
CREAT SERPL-MCNC: 0.53 MG/DL (ref 0.52–1.04)
CREAT SERPL-MCNC: 0.58 MG/DL (ref 0.55–1.02)
CREAT SERPL-MCNC: 0.59 MG/DL (ref 0.52–1.04)
CREAT SERPL-MCNC: 0.6 MG/DL (ref 0.52–1.04)
CREAT SERPL-MCNC: 0.6 MG/DL (ref 0.52–1.04)
CREAT SERPL-MCNC: 0.61 MG/DL (ref 0.52–1.04)
CREAT SERPL-MCNC: 0.62 MG/DL (ref 0.52–1.04)
CREAT SERPL-MCNC: 0.67 MG/DL (ref 0.52–1.04)
CREAT SERPL-MCNC: 0.69 MG/DL (ref 0.52–1.04)
CREAT SERPL-MCNC: 0.71 MG/DL (ref 0.52–1.04)
CREAT SERPL-MCNC: 0.73 MG/DL (ref 0.52–1.04)
CREAT SERPL-MCNC: 0.77 MG/DL (ref 0.52–1.04)
CREAT SERPL-MCNC: 0.77 MG/DL (ref 0.52–1.04)
CREAT SERPL-MCNC: 0.79 MG/DL (ref 0.52–1.04)
CREAT SERPL-MCNC: 0.81 MG/DL (ref 0.52–1.04)
CREAT SERPL-MCNC: 0.84 MG/DL (ref 0.52–1.04)
CREAT SERPL-MCNC: 1.16 MG/DL (ref 0.52–1.04)
CREAT SERPL-MCNC: 2.02 MG/DL (ref 0.52–1.04)
CREAT UR-MCNC: 116 MG/DL
CREAT UR-MCNC: 130 MG/DL
CREAT UR-MCNC: 344 MG/DL
CRP SERPL-MCNC: <2.9 MG/L (ref 0–8)
DIFFERENTIAL METHOD BLD: ABNORMAL
DLCOUNC-%PRED-PRE: 63 %
DLCOUNC-PRE: 12.15 ML/MIN/MMHG
DLCOUNC-PRED: 19.18 ML/MIN/MMHG
EC STX1 GENE STL QL NAA+PROBE: NOT DETECTED
EC STX2 GENE STL QL NAA+PROBE: NOT DETECTED
ENA JO1 IGG SER-ACNC: <0.2 AI (ref 0–0.9)
ENA SCL70 IGG SER IA-ACNC: <0.2 AI (ref 0–0.9)
ENA SS-A IGG SER IA-ACNC: <0.2 AI (ref 0–0.9)
ENA SS-B IGG SER IA-ACNC: <0.2 AI (ref 0–0.9)
ENTERIC PATHOGEN COMMENT: NORMAL
EOSINOPHIL # BLD AUTO: 0 10E9/L (ref 0–0.7)
EOSINOPHIL # BLD AUTO: 0.1 10E9/L (ref 0–0.7)
EOSINOPHIL NFR BLD AUTO: 0.7 %
EOSINOPHIL NFR BLD AUTO: 1 %
EOSINOPHIL NFR BLD AUTO: 1.1 %
EOSINOPHIL NFR BLD AUTO: 1.3 %
EOSINOPHIL NFR BLD AUTO: 1.3 %
EOSINOPHIL NFR BLD AUTO: 2 %
EOSINOPHIL NFR BLD AUTO: 2 %
EOSINOPHIL NFR BLD AUTO: 2.1 %
EOSINOPHIL NFR BLD AUTO: 2.2 %
EOSINOPHIL NFR BLD AUTO: 2.2 %
EOSINOPHIL NFR BLD AUTO: 2.3 %
EOSINOPHIL NFR BLD AUTO: 2.4 %
EOSINOPHIL NFR BLD AUTO: 2.5 %
EOSINOPHIL NFR BLD AUTO: 2.6 %
EOSINOPHIL NFR BLD AUTO: 3 %
EOSINOPHIL NFR BLD AUTO: 3 %
EOSINOPHIL NFR BLD AUTO: 3.1 %
EOSINOPHIL NFR BLD AUTO: 3.8 %
ERV-%PRED-PRE: 104 %
ERV-PRE: 0.58 L
ERV-PRED: 0.55 L
ERYTHROCYTE [DISTWIDTH] IN BLOOD BY AUTOMATED COUNT: 13.5 % (ref 10–15)
ERYTHROCYTE [DISTWIDTH] IN BLOOD BY AUTOMATED COUNT: 13.6 % (ref 10–15)
ERYTHROCYTE [DISTWIDTH] IN BLOOD BY AUTOMATED COUNT: 14 % (ref 10–15)
ERYTHROCYTE [DISTWIDTH] IN BLOOD BY AUTOMATED COUNT: 14 % (ref 10–15)
ERYTHROCYTE [DISTWIDTH] IN BLOOD BY AUTOMATED COUNT: 14.7 % (ref 10–15)
ERYTHROCYTE [DISTWIDTH] IN BLOOD BY AUTOMATED COUNT: 15.2 % (ref 10–15)
ERYTHROCYTE [DISTWIDTH] IN BLOOD BY AUTOMATED COUNT: 15.3 % (ref 10–15)
ERYTHROCYTE [DISTWIDTH] IN BLOOD BY AUTOMATED COUNT: 15.5 % (ref 10–15)
ERYTHROCYTE [DISTWIDTH] IN BLOOD BY AUTOMATED COUNT: 15.5 % (ref 10–15)
ERYTHROCYTE [DISTWIDTH] IN BLOOD BY AUTOMATED COUNT: 15.6 % (ref 10–15)
ERYTHROCYTE [DISTWIDTH] IN BLOOD BY AUTOMATED COUNT: 15.7 % (ref 10–15)
ERYTHROCYTE [DISTWIDTH] IN BLOOD BY AUTOMATED COUNT: 15.7 % (ref 10–15)
ERYTHROCYTE [DISTWIDTH] IN BLOOD BY AUTOMATED COUNT: 15.9 % (ref 10–15)
ERYTHROCYTE [DISTWIDTH] IN BLOOD BY AUTOMATED COUNT: 17 % (ref 10–15)
ERYTHROCYTE [DISTWIDTH] IN BLOOD BY AUTOMATED COUNT: 17.2 % (ref 10–15)
ERYTHROCYTE [DISTWIDTH] IN BLOOD BY AUTOMATED COUNT: 17.9 % (ref 10–15)
ERYTHROCYTE [DISTWIDTH] IN BLOOD BY AUTOMATED COUNT: 18.1 % (ref 10–15)
ERYTHROCYTE [DISTWIDTH] IN BLOOD BY AUTOMATED COUNT: 18.2 % (ref 10–15)
ERYTHROCYTE [DISTWIDTH] IN BLOOD BY AUTOMATED COUNT: 18.2 % (ref 10–15)
ERYTHROCYTE [DISTWIDTH] IN BLOOD BY AUTOMATED COUNT: 18.3 % (ref 10–15)
ERYTHROCYTE [SEDIMENTATION RATE] IN BLOOD BY WESTERGREN METHOD: 35 MM/H (ref 0–30)
EXPTIME-PRE: 6.58 SEC
FEF2575-%PRED-PRE: 91 %
FEF2575-PRE: 2.14 L/SEC
FEF2575-PRED: 2.35 L/SEC
FEFMAX-%PRED-PRE: 104 %
FEFMAX-PRE: 6.47 L/SEC
FEFMAX-PRED: 6.19 L/SEC
FEV1-%PRED-PRE: 76 %
FEV1-PRE: 1.87 L
FEV1FEV6-PRE: 83 %
FEV1FEV6-PRED: 81 %
FEV1FVC-PRE: 83 %
FEV1FVC-PRED: 80 %
FEV1SVC-PRE: 82 %
FEV1SVC-PRED: 79 %
FIFMAX-PRE: 3.45 L/SEC
FIO2-PRE: 21 %
FLUAV H1 2009 PAND RNA SPEC QL NAA+PROBE: NOT DETECTED
FLUAV H1 RNA SPEC QL NAA+PROBE: NOT DETECTED
FLUAV H3 RNA SPEC QL NAA+PROBE: NOT DETECTED
FLUAV RNA SPEC QL NAA+PROBE: NOT DETECTED
FLUBV RNA SPEC QL NAA+PROBE: NOT DETECTED
FOLATE SERPL-MCNC: 12.4 NG/ML
FOLATE SERPL-MCNC: 7.4 NG/ML
FRCPLETH-%PRED-PRE: 77 %
FRCPLETH-PRE: 2.01 L
FRCPLETH-PRED: 2.58 L
FVC-%PRED-PRE: 73 %
FVC-PRE: 2.26 L
FVC-PRED: 3.06 L
GFR SERPL CREATININE-BSD FRML MDRD: 27 ML/MIN/{1.73_M2}
GFR SERPL CREATININE-BSD FRML MDRD: 52 ML/MIN/{1.73_M2}
GFR SERPL CREATININE-BSD FRML MDRD: 77 ML/MIN/{1.73_M2}
GFR SERPL CREATININE-BSD FRML MDRD: 81 ML/MIN/{1.73_M2}
GFR SERPL CREATININE-BSD FRML MDRD: 83 ML/MIN/{1.73_M2}
GFR SERPL CREATININE-BSD FRML MDRD: 86 ML/MIN/{1.73_M2}
GFR SERPL CREATININE-BSD FRML MDRD: 86 ML/MIN/{1.73_M2}
GFR SERPL CREATININE-BSD FRML MDRD: >60 ML/MIN/1.73M2
GFR SERPL CREATININE-BSD FRML MDRD: >90 ML/MIN/{1.73_M2}
GLUCOSE BLDC GLUCOMTR-MCNC: 101 MG/DL (ref 70–99)
GLUCOSE BLDC GLUCOMTR-MCNC: 101 MG/DL (ref 70–99)
GLUCOSE BLDC GLUCOMTR-MCNC: 104 MG/DL (ref 70–99)
GLUCOSE BLDC GLUCOMTR-MCNC: 105 MG/DL (ref 70–99)
GLUCOSE BLDC GLUCOMTR-MCNC: 107 MG/DL (ref 70–99)
GLUCOSE BLDC GLUCOMTR-MCNC: 108 MG/DL (ref 70–99)
GLUCOSE BLDC GLUCOMTR-MCNC: 109 MG/DL (ref 70–99)
GLUCOSE BLDC GLUCOMTR-MCNC: 114 MG/DL (ref 70–99)
GLUCOSE BLDC GLUCOMTR-MCNC: 115 MG/DL (ref 70–99)
GLUCOSE BLDC GLUCOMTR-MCNC: 117 MG/DL (ref 70–99)
GLUCOSE BLDC GLUCOMTR-MCNC: 117 MG/DL (ref 70–99)
GLUCOSE BLDC GLUCOMTR-MCNC: 120 MG/DL (ref 70–99)
GLUCOSE BLDC GLUCOMTR-MCNC: 125 MG/DL (ref 70–99)
GLUCOSE BLDC GLUCOMTR-MCNC: 126 MG/DL (ref 70–99)
GLUCOSE BLDC GLUCOMTR-MCNC: 131 MG/DL (ref 70–99)
GLUCOSE BLDC GLUCOMTR-MCNC: 132 MG/DL (ref 70–99)
GLUCOSE BLDC GLUCOMTR-MCNC: 133 MG/DL (ref 70–99)
GLUCOSE BLDC GLUCOMTR-MCNC: 134 MG/DL (ref 70–99)
GLUCOSE BLDC GLUCOMTR-MCNC: 136 MG/DL (ref 70–99)
GLUCOSE BLDC GLUCOMTR-MCNC: 137 MG/DL (ref 70–99)
GLUCOSE BLDC GLUCOMTR-MCNC: 138 MG/DL (ref 70–99)
GLUCOSE BLDC GLUCOMTR-MCNC: 138 MG/DL (ref 70–99)
GLUCOSE BLDC GLUCOMTR-MCNC: 141 MG/DL (ref 70–99)
GLUCOSE BLDC GLUCOMTR-MCNC: 145 MG/DL (ref 70–99)
GLUCOSE BLDC GLUCOMTR-MCNC: 146 MG/DL (ref 70–99)
GLUCOSE BLDC GLUCOMTR-MCNC: 146 MG/DL (ref 70–99)
GLUCOSE BLDC GLUCOMTR-MCNC: 147 MG/DL (ref 70–99)
GLUCOSE BLDC GLUCOMTR-MCNC: 151 MG/DL (ref 70–99)
GLUCOSE BLDC GLUCOMTR-MCNC: 155 MG/DL (ref 70–99)
GLUCOSE BLDC GLUCOMTR-MCNC: 159 MG/DL (ref 70–99)
GLUCOSE BLDC GLUCOMTR-MCNC: 167 MG/DL (ref 70–99)
GLUCOSE BLDC GLUCOMTR-MCNC: 167 MG/DL (ref 70–99)
GLUCOSE BLDC GLUCOMTR-MCNC: 170 MG/DL (ref 70–99)
GLUCOSE BLDC GLUCOMTR-MCNC: 171 MG/DL (ref 70–99)
GLUCOSE BLDC GLUCOMTR-MCNC: 172 MG/DL (ref 70–99)
GLUCOSE BLDC GLUCOMTR-MCNC: 172 MG/DL (ref 70–99)
GLUCOSE BLDC GLUCOMTR-MCNC: 177 MG/DL (ref 70–99)
GLUCOSE BLDC GLUCOMTR-MCNC: 179 MG/DL (ref 70–99)
GLUCOSE BLDC GLUCOMTR-MCNC: 181 MG/DL (ref 70–99)
GLUCOSE BLDC GLUCOMTR-MCNC: 185 MG/DL (ref 70–99)
GLUCOSE BLDC GLUCOMTR-MCNC: 216 MG/DL (ref 70–99)
GLUCOSE BLDC GLUCOMTR-MCNC: 253 MG/DL (ref 70–99)
GLUCOSE BLDC GLUCOMTR-MCNC: 257 MG/DL (ref 70–99)
GLUCOSE BLDC GLUCOMTR-MCNC: 67 MG/DL (ref 70–99)
GLUCOSE BLDC GLUCOMTR-MCNC: 67 MG/DL (ref 70–99)
GLUCOSE BLDC GLUCOMTR-MCNC: 85 MG/DL (ref 70–99)
GLUCOSE BLDC GLUCOMTR-MCNC: 90 MG/DL (ref 70–99)
GLUCOSE FLD-MCNC: 184 MG/DL
GLUCOSE SERPL-MCNC: 104 MG/DL (ref 70–99)
GLUCOSE SERPL-MCNC: 106 MG/DL (ref 70–99)
GLUCOSE SERPL-MCNC: 115 MG/DL (ref 70–99)
GLUCOSE SERPL-MCNC: 128 MG/DL (ref 70–99)
GLUCOSE SERPL-MCNC: 135 MG/DL (ref 70–99)
GLUCOSE SERPL-MCNC: 137 MG/DL (ref 70–100)
GLUCOSE SERPL-MCNC: 137 MG/DL (ref 70–99)
GLUCOSE SERPL-MCNC: 137 MG/DL (ref 70–99)
GLUCOSE SERPL-MCNC: 138 MG/DL (ref 70–99)
GLUCOSE SERPL-MCNC: 147 MG/DL (ref 70–99)
GLUCOSE SERPL-MCNC: 151 MG/DL (ref 70–99)
GLUCOSE SERPL-MCNC: 155 MG/DL (ref 70–99)
GLUCOSE SERPL-MCNC: 156 MG/DL (ref 70–99)
GLUCOSE SERPL-MCNC: 160 MG/DL (ref 70–99)
GLUCOSE SERPL-MCNC: 160 MG/DL (ref 70–99)
GLUCOSE SERPL-MCNC: 166 MG/DL (ref 70–99)
GLUCOSE SERPL-MCNC: 181 MG/DL (ref 70–99)
GLUCOSE SERPL-MCNC: 184 MG/DL (ref 70–99)
GLUCOSE SERPL-MCNC: 192 MG/DL (ref 70–99)
GLUCOSE SERPL-MCNC: 203 MG/DL (ref 70–99)
GLUCOSE SERPL-MCNC: 208 MG/DL (ref 70–99)
GLUCOSE SERPL-MCNC: 212 MG/DL (ref 70–99)
GLUCOSE SERPL-MCNC: 273 MG/DL (ref 70–99)
GLUCOSE SERPL-MCNC: 67 MG/DL (ref 70–99)
GLUCOSE SERPL-MCNC: 83 MG/DL (ref 70–99)
GLUCOSE SERPL-MCNC: 94 MG/DL (ref 70–99)
GLUCOSE UR STRIP-MCNC: NEGATIVE MG/DL
GRAM STN SPEC: NORMAL
GRAM STN SPEC: NORMAL
HADV DNA SPEC QL NAA+PROBE: NOT DETECTED
HBA1C MFR BLD: 5.9 % (ref 0–5.6)
HCO3 BLD-SCNC: 24 MMOL/L (ref 21–28)
HCO3 BLDV-SCNC: 29 MMOL/L (ref 21–28)
HCOV PNL SPEC NAA+PROBE: NOT DETECTED
HCT VFR BLD AUTO: 31.2 % (ref 35–47)
HCT VFR BLD AUTO: 31.2 % (ref 35–47)
HCT VFR BLD AUTO: 31.6 % (ref 35–47)
HCT VFR BLD AUTO: 31.7 % (ref 35–47)
HCT VFR BLD AUTO: 32.5 % (ref 35–47)
HCT VFR BLD AUTO: 32.9 % (ref 35–47)
HCT VFR BLD AUTO: 33.3 % (ref 35–47)
HCT VFR BLD AUTO: 33.6 % (ref 35–47)
HCT VFR BLD AUTO: 34.4 % (ref 35–47)
HCT VFR BLD AUTO: 34.4 % (ref 35–47)
HCT VFR BLD AUTO: 34.8 % (ref 35–47)
HCT VFR BLD AUTO: 35.9 % (ref 35–47)
HCT VFR BLD AUTO: 36.3 % (ref 35–47)
HCT VFR BLD AUTO: 37.7 % (ref 35–47)
HCT VFR BLD AUTO: 37.8 % (ref 35–47)
HCT VFR BLD AUTO: 37.8 % (ref 35–47)
HCT VFR BLD AUTO: 38.2 % (ref 35–47)
HCT VFR BLD AUTO: 38.8 % (ref 35–47)
HCT VFR BLD AUTO: 38.9 % (ref 35–47)
HCT VFR BLD AUTO: 39 % (ref 35–47)
HCT VFR BLD AUTO: 39.6 % (ref 35–47)
HCT VFR BLD AUTO: 39.8 % (ref 35–47)
HCT VFR BLD AUTO: 39.8 % (ref 35–47)
HCT VFR BLD AUTO: 41.9 % (ref 35–47)
HCT VFR BLD AUTO: 41.9 % (ref 35–47)
HCT VFR BLD AUTO: 42 % (ref 35–47)
HGB BLD-MCNC: 10 G/DL (ref 11.7–15.7)
HGB BLD-MCNC: 10 G/DL (ref 11.7–15.7)
HGB BLD-MCNC: 10.2 G/DL (ref 11.7–15.7)
HGB BLD-MCNC: 10.2 G/DL (ref 11.7–15.7)
HGB BLD-MCNC: 10.5 G/DL (ref 11.7–15.7)
HGB BLD-MCNC: 10.9 G/DL (ref 11.7–15.7)
HGB BLD-MCNC: 11.1 G/DL (ref 11.7–15.7)
HGB BLD-MCNC: 11.2 G/DL (ref 11.7–15.7)
HGB BLD-MCNC: 11.4 G/DL (ref 11.7–15.7)
HGB BLD-MCNC: 11.5 G/DL (ref 11.7–15.7)
HGB BLD-MCNC: 12.1 G/DL (ref 11.7–15.7)
HGB BLD-MCNC: 12.2 G/DL (ref 11.7–15.7)
HGB BLD-MCNC: 12.3 G/DL (ref 11.7–15.7)
HGB BLD-MCNC: 12.3 G/DL (ref 11.7–15.7)
HGB BLD-MCNC: 12.5 G/DL (ref 11.7–15.7)
HGB BLD-MCNC: 12.9 G/DL (ref 11.7–15.7)
HGB BLD-MCNC: 13 G/DL (ref 11.7–15.7)
HGB BLD-MCNC: 13.2 G/DL (ref 11.7–15.7)
HGB BLD-MCNC: 13.3 G/DL (ref 11.7–15.7)
HGB BLD-MCNC: 13.4 G/DL (ref 11.7–15.7)
HGB BLD-MCNC: 14.2 G/DL (ref 11.7–15.7)
HGB BLD-MCNC: 14.4 G/DL (ref 11.7–15.7)
HGB BLD-MCNC: 14.5 G/DL (ref 11.7–15.7)
HGB BLD-MCNC: 9.8 G/DL (ref 11.7–15.7)
HGB UR QL STRIP: NEGATIVE
HMPV RNA SPEC QL NAA+PROBE: NOT DETECTED
HPIV1 RNA SPEC QL NAA+PROBE: NOT DETECTED
HPIV2 RNA SPEC QL NAA+PROBE: NOT DETECTED
HPIV3 RNA SPEC QL NAA+PROBE: NOT DETECTED
HPIV4 RNA SPEC QL NAA+PROBE: NOT DETECTED
HYALINE CASTS #/AREA URNS LPF: 22 /LPF (ref 0–2)
HYALINE CASTS #/AREA URNS LPF: 3 /LPF (ref 0–2)
IC-%PRED-PRE: 67 %
IC-PRE: 1.72 L
IC-PRED: 2.55 L
IGG SERPL-MCNC: 1596 MG/DL (ref 610–1616)
IGG1 SER-MCNC: 1370 MG/DL (ref 382–929)
IGG2 SER-MCNC: 125 MG/DL (ref 242–700)
IGG3 SER-MCNC: 50 MG/DL (ref 22–176)
IGG4 SER-MCNC: 4 MG/DL (ref 4–86)
IMM GRANULOCYTES # BLD: 0 10E9/L (ref 0–0.4)
IMM GRANULOCYTES NFR BLD: 0 %
IMM GRANULOCYTES NFR BLD: 0.3 %
IMM GRANULOCYTES NFR BLD: 0.3 %
IMM GRANULOCYTES NFR BLD: 0.4 %
IMM GRANULOCYTES NFR BLD: 0.5 %
IMM GRANULOCYTES NFR BLD: 0.6 %
INR PPP: 1.05 (ref 0.86–1.14)
INR PPP: 1.09 (ref 0.86–1.14)
INR PPP: 1.1 (ref 0.86–1.14)
INR PPP: 1.13 (ref 0.86–1.14)
INR PPP: 1.14 (ref 0.86–1.14)
INR PPP: 1.15 (ref 0.86–1.14)
INR PPP: 1.19 (ref 0.86–1.14)
INR PPP: 1.29 (ref 0.86–1.14)
INTERPRETATION ECG - MUSE: NORMAL
KETONES UR STRIP-MCNC: 10 MG/DL
KETONES UR STRIP-MCNC: NEGATIVE MG/DL
LABORATORY COMMENT REPORT: NORMAL
LACTATE BLD-SCNC: 1.1 MMOL/L (ref 0.7–2)
LACTATE BLD-SCNC: 1.2 MMOL/L (ref 0.7–2)
LACTATE BLD-SCNC: 1.5 MMOL/L (ref 0.7–2)
LEUKOCYTE ESTERASE UR QL STRIP: ABNORMAL
LEUKOCYTE ESTERASE UR QL STRIP: NEGATIVE
LIPASE SERPL-CCNC: 43 U/L (ref 73–393)
LIPASE SERPL-CCNC: 85 U/L (ref 73–393)
LYMPHOCYTES # BLD AUTO: 0.5 10E9/L (ref 0.8–5.3)
LYMPHOCYTES # BLD AUTO: 0.6 10E9/L (ref 0.8–5.3)
LYMPHOCYTES # BLD AUTO: 0.7 10E9/L (ref 0.8–5.3)
LYMPHOCYTES # BLD AUTO: 0.8 10E9/L (ref 0.8–5.3)
LYMPHOCYTES # BLD AUTO: 0.8 10E9/L (ref 0.8–5.3)
LYMPHOCYTES # BLD AUTO: 0.9 10E9/L (ref 0.8–5.3)
LYMPHOCYTES # BLD AUTO: 1 10E9/L (ref 0.8–5.3)
LYMPHOCYTES # BLD AUTO: 1 10E9/L (ref 0.8–5.3)
LYMPHOCYTES NFR BLD AUTO: 19.6 %
LYMPHOCYTES NFR BLD AUTO: 20.3 %
LYMPHOCYTES NFR BLD AUTO: 26 %
LYMPHOCYTES NFR BLD AUTO: 26.5 %
LYMPHOCYTES NFR BLD AUTO: 27.3 %
LYMPHOCYTES NFR BLD AUTO: 28.2 %
LYMPHOCYTES NFR BLD AUTO: 28.8 %
LYMPHOCYTES NFR BLD AUTO: 30.1 %
LYMPHOCYTES NFR BLD AUTO: 31 %
LYMPHOCYTES NFR BLD AUTO: 32.5 %
LYMPHOCYTES NFR BLD AUTO: 33.5 %
LYMPHOCYTES NFR BLD AUTO: 33.9 %
LYMPHOCYTES NFR BLD AUTO: 34.2 %
LYMPHOCYTES NFR BLD AUTO: 34.4 %
LYMPHOCYTES NFR BLD AUTO: 36.5 %
LYMPHOCYTES NFR BLD AUTO: 37.4 %
LYMPHOCYTES NFR BLD AUTO: 40 %
LYMPHOCYTES NFR BLD AUTO: 43.4 %
LYMPHOCYTES NFR BLD AUTO: 45.3 %
LYMPHOCYTES NFR BLD AUTO: 45.5 %
LYMPHOCYTES NFR FLD MANUAL: 50 %
Lab: ABNORMAL
Lab: NORMAL
M PNEUMO DNA SPEC QL NAA+PROBE: NOT DETECTED
MAGNESIUM SERPL-MCNC: 1.4 MG/DL (ref 1.6–2.3)
MAGNESIUM SERPL-MCNC: 1.6 MG/DL (ref 1.6–2.3)
MAGNESIUM SERPL-MCNC: 1.7 MG/DL (ref 1.6–2.3)
MAGNESIUM SERPL-MCNC: 1.8 MG/DL (ref 1.6–2.3)
MAGNESIUM SERPL-MCNC: 1.8 MG/DL (ref 1.6–2.3)
MAGNESIUM SERPL-MCNC: 1.9 MG/DL (ref 1.6–2.3)
MAGNESIUM SERPL-MCNC: 2 MG/DL (ref 1.6–2.3)
MAGNESIUM SERPL-MCNC: 2 MG/DL (ref 1.6–2.3)
MAGNESIUM SERPL-MCNC: 2.1 MG/DL (ref 1.6–2.3)
MCH RBC QN AUTO: 33.8 PG (ref 26.5–33)
MCH RBC QN AUTO: 33.9 PG (ref 26.5–33)
MCH RBC QN AUTO: 34 PG (ref 26.5–33)
MCH RBC QN AUTO: 34.3 PG (ref 26.5–33)
MCH RBC QN AUTO: 34.6 PG (ref 26.5–33)
MCH RBC QN AUTO: 34.7 PG (ref 26.5–33)
MCH RBC QN AUTO: 34.7 PG (ref 26.5–33)
MCH RBC QN AUTO: 34.8 PG (ref 26.5–33)
MCH RBC QN AUTO: 34.9 PG (ref 26.5–33)
MCH RBC QN AUTO: 35 PG (ref 26.5–33)
MCH RBC QN AUTO: 35.1 PG (ref 26.5–33)
MCH RBC QN AUTO: 35.2 PG (ref 26.5–33)
MCH RBC QN AUTO: 35.7 PG (ref 26.5–33)
MCH RBC QN AUTO: 35.7 PG (ref 26.5–33)
MCH RBC QN AUTO: 35.8 PG (ref 26.5–33)
MCH RBC QN AUTO: 36.5 PG (ref 26.5–33)
MCH RBC QN AUTO: 36.6 PG (ref 26.5–33)
MCH RBC QN AUTO: 36.8 PG (ref 26.5–33)
MCH RBC QN AUTO: 37 PG (ref 26.5–33)
MCH RBC QN AUTO: 37 PG (ref 26.5–33)
MCH RBC QN AUTO: 37.1 PG (ref 26.5–33)
MCH RBC QN AUTO: 37.1 PG (ref 26.5–33)
MCH RBC QN AUTO: 37.2 PG (ref 26.5–33)
MCH RBC QN AUTO: 37.4 PG (ref 26.5–33)
MCH RBC QN AUTO: 37.5 PG (ref 26.5–33)
MCH RBC QN AUTO: 37.6 PG (ref 26.5–33)
MCHC RBC AUTO-ENTMCNC: 31.4 G/DL (ref 31.5–36.5)
MCHC RBC AUTO-ENTMCNC: 31.4 G/DL (ref 31.5–36.5)
MCHC RBC AUTO-ENTMCNC: 31.6 G/DL (ref 31.5–36.5)
MCHC RBC AUTO-ENTMCNC: 31.9 G/DL (ref 31.5–36.5)
MCHC RBC AUTO-ENTMCNC: 32.1 G/DL (ref 31.5–36.5)
MCHC RBC AUTO-ENTMCNC: 32.2 G/DL (ref 31.5–36.5)
MCHC RBC AUTO-ENTMCNC: 32.6 G/DL (ref 31.5–36.5)
MCHC RBC AUTO-ENTMCNC: 32.6 G/DL (ref 31.5–36.5)
MCHC RBC AUTO-ENTMCNC: 32.7 G/DL (ref 31.5–36.5)
MCHC RBC AUTO-ENTMCNC: 33 G/DL (ref 31.5–36.5)
MCHC RBC AUTO-ENTMCNC: 33 G/DL (ref 31.5–36.5)
MCHC RBC AUTO-ENTMCNC: 33.1 G/DL (ref 31.5–36.5)
MCHC RBC AUTO-ENTMCNC: 33.1 G/DL (ref 31.5–36.5)
MCHC RBC AUTO-ENTMCNC: 33.2 G/DL (ref 31.5–36.5)
MCHC RBC AUTO-ENTMCNC: 33.2 G/DL (ref 31.5–36.5)
MCHC RBC AUTO-ENTMCNC: 33.4 G/DL (ref 31.5–36.5)
MCHC RBC AUTO-ENTMCNC: 33.6 G/DL (ref 31.5–36.5)
MCHC RBC AUTO-ENTMCNC: 33.6 G/DL (ref 31.5–36.5)
MCHC RBC AUTO-ENTMCNC: 33.7 G/DL (ref 31.5–36.5)
MCHC RBC AUTO-ENTMCNC: 33.8 G/DL (ref 31.5–36.5)
MCHC RBC AUTO-ENTMCNC: 33.8 G/DL (ref 31.5–36.5)
MCHC RBC AUTO-ENTMCNC: 34 G/DL (ref 31.5–36.5)
MCHC RBC AUTO-ENTMCNC: 34.3 G/DL (ref 31.5–36.5)
MCHC RBC AUTO-ENTMCNC: 34.4 G/DL (ref 31.5–36.5)
MCHC RBC AUTO-ENTMCNC: 34.6 G/DL (ref 31.5–36.5)
MCHC RBC AUTO-ENTMCNC: 34.9 G/DL (ref 31.5–36.5)
MCV RBC AUTO: 101 FL (ref 78–100)
MCV RBC AUTO: 101 FL (ref 78–100)
MCV RBC AUTO: 102 FL (ref 78–100)
MCV RBC AUTO: 103 FL (ref 78–100)
MCV RBC AUTO: 104 FL (ref 78–100)
MCV RBC AUTO: 105 FL (ref 78–100)
MCV RBC AUTO: 105 FL (ref 78–100)
MCV RBC AUTO: 107 FL (ref 78–100)
MCV RBC AUTO: 108 FL (ref 78–100)
MCV RBC AUTO: 108 FL (ref 78–100)
MCV RBC AUTO: 109 FL (ref 78–100)
MCV RBC AUTO: 110 FL (ref 78–100)
MCV RBC AUTO: 111 FL (ref 78–100)
MCV RBC AUTO: 113 FL (ref 78–100)
MCV RBC AUTO: 114 FL (ref 78–100)
MCV RBC AUTO: 114 FL (ref 78–100)
MCV RBC AUTO: 116 FL (ref 78–100)
MCV RBC AUTO: 117 FL (ref 78–100)
MCV RBC AUTO: 118 FL (ref 78–100)
METHGB MFR BLD: 0.1 % (ref 0–3)
MICROBIOLOGIST REVIEW: NORMAL
MONOCYTES # BLD AUTO: 0.1 10E9/L (ref 0–1.3)
MONOCYTES # BLD AUTO: 0.2 10E9/L (ref 0–1.3)
MONOCYTES NFR BLD AUTO: 3.3 %
MONOCYTES NFR BLD AUTO: 4.3 %
MONOCYTES NFR BLD AUTO: 4.8 %
MONOCYTES NFR BLD AUTO: 4.9 %
MONOCYTES NFR BLD AUTO: 4.9 %
MONOCYTES NFR BLD AUTO: 5.1 %
MONOCYTES NFR BLD AUTO: 5.1 %
MONOCYTES NFR BLD AUTO: 5.6 %
MONOCYTES NFR BLD AUTO: 6 %
MONOCYTES NFR BLD AUTO: 6.3 %
MONOCYTES NFR BLD AUTO: 6.5 %
MONOCYTES NFR BLD AUTO: 6.7 %
MONOCYTES NFR BLD AUTO: 7 %
MONOCYTES NFR BLD AUTO: 7 %
MONOCYTES NFR BLD AUTO: 7.4 %
MONOCYTES NFR BLD AUTO: 7.4 %
MONOCYTES NFR BLD AUTO: 7.5 %
MONOCYTES NFR BLD AUTO: 7.8 %
MONOCYTES NFR BLD AUTO: 8.6 %
MONOCYTES NFR BLD AUTO: 9.3 %
MUCOUS THREADS #/AREA URNS LPF: PRESENT /LPF
NEUTROPHILS # BLD AUTO: 0.6 10E9/L (ref 1.6–8.3)
NEUTROPHILS # BLD AUTO: 0.8 10E9/L (ref 1.6–8.3)
NEUTROPHILS # BLD AUTO: 0.9 10E9/L (ref 1.6–8.3)
NEUTROPHILS # BLD AUTO: 1.1 10E9/L (ref 1.6–8.3)
NEUTROPHILS # BLD AUTO: 1.3 10E9/L (ref 1.6–8.3)
NEUTROPHILS # BLD AUTO: 1.5 10E9/L (ref 1.6–8.3)
NEUTROPHILS # BLD AUTO: 1.6 10E9/L (ref 1.6–8.3)
NEUTROPHILS # BLD AUTO: 1.7 10E9/L (ref 1.6–8.3)
NEUTROPHILS # BLD AUTO: 1.7 10E9/L (ref 1.6–8.3)
NEUTROPHILS # BLD AUTO: 1.9 10E9/L (ref 1.6–8.3)
NEUTROPHILS # BLD AUTO: 2 10E9/L (ref 1.6–8.3)
NEUTROPHILS # BLD AUTO: 2.2 10E9/L (ref 1.6–8.3)
NEUTROPHILS NFR BLD AUTO: 44.9 %
NEUTROPHILS NFR BLD AUTO: 46.1 %
NEUTROPHILS NFR BLD AUTO: 47.1 %
NEUTROPHILS NFR BLD AUTO: 50 %
NEUTROPHILS NFR BLD AUTO: 54 %
NEUTROPHILS NFR BLD AUTO: 54.8 %
NEUTROPHILS NFR BLD AUTO: 55.4 %
NEUTROPHILS NFR BLD AUTO: 56.2 %
NEUTROPHILS NFR BLD AUTO: 56.4 %
NEUTROPHILS NFR BLD AUTO: 56.6 %
NEUTROPHILS NFR BLD AUTO: 57 %
NEUTROPHILS NFR BLD AUTO: 58.8 %
NEUTROPHILS NFR BLD AUTO: 60.6 %
NEUTROPHILS NFR BLD AUTO: 60.7 %
NEUTROPHILS NFR BLD AUTO: 63.4 %
NEUTROPHILS NFR BLD AUTO: 64 %
NEUTROPHILS NFR BLD AUTO: 64.5 %
NEUTROPHILS NFR BLD AUTO: 65 %
NEUTROPHILS NFR BLD AUTO: 72.7 %
NEUTROPHILS NFR BLD AUTO: 72.9 %
NEUTS BAND NFR FLD MANUAL: 4 %
NITRATE UR QL: NEGATIVE
NOROV GI+II ORF1-ORF2 JNC STL QL NAA+PR: NOT DETECTED
NRBC # BLD AUTO: 0 10*3/UL
NRBC BLD AUTO-RTO: 0 /100
NT-PROBNP SERPL-MCNC: 136 PG/ML (ref 0–900)
NUM BPU REQUESTED: 1
O2/TOTAL GAS SETTING VFR VENT: 21 %
O2/TOTAL GAS SETTING VFR VENT: 21 %
OTHER CELLS FLD MANUAL: 46 %
OXYHGB MFR BLD: 93 % (ref 92–100)
PCO2 BLD: 35 MM HG (ref 35–45)
PCO2 BLDV: 44 MM HG (ref 40–50)
PH BLD: 7.45 PH (ref 7.35–7.45)
PH BLDV: 7.43 PH (ref 7.32–7.43)
PH UR STRIP: 5 PH (ref 5–7)
PH UR STRIP: 6 PH (ref 5–7)
PH UR STRIP: 6.5 PH (ref 5–7)
PHOSPHATE SERPL-MCNC: 2.8 MG/DL (ref 2.5–4.5)
PHOSPHATE SERPL-MCNC: 3.7 MG/DL (ref 2.5–4.5)
PHOSPHATE SERPL-MCNC: 3.8 MG/DL (ref 2.5–4.5)
PIGEON DROP IGE QN: NORMAL
PLATELET # BLD AUTO: 24 10E9/L (ref 150–450)
PLATELET # BLD AUTO: 24 10E9/L (ref 150–450)
PLATELET # BLD AUTO: 27 10E9/L (ref 150–450)
PLATELET # BLD AUTO: 27 10E9/L (ref 150–450)
PLATELET # BLD AUTO: 28 10E9/L (ref 150–450)
PLATELET # BLD AUTO: 28 10E9/L (ref 150–450)
PLATELET # BLD AUTO: 29 10E9/L (ref 150–450)
PLATELET # BLD AUTO: 30 10E9/L (ref 150–450)
PLATELET # BLD AUTO: 31 10E9/L (ref 150–450)
PLATELET # BLD AUTO: 31 10E9/L (ref 150–450)
PLATELET # BLD AUTO: 32 10E9/L (ref 150–450)
PLATELET # BLD AUTO: 35 10E9/L (ref 150–450)
PLATELET # BLD AUTO: 36 10E9/L (ref 150–450)
PLATELET # BLD AUTO: 38 10E9/L (ref 150–450)
PLATELET # BLD AUTO: 40 10E9/L (ref 150–450)
PLATELET # BLD AUTO: 40 10E9/L (ref 150–450)
PLATELET # BLD AUTO: 41 10E9/L (ref 150–450)
PLATELET # BLD AUTO: 41 10E9/L (ref 150–450)
PLATELET # BLD AUTO: 43 10E9/L (ref 150–450)
PLATELET # BLD AUTO: 44 10E9/L (ref 150–450)
PLATELET # BLD AUTO: 51 10E9/L (ref 150–450)
PLATELET # BLD AUTO: 51 10E9/L (ref 150–450)
PLATELET # BLD AUTO: 59 10E9/L (ref 150–450)
PLATELET # BLD EST: ABNORMAL 10*3/UL
PLATELET # BLD EST: ABNORMAL 10*3/UL
PO2 BLD: 91 MM HG (ref 80–105)
PO2 BLDV: 35 MM HG (ref 25–47)
POTASSIUM SERPL-SCNC: 3 MMOL/L (ref 3.4–5.3)
POTASSIUM SERPL-SCNC: 3.1 MMOL/L (ref 3.4–5.3)
POTASSIUM SERPL-SCNC: 3.1 MMOL/L (ref 3.4–5.3)
POTASSIUM SERPL-SCNC: 3.2 MMOL/L (ref 3.4–5.3)
POTASSIUM SERPL-SCNC: 3.3 MMOL/L (ref 3.4–5.3)
POTASSIUM SERPL-SCNC: 3.4 MMOL/L (ref 3.4–5.3)
POTASSIUM SERPL-SCNC: 3.4 MMOL/L (ref 3.4–5.3)
POTASSIUM SERPL-SCNC: 3.4 MMOL/L (ref 3.5–5.1)
POTASSIUM SERPL-SCNC: 3.5 MMOL/L (ref 3.4–5.3)
POTASSIUM SERPL-SCNC: 3.6 MMOL/L (ref 3.4–5.3)
POTASSIUM SERPL-SCNC: 3.7 MMOL/L (ref 3.4–5.3)
POTASSIUM SERPL-SCNC: 3.8 MMOL/L (ref 3.4–5.3)
POTASSIUM SERPL-SCNC: 3.8 MMOL/L (ref 3.4–5.3)
POTASSIUM SERPL-SCNC: 3.9 MMOL/L (ref 3.4–5.3)
POTASSIUM SERPL-SCNC: 3.9 MMOL/L (ref 3.4–5.3)
POTASSIUM SERPL-SCNC: 4 MMOL/L (ref 3.4–5.3)
POTASSIUM SERPL-SCNC: 4.2 MMOL/L (ref 3.4–5.3)
POTASSIUM SERPL-SCNC: 4.3 MMOL/L (ref 3.4–5.3)
POTASSIUM SERPL-SCNC: 4.3 MMOL/L (ref 3.4–5.3)
POTASSIUM SERPL-SCNC: 4.5 MMOL/L (ref 3.4–5.3)
POTASSIUM SERPL-SCNC: 4.6 MMOL/L (ref 3.4–5.3)
POTASSIUM SERPL-SCNC: 5.5 MMOL/L (ref 3.4–5.3)
PROCALCITONIN SERPL-MCNC: <0.05 NG/ML
PROT FLD-MCNC: 0.8 G/DL
PROT SERPL-MCNC: 5.4 G/DL (ref 6.8–8.8)
PROT SERPL-MCNC: 5.6 G/DL (ref 6.8–8.8)
PROT SERPL-MCNC: 5.6 G/DL (ref 6.8–8.8)
PROT SERPL-MCNC: 5.7 G/DL (ref 6.8–8.8)
PROT SERPL-MCNC: 5.8 G/DL (ref 6.8–8.8)
PROT SERPL-MCNC: 5.9 G/DL (ref 6.8–8.8)
PROT SERPL-MCNC: 6.1 G/DL (ref 6.8–8.8)
PROT SERPL-MCNC: 6.2 G/DL (ref 6.8–8.8)
PROT SERPL-MCNC: 6.2 G/DL (ref 6.8–8.8)
PROT SERPL-MCNC: 6.7 G/DL (ref 6.8–8.8)
PROT SERPL-MCNC: 6.8 G/DL (ref 6.8–8.8)
PROT SERPL-MCNC: 7 G/DL (ref 6.8–8.8)
PROT SERPL-MCNC: 7 G/DL (ref 6.8–8.8)
PROT SERPL-MCNC: 7.2 G/DL (ref 6.8–8.8)
PROT SERPL-MCNC: 7.3 G/DL (ref 6.8–8.8)
PROT SERPL-MCNC: 7.4 G/DL (ref 6.8–8.8)
PROT SERPL-MCNC: 7.5 G/DL (ref 6.8–8.8)
PROT SERPL-MCNC: 7.6 G/DL (ref 6.8–8.8)
PROT SERPL-MCNC: 8 G/DL (ref 6.8–8.8)
PROT SERPL-MCNC: 8 G/DL (ref 6.8–8.8)
PROT SERPL-MCNC: 8.1 G/DL (ref 6.8–8.8)
PROT SERPL-MCNC: 8.2 G/DL (ref 6.8–8.8)
PROT UR-MCNC: 0.06 G/L
PROT UR-MCNC: 0.09 G/L
PROT UR-MCNC: 1.37 G/L
PROT/CREAT 24H UR: 0.05 G/G CR (ref 0–0.2)
PROT/CREAT 24H UR: 0.07 G/G CR (ref 0–0.2)
PROT/CREAT 24H UR: 0.4 G/G CR (ref 0–0.2)
RADIOLOGIST FLAGS: NORMAL
RBC # BLD AUTO: 2.64 10E12/L (ref 3.8–5.2)
RBC # BLD AUTO: 2.7 10E12/L (ref 3.8–5.2)
RBC # BLD AUTO: 2.73 10E12/L (ref 3.8–5.2)
RBC # BLD AUTO: 2.77 10E12/L (ref 3.8–5.2)
RBC # BLD AUTO: 2.83 10E12/L (ref 3.8–5.2)
RBC # BLD AUTO: 2.91 10E12/L (ref 3.8–5.2)
RBC # BLD AUTO: 2.92 10E12/L (ref 3.8–5.2)
RBC # BLD AUTO: 2.98 10E12/L (ref 3.8–5.2)
RBC # BLD AUTO: 3.06 10E12/L (ref 3.8–5.2)
RBC # BLD AUTO: 3.23 10E12/L (ref 3.8–5.2)
RBC # BLD AUTO: 3.28 10E12/L (ref 3.8–5.2)
RBC # BLD AUTO: 3.44 10E12/L (ref 3.8–5.2)
RBC # BLD AUTO: 3.51 10E12/L (ref 3.8–5.2)
RBC # BLD AUTO: 3.54 10E12/L (ref 3.8–5.2)
RBC # BLD AUTO: 3.56 10E12/L (ref 3.8–5.2)
RBC # BLD AUTO: 3.61 10E12/L (ref 3.8–5.2)
RBC # BLD AUTO: 3.69 10E12/L (ref 3.8–5.2)
RBC # BLD AUTO: 3.7 10E12/L (ref 3.8–5.2)
RBC # BLD AUTO: 3.77 10E12/L (ref 3.8–5.2)
RBC # BLD AUTO: 3.81 10E12/L (ref 3.8–5.2)
RBC # BLD AUTO: 3.82 10E12/L (ref 3.8–5.2)
RBC # BLD AUTO: 3.88 10E12/L (ref 3.8–5.2)
RBC # BLD AUTO: 3.89 10E12/L (ref 3.8–5.2)
RBC # BLD AUTO: 3.91 10E12/L (ref 3.8–5.2)
RBC # BLD AUTO: 3.91 10E12/L (ref 3.8–5.2)
RBC # BLD AUTO: 4.03 10E12/L (ref 3.8–5.2)
RBC #/AREA URNS AUTO: 0 /HPF (ref 0–2)
RBC #/AREA URNS AUTO: 1 /HPF (ref 0–2)
RBC #/AREA URNS AUTO: 2 /HPF (ref 0–2)
RBC #/AREA URNS AUTO: 2 /HPF (ref 0–2)
RBC #/AREA URNS AUTO: 3 /HPF (ref 0–2)
RBC #/AREA URNS AUTO: <1 /HPF (ref 0–2)
RBC #/AREA URNS AUTO: <1 /HPF (ref 0–2)
RETICS # AUTO: 87.8 10E9/L (ref 25–95)
RETICS/RBC NFR AUTO: 2.5 % (ref 0.5–2)
RHEUMATOID FACT SER NEPH-ACNC: 377 IU/ML (ref 0–20)
RSV RNA SPEC QL NAA+PROBE: NOT DETECTED
RSV RNA SPEC QL NAA+PROBE: NOT DETECTED
RV+EV RNA SPEC QL NAA+PROBE: NOT DETECTED
RVA NSP5 STL QL NAA+PROBE: NOT DETECTED
RVPLETH-%PRED-PRE: 82 %
RVPLETH-PRE: 1.44 L
RVPLETH-PRED: 1.75 L
S RECTIVIRGULA AB SER QL ID: NORMAL
S VIRIDIS AB SER QL ID: NORMAL
SALMONELLA SP RPOD STL QL NAA+PROBE: NOT DETECTED
SARS-COV-2 RNA SPEC QL NAA+PROBE: NEGATIVE
SARS-COV-2 RNA SPEC QL NAA+PROBE: NORMAL
SARS-COV-2 RNA SPEC QL NAA+PROBE: NOT DETECTED
SHIGELLA SP+EIEC IPAH STL QL NAA+PROBE: NOT DETECTED
SODIUM SERPL-SCNC: 132 MMOL/L (ref 133–144)
SODIUM SERPL-SCNC: 133 MMOL/L (ref 133–144)
SODIUM SERPL-SCNC: 134 MMOL/L (ref 133–144)
SODIUM SERPL-SCNC: 135 MMOL/L (ref 133–144)
SODIUM SERPL-SCNC: 135 MMOL/L (ref 133–144)
SODIUM SERPL-SCNC: 136 MMOL/L (ref 133–144)
SODIUM SERPL-SCNC: 136 MMOL/L (ref 133–144)
SODIUM SERPL-SCNC: 137 MMOL/L (ref 133–144)
SODIUM SERPL-SCNC: 138 MMOL/L (ref 133–144)
SODIUM SERPL-SCNC: 139 MMOL/L (ref 133–144)
SODIUM SERPL-SCNC: 140 MMOL/L (ref 133–144)
SODIUM SERPL-SCNC: 141 MMOL/L (ref 133–144)
SODIUM SERPL-SCNC: 141 MMOL/L (ref 133–144)
SODIUM SERPL-SCNC: 142 MMOL/L (ref 133–144)
SODIUM SERPL-SCNC: 142 MMOL/L (ref 133–144)
SODIUM SERPL-SCNC: 144 MMOL/L (ref 133–144)
SODIUM SERPL-SCNC: 145 MMOL/L (ref 133–144)
SOURCE: ABNORMAL
SP GR UR STRIP: 1 (ref 1–1.03)
SP GR UR STRIP: 1.01 (ref 1–1.03)
SP GR UR STRIP: 1.01 (ref 1–1.03)
SP GR UR STRIP: 1.02 (ref 1–1.03)
SP GR UR STRIP: >1.035 (ref 1–1.03)
SPECIMEN EXP DATE BLD: NORMAL
SPECIMEN SOURCE FLD: NORMAL
SPECIMEN SOURCE: ABNORMAL
SPECIMEN SOURCE: NORMAL
SQUAMOUS #/AREA URNS AUTO: 1 /HPF (ref 0–1)
SQUAMOUS #/AREA URNS AUTO: 2 /HPF (ref 0–1)
SQUAMOUS #/AREA URNS AUTO: 4 /HPF (ref 0–1)
SQUAMOUS #/AREA URNS AUTO: 6 /HPF (ref 0–1)
SQUAMOUS #/AREA URNS AUTO: 8 /HPF (ref 0–1)
T CANDIDUS AB SER QL: NORMAL
T VULGARIS AB SER QL ID: NORMAL
T4 FREE SERPL-MCNC: 0.82 NG/DL (ref 0.76–1.46)
T4 FREE SERPL-MCNC: 0.98 NG/DL (ref 0.76–1.46)
T4 FREE SERPL-MCNC: 1.01 NG/DL (ref 0.76–1.46)
TLCPLETH-%PRED-PRE: 80 %
TLCPLETH-PRE: 3.73 L
TLCPLETH-PRED: 4.6 L
TRANS CELLS #/AREA URNS HPF: <1 /HPF (ref 0–1)
TRANS CELLS #/AREA URNS HPF: <1 /HPF (ref 0–1)
TRANSFUSION STATUS PATIENT QL: NORMAL
TRANSFUSION STATUS PATIENT QL: NORMAL
TROPONIN I SERPL-MCNC: <0.015 UG/L (ref 0–0.04)
TROPONIN I SERPL-MCNC: <0.015 UG/L (ref 0–0.04)
TSH SERPL DL<=0.005 MIU/L-ACNC: 12.42 MU/L (ref 0.4–4)
TSH SERPL DL<=0.005 MIU/L-ACNC: 2.95 MU/L (ref 0.4–4)
TSH SERPL DL<=0.005 MIU/L-ACNC: 3.76 MU/L (ref 0.4–4)
TSH SERPL DL<=0.005 MIU/L-ACNC: 5.66 MU/L (ref 0.4–4)
TSH SERPL DL<=0.005 MIU/L-ACNC: 8.31 MU/L (ref 0.4–4)
TSH SERPL-ACNC: 1.62 UIU/ML (ref 0.3–4.5)
UROBILINOGEN UR STRIP-MCNC: 0 MG/DL (ref 0–2)
UROBILINOGEN UR STRIP-MCNC: 0 MG/DL (ref 0–2)
UROBILINOGEN UR STRIP-MCNC: 2 MG/DL (ref 0–2)
UROBILINOGEN UR STRIP-MCNC: 4 MG/DL (ref 0–2)
UROBILINOGEN UR STRIP-MCNC: 8 MG/DL (ref 0–2)
UROBILINOGEN UR STRIP-MCNC: NORMAL MG/DL (ref 0–2)
UROBILINOGEN UR STRIP-MCNC: NORMAL MG/DL (ref 0–2)
V CHOL+PARA RFBL+TRKH+TNAA STL QL NAA+PR: NOT DETECTED
VA-%PRED-PRE: 68 %
VA-PRE: 3.04 L
VC-%PRED-PRE: 73 %
VC-PRE: 2.29 L
VC-PRED: 3.1 L
VIT B1 BLD-MCNC: 66 NMOL/L (ref 70–180)
VIT B12 SERPL-MCNC: 839 PG/ML (ref 193–986)
VIT B12 SERPL-MCNC: 957 PG/ML (ref 193–986)
WBC # BLD AUTO: 1.3 10E9/L (ref 4–11)
WBC # BLD AUTO: 1.4 10E9/L (ref 4–11)
WBC # BLD AUTO: 1.5 10E9/L (ref 4–11)
WBC # BLD AUTO: 1.5 10E9/L (ref 4–11)
WBC # BLD AUTO: 1.6 10E9/L (ref 4–11)
WBC # BLD AUTO: 1.7 10E9/L (ref 4–11)
WBC # BLD AUTO: 1.8 10E9/L (ref 4–11)
WBC # BLD AUTO: 1.9 10E9/L (ref 4–11)
WBC # BLD AUTO: 1.9 10E9/L (ref 4–11)
WBC # BLD AUTO: 2 10E9/L (ref 4–11)
WBC # BLD AUTO: 2.2 10E9/L (ref 4–11)
WBC # BLD AUTO: 2.3 10E9/L (ref 4–11)
WBC # BLD AUTO: 2.4 10E9/L (ref 4–11)
WBC # BLD AUTO: 2.7 10E9/L (ref 4–11)
WBC # BLD AUTO: 2.7 10E9/L (ref 4–11)
WBC # BLD AUTO: 2.9 10E9/L (ref 4–11)
WBC # BLD AUTO: 3 10E9/L (ref 4–11)
WBC # BLD AUTO: 3 10E9/L (ref 4–11)
WBC # BLD AUTO: 3.1 10E9/L (ref 4–11)
WBC # BLD AUTO: 3.1 10E9/L (ref 4–11)
WBC # BLD AUTO: 3.2 10E9/L (ref 4–11)
WBC # BLD AUTO: 3.3 10E9/L (ref 4–11)
WBC # BLD AUTO: 3.7 10E9/L (ref 4–11)
WBC # FLD AUTO: 65 /UL
WBC #/AREA URNS AUTO: 1 /HPF (ref 0–5)
WBC #/AREA URNS AUTO: 46 /HPF (ref 0–5)
WBC #/AREA URNS AUTO: <1 /HPF (ref 0–5)
WBC #/AREA URNS AUTO: ABNORMAL /HPF (ref 0–5)
Y ENTERO RECN STL QL NAA+PROBE: NOT DETECTED

## 2020-01-01 PROCEDURE — 999N000147 HC STATISTIC PT IP EVAL DEFER: Performed by: REHABILITATION PRACTITIONER

## 2020-01-01 PROCEDURE — 84439 ASSAY OF FREE THYROXINE: CPT | Mod: GT | Performed by: PATHOLOGY

## 2020-01-01 PROCEDURE — 82105 ALPHA-FETOPROTEIN SERUM: CPT | Performed by: INTERNAL MEDICINE

## 2020-01-01 PROCEDURE — G0463 HOSPITAL OUTPT CLINIC VISIT: HCPCS | Mod: ZF

## 2020-01-01 PROCEDURE — 37000009 ZZH ANESTHESIA TECHNICAL FEE, EACH ADDTL 15 MIN

## 2020-01-01 PROCEDURE — 97535 SELF CARE MNGMENT TRAINING: CPT | Mod: GO

## 2020-01-01 PROCEDURE — 83735 ASSAY OF MAGNESIUM: CPT | Performed by: PHYSICIAN ASSISTANT

## 2020-01-01 PROCEDURE — C9803 HOPD COVID-19 SPEC COLLECT: HCPCS | Performed by: EMERGENCY MEDICINE

## 2020-01-01 PROCEDURE — 99239 HOSP IP/OBS DSCHRG MGMT >30: CPT | Performed by: INTERNAL MEDICINE

## 2020-01-01 PROCEDURE — 25000566 ZZH SEVOFLURANE, EA 15 MIN

## 2020-01-01 PROCEDURE — 87186 SC STD MICRODIL/AGAR DIL: CPT | Performed by: EMERGENCY MEDICINE

## 2020-01-01 PROCEDURE — 250N000013 HC RX MED GY IP 250 OP 250 PS 637: Performed by: PHYSICIAN ASSISTANT

## 2020-01-01 PROCEDURE — 25000132 ZZH RX MED GY IP 250 OP 250 PS 637: Performed by: INTERNAL MEDICINE

## 2020-01-01 PROCEDURE — 36415 COLL VENOUS BLD VENIPUNCTURE: CPT | Performed by: PHYSICIAN ASSISTANT

## 2020-01-01 PROCEDURE — 250N000011 HC RX IP 250 OP 636: Performed by: PHYSICIAN ASSISTANT

## 2020-01-01 PROCEDURE — 36415 COLL VENOUS BLD VENIPUNCTURE: CPT | Performed by: INTERNAL MEDICINE

## 2020-01-01 PROCEDURE — U0003 INFECTIOUS AGENT DETECTION BY NUCLEIC ACID (DNA OR RNA); SEVERE ACUTE RESPIRATORY SYNDROME CORONAVIRUS 2 (SARS-COV-2) (CORONAVIRUS DISEASE [COVID-19]), AMPLIFIED PROBE TECHNIQUE, MAKING USE OF HIGH THROUGHPUT TECHNOLOGIES AS DESCRIBED BY CMS-2020-01-R: HCPCS | Performed by: EMERGENCY MEDICINE

## 2020-01-01 PROCEDURE — 99215 OFFICE O/P EST HI 40 MIN: CPT | Mod: ZP | Performed by: PHYSICIAN ASSISTANT

## 2020-01-01 PROCEDURE — 82550 ASSAY OF CK (CPK): CPT | Performed by: INTERNAL MEDICINE

## 2020-01-01 PROCEDURE — 99233 SBSQ HOSP IP/OBS HIGH 50: CPT | Performed by: PEDIATRICS

## 2020-01-01 PROCEDURE — 84100 ASSAY OF PHOSPHORUS: CPT | Performed by: PHYSICIAN ASSISTANT

## 2020-01-01 PROCEDURE — 25000128 H RX IP 250 OP 636: Performed by: ANESTHESIOLOGY

## 2020-01-01 PROCEDURE — 84460 ALANINE AMINO (ALT) (SGPT): CPT

## 2020-01-01 PROCEDURE — 27210907 ZZH KIT CR9

## 2020-01-01 PROCEDURE — A9503 TC99M MEDRONATE: HCPCS | Performed by: INTERNAL MEDICINE

## 2020-01-01 PROCEDURE — 80053 COMPREHEN METABOLIC PANEL: CPT | Performed by: INTERNAL MEDICINE

## 2020-01-01 PROCEDURE — 36430 TRANSFUSION BLD/BLD COMPNT: CPT

## 2020-01-01 PROCEDURE — 84075 ASSAY ALKALINE PHOSPHATASE: CPT

## 2020-01-01 PROCEDURE — 80053 COMPREHEN METABOLIC PANEL: CPT | Performed by: NURSE PRACTITIONER

## 2020-01-01 PROCEDURE — 81001 URINALYSIS AUTO W/SCOPE: CPT | Performed by: EMERGENCY MEDICINE

## 2020-01-01 PROCEDURE — 93306 TTE W/DOPPLER COMPLETE: CPT | Mod: 26 | Performed by: INTERNAL MEDICINE

## 2020-01-01 PROCEDURE — 25000128 H RX IP 250 OP 636: Performed by: NURSE ANESTHETIST, CERTIFIED REGISTERED

## 2020-01-01 PROCEDURE — 27211411

## 2020-01-01 PROCEDURE — 99285 EMERGENCY DEPT VISIT HI MDM: CPT | Mod: 25 | Performed by: EMERGENCY MEDICINE

## 2020-01-01 PROCEDURE — 258N000003 HC RX IP 258 OP 636: Performed by: NURSE PRACTITIONER

## 2020-01-01 PROCEDURE — 96361 HYDRATE IV INFUSION ADD-ON: CPT | Performed by: EMERGENCY MEDICINE

## 2020-01-01 PROCEDURE — 80048 BASIC METABOLIC PNL TOTAL CA: CPT

## 2020-01-01 PROCEDURE — 80053 COMPREHEN METABOLIC PANEL: CPT | Performed by: EMERGENCY MEDICINE

## 2020-01-01 PROCEDURE — 99214 OFFICE O/P EST MOD 30 MIN: CPT | Mod: 95 | Performed by: INTERNAL MEDICINE

## 2020-01-01 PROCEDURE — 25000132 ZZH RX MED GY IP 250 OP 250 PS 637: Performed by: STUDENT IN AN ORGANIZED HEALTH CARE EDUCATION/TRAINING PROGRAM

## 2020-01-01 PROCEDURE — 40000168 ZZH STATISTIC PP CARE STAGE 3

## 2020-01-01 PROCEDURE — 99207 PR APP CREDIT; MD BILLING SHARED VISIT: CPT | Performed by: PHYSICIAN ASSISTANT

## 2020-01-01 PROCEDURE — 25000128 H RX IP 250 OP 636: Performed by: STUDENT IN AN ORGANIZED HEALTH CARE EDUCATION/TRAINING PROGRAM

## 2020-01-01 PROCEDURE — A9585 GADOBUTROL INJECTION: HCPCS | Performed by: INTERNAL MEDICINE

## 2020-01-01 PROCEDURE — 87581 M.PNEUMON DNA AMP PROBE: CPT | Performed by: NURSE PRACTITIONER

## 2020-01-01 PROCEDURE — 25000128 H RX IP 250 OP 636: Performed by: INTERNAL MEDICINE

## 2020-01-01 PROCEDURE — 84443 ASSAY THYROID STIM HORMONE: CPT | Mod: GT | Performed by: PATHOLOGY

## 2020-01-01 PROCEDURE — 120N000003 HC R&B IMCU UMMC

## 2020-01-01 PROCEDURE — 27810159 ZZH COIL/EMBOLIC DEVICE CR8

## 2020-01-01 PROCEDURE — 87070 CULTURE OTHR SPECIMN AEROBIC: CPT | Performed by: EMERGENCY MEDICINE

## 2020-01-01 PROCEDURE — 74177 CT ABD & PELVIS W/CONTRAST: CPT

## 2020-01-01 PROCEDURE — 83690 ASSAY OF LIPASE: CPT | Performed by: EMERGENCY MEDICINE

## 2020-01-01 PROCEDURE — 96367 TX/PROPH/DG ADDL SEQ IV INF: CPT | Performed by: EMERGENCY MEDICINE

## 2020-01-01 PROCEDURE — 27210909 ZZH NEEDLE CR5

## 2020-01-01 PROCEDURE — 82945 GLUCOSE OTHER FLUID: CPT | Performed by: EMERGENCY MEDICINE

## 2020-01-01 PROCEDURE — 999N000033 HC STATISTIC CHRONIC PULMONARY DISEASE SPECIALIST

## 2020-01-01 PROCEDURE — 84702 CHORIONIC GONADOTROPIN TEST: CPT | Performed by: RADIOLOGY

## 2020-01-01 PROCEDURE — 74177 CT ABD & PELVIS W/CONTRAST: CPT | Mod: 26 | Performed by: RADIOLOGY

## 2020-01-01 PROCEDURE — 85610 PROTHROMBIN TIME: CPT | Performed by: EMERGENCY MEDICINE

## 2020-01-01 PROCEDURE — 93005 ELECTROCARDIOGRAM TRACING: CPT | Performed by: EMERGENCY MEDICINE

## 2020-01-01 PROCEDURE — 999N001193 HC VIDEO/TELEPHONE VISIT; NO CHARGE

## 2020-01-01 PROCEDURE — 82040 ASSAY OF SERUM ALBUMIN: CPT

## 2020-01-01 PROCEDURE — 250N000012 HC RX MED GY IP 250 OP 636 PS 637: Performed by: NURSE PRACTITIONER

## 2020-01-01 PROCEDURE — 97530 THERAPEUTIC ACTIVITIES: CPT | Mod: GO

## 2020-01-01 PROCEDURE — 96366 THER/PROPH/DIAG IV INF ADDON: CPT | Performed by: EMERGENCY MEDICINE

## 2020-01-01 PROCEDURE — 80053 COMPREHEN METABOLIC PANEL: CPT | Mod: GT | Performed by: PATHOLOGY

## 2020-01-01 PROCEDURE — 0W9G3ZZ DRAINAGE OF PERITONEAL CAVITY, PERCUTANEOUS APPROACH: ICD-10-PCS | Performed by: PEDIATRICS

## 2020-01-01 PROCEDURE — 78306 BONE IMAGING WHOLE BODY: CPT

## 2020-01-01 PROCEDURE — 85025 COMPLETE CBC W/AUTO DIFF WBC: CPT | Performed by: EMERGENCY MEDICINE

## 2020-01-01 PROCEDURE — 88307 TISSUE EXAM BY PATHOLOGIST: CPT | Performed by: RADIOLOGY

## 2020-01-01 PROCEDURE — 85025 COMPLETE CBC W/AUTO DIFF WBC: CPT | Mod: GT | Performed by: PATHOLOGY

## 2020-01-01 PROCEDURE — 86200 CCP ANTIBODY: CPT | Performed by: INTERNAL MEDICINE

## 2020-01-01 PROCEDURE — G0463 HOSPITAL OUTPT CLINIC VISIT: HCPCS | Mod: 25

## 2020-01-01 PROCEDURE — 81001 URINALYSIS AUTO W/SCOPE: CPT | Performed by: PHYSICIAN ASSISTANT

## 2020-01-01 PROCEDURE — 250N000011 HC RX IP 250 OP 636: Performed by: STUDENT IN AN ORGANIZED HEALTH CARE EDUCATION/TRAINING PROGRAM

## 2020-01-01 PROCEDURE — 71000027 ZZH RECOVERY PHASE 2 EACH 15 MINS

## 2020-01-01 PROCEDURE — 99207 PR APP CREDIT; MD BILLING SHARED VISIT: CPT | Performed by: NURSE PRACTITIONER

## 2020-01-01 PROCEDURE — 99215 OFFICE O/P EST HI 40 MIN: CPT | Mod: GT | Performed by: INTERNAL MEDICINE

## 2020-01-01 PROCEDURE — 87493 C DIFF AMPLIFIED PROBE: CPT | Performed by: EMERGENCY MEDICINE

## 2020-01-01 PROCEDURE — 86038 ANTINUCLEAR ANTIBODIES: CPT | Performed by: INTERNAL MEDICINE

## 2020-01-01 PROCEDURE — 85027 COMPLETE CBC AUTOMATED: CPT | Performed by: PHYSICIAN ASSISTANT

## 2020-01-01 PROCEDURE — 81001 URINALYSIS AUTO W/SCOPE: CPT | Performed by: INTERNAL MEDICINE

## 2020-01-01 PROCEDURE — 27210903 ZZH KIT CR5

## 2020-01-01 PROCEDURE — 93005 ELECTROCARDIOGRAM TRACING: CPT

## 2020-01-01 PROCEDURE — 85025 COMPLETE CBC W/AUTO DIFF WBC: CPT | Performed by: NURSE PRACTITIONER

## 2020-01-01 PROCEDURE — 36415 COLL VENOUS BLD VENIPUNCTURE: CPT | Performed by: NURSE PRACTITIONER

## 2020-01-01 PROCEDURE — 85610 PROTHROMBIN TIME: CPT | Performed by: INTERNAL MEDICINE

## 2020-01-01 PROCEDURE — 71045 X-RAY EXAM CHEST 1 VIEW: CPT | Mod: 26 | Performed by: RADIOLOGY

## 2020-01-01 PROCEDURE — 83605 ASSAY OF LACTIC ACID: CPT | Performed by: INTERNAL MEDICINE

## 2020-01-01 PROCEDURE — 00000146 ZZHCL STATISTIC GLUCOSE BY METER IP

## 2020-01-01 PROCEDURE — 250N000013 HC RX MED GY IP 250 OP 250 PS 637: Performed by: NURSE PRACTITIONER

## 2020-01-01 PROCEDURE — 82105 ALPHA-FETOPROTEIN SERUM: CPT | Performed by: PHYSICIAN ASSISTANT

## 2020-01-01 PROCEDURE — 25000132 ZZH RX MED GY IP 250 OP 250 PS 637: Performed by: PSYCHIATRY & NEUROLOGY

## 2020-01-01 PROCEDURE — 999N001017 HC STATISTIC GLUCOSE BY METER IP

## 2020-01-01 PROCEDURE — 25000132 ZZH RX MED GY IP 250 OP 250 PS 637: Mod: ZF | Performed by: PHYSICIAN ASSISTANT

## 2020-01-01 PROCEDURE — 258N000001 HC RX 258: Performed by: STUDENT IN AN ORGANIZED HEALTH CARE EDUCATION/TRAINING PROGRAM

## 2020-01-01 PROCEDURE — 71275 CT ANGIOGRAPHY CHEST: CPT

## 2020-01-01 PROCEDURE — 82607 VITAMIN B-12: CPT | Performed by: INTERNAL MEDICINE

## 2020-01-01 PROCEDURE — 82140 ASSAY OF AMMONIA: CPT | Performed by: EMERGENCY MEDICINE

## 2020-01-01 PROCEDURE — 96361 HYDRATE IV INFUSION ADD-ON: CPT

## 2020-01-01 PROCEDURE — 99222 1ST HOSP IP/OBS MODERATE 55: CPT | Mod: GC | Performed by: INTERNAL MEDICINE

## 2020-01-01 PROCEDURE — 82105 ALPHA-FETOPROTEIN SERUM: CPT | Performed by: RADIOLOGY

## 2020-01-01 PROCEDURE — 93010 ELECTROCARDIOGRAM REPORT: CPT | Mod: 59 | Performed by: EMERGENCY MEDICINE

## 2020-01-01 PROCEDURE — 84155 ASSAY OF PROTEIN SERUM: CPT

## 2020-01-01 PROCEDURE — 85004 AUTOMATED DIFF WBC COUNT: CPT | Performed by: PHYSICIAN ASSISTANT

## 2020-01-01 PROCEDURE — 82565 ASSAY OF CREATININE: CPT | Performed by: PHYSICIAN ASSISTANT

## 2020-01-01 PROCEDURE — 25000128 H RX IP 250 OP 636: Performed by: PSYCHIATRY & NEUROLOGY

## 2020-01-01 PROCEDURE — 73502 X-RAY EXAM HIP UNI 2-3 VIEWS: CPT | Mod: 26 | Performed by: RADIOLOGY

## 2020-01-01 PROCEDURE — 999N000127 HC STATISTIC PERIPHERAL IV START W US GUIDANCE

## 2020-01-01 PROCEDURE — 80048 BASIC METABOLIC PNL TOTAL CA: CPT | Performed by: INTERNAL MEDICINE

## 2020-01-01 PROCEDURE — 87486 CHLMYD PNEUM DNA AMP PROBE: CPT | Performed by: NURSE PRACTITIONER

## 2020-01-01 PROCEDURE — 40000275 ZZH STATISTIC RCP TIME EA 10 MIN

## 2020-01-01 PROCEDURE — 250N000011 HC RX IP 250 OP 636: Performed by: NURSE PRACTITIONER

## 2020-01-01 PROCEDURE — 71000015 ZZH RECOVERY PHASE 1 LEVEL 2 EA ADDTL HR

## 2020-01-01 PROCEDURE — 99214 OFFICE O/P EST MOD 30 MIN: CPT | Mod: ZP | Performed by: PHYSICIAN ASSISTANT

## 2020-01-01 PROCEDURE — 37000008 ZZH ANESTHESIA TECHNICAL FEE, 1ST 30 MIN

## 2020-01-01 PROCEDURE — 40001009 ZZH VIDEO/TELEPHONE VISIT; NO CHARGE

## 2020-01-01 PROCEDURE — 82085 ASSAY OF ALDOLASE: CPT | Performed by: INTERNAL MEDICINE

## 2020-01-01 PROCEDURE — 84100 ASSAY OF PHOSPHORUS: CPT | Performed by: NURSE PRACTITIONER

## 2020-01-01 PROCEDURE — 25000125 ZZHC RX 250: Performed by: NURSE ANESTHETIST, CERTIFIED REGISTERED

## 2020-01-01 PROCEDURE — 82962 GLUCOSE BLOOD TEST: CPT

## 2020-01-01 PROCEDURE — 97165 OT EVAL LOW COMPLEX 30 MIN: CPT | Mod: GO

## 2020-01-01 PROCEDURE — U0003 INFECTIOUS AGENT DETECTION BY NUCLEIC ACID (DNA OR RNA); SEVERE ACUTE RESPIRATORY SYNDROME CORONAVIRUS 2 (SARS-COV-2) (CORONAVIRUS DISEASE [COVID-19]), AMPLIFIED PROBE TECHNIQUE, MAKING USE OF HIGH THROUGHPUT TECHNOLOGIES AS DESCRIBED BY CMS-2020-01-R: HCPCS | Performed by: NURSE PRACTITIONER

## 2020-01-01 PROCEDURE — 80053 COMPREHEN METABOLIC PANEL: CPT | Performed by: PHYSICIAN ASSISTANT

## 2020-01-01 PROCEDURE — 85610 PROTHROMBIN TIME: CPT | Performed by: RADIOLOGY

## 2020-01-01 PROCEDURE — 78306 BONE IMAGING WHOLE BODY: CPT | Mod: 26 | Performed by: RADIOLOGY

## 2020-01-01 PROCEDURE — 96374 THER/PROPH/DIAG INJ IV PUSH: CPT

## 2020-01-01 PROCEDURE — 86606 ASPERGILLUS ANTIBODY: CPT | Performed by: INTERNAL MEDICINE

## 2020-01-01 PROCEDURE — 82140 ASSAY OF AMMONIA: CPT | Performed by: INTERNAL MEDICINE

## 2020-01-01 PROCEDURE — 40000611 ZZHCL STATISTIC MORPHOLOGY W/INTERP HEMEPATH TC 85060: Performed by: INTERNAL MEDICINE

## 2020-01-01 PROCEDURE — 250N000013 HC RX MED GY IP 250 OP 250 PS 637: Performed by: STUDENT IN AN ORGANIZED HEALTH CARE EDUCATION/TRAINING PROGRAM

## 2020-01-01 PROCEDURE — 99231 SBSQ HOSP IP/OBS SF/LOW 25: CPT | Performed by: PHYSICIAN ASSISTANT

## 2020-01-01 PROCEDURE — 74183 MRI ABD W/O CNTR FLWD CNTR: CPT

## 2020-01-01 PROCEDURE — 99233 SBSQ HOSP IP/OBS HIGH 50: CPT | Performed by: INTERNAL MEDICINE

## 2020-01-01 PROCEDURE — 86235 NUCLEAR ANTIGEN ANTIBODY: CPT | Performed by: INTERNAL MEDICINE

## 2020-01-01 PROCEDURE — 87506 IADNA-DNA/RNA PROBE TQ 6-11: CPT | Performed by: INTERNAL MEDICINE

## 2020-01-01 PROCEDURE — 12000001 ZZH R&B MED SURG/OB UMMC

## 2020-01-01 PROCEDURE — 25800030 ZZH RX IP 258 OP 636: Performed by: INTERNAL MEDICINE

## 2020-01-01 PROCEDURE — 83735 ASSAY OF MAGNESIUM: CPT | Performed by: INTERNAL MEDICINE

## 2020-01-01 PROCEDURE — 27210732 ZZH ACCESSORY CR1

## 2020-01-01 PROCEDURE — 250N000011 HC RX IP 250 OP 636: Performed by: EMERGENCY MEDICINE

## 2020-01-01 PROCEDURE — 34300033 ZZH RX 343: Performed by: NURSE PRACTITIONER

## 2020-01-01 PROCEDURE — 99215 OFFICE O/P EST HI 40 MIN: CPT | Mod: ZP | Performed by: NURSE PRACTITIONER

## 2020-01-01 PROCEDURE — 25000132 ZZH RX MED GY IP 250 OP 250 PS 637: Performed by: ANESTHESIOLOGY

## 2020-01-01 PROCEDURE — 96365 THER/PROPH/DIAG IV INF INIT: CPT | Performed by: EMERGENCY MEDICINE

## 2020-01-01 PROCEDURE — 25800030 ZZH RX IP 258 OP 636: Mod: ZF | Performed by: PHYSICIAN ASSISTANT

## 2020-01-01 PROCEDURE — 99215 OFFICE O/P EST HI 40 MIN: CPT | Mod: GT | Performed by: NURSE PRACTITIONER

## 2020-01-01 PROCEDURE — 80048 BASIC METABOLIC PNL TOTAL CA: CPT | Performed by: PHYSICIAN ASSISTANT

## 2020-01-01 PROCEDURE — 99215 OFFICE O/P EST HI 40 MIN: CPT | Performed by: INTERNAL MEDICINE

## 2020-01-01 PROCEDURE — 99215 OFFICE O/P EST HI 40 MIN: CPT | Mod: 95 | Performed by: PHYSICIAN ASSISTANT

## 2020-01-01 PROCEDURE — 73502 X-RAY EXAM HIP UNI 2-3 VIEWS: CPT

## 2020-01-01 PROCEDURE — 99204 OFFICE O/P NEW MOD 45 MIN: CPT | Mod: 95 | Performed by: INTERNAL MEDICINE

## 2020-01-01 PROCEDURE — 36415 COLL VENOUS BLD VENIPUNCTURE: CPT

## 2020-01-01 PROCEDURE — 93010 ELECTROCARDIOGRAM REPORT: CPT | Performed by: EMERGENCY MEDICINE

## 2020-01-01 PROCEDURE — 84156 ASSAY OF PROTEIN URINE: CPT | Performed by: INTERNAL MEDICINE

## 2020-01-01 PROCEDURE — 71250 CT THORAX DX C-: CPT

## 2020-01-01 PROCEDURE — 86901 BLOOD TYPING SEROLOGIC RH(D): CPT | Performed by: STUDENT IN AN ORGANIZED HEALTH CARE EDUCATION/TRAINING PROGRAM

## 2020-01-01 PROCEDURE — 85025 COMPLETE CBC W/AUTO DIFF WBC: CPT | Performed by: INTERNAL MEDICINE

## 2020-01-01 PROCEDURE — 99214 OFFICE O/P EST MOD 30 MIN: CPT | Performed by: NURSE PRACTITIONER

## 2020-01-01 PROCEDURE — 83735 ASSAY OF MAGNESIUM: CPT | Performed by: NURSE PRACTITIONER

## 2020-01-01 PROCEDURE — 84484 ASSAY OF TROPONIN QUANT: CPT | Performed by: EMERGENCY MEDICINE

## 2020-01-01 PROCEDURE — 47382 PERCUT ABLATE LIVER RF: CPT

## 2020-01-01 PROCEDURE — 82140 ASSAY OF AMMONIA: CPT | Performed by: PHYSICIAN ASSISTANT

## 2020-01-01 PROCEDURE — 83735 ASSAY OF MAGNESIUM: CPT | Performed by: EMERGENCY MEDICINE

## 2020-01-01 PROCEDURE — 84443 ASSAY THYROID STIM HORMONE: CPT | Performed by: PHYSICIAN ASSISTANT

## 2020-01-01 PROCEDURE — 82247 BILIRUBIN TOTAL: CPT

## 2020-01-01 PROCEDURE — 96365 THER/PROPH/DIAG IV INF INIT: CPT

## 2020-01-01 PROCEDURE — 84132 ASSAY OF SERUM POTASSIUM: CPT | Performed by: PHYSICIAN ASSISTANT

## 2020-01-01 PROCEDURE — 25500064 ZZH RX 255 OP 636: Performed by: INTERNAL MEDICINE

## 2020-01-01 PROCEDURE — 85049 AUTOMATED PLATELET COUNT: CPT | Performed by: RADIOLOGY

## 2020-01-01 PROCEDURE — 83605 ASSAY OF LACTIC ACID: CPT | Performed by: PEDIATRICS

## 2020-01-01 PROCEDURE — 82105 ALPHA-FETOPROTEIN SERUM: CPT | Performed by: NURSE PRACTITIONER

## 2020-01-01 PROCEDURE — 84439 ASSAY OF FREE THYROXINE: CPT | Performed by: PHYSICIAN ASSISTANT

## 2020-01-01 PROCEDURE — 25800030 ZZH RX IP 258 OP 636: Performed by: PSYCHIATRY & NEUROLOGY

## 2020-01-01 PROCEDURE — 82803 BLOOD GASES ANY COMBINATION: CPT | Performed by: EMERGENCY MEDICINE

## 2020-01-01 PROCEDURE — 97802 MEDICAL NUTRITION INDIV IN: CPT | Mod: ZF | Performed by: DIETITIAN, REGISTERED

## 2020-01-01 PROCEDURE — 93010 ELECTROCARDIOGRAM REPORT: CPT | Mod: ZP | Performed by: INTERNAL MEDICINE

## 2020-01-01 PROCEDURE — 82042 OTHER SOURCE ALBUMIN QUAN EA: CPT | Performed by: EMERGENCY MEDICINE

## 2020-01-01 PROCEDURE — 83735 ASSAY OF MAGNESIUM: CPT | Mod: GT | Performed by: PATHOLOGY

## 2020-01-01 PROCEDURE — 86331 IMMUNODIFFUSION OUCHTERLONY: CPT | Performed by: INTERNAL MEDICINE

## 2020-01-01 PROCEDURE — 88333 PATH CONSLTJ SURG CYTO XM 1: CPT | Performed by: RADIOLOGY

## 2020-01-01 PROCEDURE — 87086 URINE CULTURE/COLONY COUNT: CPT | Performed by: INTERNAL MEDICINE

## 2020-01-01 PROCEDURE — 250N000013 HC RX MED GY IP 250 OP 250 PS 637: Performed by: PEDIATRICS

## 2020-01-01 PROCEDURE — 84156 ASSAY OF PROTEIN URINE: CPT | Mod: GT | Performed by: PATHOLOGY

## 2020-01-01 PROCEDURE — 84132 ASSAY OF SERUM POTASSIUM: CPT | Performed by: EMERGENCY MEDICINE

## 2020-01-01 PROCEDURE — 25800030 ZZH RX IP 258 OP 636: Performed by: RADIOLOGY

## 2020-01-01 PROCEDURE — 99215 OFFICE O/P EST HI 40 MIN: CPT | Mod: 95 | Performed by: INTERNAL MEDICINE

## 2020-01-01 PROCEDURE — 25000128 H RX IP 250 OP 636: Performed by: RADIOLOGY

## 2020-01-01 PROCEDURE — 80076 HEPATIC FUNCTION PANEL: CPT | Performed by: INTERNAL MEDICINE

## 2020-01-01 PROCEDURE — 85027 COMPLETE CBC AUTOMATED: CPT | Performed by: INTERNAL MEDICINE

## 2020-01-01 PROCEDURE — 87088 URINE BACTERIA CULTURE: CPT | Performed by: EMERGENCY MEDICINE

## 2020-01-01 PROCEDURE — 40000556 ZZH STATISTIC PERIPHERAL IV START W US GUIDANCE: Mod: ZF

## 2020-01-01 PROCEDURE — A9503 TC99M MEDRONATE: HCPCS | Performed by: NURSE PRACTITIONER

## 2020-01-01 PROCEDURE — 343N000001 HC RX 343: Performed by: INTERNAL MEDICINE

## 2020-01-01 PROCEDURE — 96360 HYDRATION IV INFUSION INIT: CPT

## 2020-01-01 PROCEDURE — 36415 COLL VENOUS BLD VENIPUNCTURE: CPT | Performed by: PEDIATRICS

## 2020-01-01 PROCEDURE — 25000128 H RX IP 250 OP 636: Mod: ZF | Performed by: PHYSICIAN ASSISTANT

## 2020-01-01 PROCEDURE — 99233 SBSQ HOSP IP/OBS HIGH 50: CPT | Performed by: NURSE PRACTITIONER

## 2020-01-01 PROCEDURE — 250N000013 HC RX MED GY IP 250 OP 250 PS 637: Performed by: EMERGENCY MEDICINE

## 2020-01-01 PROCEDURE — 25000131 ZZH RX MED GY IP 250 OP 636 PS 637: Performed by: INTERNAL MEDICINE

## 2020-01-01 PROCEDURE — 93306 TTE W/DOPPLER COMPLETE: CPT

## 2020-01-01 PROCEDURE — 84425 ASSAY OF VITAMIN B-1: CPT | Performed by: PSYCHIATRY & NEUROLOGY

## 2020-01-01 PROCEDURE — 80050 GENERAL HEALTH PANEL: CPT | Performed by: PHYSICIAN ASSISTANT

## 2020-01-01 PROCEDURE — G0463 HOSPITAL OUTPT CLINIC VISIT: HCPCS

## 2020-01-01 PROCEDURE — 77012 CT SCAN FOR NEEDLE BIOPSY: CPT

## 2020-01-01 PROCEDURE — 99407 BEHAV CHNG SMOKING > 10 MIN: CPT

## 2020-01-01 PROCEDURE — 87086 URINE CULTURE/COLONY COUNT: CPT | Performed by: EMERGENCY MEDICINE

## 2020-01-01 PROCEDURE — 87506 IADNA-DNA/RNA PROBE TQ 6-11: CPT | Performed by: EMERGENCY MEDICINE

## 2020-01-01 PROCEDURE — 25800030 ZZH RX IP 258 OP 636: Performed by: ANESTHESIOLOGY

## 2020-01-01 PROCEDURE — 120N000002 HC R&B MED SURG/OB UMMC

## 2020-01-01 PROCEDURE — 99207 PR NO BILLABLE SERVICE THIS VISIT: CPT

## 2020-01-01 PROCEDURE — 99205 OFFICE O/P NEW HI 60 MIN: CPT | Mod: ZP | Performed by: INTERNAL MEDICINE

## 2020-01-01 PROCEDURE — 99223 1ST HOSP IP/OBS HIGH 75: CPT | Mod: AI | Performed by: PEDIATRICS

## 2020-01-01 PROCEDURE — 87506 IADNA-DNA/RNA PROBE TQ 6-11: CPT | Performed by: NURSE PRACTITIONER

## 2020-01-01 PROCEDURE — 74176 CT ABD & PELVIS W/O CONTRAST: CPT

## 2020-01-01 PROCEDURE — 82784 ASSAY IGA/IGD/IGG/IGM EACH: CPT | Performed by: INTERNAL MEDICINE

## 2020-01-01 PROCEDURE — 25000128 H RX IP 250 OP 636: Performed by: EMERGENCY MEDICINE

## 2020-01-01 PROCEDURE — 84157 ASSAY OF PROTEIN OTHER: CPT | Performed by: EMERGENCY MEDICINE

## 2020-01-01 PROCEDURE — 84132 ASSAY OF SERUM POTASSIUM: CPT | Performed by: PEDIATRICS

## 2020-01-01 PROCEDURE — 85730 THROMBOPLASTIN TIME PARTIAL: CPT | Performed by: EMERGENCY MEDICINE

## 2020-01-01 PROCEDURE — 82746 ASSAY OF FOLIC ACID SERUM: CPT | Performed by: INTERNAL MEDICINE

## 2020-01-01 PROCEDURE — 96375 TX/PRO/DX INJ NEW DRUG ADDON: CPT

## 2020-01-01 PROCEDURE — 99232 SBSQ HOSP IP/OBS MODERATE 35: CPT | Performed by: INTERNAL MEDICINE

## 2020-01-01 PROCEDURE — 258N000003 HC RX IP 258 OP 636: Performed by: EMERGENCY MEDICINE

## 2020-01-01 PROCEDURE — 86431 RHEUMATOID FACTOR QUANT: CPT | Performed by: INTERNAL MEDICINE

## 2020-01-01 PROCEDURE — 87040 BLOOD CULTURE FOR BACTERIA: CPT | Performed by: EMERGENCY MEDICINE

## 2020-01-01 PROCEDURE — 70450 CT HEAD/BRAIN W/O DYE: CPT

## 2020-01-01 PROCEDURE — 36592 COLLECT BLOOD FROM PICC: CPT

## 2020-01-01 PROCEDURE — 85025 COMPLETE CBC W/AUTO DIFF WBC: CPT | Performed by: PHYSICIAN ASSISTANT

## 2020-01-01 PROCEDURE — 84132 ASSAY OF SERUM POTASSIUM: CPT | Performed by: INTERNAL MEDICINE

## 2020-01-01 PROCEDURE — 250N000013 HC RX MED GY IP 250 OP 250 PS 637: Performed by: INTERNAL MEDICINE

## 2020-01-01 PROCEDURE — 99232 SBSQ HOSP IP/OBS MODERATE 35: CPT | Mod: GC | Performed by: INTERNAL MEDICINE

## 2020-01-01 PROCEDURE — 99152 MOD SED SAME PHYS/QHP 5/>YRS: CPT

## 2020-01-01 PROCEDURE — 83605 ASSAY OF LACTIC ACID: CPT | Performed by: EMERGENCY MEDICINE

## 2020-01-01 PROCEDURE — 76705 ECHO EXAM OF ABDOMEN: CPT | Mod: 26 | Performed by: EMERGENCY MEDICINE

## 2020-01-01 PROCEDURE — 83036 HEMOGLOBIN GLYCOSYLATED A1C: CPT | Performed by: EMERGENCY MEDICINE

## 2020-01-01 PROCEDURE — 71000014 ZZH RECOVERY PHASE 1 LEVEL 2 FIRST HR

## 2020-01-01 PROCEDURE — 86255 FLUORESCENT ANTIBODY SCREEN: CPT | Performed by: INTERNAL MEDICINE

## 2020-01-01 PROCEDURE — 49083 ABD PARACENTESIS W/IMAGING: CPT | Performed by: EMERGENCY MEDICINE

## 2020-01-01 PROCEDURE — 82787 IGG 1 2 3 OR 4 EACH: CPT | Performed by: INTERNAL MEDICINE

## 2020-01-01 PROCEDURE — 80050 GENERAL HEALTH PANEL: CPT | Performed by: INTERNAL MEDICINE

## 2020-01-01 PROCEDURE — 36415 COLL VENOUS BLD VENIPUNCTURE: CPT | Performed by: RADIOLOGY

## 2020-01-01 PROCEDURE — 36415 COLL VENOUS BLD VENIPUNCTURE: CPT | Performed by: STUDENT IN AN ORGANIZED HEALTH CARE EDUCATION/TRAINING PROGRAM

## 2020-01-01 PROCEDURE — 99207 PR NO CHARGE LOS: CPT | Performed by: INTERNAL MEDICINE

## 2020-01-01 PROCEDURE — 84439 ASSAY OF FREE THYROXINE: CPT | Performed by: INTERNAL MEDICINE

## 2020-01-01 PROCEDURE — 82105 ALPHA-FETOPROTEIN SERUM: CPT | Mod: GT | Performed by: INTERNAL MEDICINE

## 2020-01-01 PROCEDURE — 76705 ECHO EXAM OF ABDOMEN: CPT | Performed by: EMERGENCY MEDICINE

## 2020-01-01 PROCEDURE — 83880 ASSAY OF NATRIURETIC PEPTIDE: CPT | Performed by: NURSE PRACTITIONER

## 2020-01-01 PROCEDURE — 80053 COMPREHEN METABOLIC PANEL: CPT | Performed by: RADIOLOGY

## 2020-01-01 PROCEDURE — 40000170 ZZH STATISTIC PRE-PROCEDURE ASSESSMENT II

## 2020-01-01 PROCEDURE — 71260 CT THORAX DX C+: CPT

## 2020-01-01 PROCEDURE — 84145 PROCALCITONIN (PCT): CPT | Performed by: EMERGENCY MEDICINE

## 2020-01-01 PROCEDURE — 71275 CT ANGIOGRAPHY CHEST: CPT | Mod: 26 | Performed by: RADIOLOGY

## 2020-01-01 PROCEDURE — 99443 ZZC PHYSICIAN TELEPHONE EVALUATION 21-30 MIN: CPT | Performed by: INTERNAL MEDICINE

## 2020-01-01 PROCEDURE — 86900 BLOOD TYPING SEROLOGIC ABO: CPT | Performed by: STUDENT IN AN ORGANIZED HEALTH CARE EDUCATION/TRAINING PROGRAM

## 2020-01-01 PROCEDURE — 85652 RBC SED RATE AUTOMATED: CPT | Performed by: INTERNAL MEDICINE

## 2020-01-01 PROCEDURE — P9037 PLATE PHERES LEUKOREDU IRRAD: HCPCS | Performed by: STUDENT IN AN ORGANIZED HEALTH CARE EDUCATION/TRAINING PROGRAM

## 2020-01-01 PROCEDURE — 85027 COMPLETE CBC AUTOMATED: CPT | Performed by: RADIOLOGY

## 2020-01-01 PROCEDURE — 87493 C DIFF AMPLIFIED PROBE: CPT | Performed by: NURSE PRACTITIONER

## 2020-01-01 PROCEDURE — 99223 1ST HOSP IP/OBS HIGH 75: CPT | Mod: AI | Performed by: INTERNAL MEDICINE

## 2020-01-01 PROCEDURE — 87205 SMEAR GRAM STAIN: CPT | Performed by: EMERGENCY MEDICINE

## 2020-01-01 PROCEDURE — 99233 SBSQ HOSP IP/OBS HIGH 50: CPT | Mod: GC | Performed by: INTERNAL MEDICINE

## 2020-01-01 PROCEDURE — 86850 RBC ANTIBODY SCREEN: CPT | Performed by: STUDENT IN AN ORGANIZED HEALTH CARE EDUCATION/TRAINING PROGRAM

## 2020-01-01 PROCEDURE — 71260 CT THORAX DX C+: CPT | Mod: 26 | Performed by: RADIOLOGY

## 2020-01-01 PROCEDURE — 70553 MRI BRAIN STEM W/O & W/DYE: CPT

## 2020-01-01 PROCEDURE — 87633 RESP VIRUS 12-25 TARGETS: CPT | Performed by: NURSE PRACTITIONER

## 2020-01-01 PROCEDURE — 85018 HEMOGLOBIN: CPT | Performed by: RADIOLOGY

## 2020-01-01 PROCEDURE — 86140 C-REACTIVE PROTEIN: CPT | Performed by: INTERNAL MEDICINE

## 2020-01-01 PROCEDURE — 93975 VASCULAR STUDY: CPT | Mod: TC

## 2020-01-01 PROCEDURE — 99285 EMERGENCY DEPT VISIT HI MDM: CPT | Mod: Z6 | Performed by: EMERGENCY MEDICINE

## 2020-01-01 PROCEDURE — 99442 PR PHYSICIAN TELEPHONE EVALUATION 11-20 MIN: CPT | Performed by: PHYSICIAN ASSISTANT

## 2020-01-01 PROCEDURE — 85045 AUTOMATED RETICULOCYTE COUNT: CPT | Performed by: INTERNAL MEDICINE

## 2020-01-01 PROCEDURE — 40000114 ZZH STATISTIC NO CHARGE CLINIC VISIT

## 2020-01-01 PROCEDURE — 25000128 H RX IP 250 OP 636: Performed by: PHYSICIAN ASSISTANT

## 2020-01-01 PROCEDURE — 40000556 ZZH STATISTIC PERIPHERAL IV START W US GUIDANCE

## 2020-01-01 PROCEDURE — 89051 BODY FLUID CELL COUNT: CPT | Performed by: EMERGENCY MEDICINE

## 2020-01-01 PROCEDURE — 99207 PR CDG-MDM COMPONENT: MEETS MODERATE - UP CODED: CPT | Performed by: NURSE PRACTITIONER

## 2020-01-01 PROCEDURE — 49083 ABD PARACENTESIS W/IMAGING: CPT | Performed by: PEDIATRICS

## 2020-01-01 PROCEDURE — 82140 ASSAY OF AMMONIA: CPT | Performed by: STUDENT IN AN ORGANIZED HEALTH CARE EDUCATION/TRAINING PROGRAM

## 2020-01-01 PROCEDURE — 36415 COLL VENOUS BLD VENIPUNCTURE: CPT | Performed by: PSYCHIATRY & NEUROLOGY

## 2020-01-01 PROCEDURE — 96365 THER/PROPH/DIAG IV INF INIT: CPT | Mod: 59 | Performed by: EMERGENCY MEDICINE

## 2020-01-01 PROCEDURE — 71250 CT THORAX DX C-: CPT | Mod: 26 | Performed by: RADIOLOGY

## 2020-01-01 PROCEDURE — 120N000001 HC R&B MED SURG/OB

## 2020-01-01 PROCEDURE — 71045 X-RAY EXAM CHEST 1 VIEW: CPT

## 2020-01-01 RX ORDER — FENTANYL CITRATE 50 UG/ML
25-50 INJECTION, SOLUTION INTRAMUSCULAR; INTRAVENOUS EVERY 5 MIN PRN
Status: DISCONTINUED | OUTPATIENT
Start: 2020-01-01 | End: 2020-01-01 | Stop reason: HOSPADM

## 2020-01-01 RX ORDER — OXYCODONE HYDROCHLORIDE 5 MG/1
5 TABLET ORAL EVERY 4 HOURS PRN
Status: DISCONTINUED | OUTPATIENT
Start: 2020-01-01 | End: 2020-01-01 | Stop reason: HOSPADM

## 2020-01-01 RX ORDER — FUROSEMIDE 20 MG
20 TABLET ORAL DAILY
Qty: 30 TABLET | Refills: 0 | Status: SHIPPED | OUTPATIENT
Start: 2020-01-01 | End: 2020-01-01

## 2020-01-01 RX ORDER — GABAPENTIN 100 MG/1
100 CAPSULE ORAL 3 TIMES DAILY
Status: DISCONTINUED | OUTPATIENT
Start: 2020-01-01 | End: 2020-01-01 | Stop reason: HOSPADM

## 2020-01-01 RX ORDER — PROCHLORPERAZINE MALEATE 10 MG
10 TABLET ORAL EVERY 6 HOURS PRN
Qty: 30 TABLET | Refills: 2 | Status: SHIPPED | OUTPATIENT
Start: 2020-01-01 | End: 2020-01-01

## 2020-01-01 RX ORDER — NALOXONE HYDROCHLORIDE 0.4 MG/ML
.1-.4 INJECTION, SOLUTION INTRAMUSCULAR; INTRAVENOUS; SUBCUTANEOUS
Status: DISCONTINUED | OUTPATIENT
Start: 2020-01-01 | End: 2020-01-01 | Stop reason: HOSPADM

## 2020-01-01 RX ORDER — OXYCODONE HYDROCHLORIDE 5 MG/1
5 TABLET ORAL 3 TIMES DAILY PRN
Status: DISCONTINUED | OUTPATIENT
Start: 2020-01-01 | End: 2020-01-01 | Stop reason: HOSPADM

## 2020-01-01 RX ORDER — OXYCODONE HYDROCHLORIDE 5 MG/1
2.5-5 TABLET ORAL EVERY 8 HOURS PRN
Qty: 12 TABLET | Refills: 0 | Status: SHIPPED | OUTPATIENT
Start: 2020-01-01 | End: 2020-01-01

## 2020-01-01 RX ORDER — POTASSIUM CHLORIDE 1.5 G/1.58G
20 POWDER, FOR SOLUTION ORAL DAILY
Qty: 60 PACKET | Refills: 1 | COMMUNITY
Start: 2020-01-01

## 2020-01-01 RX ORDER — OXYCODONE HYDROCHLORIDE 5 MG/1
5 TABLET ORAL
Status: COMPLETED | OUTPATIENT
Start: 2020-01-01 | End: 2020-01-01

## 2020-01-01 RX ORDER — HEPARIN SODIUM,PORCINE 10 UNIT/ML
5 VIAL (ML) INTRAVENOUS
Status: CANCELLED | OUTPATIENT
Start: 2020-01-01

## 2020-01-01 RX ORDER — OXYCODONE HYDROCHLORIDE 5 MG/1
5 TABLET ORAL EVERY 6 HOURS PRN
Qty: 5 TABLET | Refills: 0 | Status: SHIPPED | OUTPATIENT
Start: 2020-01-01 | End: 2020-01-01

## 2020-01-01 RX ORDER — HEPARIN SODIUM (PORCINE) LOCK FLUSH IV SOLN 100 UNIT/ML 100 UNIT/ML
5 SOLUTION INTRAVENOUS
Status: CANCELLED | OUTPATIENT
Start: 2020-01-01

## 2020-01-01 RX ORDER — OXYCODONE HYDROCHLORIDE 5 MG/1
5 TABLET ORAL EVERY 4 HOURS PRN
Qty: 20 TABLET | Refills: 0 | Status: SHIPPED | OUTPATIENT
Start: 2020-01-01 | End: 2020-01-01

## 2020-01-01 RX ORDER — NICOTINE POLACRILEX 4 MG
15-30 LOZENGE BUCCAL
Status: DISCONTINUED | OUTPATIENT
Start: 2020-01-01 | End: 2020-01-01 | Stop reason: HOSPADM

## 2020-01-01 RX ORDER — MAGNESIUM OXIDE 400 MG/1
400 TABLET ORAL 2 TIMES DAILY
Qty: 60 TABLET | Refills: 1 | Status: SHIPPED | OUTPATIENT
Start: 2020-01-01

## 2020-01-01 RX ORDER — SIMETHICONE 80 MG
80 TABLET,CHEWABLE ORAL EVERY 6 HOURS PRN
Status: DISCONTINUED | OUTPATIENT
Start: 2020-01-01 | End: 2020-01-01 | Stop reason: HOSPADM

## 2020-01-01 RX ORDER — IOPAMIDOL 755 MG/ML
93 INJECTION, SOLUTION INTRAVASCULAR ONCE
Status: COMPLETED | OUTPATIENT
Start: 2020-01-01 | End: 2020-01-01

## 2020-01-01 RX ORDER — QUETIAPINE FUMARATE 25 MG/1
50 TABLET, FILM COATED ORAL AT BEDTIME
Status: DISCONTINUED | OUTPATIENT
Start: 2020-01-01 | End: 2020-01-01 | Stop reason: HOSPADM

## 2020-01-01 RX ORDER — LEVOTHYROXINE SODIUM 25 UG/1
25 TABLET ORAL DAILY
Status: DISCONTINUED | OUTPATIENT
Start: 2020-01-01 | End: 2020-01-01 | Stop reason: HOSPADM

## 2020-01-01 RX ORDER — HYDROMORPHONE HYDROCHLORIDE 1 MG/ML
0.5 INJECTION, SOLUTION INTRAMUSCULAR; INTRAVENOUS; SUBCUTANEOUS
Status: DISCONTINUED | OUTPATIENT
Start: 2020-01-01 | End: 2020-01-01

## 2020-01-01 RX ORDER — DEXTROSE MONOHYDRATE 25 G/50ML
25-50 INJECTION, SOLUTION INTRAVENOUS
Status: DISCONTINUED | OUTPATIENT
Start: 2020-01-01 | End: 2020-01-01 | Stop reason: HOSPADM

## 2020-01-01 RX ORDER — SPIRONOLACTONE 25 MG/1
50 TABLET ORAL DAILY
Status: DISCONTINUED | OUTPATIENT
Start: 2020-01-01 | End: 2020-01-01 | Stop reason: HOSPADM

## 2020-01-01 RX ORDER — GADOBUTROL 604.72 MG/ML
10 INJECTION INTRAVENOUS ONCE
Status: COMPLETED | OUTPATIENT
Start: 2020-01-01 | End: 2020-01-01

## 2020-01-01 RX ORDER — CEFAZOLIN SODIUM 1 G/3ML
1 INJECTION, POWDER, FOR SOLUTION INTRAMUSCULAR; INTRAVENOUS SEE ADMIN INSTRUCTIONS
Status: DISCONTINUED | OUTPATIENT
Start: 2020-01-01 | End: 2020-01-01 | Stop reason: HOSPADM

## 2020-01-01 RX ORDER — DOCOSANOL 100 MG/G
CREAM TOPICAL
Status: DISPENSED | OUTPATIENT
Start: 2020-01-01 | End: 2020-01-01

## 2020-01-01 RX ORDER — FENTANYL CITRATE 50 UG/ML
INJECTION, SOLUTION INTRAMUSCULAR; INTRAVENOUS PRN
Status: DISCONTINUED | OUTPATIENT
Start: 2020-01-01 | End: 2020-01-01

## 2020-01-01 RX ORDER — LIDOCAINE 4 G/G
1-3 PATCH TOPICAL
Status: DISCONTINUED | OUTPATIENT
Start: 2020-01-01 | End: 2020-01-01 | Stop reason: HOSPADM

## 2020-01-01 RX ORDER — ZOLEDRONIC ACID 0.04 MG/ML
4 INJECTION, SOLUTION INTRAVENOUS ONCE
Status: COMPLETED | OUTPATIENT
Start: 2020-01-01 | End: 2020-01-01

## 2020-01-01 RX ORDER — VITAMIN B COMPLEX
25 TABLET ORAL DAILY
Status: DISCONTINUED | OUTPATIENT
Start: 2020-01-01 | End: 2020-01-01 | Stop reason: HOSPADM

## 2020-01-01 RX ORDER — OXYCODONE HYDROCHLORIDE 5 MG/1
2.5-5 TABLET ORAL EVERY 6 HOURS PRN
Qty: 30 TABLET | Refills: 0 | Status: SHIPPED | OUTPATIENT
Start: 2020-01-01 | End: 2020-01-01

## 2020-01-01 RX ORDER — LANOLIN ALCOHOL/MO/W.PET/CERES
100 CREAM (GRAM) TOPICAL DAILY
Qty: 30 TABLET | Refills: 0 | Status: SHIPPED | OUTPATIENT
Start: 2020-01-01 | End: 2020-01-01

## 2020-01-01 RX ORDER — LOPERAMIDE HCL 1 MG/5ML
1 SOLUTION ORAL 2 TIMES DAILY PRN
Qty: 200 ML | Refills: 1 | Status: CANCELLED | OUTPATIENT
Start: 2020-01-01

## 2020-01-01 RX ORDER — HYDROMORPHONE HYDROCHLORIDE 2 MG/1
2 TABLET ORAL EVERY 6 HOURS PRN
Qty: 74 TABLET | Refills: 0 | Status: SHIPPED | OUTPATIENT
Start: 2020-01-01 | End: 2021-01-15

## 2020-01-01 RX ORDER — IOPAMIDOL 755 MG/ML
99 INJECTION, SOLUTION INTRAVASCULAR ONCE
Status: COMPLETED | OUTPATIENT
Start: 2020-01-01 | End: 2020-01-01

## 2020-01-01 RX ORDER — ONDANSETRON 8 MG/1
8 TABLET, FILM COATED ORAL DAILY
Qty: 30 TABLET | Refills: 3 | Status: SHIPPED | OUTPATIENT
Start: 2020-01-01 | End: 2020-01-01

## 2020-01-01 RX ORDER — QUETIAPINE FUMARATE 50 MG/1
50 TABLET, FILM COATED ORAL AT BEDTIME
Status: DISCONTINUED | OUTPATIENT
Start: 2020-01-01 | End: 2020-01-01 | Stop reason: HOSPADM

## 2020-01-01 RX ORDER — HYDROXYZINE HYDROCHLORIDE 25 MG/1
25 TABLET, FILM COATED ORAL EVERY 6 HOURS PRN
Qty: 90 TABLET | Refills: 3 | Status: SHIPPED | OUTPATIENT
Start: 2020-01-01

## 2020-01-01 RX ORDER — LANOLIN ALCOHOL/MO/W.PET/CERES
3 CREAM (GRAM) TOPICAL AT BEDTIME
Status: DISCONTINUED | OUTPATIENT
Start: 2020-01-01 | End: 2020-01-01 | Stop reason: HOSPADM

## 2020-01-01 RX ORDER — PANTOPRAZOLE SODIUM 40 MG/1
40 TABLET, DELAYED RELEASE ORAL DAILY
Status: DISCONTINUED | OUTPATIENT
Start: 2020-01-01 | End: 2020-01-01 | Stop reason: HOSPADM

## 2020-01-01 RX ORDER — HYDROMORPHONE HYDROCHLORIDE 1 MG/ML
0.5 INJECTION, SOLUTION INTRAMUSCULAR; INTRAVENOUS; SUBCUTANEOUS
Status: DISCONTINUED | OUTPATIENT
Start: 2020-01-01 | End: 2020-01-01 | Stop reason: HOSPADM

## 2020-01-01 RX ORDER — ALBUTEROL SULFATE 90 UG/1
2 AEROSOL, METERED RESPIRATORY (INHALATION) EVERY 6 HOURS PRN
Status: DISCONTINUED | OUTPATIENT
Start: 2020-01-01 | End: 2020-01-01 | Stop reason: HOSPADM

## 2020-01-01 RX ORDER — POTASSIUM CHLORIDE 7.45 MG/ML
10 INJECTION INTRAVENOUS ONCE
Status: DISCONTINUED | OUTPATIENT
Start: 2020-01-01 | End: 2020-01-01 | Stop reason: HOSPADM

## 2020-01-01 RX ORDER — POTASSIUM CHLORIDE 750 MG/1
40 TABLET, EXTENDED RELEASE ORAL ONCE
Status: COMPLETED | OUTPATIENT
Start: 2020-01-01 | End: 2020-01-01

## 2020-01-01 RX ORDER — CEFAZOLIN SODIUM 2 G/100ML
2 INJECTION, SOLUTION INTRAVENOUS
Status: COMPLETED | OUTPATIENT
Start: 2020-01-01 | End: 2020-01-01

## 2020-01-01 RX ORDER — POTASSIUM CHLORIDE 1500 MG/1
40 TABLET, EXTENDED RELEASE ORAL ONCE
Status: DISCONTINUED | OUTPATIENT
Start: 2020-01-01 | End: 2020-01-01 | Stop reason: HOSPADM

## 2020-01-01 RX ORDER — FUROSEMIDE 20 MG
20 TABLET ORAL DAILY
Status: DISCONTINUED | OUTPATIENT
Start: 2020-01-01 | End: 2020-01-01 | Stop reason: HOSPADM

## 2020-01-01 RX ORDER — THIAMINE HYDROCHLORIDE 100 MG/ML
100 INJECTION, SOLUTION INTRAMUSCULAR; INTRAVENOUS DAILY
Status: DISCONTINUED | OUTPATIENT
Start: 2020-01-01 | End: 2020-01-01

## 2020-01-01 RX ORDER — OXYCODONE HYDROCHLORIDE 5 MG/1
5 TABLET ORAL EVERY 6 HOURS PRN
Qty: 30 TABLET | Refills: 0 | Status: SHIPPED | OUTPATIENT
Start: 2020-01-01 | End: 2020-01-01

## 2020-01-01 RX ORDER — OXYCODONE HYDROCHLORIDE 5 MG/1
2.5 TABLET ORAL EVERY 6 HOURS PRN
Qty: 5 TABLET | Refills: 0 | Status: SHIPPED | OUTPATIENT
Start: 2020-01-01 | End: 2020-01-01

## 2020-01-01 RX ORDER — OLANZAPINE 5 MG/1
5 TABLET, ORALLY DISINTEGRATING ORAL AT BEDTIME
Qty: 60 TABLET | Refills: 1 | Status: SHIPPED | OUTPATIENT
Start: 2020-01-01

## 2020-01-01 RX ORDER — LACTULOSE 10 G/15ML
20 SOLUTION ORAL DAILY
Status: DISCONTINUED | OUTPATIENT
Start: 2020-01-01 | End: 2020-01-01 | Stop reason: HOSPADM

## 2020-01-01 RX ORDER — SIMETHICONE 80 MG
80 TABLET,CHEWABLE ORAL EVERY 6 HOURS PRN
Qty: 90 TABLET | Refills: 0 | Status: SHIPPED | OUTPATIENT
Start: 2020-01-01

## 2020-01-01 RX ORDER — NICOTINE 21 MG/24HR
1 PATCH, TRANSDERMAL 24 HOURS TRANSDERMAL EVERY 24 HOURS
Qty: 30 PATCH | Refills: 3 | Status: SHIPPED | OUTPATIENT
Start: 2020-01-01 | End: 2020-01-01

## 2020-01-01 RX ORDER — DOXYCYCLINE 100 MG/1
CAPSULE ORAL
COMMUNITY
Start: 2020-01-01 | End: 2020-01-01

## 2020-01-01 RX ORDER — OXYCODONE HYDROCHLORIDE 5 MG/1
5-10 TABLET ORAL EVERY 6 HOURS PRN
Status: DISCONTINUED | OUTPATIENT
Start: 2020-01-01 | End: 2020-01-01

## 2020-01-01 RX ORDER — MORPHINE SULFATE 4 MG/ML
4 INJECTION, SOLUTION INTRAMUSCULAR; INTRAVENOUS ONCE
Status: COMPLETED | OUTPATIENT
Start: 2020-01-01 | End: 2020-01-01

## 2020-01-01 RX ORDER — HYDROMORPHONE HYDROCHLORIDE 1 MG/ML
.3-.5 INJECTION, SOLUTION INTRAMUSCULAR; INTRAVENOUS; SUBCUTANEOUS EVERY 5 MIN PRN
Status: DISCONTINUED | OUTPATIENT
Start: 2020-01-01 | End: 2020-01-01 | Stop reason: HOSPADM

## 2020-01-01 RX ORDER — FLUMAZENIL 0.1 MG/ML
0.2 INJECTION, SOLUTION INTRAVENOUS
Status: DISCONTINUED | OUTPATIENT
Start: 2020-01-01 | End: 2020-01-01 | Stop reason: HOSPADM

## 2020-01-01 RX ORDER — IOPAMIDOL 755 MG/ML
107 INJECTION, SOLUTION INTRAVASCULAR ONCE
Status: COMPLETED | OUTPATIENT
Start: 2020-01-01 | End: 2020-01-01

## 2020-01-01 RX ORDER — LANOLIN ALCOHOL/MO/W.PET/CERES
3 CREAM (GRAM) TOPICAL
Status: DISCONTINUED | OUTPATIENT
Start: 2020-01-01 | End: 2020-01-01 | Stop reason: HOSPADM

## 2020-01-01 RX ORDER — LIDOCAINE 40 MG/G
CREAM TOPICAL
Status: DISCONTINUED | OUTPATIENT
Start: 2020-01-01 | End: 2020-01-01 | Stop reason: HOSPADM

## 2020-01-01 RX ORDER — POTASSIUM CHLORIDE 750 MG/1
20-40 TABLET, EXTENDED RELEASE ORAL
Status: DISCONTINUED | OUTPATIENT
Start: 2020-01-01 | End: 2020-01-01 | Stop reason: HOSPADM

## 2020-01-01 RX ORDER — NICOTINE 21 MG/24HR
1 PATCH, TRANSDERMAL 24 HOURS TRANSDERMAL EVERY 24 HOURS
Status: DISCONTINUED | OUTPATIENT
Start: 2020-01-01 | End: 2020-01-01 | Stop reason: HOSPADM

## 2020-01-01 RX ORDER — ZOLEDRONIC ACID 0.04 MG/ML
4 INJECTION, SOLUTION INTRAVENOUS ONCE
Status: CANCELLED | OUTPATIENT
Start: 2020-01-01 | End: 2020-01-01

## 2020-01-01 RX ORDER — LIDOCAINE HYDROCHLORIDE 20 MG/ML
INJECTION, SOLUTION INFILTRATION; PERINEURAL PRN
Status: DISCONTINUED | OUTPATIENT
Start: 2020-01-01 | End: 2020-01-01

## 2020-01-01 RX ORDER — BENZONATATE 100 MG/1
100 CAPSULE ORAL 3 TIMES DAILY PRN
Status: DISCONTINUED | OUTPATIENT
Start: 2020-01-01 | End: 2020-01-01 | Stop reason: HOSPADM

## 2020-01-01 RX ORDER — SERTRALINE HYDROCHLORIDE 100 MG/1
100 TABLET, FILM COATED ORAL DAILY
Status: DISCONTINUED | OUTPATIENT
Start: 2020-01-01 | End: 2020-01-01 | Stop reason: HOSPADM

## 2020-01-01 RX ORDER — SODIUM CHLORIDE 9 MG/ML
INJECTION, SOLUTION INTRAVENOUS CONTINUOUS
Status: DISCONTINUED | OUTPATIENT
Start: 2020-01-01 | End: 2020-01-01

## 2020-01-01 RX ORDER — FUROSEMIDE 10 MG/ML
10 INJECTION INTRAMUSCULAR; INTRAVENOUS ONCE
Status: COMPLETED | OUTPATIENT
Start: 2020-01-01 | End: 2020-01-01

## 2020-01-01 RX ORDER — ONDANSETRON 2 MG/ML
4 INJECTION INTRAMUSCULAR; INTRAVENOUS EVERY 6 HOURS PRN
Status: DISCONTINUED | OUTPATIENT
Start: 2020-01-01 | End: 2020-01-01 | Stop reason: HOSPADM

## 2020-01-01 RX ORDER — NICOTINE 21 MG/24HR
1 PATCH, TRANSDERMAL 24 HOURS TRANSDERMAL EVERY 24 HOURS
Qty: 30 PATCH | Refills: 3 | OUTPATIENT
Start: 2020-01-01

## 2020-01-01 RX ORDER — POTASSIUM CHLORIDE 1.5 G/1.58G
20-40 POWDER, FOR SOLUTION ORAL
Status: DISCONTINUED | OUTPATIENT
Start: 2020-01-01 | End: 2020-01-01 | Stop reason: HOSPADM

## 2020-01-01 RX ORDER — POTASSIUM CHLORIDE 1.5 G/1.58G
40 POWDER, FOR SOLUTION ORAL ONCE
Status: COMPLETED | OUTPATIENT
Start: 2020-01-01 | End: 2020-01-01

## 2020-01-01 RX ORDER — TC 99M MEDRONATE 20 MG/10ML
25 INJECTION, POWDER, LYOPHILIZED, FOR SOLUTION INTRAVENOUS ONCE
Status: COMPLETED | OUTPATIENT
Start: 2020-01-01 | End: 2020-01-01

## 2020-01-01 RX ORDER — SPIRONOLACTONE 50 MG/1
50 TABLET, FILM COATED ORAL DAILY
Qty: 30 TABLET | Refills: 0 | Status: SHIPPED | OUTPATIENT
Start: 2020-01-01 | End: 2020-01-01 | Stop reason: SINTOL

## 2020-01-01 RX ORDER — OXYCODONE HYDROCHLORIDE 5 MG/1
5 TABLET ORAL 3 TIMES DAILY PRN
Qty: 10 TABLET | Refills: 0 | Status: SHIPPED | OUTPATIENT
Start: 2020-01-01 | End: 2020-01-01

## 2020-01-01 RX ORDER — CEFTRIAXONE 1 G/1
1 INJECTION, POWDER, FOR SOLUTION INTRAMUSCULAR; INTRAVENOUS ONCE
Status: COMPLETED | OUTPATIENT
Start: 2020-01-01 | End: 2020-01-01

## 2020-01-01 RX ORDER — LACTULOSE 10 G/15ML
10 SOLUTION ORAL 4 TIMES DAILY PRN
Status: DISCONTINUED | OUTPATIENT
Start: 2020-01-01 | End: 2020-01-01 | Stop reason: HOSPADM

## 2020-01-01 RX ORDER — BENZONATATE 100 MG/1
100-200 CAPSULE ORAL 3 TIMES DAILY PRN
Status: DISCONTINUED | OUTPATIENT
Start: 2020-01-01 | End: 2020-01-01 | Stop reason: HOSPADM

## 2020-01-01 RX ORDER — OXYCODONE HYDROCHLORIDE 5 MG/1
5 TABLET ORAL EVERY 6 HOURS PRN
Qty: 30 TABLET | Refills: 0 | Status: ON HOLD | OUTPATIENT
Start: 2020-01-01 | End: 2020-01-01

## 2020-01-01 RX ORDER — LANOLIN ALCOHOL/MO/W.PET/CERES
3 CREAM (GRAM) TOPICAL AT BEDTIME
Qty: 30 TABLET | Refills: 0 | Status: SHIPPED | OUTPATIENT
Start: 2020-01-01 | End: 2020-01-01 | Stop reason: ALTCHOICE

## 2020-01-01 RX ORDER — OXYCODONE HYDROCHLORIDE 5 MG/1
5 TABLET ORAL EVERY 4 HOURS PRN
Qty: 10 TABLET | Refills: 0 | Status: ON HOLD | OUTPATIENT
Start: 2020-01-01 | End: 2020-01-01

## 2020-01-01 RX ORDER — AMOXICILLIN 250 MG
2 CAPSULE ORAL 2 TIMES DAILY
Status: DISCONTINUED | OUTPATIENT
Start: 2020-01-01 | End: 2020-01-01 | Stop reason: HOSPADM

## 2020-01-01 RX ORDER — OXYCODONE HYDROCHLORIDE 5 MG/1
5 TABLET ORAL EVERY 4 HOURS PRN
Qty: 10 TABLET | Refills: 0 | Status: SHIPPED | OUTPATIENT
Start: 2020-01-01 | End: 2020-01-01

## 2020-01-01 RX ORDER — CEFTRIAXONE 2 G/1
2 INJECTION, POWDER, FOR SOLUTION INTRAMUSCULAR; INTRAVENOUS EVERY 24 HOURS
Status: COMPLETED | OUTPATIENT
Start: 2020-01-01 | End: 2020-01-01

## 2020-01-01 RX ORDER — PIPERACILLIN SODIUM, TAZOBACTAM SODIUM 3; .375 G/15ML; G/15ML
3.38 INJECTION, POWDER, LYOPHILIZED, FOR SOLUTION INTRAVENOUS ONCE
Status: COMPLETED | OUTPATIENT
Start: 2020-01-01 | End: 2020-01-01

## 2020-01-01 RX ORDER — PROPOFOL 10 MG/ML
INJECTION, EMULSION INTRAVENOUS PRN
Status: DISCONTINUED | OUTPATIENT
Start: 2020-01-01 | End: 2020-01-01

## 2020-01-01 RX ORDER — ONDANSETRON 8 MG/1
8 TABLET, ORALLY DISINTEGRATING ORAL EVERY 8 HOURS PRN
Qty: 30 TABLET | Refills: 3 | Status: SHIPPED | OUTPATIENT
Start: 2020-01-01 | End: 2020-01-01

## 2020-01-01 RX ORDER — POLYETHYLENE GLYCOL 3350 17 G/17G
17 POWDER, FOR SOLUTION ORAL DAILY
Status: DISCONTINUED | OUTPATIENT
Start: 2020-01-01 | End: 2020-01-01 | Stop reason: HOSPADM

## 2020-01-01 RX ORDER — LANOLIN ALCOHOL/MO/W.PET/CERES
100 CREAM (GRAM) TOPICAL DAILY
Status: DISCONTINUED | OUTPATIENT
Start: 2020-01-01 | End: 2020-01-01 | Stop reason: HOSPADM

## 2020-01-01 RX ORDER — POTASSIUM CHLORIDE 1500 MG/1
20 TABLET, EXTENDED RELEASE ORAL DAILY
Qty: 5 TABLET | Refills: 0 | Status: ON HOLD | OUTPATIENT
Start: 2020-01-01 | End: 2020-01-01

## 2020-01-01 RX ORDER — ONDANSETRON 4 MG/1
4 TABLET, ORALLY DISINTEGRATING ORAL EVERY 30 MIN PRN
Status: DISCONTINUED | OUTPATIENT
Start: 2020-01-01 | End: 2020-01-01 | Stop reason: HOSPADM

## 2020-01-01 RX ORDER — SODIUM CHLORIDE 9 MG/ML
INJECTION, SOLUTION INTRAVENOUS CONTINUOUS
Status: DISCONTINUED | OUTPATIENT
Start: 2020-01-01 | End: 2020-01-01 | Stop reason: HOSPADM

## 2020-01-01 RX ORDER — ZOLEDRONIC ACID 0.04 MG/ML
4 INJECTION, SOLUTION INTRAVENOUS ONCE
Status: CANCELLED | OUTPATIENT
Start: 2020-01-01

## 2020-01-01 RX ORDER — LANOLIN ALCOHOL/MO/W.PET/CERES
100 CREAM (GRAM) TOPICAL DAILY
Qty: 30 TABLET | Refills: 0 | Status: SHIPPED | OUTPATIENT
Start: 2020-01-01

## 2020-01-01 RX ORDER — PROCHLORPERAZINE MALEATE 10 MG
10 TABLET ORAL EVERY 6 HOURS PRN
Status: DISCONTINUED | OUTPATIENT
Start: 2020-01-01 | End: 2020-01-01 | Stop reason: HOSPADM

## 2020-01-01 RX ORDER — ONDANSETRON 4 MG/1
4 TABLET, ORALLY DISINTEGRATING ORAL EVERY 6 HOURS PRN
Status: DISCONTINUED | OUTPATIENT
Start: 2020-01-01 | End: 2020-01-01 | Stop reason: HOSPADM

## 2020-01-01 RX ORDER — ONDANSETRON 2 MG/ML
4 INJECTION INTRAMUSCULAR; INTRAVENOUS EVERY 30 MIN PRN
Status: DISCONTINUED | OUTPATIENT
Start: 2020-01-01 | End: 2020-01-01 | Stop reason: HOSPADM

## 2020-01-01 RX ORDER — OXYCODONE HYDROCHLORIDE 5 MG/1
5 TABLET ORAL ONCE
Status: COMPLETED | OUTPATIENT
Start: 2020-01-01 | End: 2020-01-01

## 2020-01-01 RX ORDER — ACETAMINOPHEN 500 MG
1000 TABLET ORAL EVERY 8 HOURS PRN
Status: DISCONTINUED | OUTPATIENT
Start: 2020-01-01 | End: 2020-01-01 | Stop reason: HOSPADM

## 2020-01-01 RX ORDER — IOPAMIDOL 755 MG/ML
100 INJECTION, SOLUTION INTRAVASCULAR ONCE
Status: COMPLETED | OUTPATIENT
Start: 2020-01-01 | End: 2020-01-01

## 2020-01-01 RX ORDER — LACTULOSE 10 G/15ML
10 SOLUTION ORAL 4 TIMES DAILY PRN
Qty: 500 ML | Refills: 0 | Status: SHIPPED | OUTPATIENT
Start: 2020-01-01 | End: 2020-01-01

## 2020-01-01 RX ORDER — SPIRONOLACTONE 50 MG/1
50 TABLET, FILM COATED ORAL DAILY
Qty: 30 TABLET | Refills: 0 | Status: SHIPPED | OUTPATIENT
Start: 2020-01-01 | End: 2020-01-01

## 2020-01-01 RX ORDER — POTASSIUM CHLORIDE 1.5 G/1.58G
20 POWDER, FOR SOLUTION ORAL 2 TIMES DAILY
Qty: 60 PACKET | Refills: 1 | Status: ON HOLD | OUTPATIENT
Start: 2020-01-01 | End: 2020-01-01

## 2020-01-01 RX ORDER — DIPHENHYDRAMINE HYDROCHLORIDE AND LIDOCAINE HYDROCHLORIDE AND ALUMINUM HYDROXIDE AND MAGNESIUM HYDRO
10 KIT EVERY 6 HOURS PRN
Status: DISCONTINUED | OUTPATIENT
Start: 2020-01-01 | End: 2020-01-01 | Stop reason: HOSPADM

## 2020-01-01 RX ORDER — AMOXICILLIN 250 MG
1 CAPSULE ORAL 2 TIMES DAILY
Status: DISCONTINUED | OUTPATIENT
Start: 2020-01-01 | End: 2020-01-01 | Stop reason: HOSPADM

## 2020-01-01 RX ORDER — BENZONATATE 100 MG/1
100-200 CAPSULE ORAL 3 TIMES DAILY PRN
Qty: 90 CAPSULE | Refills: 0 | Status: SHIPPED | OUTPATIENT
Start: 2020-01-01

## 2020-01-01 RX ORDER — LACTULOSE 10 G/15ML
20 SOLUTION ORAL DAILY PRN
COMMUNITY
Start: 2020-01-01 | End: 2021-01-01

## 2020-01-01 RX ORDER — TC 99M MEDRONATE 20 MG/10ML
20-30 INJECTION, POWDER, LYOPHILIZED, FOR SOLUTION INTRAVENOUS ONCE
Status: COMPLETED | OUTPATIENT
Start: 2020-01-01 | End: 2020-01-01

## 2020-01-01 RX ORDER — ACYCLOVIR 200 MG/1
10 CAPSULE ORAL ONCE
Status: COMPLETED | OUTPATIENT
Start: 2020-01-01 | End: 2020-01-01

## 2020-01-01 RX ORDER — ZOLEDRONIC ACID 0.04 MG/ML
4 INJECTION, SOLUTION INTRAVENOUS ONCE
Status: DISCONTINUED | OUTPATIENT
Start: 2020-01-01 | End: 2020-01-01 | Stop reason: HOSPADM

## 2020-01-01 RX ORDER — IOPAMIDOL 755 MG/ML
56 INJECTION, SOLUTION INTRAVASCULAR ONCE
Status: COMPLETED | OUTPATIENT
Start: 2020-01-01 | End: 2020-01-01

## 2020-01-01 RX ORDER — ONDANSETRON 2 MG/ML
8 INJECTION INTRAMUSCULAR; INTRAVENOUS ONCE
Status: COMPLETED | OUTPATIENT
Start: 2020-01-01 | End: 2020-01-01

## 2020-01-01 RX ORDER — ALBUTEROL SULFATE 90 UG/1
2 AEROSOL, METERED RESPIRATORY (INHALATION) 4 TIMES DAILY PRN
Status: DISCONTINUED | OUTPATIENT
Start: 2020-01-01 | End: 2020-01-01 | Stop reason: HOSPADM

## 2020-01-01 RX ORDER — ONDANSETRON 8 MG/1
8 TABLET, ORALLY DISINTEGRATING ORAL EVERY 8 HOURS PRN
COMMUNITY
End: 2020-01-01

## 2020-01-01 RX ORDER — GADOBUTROL 604.72 MG/ML
7.5 INJECTION INTRAVENOUS ONCE
Status: COMPLETED | OUTPATIENT
Start: 2020-01-01 | End: 2020-01-01

## 2020-01-01 RX ORDER — ACETAMINOPHEN 325 MG/1
650 TABLET ORAL EVERY 4 HOURS PRN
Status: DISCONTINUED | OUTPATIENT
Start: 2020-01-01 | End: 2020-01-01 | Stop reason: HOSPADM

## 2020-01-01 RX ORDER — CEFTRIAXONE 2 G/1
2 INJECTION, POWDER, FOR SOLUTION INTRAMUSCULAR; INTRAVENOUS ONCE
Status: COMPLETED | OUTPATIENT
Start: 2020-01-01 | End: 2020-01-01

## 2020-01-01 RX ORDER — SODIUM CHLORIDE, SODIUM LACTATE, POTASSIUM CHLORIDE, CALCIUM CHLORIDE 600; 310; 30; 20 MG/100ML; MG/100ML; MG/100ML; MG/100ML
INJECTION, SOLUTION INTRAVENOUS CONTINUOUS
Status: DISCONTINUED | OUTPATIENT
Start: 2020-01-01 | End: 2020-01-01 | Stop reason: HOSPADM

## 2020-01-01 RX ORDER — PIPERACILLIN SODIUM, TAZOBACTAM SODIUM 3; .375 G/15ML; G/15ML
3.38 INJECTION, POWDER, LYOPHILIZED, FOR SOLUTION INTRAVENOUS EVERY 6 HOURS
Status: DISCONTINUED | OUTPATIENT
Start: 2020-01-01 | End: 2020-01-01

## 2020-01-01 RX ORDER — ONDANSETRON 8 MG/1
8 TABLET, ORALLY DISINTEGRATING ORAL EVERY 8 HOURS PRN
Qty: 30 TABLET | Refills: 3 | Status: SHIPPED | OUTPATIENT
Start: 2020-01-01

## 2020-01-01 RX ORDER — OXYCODONE HYDROCHLORIDE 5 MG/1
5-10 TABLET ORAL EVERY 4 HOURS PRN
Qty: 90 TABLET | Refills: 0 | Status: SHIPPED | OUTPATIENT
Start: 2020-01-01

## 2020-01-01 RX ORDER — OXYCODONE HYDROCHLORIDE 5 MG/1
5-10 TABLET ORAL EVERY 6 HOURS PRN
Qty: 60 TABLET | Refills: 0 | Status: ON HOLD | OUTPATIENT
Start: 2020-01-01 | End: 2020-01-01

## 2020-01-01 RX ORDER — SERTRALINE HYDROCHLORIDE 25 MG/1
25 TABLET, FILM COATED ORAL DAILY
Status: DISCONTINUED | OUTPATIENT
Start: 2020-01-01 | End: 2020-01-01

## 2020-01-01 RX ORDER — LACTULOSE 10 G/15ML
10 SOLUTION ORAL 3 TIMES DAILY
Qty: 500 ML | Refills: 0 | Status: ON HOLD | OUTPATIENT
Start: 2020-01-01 | End: 2020-01-01

## 2020-01-01 RX ORDER — MAGNESIUM OXIDE 400 MG/1
400 TABLET ORAL 2 TIMES DAILY
Status: DISCONTINUED | OUTPATIENT
Start: 2020-01-01 | End: 2020-01-01 | Stop reason: HOSPADM

## 2020-01-01 RX ORDER — LEVOTHYROXINE SODIUM 25 UG/1
25 TABLET ORAL DAILY
Qty: 30 TABLET | Refills: 0 | Status: SHIPPED | OUTPATIENT
Start: 2020-01-01

## 2020-01-01 RX ORDER — ONDANSETRON 2 MG/ML
4 INJECTION INTRAMUSCULAR; INTRAVENOUS
Status: COMPLETED | OUTPATIENT
Start: 2020-01-01 | End: 2020-01-01

## 2020-01-01 RX ORDER — FUROSEMIDE 20 MG
20 TABLET ORAL DAILY
Qty: 30 TABLET | Refills: 0 | Status: SHIPPED | OUTPATIENT
Start: 2020-01-01 | End: 2020-01-01 | Stop reason: SINTOL

## 2020-01-01 RX ORDER — BENZONATATE 100 MG/1
100 CAPSULE ORAL 3 TIMES DAILY PRN
Qty: 90 CAPSULE | Refills: 0 | Status: SHIPPED | OUTPATIENT
Start: 2020-01-01 | End: 2020-01-01

## 2020-01-01 RX ADMIN — QUETIAPINE 50 MG: 50 TABLET ORAL at 21:06

## 2020-01-01 RX ADMIN — PANTOPRAZOLE SODIUM 40 MG: 40 TABLET, DELAYED RELEASE ORAL at 08:33

## 2020-01-01 RX ADMIN — PIPERACILLIN AND TAZOBACTAM 3.38 G: 3; .375 INJECTION, POWDER, FOR SOLUTION INTRAVENOUS at 16:20

## 2020-01-01 RX ADMIN — RIFAXIMIN 550 MG: 550 TABLET ORAL at 22:43

## 2020-01-01 RX ADMIN — HYDROMORPHONE HYDROCHLORIDE 0.5 MG: 1 INJECTION, SOLUTION INTRAMUSCULAR; INTRAVENOUS; SUBCUTANEOUS at 12:37

## 2020-01-01 RX ADMIN — ONDANSETRON 4 MG: 2 INJECTION INTRAMUSCULAR; INTRAVENOUS at 10:06

## 2020-01-01 RX ADMIN — CEFTRIAXONE SODIUM 2 G: 2 INJECTION, POWDER, FOR SOLUTION INTRAMUSCULAR; INTRAVENOUS at 16:18

## 2020-01-01 RX ADMIN — Medication 25 MCG: at 09:30

## 2020-01-01 RX ADMIN — ROCURONIUM BROMIDE 20 MG: 10 INJECTION INTRAVENOUS at 14:13

## 2020-01-01 RX ADMIN — MAGNESIUM OXIDE 400 MG: 400 TABLET ORAL at 09:08

## 2020-01-01 RX ADMIN — NICOTINE 1 PATCH: 14 PATCH, EXTENDED RELEASE TRANSDERMAL at 11:32

## 2020-01-01 RX ADMIN — MAGNESIUM OXIDE 400 MG: 400 TABLET ORAL at 09:11

## 2020-01-01 RX ADMIN — SERTRALINE HYDROCHLORIDE 100 MG: 100 TABLET ORAL at 09:09

## 2020-01-01 RX ADMIN — MAGNESIUM OXIDE 400 MG: 400 TABLET ORAL at 19:41

## 2020-01-01 RX ADMIN — Medication 2.5 MG: at 00:44

## 2020-01-01 RX ADMIN — FENTANYL CITRATE 50 MCG: 50 INJECTION, SOLUTION INTRAMUSCULAR; INTRAVENOUS at 16:06

## 2020-01-01 RX ADMIN — MAGNESIUM OXIDE 400 MG: 400 TABLET ORAL at 20:58

## 2020-01-01 RX ADMIN — LACTULOSE 20 G: 20 SOLUTION ORAL at 08:33

## 2020-01-01 RX ADMIN — IOPAMIDOL 107 ML: 755 INJECTION, SOLUTION INTRAVENOUS at 00:33

## 2020-01-01 RX ADMIN — INSULIN ASPART 1 UNITS: 100 INJECTION, SOLUTION INTRAVENOUS; SUBCUTANEOUS at 14:47

## 2020-01-01 RX ADMIN — SODIUM CHLORIDE: 9 INJECTION, SOLUTION INTRAVENOUS at 08:46

## 2020-01-01 RX ADMIN — ZOLEDRONIC ACID 4 MG: 0.04 INJECTION, SOLUTION INTRAVENOUS at 09:44

## 2020-01-01 RX ADMIN — SPIRONOLACTONE 50 MG: 25 TABLET ORAL at 16:40

## 2020-01-01 RX ADMIN — PANTOPRAZOLE SODIUM 40 MG: 40 TABLET, DELAYED RELEASE ORAL at 18:43

## 2020-01-01 RX ADMIN — Medication 2.5 MG: at 21:06

## 2020-01-01 RX ADMIN — DIPHENHYDRAMINE HYDROCHLORIDE AND LIDOCAINE HYDROCHLORIDE AND ALUMINUM HYDROXIDE AND MAGNESIUM HYDRO 10 ML: KIT at 04:05

## 2020-01-01 RX ADMIN — QUETIAPINE FUMARATE 50 MG: 50 TABLET ORAL at 23:27

## 2020-01-01 RX ADMIN — INSULIN ASPART 1 UNITS: 100 INJECTION, SOLUTION INTRAVENOUS; SUBCUTANEOUS at 17:17

## 2020-01-01 RX ADMIN — DICLOFENAC SODIUM 2 G: 10 GEL TOPICAL at 11:32

## 2020-01-01 RX ADMIN — PIPERACILLIN AND TAZOBACTAM 3.38 G: 3; .375 INJECTION, POWDER, FOR SOLUTION INTRAVENOUS at 03:36

## 2020-01-01 RX ADMIN — IOPAMIDOL 93 ML: 755 INJECTION, SOLUTION INTRAVENOUS at 10:09

## 2020-01-01 RX ADMIN — INSULIN ASPART 1 UNITS: 100 INJECTION, SOLUTION INTRAVENOUS; SUBCUTANEOUS at 12:14

## 2020-01-01 RX ADMIN — PANTOPRAZOLE SODIUM 40 MG: 40 TABLET, DELAYED RELEASE ORAL at 09:08

## 2020-01-01 RX ADMIN — AZITHROMYCIN MONOHYDRATE 500 MG: 500 INJECTION, POWDER, LYOPHILIZED, FOR SOLUTION INTRAVENOUS at 13:14

## 2020-01-01 RX ADMIN — ROCURONIUM BROMIDE 50 MG: 10 INJECTION INTRAVENOUS at 13:35

## 2020-01-01 RX ADMIN — MIDAZOLAM 1 MG: 1 INJECTION INTRAMUSCULAR; INTRAVENOUS at 14:50

## 2020-01-01 RX ADMIN — RIFAXIMIN 550 MG: 550 TABLET ORAL at 20:53

## 2020-01-01 RX ADMIN — THIAMINE HCL TAB 100 MG 100 MG: 100 TAB at 09:31

## 2020-01-01 RX ADMIN — POTASSIUM CHLORIDE 20 MEQ: 750 TABLET, EXTENDED RELEASE ORAL at 14:47

## 2020-01-01 RX ADMIN — GADOBUTROL 9 ML: 604.72 INJECTION INTRAVENOUS at 09:03

## 2020-01-01 RX ADMIN — LACTULOSE 10 G: 20 SOLUTION ORAL at 20:53

## 2020-01-01 RX ADMIN — LIDOCAINE 1 PATCH: 560 PATCH PERCUTANEOUS; TOPICAL; TRANSDERMAL at 00:47

## 2020-01-01 RX ADMIN — RIFAXIMIN 550 MG: 550 TABLET ORAL at 09:11

## 2020-01-01 RX ADMIN — MAGNESIUM OXIDE 400 MG: 400 TABLET ORAL at 22:01

## 2020-01-01 RX ADMIN — SERTRALINE HYDROCHLORIDE 25 MG: 25 TABLET ORAL at 08:03

## 2020-01-01 RX ADMIN — INSULIN GLARGINE 20 UNITS: 100 INJECTION, SOLUTION SUBCUTANEOUS at 23:00

## 2020-01-01 RX ADMIN — THIAMINE HCL TAB 100 MG 100 MG: 100 TAB at 08:47

## 2020-01-01 RX ADMIN — PANTOPRAZOLE SODIUM 40 MG: 40 TABLET, DELAYED RELEASE ORAL at 07:56

## 2020-01-01 RX ADMIN — ONDANSETRON 4 MG: 2 INJECTION INTRAMUSCULAR; INTRAVENOUS at 14:14

## 2020-01-01 RX ADMIN — RIFAXIMIN 550 MG: 550 TABLET ORAL at 09:59

## 2020-01-01 RX ADMIN — THIAMINE HCL TAB 100 MG 100 MG: 100 TAB at 09:08

## 2020-01-01 RX ADMIN — CEFTRIAXONE SODIUM 2 G: 2 INJECTION, POWDER, FOR SOLUTION INTRAMUSCULAR; INTRAVENOUS at 14:16

## 2020-01-01 RX ADMIN — ACETAMINOPHEN 650 MG: 325 TABLET, FILM COATED ORAL at 23:09

## 2020-01-01 RX ADMIN — INSULIN GLARGINE 20 UNITS: 100 INJECTION, SOLUTION SUBCUTANEOUS at 22:58

## 2020-01-01 RX ADMIN — POTASSIUM CHLORIDE 40 MEQ: 1.5 POWDER, FOR SOLUTION ORAL at 14:44

## 2020-01-01 RX ADMIN — Medication 2.5 MG: at 00:32

## 2020-01-01 RX ADMIN — FUROSEMIDE 10 MG: 10 INJECTION, SOLUTION INTRAVENOUS at 11:13

## 2020-01-01 RX ADMIN — OXYCODONE HYDROCHLORIDE 5 MG: 5 TABLET ORAL at 08:03

## 2020-01-01 RX ADMIN — PANTOPRAZOLE SODIUM 40 MG: 40 TABLET, DELAYED RELEASE ORAL at 09:37

## 2020-01-01 RX ADMIN — PIPERACILLIN AND TAZOBACTAM 3.38 G: 3; .375 INJECTION, POWDER, FOR SOLUTION INTRAVENOUS at 06:18

## 2020-01-01 RX ADMIN — GADOBUTROL 7.5 ML: 604.72 INJECTION INTRAVENOUS at 22:49

## 2020-01-01 RX ADMIN — TC 99M MEDRONATE 25.4 MCI.: 20 INJECTION, POWDER, LYOPHILIZED, FOR SOLUTION INTRAVENOUS at 08:45

## 2020-01-01 RX ADMIN — LEVOTHYROXINE SODIUM 25 MCG: 0.03 TABLET ORAL at 09:31

## 2020-01-01 RX ADMIN — CEFTRIAXONE SODIUM 2 G: 2 INJECTION, POWDER, FOR SOLUTION INTRAMUSCULAR; INTRAVENOUS at 14:55

## 2020-01-01 RX ADMIN — Medication 1 MG: at 22:58

## 2020-01-01 RX ADMIN — OYSTER SHELL CALCIUM WITH VITAMIN D 1 TABLET: 500; 200 TABLET, FILM COATED ORAL at 18:27

## 2020-01-01 RX ADMIN — QUETIAPINE 50 MG: 50 TABLET ORAL at 00:32

## 2020-01-01 RX ADMIN — MAGNESIUM OXIDE 400 MG: 400 TABLET ORAL at 19:44

## 2020-01-01 RX ADMIN — Medication 25 MCG: at 08:33

## 2020-01-01 RX ADMIN — OYSTER SHELL CALCIUM WITH VITAMIN D 1 TABLET: 500; 200 TABLET, FILM COATED ORAL at 18:39

## 2020-01-01 RX ADMIN — RIFAXIMIN 550 MG: 550 TABLET ORAL at 20:58

## 2020-01-01 RX ADMIN — FUROSEMIDE 20 MG: 20 TABLET ORAL at 09:09

## 2020-01-01 RX ADMIN — OXYCODONE HYDROCHLORIDE 5 MG: 5 TABLET ORAL at 23:37

## 2020-01-01 RX ADMIN — THIAMINE HCL TAB 100 MG 100 MG: 100 TAB at 08:33

## 2020-01-01 RX ADMIN — FENTANYL CITRATE 50 MCG: 50 INJECTION, SOLUTION INTRAMUSCULAR; INTRAVENOUS at 13:34

## 2020-01-01 RX ADMIN — MORPHINE SULFATE 4 MG: 4 INJECTION INTRAVENOUS at 11:37

## 2020-01-01 RX ADMIN — OYSTER SHELL CALCIUM WITH VITAMIN D 1 TABLET: 500; 200 TABLET, FILM COATED ORAL at 09:37

## 2020-01-01 RX ADMIN — OYSTER SHELL CALCIUM WITH VITAMIN D 1 TABLET: 500; 200 TABLET, FILM COATED ORAL at 08:46

## 2020-01-01 RX ADMIN — HYDROMORPHONE HYDROCHLORIDE 0.3 MG: 1 INJECTION, SOLUTION INTRAMUSCULAR; INTRAVENOUS; SUBCUTANEOUS at 15:51

## 2020-01-01 RX ADMIN — GABAPENTIN 100 MG: 100 CAPSULE ORAL at 08:47

## 2020-01-01 RX ADMIN — MELATONIN TAB 3 MG 3 MG: 3 TAB at 22:50

## 2020-01-01 RX ADMIN — GUAIFENESIN 10 ML: 100 SOLUTION ORAL at 23:27

## 2020-01-01 RX ADMIN — PROPOFOL 50 MG: 10 INJECTION, EMULSION INTRAVENOUS at 15:04

## 2020-01-01 RX ADMIN — PANTOPRAZOLE SODIUM 40 MG: 40 TABLET, DELAYED RELEASE ORAL at 08:46

## 2020-01-01 RX ADMIN — MELATONIN 25 MCG: at 09:08

## 2020-01-01 RX ADMIN — RIFAXIMIN 550 MG: 550 TABLET ORAL at 09:08

## 2020-01-01 RX ADMIN — INSULIN GLARGINE 20 UNITS: 100 INJECTION, SOLUTION SUBCUTANEOUS at 23:29

## 2020-01-01 RX ADMIN — POTASSIUM CHLORIDE 40 MEQ: 1.5 POWDER, FOR SOLUTION ORAL at 09:51

## 2020-01-01 RX ADMIN — MIDAZOLAM 1 MG: 1 INJECTION INTRAMUSCULAR; INTRAVENOUS at 14:56

## 2020-01-01 RX ADMIN — INSULIN ASPART 1 UNITS: 100 INJECTION, SOLUTION INTRAVENOUS; SUBCUTANEOUS at 19:44

## 2020-01-01 RX ADMIN — LIDOCAINE 3 PATCH: 560 PATCH PERCUTANEOUS; TOPICAL; TRANSDERMAL at 20:23

## 2020-01-01 RX ADMIN — INSULIN ASPART 1 UNITS: 100 INJECTION, SOLUTION INTRAVENOUS; SUBCUTANEOUS at 21:04

## 2020-01-01 RX ADMIN — DOCUSATE SODIUM 50 MG AND SENNOSIDES 8.6 MG 2 TABLET: 8.6; 5 TABLET, FILM COATED ORAL at 09:11

## 2020-01-01 RX ADMIN — OYSTER SHELL CALCIUM WITH VITAMIN D 1 TABLET: 500; 200 TABLET, FILM COATED ORAL at 17:24

## 2020-01-01 RX ADMIN — HYDROMORPHONE HYDROCHLORIDE 0.2 MG: 1 INJECTION, SOLUTION INTRAMUSCULAR; INTRAVENOUS; SUBCUTANEOUS at 15:57

## 2020-01-01 RX ADMIN — OXYCODONE HYDROCHLORIDE 5 MG: 5 TABLET ORAL at 22:34

## 2020-01-01 RX ADMIN — LIDOCAINE 1 PATCH: 560 PATCH PERCUTANEOUS; TOPICAL; TRANSDERMAL at 20:54

## 2020-01-01 RX ADMIN — FUROSEMIDE 20 MG: 20 TABLET ORAL at 12:04

## 2020-01-01 RX ADMIN — Medication 5 MG: at 09:45

## 2020-01-01 RX ADMIN — IOPAMIDOL 56 ML: 755 INJECTION, SOLUTION INTRAVENOUS at 18:25

## 2020-01-01 RX ADMIN — MAGNESIUM OXIDE 400 MG: 400 TABLET ORAL at 10:00

## 2020-01-01 RX ADMIN — INSULIN ASPART 1 UNITS: 100 INJECTION, SOLUTION INTRAVENOUS; SUBCUTANEOUS at 09:22

## 2020-01-01 RX ADMIN — THIAMINE HYDROCHLORIDE 500 MG: 100 INJECTION, SOLUTION INTRAMUSCULAR; INTRAVENOUS at 22:58

## 2020-01-01 RX ADMIN — SODIUM CHLORIDE 10 ML: 9 INJECTION, SOLUTION INTRAMUSCULAR; INTRAVENOUS; SUBCUTANEOUS at 08:02

## 2020-01-01 RX ADMIN — POLYETHYLENE GLYCOL 3350 17 G: 17 POWDER, FOR SOLUTION ORAL at 09:11

## 2020-01-01 RX ADMIN — SODIUM CHLORIDE, POTASSIUM CHLORIDE, SODIUM LACTATE AND CALCIUM CHLORIDE: 600; 310; 30; 20 INJECTION, SOLUTION INTRAVENOUS at 13:30

## 2020-01-01 RX ADMIN — MAGNESIUM OXIDE 400 MG: 400 TABLET ORAL at 08:33

## 2020-01-01 RX ADMIN — PANTOPRAZOLE SODIUM 40 MG: 40 TABLET, DELAYED RELEASE ORAL at 09:59

## 2020-01-01 RX ADMIN — PANTOPRAZOLE SODIUM 40 MG: 40 TABLET, DELAYED RELEASE ORAL at 09:31

## 2020-01-01 RX ADMIN — QUETIAPINE 50 MG: 50 TABLET ORAL at 22:07

## 2020-01-01 RX ADMIN — FENTANYL CITRATE 50 MCG: 50 INJECTION, SOLUTION INTRAMUSCULAR; INTRAVENOUS at 14:56

## 2020-01-01 RX ADMIN — THIAMINE HYDROCHLORIDE 500 MG: 100 INJECTION, SOLUTION INTRAMUSCULAR; INTRAVENOUS at 23:30

## 2020-01-01 RX ADMIN — PANTOPRAZOLE SODIUM 40 MG: 40 TABLET, DELAYED RELEASE ORAL at 09:11

## 2020-01-01 RX ADMIN — DICLOFENAC SODIUM 2 G: 10 GEL TOPICAL at 16:40

## 2020-01-01 RX ADMIN — CEFTRIAXONE SODIUM 2 G: 2 INJECTION, POWDER, FOR SOLUTION INTRAMUSCULAR; INTRAVENOUS at 16:30

## 2020-01-01 RX ADMIN — INSULIN ASPART 1 UNITS: 100 INJECTION, SOLUTION INTRAVENOUS; SUBCUTANEOUS at 13:32

## 2020-01-01 RX ADMIN — Medication 25 MCG: at 09:11

## 2020-01-01 RX ADMIN — RIFAXIMIN 550 MG: 550 TABLET ORAL at 08:46

## 2020-01-01 RX ADMIN — ZOLEDRONIC ACID 4 MG: 0.04 INJECTION, SOLUTION INTRAVENOUS at 16:23

## 2020-01-01 RX ADMIN — POTASSIUM CHLORIDE 40 MEQ: 750 TABLET, EXTENDED RELEASE ORAL at 12:04

## 2020-01-01 RX ADMIN — DOCOSANOL: 100 CREAM TOPICAL at 14:25

## 2020-01-01 RX ADMIN — GABAPENTIN 100 MG: 100 CAPSULE ORAL at 14:56

## 2020-01-01 RX ADMIN — DICLOFENAC SODIUM 2 G: 10 GEL TOPICAL at 20:54

## 2020-01-01 RX ADMIN — MAGNESIUM OXIDE 400 MG: 400 TABLET ORAL at 20:49

## 2020-01-01 RX ADMIN — Medication 25 MCG: at 10:00

## 2020-01-01 RX ADMIN — AZITHROMYCIN MONOHYDRATE 500 MG: 500 INJECTION, POWDER, LYOPHILIZED, FOR SOLUTION INTRAVENOUS at 11:50

## 2020-01-01 RX ADMIN — OYSTER SHELL CALCIUM WITH VITAMIN D 1 TABLET: 500; 200 TABLET, FILM COATED ORAL at 09:09

## 2020-01-01 RX ADMIN — Medication 2.5 MG: at 22:01

## 2020-01-01 RX ADMIN — LEVOTHYROXINE SODIUM 25 MCG: 0.03 TABLET ORAL at 09:11

## 2020-01-01 RX ADMIN — INSULIN GLARGINE 20 UNITS: 100 INJECTION, SOLUTION SUBCUTANEOUS at 22:52

## 2020-01-01 RX ADMIN — OXYCODONE HYDROCHLORIDE 5 MG: 5 TABLET ORAL at 00:28

## 2020-01-01 RX ADMIN — MELATONIN 25 MCG: at 08:02

## 2020-01-01 RX ADMIN — Medication 5 MG: at 21:13

## 2020-01-01 RX ADMIN — CEFTRIAXONE SODIUM 2 G: 2 INJECTION, POWDER, FOR SOLUTION INTRAMUSCULAR; INTRAVENOUS at 14:46

## 2020-01-01 RX ADMIN — DOCOSANOL: 100 CREAM TOPICAL at 21:06

## 2020-01-01 RX ADMIN — PROPOFOL 100 MG: 10 INJECTION, EMULSION INTRAVENOUS at 13:34

## 2020-01-01 RX ADMIN — POTASSIUM CHLORIDE 40 MEQ: 750 TABLET, EXTENDED RELEASE ORAL at 12:42

## 2020-01-01 RX ADMIN — Medication 25 MCG: at 08:46

## 2020-01-01 RX ADMIN — LACTULOSE 10 G: 20 SOLUTION ORAL at 12:04

## 2020-01-01 RX ADMIN — SODIUM CHLORIDE: 9 INJECTION, SOLUTION INTRAVENOUS at 14:49

## 2020-01-01 RX ADMIN — THIAMINE HYDROCHLORIDE 500 MG: 100 INJECTION, SOLUTION INTRAMUSCULAR; INTRAVENOUS at 16:26

## 2020-01-01 RX ADMIN — RIFAXIMIN 550 MG: 550 TABLET ORAL at 08:33

## 2020-01-01 RX ADMIN — MAGNESIUM OXIDE 400 MG: 400 TABLET ORAL at 09:31

## 2020-01-01 RX ADMIN — LIDOCAINE HYDROCHLORIDE 80 MG: 20 INJECTION, SOLUTION INFILTRATION; PERINEURAL at 13:34

## 2020-01-01 RX ADMIN — DOCOSANOL: 100 CREAM TOPICAL at 17:17

## 2020-01-01 RX ADMIN — RIFAXIMIN 550 MG: 550 TABLET ORAL at 19:41

## 2020-01-01 RX ADMIN — INSULIN ASPART 3 UNITS: 100 INJECTION, SOLUTION INTRAVENOUS; SUBCUTANEOUS at 13:59

## 2020-01-01 RX ADMIN — QUETIAPINE FUMARATE 50 MG: 50 TABLET ORAL at 22:50

## 2020-01-01 RX ADMIN — OYSTER SHELL CALCIUM WITH VITAMIN D 1 TABLET: 500; 200 TABLET, FILM COATED ORAL at 07:56

## 2020-01-01 RX ADMIN — FUROSEMIDE 10 MG: 10 INJECTION, SOLUTION INTRAVENOUS at 16:27

## 2020-01-01 RX ADMIN — POTASSIUM CHLORIDE 40 MEQ: 750 TABLET, EXTENDED RELEASE ORAL at 08:33

## 2020-01-01 RX ADMIN — SUGAMMADEX 160 MG: 100 INJECTION, SOLUTION INTRAVENOUS at 15:18

## 2020-01-01 RX ADMIN — POTASSIUM CHLORIDE 40 MEQ: 750 TABLET, EXTENDED RELEASE ORAL at 22:45

## 2020-01-01 RX ADMIN — ACETAMINOPHEN 650 MG: 325 TABLET, FILM COATED ORAL at 00:47

## 2020-01-01 RX ADMIN — INSULIN ASPART 1 UNITS: 100 INJECTION, SOLUTION INTRAVENOUS; SUBCUTANEOUS at 17:47

## 2020-01-01 RX ADMIN — THIAMINE HCL TAB 100 MG 100 MG: 100 TAB at 10:00

## 2020-01-01 RX ADMIN — SODIUM CHLORIDE: 9 INJECTION, SOLUTION INTRAVENOUS at 11:59

## 2020-01-01 RX ADMIN — INSULIN ASPART 3 UNITS: 100 INJECTION, SOLUTION INTRAVENOUS; SUBCUTANEOUS at 20:38

## 2020-01-01 RX ADMIN — MAGNESIUM OXIDE 400 MG: 400 TABLET ORAL at 22:44

## 2020-01-01 RX ADMIN — RIFAXIMIN 550 MG: 550 TABLET ORAL at 07:56

## 2020-01-01 RX ADMIN — SODIUM CHLORIDE 1000 ML: 9 INJECTION, SOLUTION INTRAVENOUS at 11:02

## 2020-01-01 RX ADMIN — IOPAMIDOL 93 ML: 755 INJECTION, SOLUTION INTRAVENOUS at 13:09

## 2020-01-01 RX ADMIN — AZITHROMYCIN MONOHYDRATE 500 MG: 500 INJECTION, POWDER, LYOPHILIZED, FOR SOLUTION INTRAVENOUS at 12:50

## 2020-01-01 RX ADMIN — MIDAZOLAM 2 MG: 1 INJECTION INTRAMUSCULAR; INTRAVENOUS at 13:34

## 2020-01-01 RX ADMIN — IOPAMIDOL 99 ML: 755 INJECTION, SOLUTION INTRAVENOUS at 09:03

## 2020-01-01 RX ADMIN — RIFAXIMIN 550 MG: 550 TABLET ORAL at 20:50

## 2020-01-01 RX ADMIN — MELATONIN 25 MCG: at 07:56

## 2020-01-01 RX ADMIN — GADOBUTROL 10 ML: 604.72 INJECTION INTRAVENOUS at 13:28

## 2020-01-01 RX ADMIN — DOCOSANOL: 100 CREAM TOPICAL at 12:16

## 2020-01-01 RX ADMIN — SODIUM CHLORIDE 500 ML: 9 INJECTION, SOLUTION INTRAVENOUS at 12:23

## 2020-01-01 RX ADMIN — AZITHROMYCIN MONOHYDRATE 500 MG: 500 INJECTION, POWDER, LYOPHILIZED, FOR SOLUTION INTRAVENOUS at 13:05

## 2020-01-01 RX ADMIN — THIAMINE HCL TAB 100 MG 100 MG: 100 TAB at 11:32

## 2020-01-01 RX ADMIN — SPIRONOLACTONE 50 MG: 25 TABLET ORAL at 07:57

## 2020-01-01 RX ADMIN — PIPERACILLIN AND TAZOBACTAM 3.38 G: 3; .375 INJECTION, POWDER, FOR SOLUTION INTRAVENOUS at 12:10

## 2020-01-01 RX ADMIN — TC 99M MEDRONATE 24.6 MCI.: 20 INJECTION, POWDER, LYOPHILIZED, FOR SOLUTION INTRAVENOUS at 09:00

## 2020-01-01 RX ADMIN — POTASSIUM CHLORIDE 40 MEQ: 1.5 POWDER, FOR SOLUTION ORAL at 12:33

## 2020-01-01 RX ADMIN — AZITHROMYCIN MONOHYDRATE 500 MG: 500 INJECTION, POWDER, LYOPHILIZED, FOR SOLUTION INTRAVENOUS at 11:58

## 2020-01-01 RX ADMIN — OYSTER SHELL CALCIUM WITH VITAMIN D 1 TABLET: 500; 200 TABLET, FILM COATED ORAL at 17:47

## 2020-01-01 RX ADMIN — FENTANYL CITRATE 50 MCG: 50 INJECTION, SOLUTION INTRAMUSCULAR; INTRAVENOUS at 16:39

## 2020-01-01 RX ADMIN — THIAMINE HCL TAB 100 MG 100 MG: 100 TAB at 07:56

## 2020-01-01 RX ADMIN — SODIUM CHLORIDE 1000 ML: 9 INJECTION, SOLUTION INTRAVENOUS at 07:18

## 2020-01-01 RX ADMIN — Medication 25 MCG: at 09:08

## 2020-01-01 RX ADMIN — SERTRALINE HYDROCHLORIDE 100 MG: 100 TABLET ORAL at 07:56

## 2020-01-01 RX ADMIN — GABAPENTIN 100 MG: 100 CAPSULE ORAL at 09:30

## 2020-01-01 RX ADMIN — CEFAZOLIN 2 G: 10 INJECTION, POWDER, FOR SOLUTION INTRAVENOUS at 14:00

## 2020-01-01 RX ADMIN — OYSTER SHELL CALCIUM WITH VITAMIN D 1 TABLET: 500; 200 TABLET, FILM COATED ORAL at 20:50

## 2020-01-01 RX ADMIN — ACETAMINOPHEN 650 MG: 325 TABLET, FILM COATED ORAL at 10:06

## 2020-01-01 RX ADMIN — SERTRALINE HYDROCHLORIDE 25 MG: 25 TABLET ORAL at 09:38

## 2020-01-01 RX ADMIN — OYSTER SHELL CALCIUM WITH VITAMIN D 1 TABLET: 500; 200 TABLET, FILM COATED ORAL at 08:33

## 2020-01-01 RX ADMIN — GABAPENTIN 100 MG: 100 CAPSULE ORAL at 20:50

## 2020-01-01 RX ADMIN — HYDROMORPHONE HYDROCHLORIDE 0.5 MG: 1 INJECTION, SOLUTION INTRAMUSCULAR; INTRAVENOUS; SUBCUTANEOUS at 15:37

## 2020-01-01 RX ADMIN — ONDANSETRON 4 MG: 2 INJECTION INTRAMUSCULAR; INTRAVENOUS at 15:04

## 2020-01-01 RX ADMIN — CEFTRIAXONE 1 G: 1 INJECTION, POWDER, FOR SOLUTION INTRAMUSCULAR; INTRAVENOUS at 14:45

## 2020-01-01 RX ADMIN — SPIRONOLACTONE 50 MG: 25 TABLET ORAL at 09:09

## 2020-01-01 RX ADMIN — INSULIN ASPART 1 UNITS: 100 INJECTION, SOLUTION INTRAVENOUS; SUBCUTANEOUS at 12:25

## 2020-01-01 RX ADMIN — IOPAMIDOL 80 ML: 755 INJECTION, SOLUTION INTRAVENOUS at 15:25

## 2020-01-01 RX ADMIN — THIAMINE HCL TAB 100 MG 100 MG: 100 TAB at 18:43

## 2020-01-01 RX ADMIN — LACTULOSE 20 G: 20 SOLUTION ORAL at 09:08

## 2020-01-01 RX ADMIN — ONDANSETRON 8 MG: 2 INJECTION INTRAMUSCULAR; INTRAVENOUS at 11:36

## 2020-01-01 RX ADMIN — OYSTER SHELL CALCIUM WITH VITAMIN D 1 TABLET: 500; 200 TABLET, FILM COATED ORAL at 18:43

## 2020-01-01 RX ADMIN — OYSTER SHELL CALCIUM WITH VITAMIN D 1 TABLET: 500; 200 TABLET, FILM COATED ORAL at 08:02

## 2020-01-01 RX ADMIN — QUETIAPINE 50 MG: 50 TABLET ORAL at 22:54

## 2020-01-01 RX ADMIN — OYSTER SHELL CALCIUM WITH VITAMIN D 1 TABLET: 500; 200 TABLET, FILM COATED ORAL at 19:44

## 2020-01-01 RX ADMIN — CEFTRIAXONE SODIUM 2 G: 2 INJECTION, POWDER, FOR SOLUTION INTRAMUSCULAR; INTRAVENOUS at 14:51

## 2020-01-01 RX ADMIN — MAGNESIUM OXIDE 400 MG: 400 TABLET ORAL at 08:46

## 2020-01-01 RX ADMIN — PIPERACILLIN AND TAZOBACTAM 3.38 G: 3; .375 INJECTION, POWDER, FOR SOLUTION INTRAVENOUS at 00:50

## 2020-01-01 RX ADMIN — FENTANYL CITRATE 50 MCG: 50 INJECTION, SOLUTION INTRAMUSCULAR; INTRAVENOUS at 14:50

## 2020-01-01 RX ADMIN — OYSTER SHELL CALCIUM WITH VITAMIN D 1 TABLET: 500; 200 TABLET, FILM COATED ORAL at 20:23

## 2020-01-01 RX ADMIN — QUETIAPINE FUMARATE 50 MG: 50 TABLET ORAL at 22:58

## 2020-01-01 RX ADMIN — INSULIN GLARGINE 20 UNITS: 100 INJECTION, SOLUTION SUBCUTANEOUS at 22:30

## 2020-01-01 RX ADMIN — CEFTRIAXONE SODIUM 2 G: 2 INJECTION, POWDER, FOR SOLUTION INTRAMUSCULAR; INTRAVENOUS at 14:24

## 2020-01-01 RX ADMIN — FENTANYL CITRATE 50 MCG: 50 INJECTION, SOLUTION INTRAMUSCULAR; INTRAVENOUS at 14:06

## 2020-01-01 RX ADMIN — HYDROMORPHONE HYDROCHLORIDE 0.5 MG: 1 INJECTION, SOLUTION INTRAMUSCULAR; INTRAVENOUS; SUBCUTANEOUS at 17:13

## 2020-01-01 RX ADMIN — PIPERACILLIN AND TAZOBACTAM 3.38 G: 3; .375 INJECTION, POWDER, FOR SOLUTION INTRAVENOUS at 09:41

## 2020-01-01 RX ADMIN — ACETAMINOPHEN 650 MG: 325 TABLET, FILM COATED ORAL at 22:00

## 2020-01-01 RX ADMIN — QUETIAPINE 50 MG: 50 TABLET ORAL at 22:43

## 2020-01-01 RX ADMIN — Medication 5 MG: at 22:37

## 2020-01-01 RX ADMIN — OXYCODONE HYDROCHLORIDE 5 MG: 5 TABLET ORAL at 16:34

## 2020-01-01 RX ADMIN — INSULIN GLARGINE 20 UNITS: 100 INJECTION, SOLUTION SUBCUTANEOUS at 00:32

## 2020-01-01 RX ADMIN — PROPOFOL 50 MG: 10 INJECTION, EMULSION INTRAVENOUS at 15:09

## 2020-01-01 RX ADMIN — OYSTER SHELL CALCIUM WITH VITAMIN D 1 TABLET: 500; 200 TABLET, FILM COATED ORAL at 10:00

## 2020-01-01 RX ADMIN — GABAPENTIN 100 MG: 100 CAPSULE ORAL at 14:55

## 2020-01-01 RX ADMIN — LEVOTHYROXINE SODIUM 25 MCG: 0.03 TABLET ORAL at 08:46

## 2020-01-01 RX ADMIN — DOCOSANOL: 100 CREAM TOPICAL at 22:03

## 2020-01-01 RX ADMIN — DICLOFENAC SODIUM 2 G: 10 GEL TOPICAL at 09:19

## 2020-01-01 RX ADMIN — RIFAXIMIN 550 MG: 550 TABLET ORAL at 09:30

## 2020-01-01 RX ADMIN — NICOTINE 1 PATCH: 14 PATCH, EXTENDED RELEASE TRANSDERMAL at 08:06

## 2020-01-01 RX ADMIN — LEVOTHYROXINE SODIUM 25 MCG: 0.03 TABLET ORAL at 08:33

## 2020-01-01 RX ADMIN — OYSTER SHELL CALCIUM WITH VITAMIN D 1 TABLET: 500; 200 TABLET, FILM COATED ORAL at 09:30

## 2020-01-01 RX ADMIN — NICOTINE 1 PATCH: 14 PATCH, EXTENDED RELEASE TRANSDERMAL at 09:17

## 2020-01-01 RX ADMIN — RIFAXIMIN 550 MG: 550 TABLET ORAL at 20:23

## 2020-01-01 RX ADMIN — OYSTER SHELL CALCIUM WITH VITAMIN D 1 TABLET: 500; 200 TABLET, FILM COATED ORAL at 09:11

## 2020-01-01 RX ADMIN — MELATONIN 25 MCG: at 09:41

## 2020-01-01 RX ADMIN — OYSTER SHELL CALCIUM WITH VITAMIN D 1 TABLET: 500; 200 TABLET, FILM COATED ORAL at 09:08

## 2020-01-01 RX ADMIN — RIFAXIMIN 550 MG: 550 TABLET ORAL at 22:01

## 2020-01-01 RX ADMIN — THIAMINE HCL TAB 100 MG 100 MG: 100 TAB at 09:11

## 2020-01-01 RX ADMIN — SODIUM CHLORIDE 1000 ML: 9 INJECTION, SOLUTION INTRAVENOUS at 12:13

## 2020-01-01 RX ADMIN — LEVOTHYROXINE SODIUM 25 MCG: 0.03 TABLET ORAL at 09:09

## 2020-01-01 RX ADMIN — OXYCODONE HYDROCHLORIDE 5 MG: 5 TABLET ORAL at 17:01

## 2020-01-01 RX ADMIN — INSULIN GLARGINE 20 UNITS: 100 INJECTION, SOLUTION SUBCUTANEOUS at 22:54

## 2020-01-01 RX ADMIN — NICOTINE 1 PATCH: 14 PATCH, EXTENDED RELEASE TRANSDERMAL at 10:00

## 2020-01-01 RX ADMIN — SODIUM CHLORIDE 1000 ML: 9 INJECTION, SOLUTION INTRAVENOUS at 16:22

## 2020-01-01 RX ADMIN — Medication 5 MG: at 04:03

## 2020-01-01 RX ADMIN — HYDROMORPHONE HYDROCHLORIDE 0.5 MG: 1 INJECTION, SOLUTION INTRAMUSCULAR; INTRAVENOUS; SUBCUTANEOUS at 16:23

## 2020-01-01 RX ADMIN — RIFAXIMIN 550 MG: 550 TABLET ORAL at 19:44

## 2020-01-01 RX ADMIN — GABAPENTIN 100 MG: 100 CAPSULE ORAL at 22:44

## 2020-01-01 RX ADMIN — PANTOPRAZOLE SODIUM 40 MG: 40 TABLET, DELAYED RELEASE ORAL at 08:02

## 2020-01-01 RX ADMIN — THIAMINE HCL TAB 100 MG 100 MG: 100 TAB at 08:03

## 2020-01-01 RX ADMIN — ONDANSETRON 4 MG: 2 INJECTION INTRAMUSCULAR; INTRAVENOUS at 15:21

## 2020-01-01 RX ADMIN — DOCOSANOL: 100 CREAM TOPICAL at 17:47

## 2020-01-01 RX ADMIN — OYSTER SHELL CALCIUM WITH VITAMIN D 1 TABLET: 500; 200 TABLET, FILM COATED ORAL at 17:17

## 2020-01-01 RX ADMIN — QUETIAPINE 50 MG: 50 TABLET ORAL at 22:30

## 2020-01-01 RX ADMIN — POLYETHYLENE GLYCOL 3350 17 G: 17 POWDER, FOR SOLUTION ORAL at 10:00

## 2020-01-01 RX ADMIN — DIPHENHYDRAMINE HYDROCHLORIDE AND LIDOCAINE HYDROCHLORIDE AND ALUMINUM HYDROXIDE AND MAGNESIUM HYDRO 10 ML: KIT at 08:44

## 2020-01-01 RX ADMIN — DOCOSANOL: 100 CREAM TOPICAL at 13:34

## 2020-01-01 RX ADMIN — DOCOSANOL: 100 CREAM TOPICAL at 09:12

## 2020-01-01 RX ADMIN — LEVOTHYROXINE SODIUM 25 MCG: 0.03 TABLET ORAL at 10:01

## 2020-01-01 RX ADMIN — LACTULOSE 20 G: 20 SOLUTION ORAL at 16:26

## 2020-01-01 ASSESSMENT — MIFFLIN-ST. JEOR
SCORE: 1319.38
SCORE: 1204.6
SCORE: 1202.33
SCORE: 1211.85
SCORE: 1322.08
SCORE: 1237.71
SCORE: 1304.16
SCORE: 1329.34
SCORE: 1321.17
SCORE: 1314.38
SCORE: 1232.27
SCORE: 1304.16
SCORE: 1316.38
SCORE: 1317.38
SCORE: 1207.38

## 2020-01-01 ASSESSMENT — ENCOUNTER SYMPTOMS
CHILLS: 0
HEMATURIA: 1
FLANK PAIN: 0
NUMBNESS: 0
DYSURIA: 0
NECK PAIN: 0
ABDOMINAL DISTENTION: 1
CONSTIPATION: 0
ADENOPATHY: 0
VOMITING: 1
NECK STIFFNESS: 0
SORE THROAT: 0
DIARRHEA: 0
ANAL BLEEDING: 0
DIARRHEA: 1
DIFFICULTY URINATING: 0
POLYDIPSIA: 0
FATIGUE: 1
COLOR CHANGE: 0
COUGH: 1
CHILLS: 0
HEADACHES: 0
DIAPHORESIS: 0
HEADACHES: 0
FEVER: 0
COUGH: 1
VOMITING: 0
ABDOMINAL PAIN: 1
VOMITING: 0
BLOOD IN STOOL: 0
NAUSEA: 0
LIGHT-HEADEDNESS: 0
HEMATURIA: 0
COUGH: 0
ABDOMINAL PAIN: 1
COLOR CHANGE: 0
SHORTNESS OF BREATH: 1
ABDOMINAL PAIN: 1
BACK PAIN: 0
FREQUENCY: 1
FATIGUE: 1
DIARRHEA: 1
MYALGIAS: 0
SHORTNESS OF BREATH: 0
EYE REDNESS: 0
CONFUSION: 0
NAUSEA: 1
SHORTNESS OF BREATH: 0
BLOOD IN STOOL: 0
FEVER: 0
NAUSEA: 1
DYSURIA: 1
CONFUSION: 1
DIFFICULTY URINATING: 0
FEVER: 0
RHINORRHEA: 0
ARTHRALGIAS: 0
EYE REDNESS: 0
HEADACHES: 0
WEAKNESS: 0
NECK STIFFNESS: 0

## 2020-01-01 ASSESSMENT — PAIN SCALES - GENERAL
PAINLEVEL: MODERATE PAIN (5)
PAINLEVEL: NO PAIN (0)
PAINLEVEL: WORST PAIN (10)
PAINLEVEL: SEVERE PAIN (6)
PAINLEVEL: NO PAIN (0)
PAINLEVEL: NO PAIN (0)
PAINLEVEL: NO PAIN (1)
PAINLEVEL: SEVERE PAIN (7)
PAINLEVEL: NO PAIN (0)
PAINLEVEL: WORST PAIN (10)
PAINLEVEL: NO PAIN (0)
PAINLEVEL: WORST PAIN (10)
PAINLEVEL: NO PAIN (0)
PAINLEVEL: EXTREME PAIN (9)
PAINLEVEL: MODERATE PAIN (4)

## 2020-01-01 ASSESSMENT — ACTIVITIES OF DAILY LIVING (ADL)
ADLS_ACUITY_SCORE: 19
ADLS_ACUITY_SCORE: 19
ADLS_ACUITY_SCORE: 17
ADLS_ACUITY_SCORE: 16
ADLS_ACUITY_SCORE: 18
ADLS_ACUITY_SCORE: 19
ADLS_ACUITY_SCORE: 17
ADLS_ACUITY_SCORE: 17
ADLS_ACUITY_SCORE: 15
ADLS_ACUITY_SCORE: 19
ADLS_ACUITY_SCORE: 17
ADLS_ACUITY_SCORE: 19
ADLS_ACUITY_SCORE: 16
ADLS_ACUITY_SCORE: 17
ADLS_ACUITY_SCORE: 17
ADLS_ACUITY_SCORE: 18
ADLS_ACUITY_SCORE: 17
ADLS_ACUITY_SCORE: 15
ADLS_ACUITY_SCORE: 17
ADLS_ACUITY_SCORE: 19
ADLS_ACUITY_SCORE: 18
ADLS_ACUITY_SCORE: 19
ADLS_ACUITY_SCORE: 21
ADLS_ACUITY_SCORE: 19
ADLS_ACUITY_SCORE: 18
ADLS_ACUITY_SCORE: 19
ADLS_ACUITY_SCORE: 15
ADLS_ACUITY_SCORE: 17
ADLS_ACUITY_SCORE: 17
ADLS_ACUITY_SCORE: 15
ADLS_ACUITY_SCORE: 18
ADLS_ACUITY_SCORE: 17
ADLS_ACUITY_SCORE: 18
ADLS_ACUITY_SCORE: 17
ADLS_ACUITY_SCORE: 18
ADLS_ACUITY_SCORE: 17
ADLS_ACUITY_SCORE: 20
ADLS_ACUITY_SCORE: 18
ADLS_ACUITY_SCORE: 17
ADLS_ACUITY_SCORE: 19
ADLS_ACUITY_SCORE: 19
ADLS_ACUITY_SCORE: 21
ADLS_ACUITY_SCORE: 20
ADLS_ACUITY_SCORE: 19
ADLS_ACUITY_SCORE: 17
ADLS_ACUITY_SCORE: 19
ADLS_ACUITY_SCORE: 17

## 2020-01-01 ASSESSMENT — PAIN DESCRIPTION - DESCRIPTORS
DESCRIPTORS: ACHING
DESCRIPTORS: ACHING;CONSTANT
DESCRIPTORS: PATIENT UNABLE TO DESCRIBE
DESCRIPTORS: ACHING;DISCOMFORT
DESCRIPTORS: PATIENT UNABLE TO DESCRIBE

## 2020-01-01 ASSESSMENT — COPD QUESTIONNAIRES
CAT_SEVERITY: MILD
COPD: 1

## 2020-01-01 ASSESSMENT — LIFESTYLE VARIABLES: TOBACCO_USE: 1

## 2020-01-09 NOTE — TELEPHONE ENCOUNTER
Tobacco Treatment Program at the [facility] attempted to reach Ms. Albert on 1/9/2020 regarding the tobacco cessation program to help Ms. Albert to quit smoking. We will attempt to reach Ms. Albert another time.     [facility]ales [facility]  Tobacco Treatment Specialist  PH: [phone]

## 2020-01-20 NOTE — TELEPHONE ENCOUNTER
ONCOLOGY INTAKE: Records Information      APPT INFORMATION: 2/6/20 - [facility]  Referring provider:  Kayli Bergman  Referring provider s clinic:  [facility]  Reason for visit/diagnosis:  hx Liver Cancer, establishing care, previously diagnosed w HCC   Has patient been notified of appointment date and time?: Yes per Pa    RECORDS INFORMATION:  Were the records received with the referral (via [employer])? Internal referral    Has patient been seen for any external appt for this diagnosis? Yes    If yes, where? [facility]     Has patient had any imaging or procedures outside of [facility] for this condition? Yes      If Yes, where?  [facility]    ADDITIONAL INFORMATION:  Scheduled via IB from Pa - no call needed - Pa confirmed with patient

## 2020-01-20 NOTE — TELEPHONE ENCOUNTER
I spoke to patient today. We received internal referral from Liver Transplant regarding this pt w/ Hx HCC, initialy diagnosed at  in June 2019, s/p chemoembolization & ablation Oct 2019 at . Pt is transplant candidate, going through work up/ eval, needs to establish care with medical oncology. Pt will see Dr Childress 2/6/19 to establish care. Pt is agreeable to this plan.

## 2020-01-21 NOTE — TELEPHONE ENCOUNTER
Action    Action Taken January 21, 2020  Call to PT to verify records Hx and PT stated she has not had a Bx.  Reviewed chart and confirmed all records and imaging in Epic.

## 2020-01-22 NOTE — TELEPHONE ENCOUNTER
Tobacco Treatment Team at the [facility] spoke with Ms. Albert on 1/22/2020 regarding the tobacco cessation program and she agreed to participate with the program.We have set up appt for initial tobacco cessation visit via phone 1/23 at 2pm    NITA Sky  Tobacco Treatment Specialist  PH: [phone]

## 2020-01-24 NOTE — TELEPHONE ENCOUNTER
Spoke with patient yesterday who stated time was not convenient for her. Rescheduled to 1/24 at 11am. Patient did not answer, left msg.     Tobacco Treatment Program at the [facility] attempted to reach Ms. Albert on 1/24/2020 for scheduled tobacco cessation visit to help Ms. Albert to quit smoking. We will attempt to reach Ms. Albert another time.     [facility] Morales [facility]  Tobacco Treatment Specialist  PH: [phone]

## 2020-01-31 NOTE — PATIENT INSTRUCTIONS
We will discuss your case at our interstitial lung disease group meeting and give you a call afterwards with our thoughts and recommendations. We will check some lab tests today.     No matter what the diagnosis for your lungs is you need to quit smoking. Call [phone] to set up an appointment.     We will check your lab tests today which will check for some different causes of lung disease.

## 2020-01-31 NOTE — PROGRESS NOTES
Initial Pulmonary Consult     Chief Complaint: Referred for evaluation prior to liver transplant    HPI:   54-year-old woman with past medical history of hepatitis C cirrhosis and hepatocellular carcinoma who is being seen in pulmonary clinic for pretransplant evaluation.   She was treated with ribavirin and SOVALDI for hepatitis C.  She has been evaluated by cardiology and had a normal dobutamine echo.  She has had chemoembolization for hepatocellular carcinoma in the past..    She was seen by pulmonary at [facility] in April 2018.  It was noted that she had had a bone marrow biopsy which was normal, autoimmune testing that was negative, and negative fungal serologies.  Overnight oximetry had showed nighttime hypoxia and she had subsequently undergone a sleep study that showed sleep apnea.  She was still smoking and had great difficulty with attempts at smoking cessation.  She described some dyspnea on exertion, particularly going up hills and a productive cough.  Per the notes there was concern for end-stage lung disease of unknown type, possibly smoking-related lung disease and smoking cessation was strongly recommended.  The plan was for a repeat CT and pulmonary function tests in 6 months.  She had normal CCP and DEANN in 1/2019. She also had negative fungal serologies including Blastomyces, Aspergillus, cryptococcus, histoplasma, Sporothrix and Coccidioides and DEANN in January 2018.     She reports today that she has not had a lot of problems with her breathing. She can walk quite a ways on flat ground and has never had to stop, but she couldn't make it up a 1/4 mile up hill in [place]. She has had an intermittently productive cough the last few years, she attributes it to sinus problems. She brings up some phlegm, usually clear and never any blood. She has had the sinus problem for a couple years.  She has had no orthopnea or PND.  She has had no platypnea.    She was diagnosed with sleep apnea in 2018  but she doesn't wake up short of breath and never got the CPAP because she did not feel like she needed it.     She continues to work on smoking cessation. She has had no cigarettes today but has difficulty going several days at a time. She has tried nicotine patches, lozenges, she has not tried gum.  She is triggered to smoke by various life events including frustrations at home, being by other people who are smoking, and various other stressors.    ROS:   Negative: headaches, neck pain, chest pain, skin rash, sore throat, ear pain.  Remainder of 10 system review of systems is negative other than noted elsewhere.  Positive: she has some ankle pain, finger pain, wrist pain, shoulder pain, occasional elbow pain. She has not noticed any red swollen joints. The pain started fairly recently.     Medications:   Current Outpatient Medications   Medication     acetaminophen (TYLENOL) 500 MG tablet     albuterol (PROAIR HFA) 108 (90 Base) MCG/ACT inhaler     benzonatate (TESSALON) 100 MG capsule     blood glucose monitoring (ONE TOUCH ULTRA MINI) meter device kit     calcium carbonate-vitamin D (OSCAL W/D) 500-200 MG-UNIT tablet     cholecalciferol (VITAMIN D3) 1000 units (25 mcg) capsule     diphenoxylate-atropine (LOMOTIL) 2.5-0.025 MG tablet     HYDROXYZINE HCL PO     insulin glargine (LANTUS SOLOSTAR) 100 UNIT/ML pen     Omega-3 Fatty Acids (OMEGA-3 FISH OIL PO)     OneTouch Delica Lancets 33G MISC     order for DME     OXYCODONE HCL PO     pantoprazole (PROTONIX) 40 MG EC tablet     QUEtiapine (SEROQUEL) 50 MG tablet     sertraline (ZOLOFT) 100 MG tablet     TRULICITY 0.75 MG/0.5ML pen     vitamin D2 (ERGOCALCIFEROL) 90551 units (1250 mcg) capsule     No current facility-administered medications for this visit.          Past medical history:   Past Medical History:   Diagnosis Date     Alcohol abuse      Chronic hepatitis C without hepatic coma (H) 10/23/2019    SVR     Cirrhosis of liver with ascites (H) 10/23/2019      COPD (chronic obstructive pulmonary disease) (H)      Depressive disorder      Diabetes (H)     Type 1 DM, Takes Insulin      Emphysema lung (H)      H/O esophageal varices      HCC (hepatocellular carcinoma) (H) 10/23/2019     History of blood transfusion     [facility] in 1983     ERICKA (obstructive sleep apnea)          Past surgical History:   Past Surgical History:   Procedure Laterality Date     ABDOMEN SURGERY      Gallbladder Removed      COLONOSCOPY      [facility]     CYSTOSCOPY, INJECT BOTOX      detrusor injection     GI SURGERY      Upper Endoscopy at [facility]      HERNIA REPAIR      Patient doesnt remember if mesh was used. [facility]      OPEN REDUCTION INTERNAL FIXATION WRIST Bilateral        Social History:   She currently smokes about 1/3 ppd. She has smoked for 44 years, has never smoked more than 1 ppd, she is unable to say how many packs per day she has smoked on average. She lives with her  with CHUCK Hawkins. 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 No known asbestos exposure. Her  has turkeys, peacocks, chickens, pigeons, a couple pigs, goats, and sheep. They have had animals for about 25 years. She does not spend much time with them but does go out to feed them occasionally. They have an indoor cat, the rest are outside in the barn. 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 She has tried marijuana in the past, not a frequent user. She has never inhaled anything else. She drank alcohol in the past, none since she was diagnosed with hepatitis C 4 years ago. They also had a water issue in the house, unclear if there was mold.     Family History:   One of her older sisters had COPD. No one in the family with pulmonary fibrosis. No one in the family with autoimmune conditions. Her mom had coronary artery disease, multiple MIs, and a pacemaker.     Physical exam:  /84 (BP  "Location: Right arm, Patient Position: Chair, Cuff Size: Adult Regular)   Pulse 85   Resp 18   Ht 1.549 m (5' 0.98\")   Wt 78.9 kg (174 lb)   SpO2 94%   BMI 32.89 kg/m    General: Well-appearing middle-aged woman accompanied by her son  HEENT: Clear oropharynx.  Clear sclera.  No nasal discharge.  Neck: No lymphadenopathy  Cardiovascular: Regular rate and rhythm without murmurs, gallops, or rubs.  Pulmonary: Fine bibasilar crackles.  No wheezes or rhonchi.  Remainder of lung fields are clear.  She is breathing comfortably on room air.  Abdomen: Soft, nontender, no obvious ascites.  Extremities: Trace bilateral lower extremity edema.  Neuro: No focal deficits  Psych: Appropriate affect.    Labs:   As reviewed in HPI.  She has not had eosinophils checked in our system.  We will obtain a interstitial lung disease panel today.      Imaging:    Chest CT 10/2019:  1.  No pulmonary embolus or thoracic aortic aneurysm or dissection.  2.  Stable appearance of the lung parenchyma with emphysema and some changes of fibrosis, could be superimposed on nonspecific interstitial pneumonitis, or possibly combined emphysema and pulmonary fibrosis.  3.  Small hiatal hernia.    In comparison to a chest CT from February 2018, there appears to have been some progression in the subpleural fibrosis, though the initial CT was with contrast and with different technique.    Chest x-ray 12/2019: Peripherally and lower lobe predominant reticular opacities, consistent with fibrotic changes partially seen on CT 10/17/2019.  Can obtain CT of the chest to further evaluate fibrotic changes.     Pulmonary function testing 12/2019:  FVC 2.55 L, 83% predicted  FEV1 2.26 L, 92% predicted  FEV1 to FVC ratio 88%, normal  Total lung capacity 3.87 L, 84% predicted  Corrected diffusion capacity 12.96, 67% predicted  PaO2 84.9 on ABG    Pulmonary function testing 2/2018:  FEV1 2.02 L, 83% predicted  FVC 2.52 L, 83% predicted  FEV1 to FVC ratio 80%, " normal  Total lung capacity 3.98, 88% predicted  Diffusion capacity 10.8, 51% predicted    In comparison to pulmonary function testing in April 2018, recent pulmonary function testing in December 2019 is stable to slightly improved, and remains normal with the exception of her diffusion capacity which is mildly reduced.      Assessment and plan:  54-year-old woman with a history of hepatitis C cirrhosis and hepatocellular carcinoma who was referred for evaluation prior to liver transplant due to concerns of pulmonary fibrosis.  She seems relatively asymptomatic, and has had no progression in any breathing difficulty over the last several years.  Her pulmonary function testing has been stable, however her imaging has progressed slightly.  She has subpleural fibrosis without a clear apical basal gradient.  Differential diagnosis includes idiopathic pulmonary fibrosis, nonspecific interstitial pneumonitis, chronic/fibrotic hypersensitivity pneumonitis (imaging not classic, however, has multiple possible exposures over many years), respiratory bronchiolitis interstitial lung disease, desquamative interstitial pneumonia.  We will obtain an interstitial lung disease panel today and discuss her case at the interstitial lung disease conference next week.  Following that discussion we can comment further on her prognosis and suitability for liver transplant.    We also had extensive discussions on the need for smoking cessation. She is motivated but has struggled on multiple previous attempts. Her son was with her today and is quite supportive. She was provided with the number for the tobacco cessation clinic and encouraged to make an appointment.     The patient was seen and staffed with Dr. Adam Breen MD  Pulmonary/critical care fellow    Attending statement:    The patient was seen and examined by me with the resident Dr Breen.  The case was discussed at length.  Vitals, lab results and  imaging from our visit were reviewed.  The note written by Dr Breen above reflects our joint assessment and plan.    Adam Welsh MD      Addendum: discussed the case at ILD conference on 2/3/2020. We reviewed clinical history, available labs, and imaging. Most likely this represents a smoking related ILD, and due to the presence of centrilobular nodules it falls into the alternative diagnosis category for IPF. Emphysema is also present on the CT, we can't exclude CPFE. There has been some mild progression over the last 4 years.     Her fibrosis may continue to mildly progress over time. The prognosis for smoing related ILD is unknown, it can still progress even after smoking cessation. Overall, she is at some risk of progressive ILD, however, we are unable to quantify that risk. Will plan to see her back in clinic in 3 months with pulmonary function testing.

## 2020-01-31 NOTE — LETTER
1/31/2020     RE: Dania Albert  [address] [place] [id number]     Dear Colleague,    Thank you for referring your patient, Dania Albert, to the [facility] at [facility]. Please see a copy of my visit note below.    Initial Pulmonary Consult     Chief Complaint: Referred for evaluation prior to liver transplant    HPI:   54-year-old woman with past medical history of hepatitis C cirrhosis and hepatocellular carcinoma who is being seen in pulmonary clinic for pretransplant evaluation.   She was treated with ribavirin and SOVALDI for hepatitis C.  She has been evaluated by cardiology and had a normal dobutamine echo.  She has had chemoembolization for hepatocellular carcinoma in the past..    She was seen by pulmonary at [facility] in April 2018.  It was noted that she had had a bone marrow biopsy which was normal, autoimmune testing that was negative, and negative fungal serologies.  Overnight oximetry had showed nighttime hypoxia and she had subsequently undergone a sleep study that showed sleep apnea.  She was still smoking and had great difficulty with attempts at smoking cessation.  She described some dyspnea on exertion, particularly going up hills and a productive cough.  Per the notes there was concern for end-stage lung disease of unknown type, possibly smoking-related lung disease and smoking cessation was strongly recommended.  The plan was for a repeat CT and pulmonary function tests in 6 months.  She had normal CCP and DEANN in 1/2019. She also had negative fungal serologies including Blastomyces, Aspergillus, cryptococcus, histoplasma, Sporothrix and Coccidioides and DEANN in January 2018.     She reports today that she has not had a lot of problems with her breathing. She can walk quite a ways on flat ground and has never had to stop, but she couldn't make it up a 1/4 mile up hill in [place]. She has had an intermittently  productive cough the last few years, she attributes it to sinus problems. She brings up some phlegm, usually clear and never any blood. She has had the sinus problem for a couple years.  She has had no orthopnea or PND.  She has had no platypnea.    She was diagnosed with sleep apnea in 2018 but she doesn't wake up short of breath and never got the CPAP because she did not feel like she needed it.     She continues to work on smoking cessation. She has had no cigarettes today but has difficulty going several days at a time. She has tried nicotine patches, lozenges, she has not tried gum.  She is triggered to smoke by various life events including frustrations at home, being by other people who are smoking, and various other stressors.    ROS:   Negative: headaches, neck pain, chest pain, skin rash, sore throat, ear pain.  Remainder of 10 system review of systems is negative other than noted elsewhere.  Positive: she has some ankle pain, finger pain, wrist pain, shoulder pain, occasional elbow pain. She has not noticed any red swollen joints. The pain started fairly recently.     Medications:   Current Outpatient Medications   Medication     acetaminophen (TYLENOL) 500 MG tablet     albuterol (PROAIR HFA) 108 (90 Base) MCG/ACT inhaler     benzonatate (TESSALON) 100 MG capsule     blood glucose monitoring (ONE TOUCH ULTRA MINI) meter device kit     calcium carbonate-vitamin D (OSCAL W/D) 500-200 MG-UNIT tablet     cholecalciferol (VITAMIN D3) 1000 units (25 mcg) capsule     diphenoxylate-atropine (LOMOTIL) 2.5-0.025 MG tablet     HYDROXYZINE HCL PO     insulin glargine (LANTUS SOLOSTAR) 100 UNIT/ML pen     Omega-3 Fatty Acids (OMEGA-3 FISH OIL PO)     OneTouch Delica Lancets 33G MISC     order for DME     OXYCODONE HCL PO     pantoprazole (PROTONIX) 40 MG EC tablet     QUEtiapine (SEROQUEL) 50 MG tablet     sertraline (ZOLOFT) 100 MG tablet     TRULICITY 0.75 MG/0.5ML pen     vitamin D2 (ERGOCALCIFEROL) 97202 units  (1250 mcg) capsule     No current facility-administered medications for this visit.          Past medical history:   Past Medical History:   Diagnosis Date     Alcohol abuse      Chronic hepatitis C without hepatic coma (H) 10/23/2019    SVR     Cirrhosis of liver with ascites (H) 10/23/2019     COPD (chronic obstructive pulmonary disease) (H)      Depressive disorder      Diabetes (H)     Type 1 DM, Takes Insulin      Emphysema lung (H)      H/O esophageal varices      HCC (hepatocellular carcinoma) (H) 10/23/2019     History of blood transfusion     [facility]     ERICKA (obstructive sleep apnea)          Past surgical History:   Past Surgical History:   Procedure Laterality Date     ABDOMEN SURGERY      Gallbladder Removed      COLONOSCOPY      [facility]     CYSTOSCOPY, INJECT BOTOX      detrusor injection     GI SURGERY      Upper Endoscopy at [facility]      HERNIA REPAIR      Patient doesnt remember if mesh was used. [facility]      OPEN REDUCTION INTERNAL FIXATION WRIST Bilateral        Social History:   She currently smokes about 1/3 ppd. She has smoked for 44 years, has never smoked more than 1 ppd, she is unable to say how many packs per day she has smoked on average. She lives with her  with CHUCK Hawkins. 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 No known asbestos exposure. Her  has turkeys, peacocks, chickens, pigeons, a couple pigs, goats, and sheep. They have had animals for about 25 years. She does not spend much time with them but does go out to feed them occasionally. They have an indoor cat, the rest are outside in the barn. 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 She has tried marijuana in the past, not a frequent user. She has never inhaled anything else. She drank alcohol in the past, none since she was diagnosed with hepatitis C 4 years ago. They also had a water issue in the  "house, unclear if there was mold.     Family History:   One of her older sisters had COPD. No one in the family with pulmonary fibrosis. No one in the family with autoimmune conditions. Her mom had coronary artery disease, multiple MIs, and a pacemaker.     Physical exam:  /84 (BP Location: Right arm, Patient Position: Chair, Cuff Size: Adult Regular)   Pulse 85   Resp 18   Ht 1.549 m (5' 0.98\")   Wt 78.9 kg (174 lb)   SpO2 94%   BMI 32.89 kg/m     General: Well-appearing middle-aged woman accompanied by her son  HEENT: Clear oropharynx.  Clear sclera.  No nasal discharge.  Neck: No lymphadenopathy  Cardiovascular: Regular rate and rhythm without murmurs, gallops, or rubs.  Pulmonary: Fine bibasilar crackles.  No wheezes or rhonchi.  Remainder of lung fields are clear.  She is breathing comfortably on room air.  Abdomen: Soft, nontender, no obvious ascites.  Extremities: Trace bilateral lower extremity edema.  Neuro: No focal deficits  Psych: Appropriate affect.    Labs:   As reviewed in HPI.  She has not had eosinophils checked in our system.  We will obtain a interstitial lung disease panel today.      Imaging:    Chest CT 10/2019:  1.  No pulmonary embolus or thoracic aortic aneurysm or dissection.  2.  Stable appearance of the lung parenchyma with emphysema and some changes of fibrosis, could be superimposed on nonspecific interstitial pneumonitis, or possibly combined emphysema and pulmonary fibrosis.  3.  Small hiatal hernia.    In comparison to a chest CT from February 2018, there appears to have been some progression in the subpleural fibrosis, though the initial CT was with contrast and with different technique.    Chest x-ray 12/2019: Peripherally and lower lobe predominant reticular opacities, consistent with fibrotic changes partially seen on CT 10/17/2019.  Can obtain CT of the chest to further evaluate fibrotic changes.     Pulmonary function testing 12/2019:  FVC 2.55 L, 83% " predicted  FEV1 2.26 L, 92% predicted  FEV1 to FVC ratio 88%, normal  Total lung capacity 3.87 L, 84% predicted  Corrected diffusion capacity 12.96, 67% predicted  PaO2 84.9 on ABG    Pulmonary function testing 2/2018:  FEV1 2.02 L, 83% predicted  FVC 2.52 L, 83% predicted  FEV1 to FVC ratio 80%, normal  Total lung capacity 3.98, 88% predicted  Diffusion capacity 10.8, 51% predicted    In comparison to pulmonary function testing in April 2018, recent pulmonary function testing in December 2019 is stable to slightly improved, and remains normal with the exception of her diffusion capacity which is mildly reduced.      Assessment and plan:  54-year-old woman with a history of hepatitis C cirrhosis and hepatocellular carcinoma who was referred for evaluation prior to liver transplant due to concerns of pulmonary fibrosis.  She seems relatively asymptomatic, and has had no progression in any breathing difficulty over the last several years.  Her pulmonary function testing has been stable, however her imaging has progressed slightly.  She has subpleural fibrosis without a clear apical basal gradient.  Differential diagnosis includes idiopathic pulmonary fibrosis, nonspecific interstitial pneumonitis, chronic/fibrotic hypersensitivity pneumonitis (imaging not classic, however, has multiple possible exposures over many years), respiratory bronchiolitis interstitial lung disease, desquamative interstitial pneumonia.  We will obtain an interstitial lung disease panel today and discuss her case at the interstitial lung disease conference next week.  Following that discussion we can comment further on her prognosis and suitability for liver transplant.    We also had extensive discussions on the need for smoking cessation. She is motivated but has struggled on multiple previous attempts. Her son was with her today and is quite supportive. She was provided with the number for the tobacco cessation clinic and encouraged to make  an appointment.     The patient was seen and staffed with Dr. Adam Breen MD  Pulmonary/critical care fellow    Attending statement:    The patient was seen and examined by me with the resident Dr Breen.  The case was discussed at length.  Vitals, lab results and imaging from our visit were reviewed.  The note written by Dr Breen above reflects our joint assessment and plan.    Adam Welsh MD      Addendum: discussed the case at ILD conference on 2/3/2020. We reviewed clinical history, available labs, and imaging. Most likely this represents a smoking related ILD, and due to the presence of centrilobular nodules it falls into the alternative diagnosis category for IPF. Emphysema is also present on the CT, we can't exclude CPFE. There has been some mild progression over the last 4 years.     Her fibrosis may continue to mildly progress over time. The prognosis for smoing related ILD is unknown, it can still progress even after smoking cessation. Overall, she is at some risk of progressive ILD, however, we are unable to quantify that risk. Will plan to see her back in clinic in 3 months with pulmonary function testing.     Again, thank you for allowing me to participate in the care of your patient.      Sincerely,    Mack Breen MD

## 2020-01-31 NOTE — NURSING NOTE
Chief Complaint   Patient presents with     Consult     Pre-Liver TX Eval     Medications reviewed and updated.  Vitals taken  Carie Gan CMA

## 2020-02-06 NOTE — LETTER
2/6/2020       RE: Dania Albert  [address] [facility] [address]     Dear Colleague,    Thank you for referring your patient, Dania Albert, to the [facility]. Please see a copy of my visit note below.    Oncology initial visit:  Date on this visit: 2/6/2020    Dania Albert  is referred by Dr.Mark Eric Aguilar for an oncology consultation. She requires evaluation for HCC.    Primary Physician: Micaela Gould     History Of Present Illness:  Ms. Albert is a 54 year old female who has a hx of DM, Hep C ( s/p treatment in 2017 and had SVR ), cirrhosis complicated by HCC, comes in for evaluation.  In June 2019 she was found to have a 1.9 x 2.2 x 1.7 cm left hepatic lobe segment IVb lesion c/w HCC. Baseline AFP was not elevated 3.9.  No evidence of metastasis on CT chest from Aug 2019 although chronic interstitial lung disease was seen.      Bone Scan on 12/11/19 was also negative for bone mets.  She had TACE on 10/10/2019 followed by MWA on 10/11/2019 at [facility]. Repeat imaging shows no residual disease on 11/22/2019.  Last AFP was 1.5 on 1/31/2020.    She had a recent US Abdomen on 1/31/2020 which showed the treated lesion in left hepatic lobe but a new hypoechoic lesion in segment 2 without correlate on  previous MRI, measuring 1.5 cm was also seen. She will have MRI Abdomen tomorrow to follow up on this.     She comes in today accompanied by her  and tells me overall she has been doing well.  She denies any abdominal pain.  She gets occasional nausea for which he takes Zofran.  She has noticed off-and-on diarrhea but she does not take anything for it.  Denies any fevers or infections.  No issues with bleeding.  Denies new swellings.  Denies any significant shortness of breath.  She has noticed some tingling and numbness of her fingers.  She mentioned that previously she had issues with esophageal varices and had banding done.  She had issues with ascites as well as mild hepatic  encephalopathy.  Recently it has not been an issue.    ECOG 1    ROS:  A comprehensive ROS was otherwise neg    Past Medical/Surgical History:  Past Medical History:   Diagnosis Date     Alcohol abuse      Chronic hepatitis C without hepatic coma (H) 10/23/2019    SVR     Cirrhosis of liver with ascites (H) 10/23/2019     COPD (chronic obstructive pulmonary disease) (H)      Depressive disorder      Diabetes (H)     Type 1 DM, Takes Insulin      Emphysema lung (H)      H/O esophageal varices      HCC (hepatocellular carcinoma) (H) 10/23/2019     History of blood transfusion     [facility] in 1983     ERICKA (obstructive sleep apnea)      Past Surgical History:   Procedure Laterality Date     ABDOMEN SURGERY      Gallbladder Removed      [place]      [place]     CYSTOSCOPY, INJECT BOTOX      detrusor injection     GI SURGERY      Upper Endoscopy at [facility]      HERNIA REPAIR      Patient doesnt remember if mesh was used[facility]      OPEN REDUCTION INTERNAL FIXATION WRIST Bilateral      Cancer History:     As above    Allergies:  Allergies as of 02/06/2020 - Reviewed 02/06/2020   Allergen Reaction Noted     Bee venom Other (See Comments) and Swelling 07/06/2012     Hydrocodone Other (See Comments) 02/18/2013     Nickel Rash 03/25/2016     Wool fiber Hives and Rash 03/25/2016     Current Medications:  Current Outpatient Medications   Medication Sig Dispense Refill     acetaminophen (TYLENOL) 500 MG tablet Take 1,000 mg by mouth       albuterol (PROAIR HFA) 108 (90 Base) MCG/ACT inhaler INHALE 2 PUFFS 4 TIMES DAILY       benzonatate (TESSALON) 100 MG capsule TAKE 1 CAPSULE BY MOUTH 3 TIMES DAILY AS NEEDED FOR COUGH  2     blood glucose monitoring (ONE TOUCH ULTRA MINI) meter device kit Use as directed to test glucose three times daily. Pharmacy dispense brand based on insurance.  Indications: Diabetes       calcium carbonate-vitamin D (OSCAL W/D) 500-200 MG-UNIT tablet Take 1 tablet by mouth 2  times daily (with meals) 60 tablet 3     cholecalciferol (VITAMIN D3) 1000 units (25 mcg) capsule Take 1 capsule (1,000 Units) by mouth daily 30 capsule 3     insulin glargine (LANTUS SOLOSTAR) 100 UNIT/ML pen Inject 38 Units Subcutaneous       OneTouch Delica Lancets 33G MISC Change lancet one time daily as directed.       pantoprazole (PROTONIX) 40 MG EC tablet TAKE ONE TABLET BY MOUTH EVERY DAY       QUEtiapine (SEROQUEL) 50 MG tablet Take 50 mg by mouth At Bedtime  0     sertraline (ZOLOFT) 100 MG tablet TAKE 2 TABLETS BY MOUTH EVERY DAY       TRULICITY 0.75 MG/0.5ML pen INJECT 0.5 ml's SUBCUTANEOUSLY ONCE WEEKLY  2     vitamin D2 (ERGOCALCIFEROL) 13297 units (1250 mcg) capsule TAKE ONE CAPSULE BY MOUTH ONCE every WEEK  0     diphenoxylate-atropine (LOMOTIL) 2.5-0.025 MG tablet Take 1 tablet by mouth 4 times daily as needed for diarrhea (Patient not taking: Reported on 2/6/2020) 120 tablet 0     HYDROXYZINE HCL PO Take 10 mg by mouth every 4 hours as needed for itching       Omega-3 Fatty Acids (OMEGA-3 FISH OIL PO)        order for DME Abdominal Binder - small (Patient not taking: Reported on 2/6/2020) 1 each 1     OXYCODONE HCL PO Take 5 mg by mouth every 4 hours as needed        Family History:  Family History   Problem Relation Age of Onset     Coronary Artery Disease Mother      Coronary Artery Disease Father      No Known Problems Brother      Meniere's disease Sister      Diabetes Sister      No Known Problems Son      No Known Problems Daughter      Diabetes Sister      Liver Disease Sister      Chronic Obstructive Pulmonary Disease Sister    No history of cancers.  Patient has 2 children who are healthy.    Social History:  Social History     Socioeconomic History     Marital status:      Spouse name: Not on file     Number of children: Not on file     Years of education: Not on file     Highest education level: Not on file   Occupational History     Not on file   Social Needs     Financial  "resource strain: Not on file     Food insecurity:     Worry: Not on file     Inability: Not on file     Transportation needs:     Medical: Not on file     Non-medical: Not on file   Tobacco Use     Smoking status: Current Every Day Smoker     Packs/day: 0.30     Years: 40.00     Pack years: 12.00     Types: Cigarettes     Smokeless tobacco: Never Used   Substance and Sexual Activity     Alcohol use: Not Currently     Comment: Rarely drank alcohol.      Drug use: Not Currently     Sexual activity: Not on file   Lifestyle     Physical activity:     Days per week: Not on file     Minutes per session: Not on file     Stress: Not on file   Relationships     Social connections:     Talks on phone: Not on file     Gets together: Not on file     Attends Cheondoism service: Not on file     Active member of club or organization: Not on file     Attends meetings of clubs or organizations: Not on file     Relationship status: Not on file     Intimate partner violence:     Fear of current or ex partner: Not on file     Emotionally abused: Not on file     Physically abused: Not on file     Forced sexual activity: Not on file   Other Topics Concern     Parent/sibling w/ CABG, MI or angioplasty before 65F 55M? Not Asked   Social History Narrative     Not on file     Continues to smoke on average a pack lasts her 3 to 4 days.  She is trying to quit.  She used to drink alcohol previously but now only drinks occasionally.  Her last alcohol use was last year.  She lives with her .  She is not working.    Physical Exam:  /81   Pulse 87   Temp 98.5  F (36.9  C) (Oral)   Ht 1.549 m (5' 1\")   Wt 80.2 kg (176 lb 12.8 oz)   SpO2 94%   BMI 33.41 kg/m      Wt Readings from Last 4 Encounters:   02/06/20 80.2 kg (176 lb 12.8 oz)   01/31/20 78.9 kg (174 lb)   12/17/19 78.9 kg (174 lb)   12/10/19 80.2 kg (176 lb 11.2 oz)     CONSTITUTIONAL: no acute distress  EYES: PERRLA, no palor or icterus.   ENT/MOUTH: no mouth lesions. Ears " normal  CVS: s1s2 systolic murmur is heard.    RESPIRATORY: She has bilateral crackles throughout her lung fields.  GI: Abdomen is soft.  She has mild tenderness in the right side and across the lower abdomen.  I was unable to feel hepatosplenomegaly.   NEURO: AAOX3  Grossly non focal neuro exam  INTEGUMENT: no obvious rashes  LYMPHATIC: no palpable cervical, supraclavicular, axillary or inguinal LAD  MUSCULOSKELETAL: Unremarkable. No bony tenderness.   EXTREMITIES: no edema.  Clubbing of fingers.  PSYCH: Mentation, mood and affect are normal. Decision making capacity is intact    Laboratory/Imaging Studies      Reviewed  Results for ROBBIE RAWLS (MRN [id number]) as of 2/6/2020 07:33   Ref. Range 1/31/2020 14:41   Sodium Latest Ref Range: 133 - 144 mmol/L 135   Potassium Latest Ref Range: 3.4 - 5.3 mmol/L 3.5   Chloride Latest Ref Range: 94 - 109 mmol/L 103   Carbon Dioxide Latest Ref Range: 20 - 32 mmol/L 27   Urea Nitrogen Latest Ref Range: 7 - 30 mg/dL 8   Creatinine Latest Ref Range: 0.52 - 1.04 mg/dL 0.49 (L)   GFR Estimate Latest Ref Range: >60 mL/min/1.73_m2 >90   GFR Estimate If Black Latest Ref Range: >60 mL/min/1.73_m2 >90   Calcium Latest Ref Range: 8.5 - 10.1 mg/dL 9.1   Anion Gap Latest Ref Range: 3 - 14 mmol/L 5   Albumin Latest Ref Range: 3.4 - 5.0 g/dL 3.4   Protein Total Latest Ref Range: 6.8 - 8.8 g/dL 8.0   Bilirubin Total Latest Ref Range: 0.2 - 1.3 mg/dL 1.1   Alkaline Phosphatase Latest Ref Range: 40 - 150 U/L 149   ALT Latest Ref Range: 0 - 50 U/L 24   AST Latest Ref Range: 0 - 45 U/L 28   Aldolase Latest Ref Range: 1.5 - 8.1 U/L 5.3   Alpha Fetoprotein Latest Ref Range: 0 - 8 ug/L 4.5   Bilirubin Direct Latest Ref Range: 0.0 - 0.2 mg/dL 0.3 (H)   CK Total Latest Ref Range: 30 - 225 U/L 60   CRP Inflammation Latest Ref Range: 0.0 - 8.0 mg/L <2.9   Cyclic Citrullinated Peptide Antibody, IgG Latest Ref Range: <7 U/mL 2   Rheumatoid Factor Latest Ref Range: <20 IU/mL 377 (H)   Glucose Latest  Ref Range: 70 - 99 mg/dL 166 (H)   Aspergillus Fumagatis 1 Antibody Latest Ref Range: None Detected  None Detected   Aspergillus Fumagatis 6 Antibody Latest Ref Range: None Detected  None Detected   Aureo Pullulans Latest Ref Range: None Detected  None Detected   Ashburn serum Latest Ref Range: None Detected  None Detected   Micropolyspora Faeni Latest Ref Range: None Detected  None Detected   WBC Latest Ref Range: 4.0 - 11.0 10e9/L 3.7 (L)   Hemoglobin Latest Ref Range: 11.7 - 15.7 g/dL 13.3   Hematocrit Latest Ref Range: 35.0 - 47.0 % 39.6   Platelet Count Latest Ref Range: 150 - 450 10e9/L 51 (L)   RBC Count Latest Ref Range: 3.8 - 5.2 10e12/L 3.91   MCV Latest Ref Range: 78 - 100 fl 101 (H)   MCH Latest Ref Range: 26.5 - 33.0 pg 34.0 (H)   MCHC Latest Ref Range: 31.5 - 36.5 g/dL 33.6   RDW Latest Ref Range: 10.0 - 15.0 % 13.5   Sed Rate Latest Ref Range: 0 - 30 mm/h 35 (H)   INR Latest Ref Range: 0.86 - 1.14  1.09     EXAM: MR ABD W/WO IV CONT  LOCATION: [facility]  DATE/TIME: 11/22/2019 11:43 AM    INDICATION: Hepatocellular carcinoma, monitor; f/u TACE and MWA to liver mass 10/10/19 and 10/11/19  COMPARISON: CTs 10/17/2019, 08/18/2018, MRI 06/10/2019  TECHNIQUE: Routine MRI abdomen protocol including T1 in/out phase, diffusion, multiplane T2, and dynamic T1 with IV contrast.   CONTRAST: GADOBENATE DIMEGLUMINE 529 MG/ML IV SOLN 10 mL    FINDINGS: Mass in hepatic segment 3 with intrinsically T1 hyperintense rim measures 2.9 x 2.6 cm, on original MRI 2.2 x 1.9 cm, however it is now nonenhancing, and this is corroborated on subtraction images, although there is minor degree of misregistration on subtraction images. This demonstrates heterogeneous T2 signal. The T1 hyperintense rim does demonstrate restricted diffusion, however this is favored to be due to coagulative necrosis from interval treatment, showing much more marked restricted diffusion than the original viable tumor on original MRI. No suspicious  enhancing hepatic lesions. Heterogeneous hepatic parenchyma with enlarged left lateral segment consistent with cirrhosis, with mild drop in signal on out of phase images consistent with mild fatty liver. There is a large accessory or replaced left hepatic artery arising from the left gastric artery. Dilated main portal vein measuring 17 mm. Patent hepatic vasculature. Splenomegaly measuring 15.5 cm. Varices left upper quadrant and recanalized umbilical vein. No ascites or adenopathy. Stable mild biliary ductal dilatation after cholecystectomy, likely reservoir effect. Benign cysts left kidney inferior pole. Pancreas, right kidney, and adrenal glands negative. Interstitial opacities at the lung bases likely some degree of fibrosis, possibly combined emphysema and pulmonary fibrosis.    IMPRESSION:  1.  Excellent treatment effect of hepatic segment 3 hepatocellular carcinoma with no evidence of enhancing, viable tumor. No suspicious liver lesions.  2.  Cirrhosis and stigmata of portal hypertension with splenomegaly and portosystemic shunting.  3.  Mild fatty liver.    LI-RADS 5Treated: Treated HCC.    MM MAMMOGRAM SCREENING BILAT W 3D BARRIE W CAD performed on 12/6/19     Compared to: 08/24/2018 MM Mammogram Screening Bilat W Barrie W CAD,   08/21/2017 MM Mammogram Screening Bilat W CAD, and 12/26/2013 MAMMOGRAM   SCREENING BILATERAL*     FINDINGS: Bilateral screening mammogram was performed with the assistance   of Computer-Aided Detection and breast tomosynthesis. The breasts are   heterogeneously dense, which may obscure small masses.    There is no radiographic evidence of malignancy    IMPRESSION:   ACR BI-RADS Category 1: Negative    RECOMMENDATION: Follow Up Imaging in 12 months - Bilateral    ASSESSMENT/PLAN:  HCC- 2.2 cm left hepatic lobe HCC in the setting of HCV cirrhosis-  Treated with TACE/MWA in Oct 2019 and had no evidence of residual disease on MRI from Nov 2019. AFP was normal.    She had a recent US  Abdomen on 1/31/2020 which showed the treated lesion in left hepatic lobe but a new hypoechoic lesion in segment 2 without correlate on  previous MRI, measuring 1.5 cm was also seen. She will have MRI Abdomen tomorrow to follow up on this.     She is scheduled to get MRI tomorrow. We will determine follow up accordingly. She might need to see IR again depending on what it shows.       She is getting evaluated for liver transplant.cont to follow with Hepatology.      Thrombocytopenia- likely in the setting of Hep C cirrhosis, portal HTN and splenomegaly. Will check smear, B12/folate.    Leukopenia- check smear and B12/folate- likely due to underlying cirrhosis and splenomegaly.      ILD- likely smoking related. Follow with pulmonology.     Discussion regarding smoking cessation.  We discussed importance of smoking cessation.  I offered her referral to smoking cessation program but she mentions that she has received a referral last week.  She has not contacted them as of yet.      Discussion regarding alcohol.  Her last alcohol use was last year when she had wine.  I strongly encouraged her to completely quit alcohol.  We will determine the follow-up after the MRI from tomorrow.    All questions answered. She is agreeable with the plan.    Again, thank you for allowing me to participate in the care of your patient.      Sincerely,    Dhruv Childress MD

## 2020-02-06 NOTE — NURSING NOTE
"Oncology Rooming Note    February 6, 2020 10:28 AM   Dania Albert is a 54 year old female who presents for:    Chief Complaint   Patient presents with     New Patient     [facility] NEW; hx of LIVER CANCER     Initial Vitals: /81   Pulse 87   Temp 98.5  F (36.9  C) (Oral)   Ht 1.549 m (5' 1\")   Wt 80.2 kg (176 lb 12.8 oz)   SpO2 94%   BMI 33.41 kg/m   Estimated body mass index is 33.41 kg/m  as calculated from the following:    Height as of this encounter: 1.549 m (5' 1\").    Weight as of this encounter: 80.2 kg (176 lb 12.8 oz). Body surface area is 1.86 meters squared.  Data Unavailable Comment: Data Unavailable   No LMP recorded.  Allergies reviewed: Yes  Medications reviewed: Yes    Medications: Medication refills not needed today.  Pharmacy name entered into [employer]:    [id number] - [place], [place] - [address] [facility] - [place] - [place], [place] - [address]    Clinical concerns: No new concerns.  Dr. Childress was notified.      Jaimee Stern, [facility]              "

## 2020-02-06 NOTE — PROGRESS NOTES
Oncology initial visit:  Date on this visit: 2/6/2020    Dania Albert  is referred by Dr.Mark Eric Aguilar for an oncology consultation. She requires evaluation for HCC.    Primary Physician: Micaela Gould     History Of Present Illness:  Ms. Albert is a 54 year old female who has a hx of DM, Hep C ( s/p treatment in 2017 and had SVR ), cirrhosis complicated by HCC, comes in for evaluation.  In June 2019 she was found to have a 1.9 x 2.2 x 1.7 cm left hepatic lobe segment IVb lesion c/w HCC. Baseline AFP was not elevated 3.9.  No evidence of metastasis on CT chest from Aug 2019 although chronic interstitial lung disease was seen.      Bone Scan on 12/11/19 was also negative for bone mets.  She had TACE on 10/10/2019 followed by MWA on 10/11/2019 at [facility]. Repeat imaging shows no residual disease on 11/22/2019.  Last AFP was 1.5 on 1/31/2020.    She had a recent US Abdomen on 1/31/2020 which showed the treated lesion in left hepatic lobe but a new hypoechoic lesion in segment 2 without correlate on  previous MRI, measuring 1.5 cm was also seen. She will have MRI Abdomen tomorrow to follow up on this.     She comes in today accompanied by her  and tells me overall she has been doing well.  She denies any abdominal pain.  She gets occasional nausea for which he takes Zofran.  She has noticed off-and-on diarrhea but she does not take anything for it.  Denies any fevers or infections.  No issues with bleeding.  Denies new swellings.  Denies any significant shortness of breath.  She has noticed some tingling and numbness of her fingers.  She mentioned that previously she had issues with esophageal varices and had banding done.  She had issues with ascites as well as mild hepatic encephalopathy.  Recently it has not been an issue.    ECOG 1    ROS:  A comprehensive ROS was otherwise neg    Past Medical/Surgical History:  Past Medical History:   Diagnosis Date     Alcohol abuse      Chronic hepatitis C  without hepatic coma (H) 10/23/2019    SVR     Cirrhosis of liver with ascites (H) 10/23/2019     COPD (chronic obstructive pulmonary disease) (H)      Depressive disorder      Diabetes (H)     Type 1 DM, Takes Insulin      Emphysema lung (H)      H/O esophageal varices      HCC (hepatocellular carcinoma) (H) 10/23/2019     History of blood transfusion     [facility]     ERICKA (obstructive sleep apnea)      Past Surgical History:   Procedure Laterality Date     ABDOMEN SURGERY      Gallbladder Removed      COLONOSCOPY      [place]     CYSTOSCOPY, INJECT BOTOX      detrusor injection     GI SURGERY      Upper Endoscopy at [facility]      HERNIA REPAIR      Patient doesnt remember if mesh was used[facility]      OPEN REDUCTION INTERNAL FIXATION WRIST Bilateral      Cancer History:     As above    Allergies:  Allergies as of 02/06/2020 - Reviewed 02/06/2020   Allergen Reaction Noted     Bee venom Other (See Comments) and Swelling 07/06/2012     Hydrocodone Other (See Comments) 02/18/2013     Nickel Rash 03/25/2016     Wool fiber Hives and Rash 03/25/2016     Current Medications:  Current Outpatient Medications   Medication Sig Dispense Refill     acetaminophen (TYLENOL) 500 MG tablet Take 1,000 mg by mouth       albuterol (PROAIR HFA) 108 (90 Base) MCG/ACT inhaler INHALE 2 PUFFS 4 TIMES DAILY       benzonatate (TESSALON) 100 MG capsule TAKE 1 CAPSULE BY MOUTH 3 TIMES DAILY AS NEEDED FOR COUGH  2     blood glucose monitoring (ONE TOUCH ULTRA MINI) meter device kit Use as directed to test glucose three times daily. Pharmacy dispense brand based on insurance.  Indications: Diabetes       calcium carbonate-vitamin D (OSCAL W/D) 500-200 MG-UNIT tablet Take 1 tablet by mouth 2 times daily (with meals) 60 tablet 3     cholecalciferol (VITAMIN D3) 1000 units (25 mcg) capsule Take 1 capsule (1,000 Units) by mouth daily 30 capsule 3     insulin glargine (LANTUS SOLOSTAR) 100 UNIT/ML pen Inject 38 Units  Subcutaneous       OneTouch Delica Lancets 33G MISC Change lancet one time daily as directed.       pantoprazole (PROTONIX) 40 MG EC tablet TAKE ONE TABLET BY MOUTH EVERY DAY       QUEtiapine (SEROQUEL) 50 MG tablet Take 50 mg by mouth At Bedtime  0     sertraline (ZOLOFT) 100 MG tablet TAKE 2 TABLETS BY MOUTH EVERY DAY       TRULICITY 0.75 MG/0.5ML pen INJECT 0.5 ml's SUBCUTANEOUSLY ONCE WEEKLY  2     vitamin D2 (ERGOCALCIFEROL) 63756 units (1250 mcg) capsule TAKE ONE CAPSULE BY MOUTH ONCE every WEEK  0     diphenoxylate-atropine (LOMOTIL) 2.5-0.025 MG tablet Take 1 tablet by mouth 4 times daily as needed for diarrhea (Patient not taking: Reported on 2/6/2020) 120 tablet 0     HYDROXYZINE HCL PO Take 10 mg by mouth every 4 hours as needed for itching       Omega-3 Fatty Acids (OMEGA-3 FISH OIL PO)        order for DME Abdominal Binder - small (Patient not taking: Reported on 2/6/2020) 1 each 1     OXYCODONE HCL PO Take 5 mg by mouth every 4 hours as needed        Family History:  Family History   Problem Relation Age of Onset     Coronary Artery Disease Mother      Coronary Artery Disease Father      No Known Problems Brother      Meniere's disease Sister      Diabetes Sister      No Known Problems Son      No Known Problems Daughter      Diabetes Sister      Liver Disease Sister      Chronic Obstructive Pulmonary Disease Sister    No history of cancers.  Patient has 2 children who are healthy.    Social History:  Social History     Socioeconomic History     Marital status:      Spouse name: Not on file     Number of children: Not on file     Years of education: Not on file     Highest education level: Not on file   Occupational History     Not on file   Social Needs     Financial resource strain: Not on file     Food insecurity:     Worry: Not on file     Inability: Not on file     Transportation needs:     Medical: Not on file     Non-medical: Not on file   Tobacco Use     Smoking status: Current Every Day  "Smoker     Packs/day: 0.30     Years: 40.00     Pack years: 12.00     Types: Cigarettes     Smokeless tobacco: Never Used   Substance and Sexual Activity     Alcohol use: Not Currently     Comment: Rarely drank alcohol.      Drug use: Not Currently     Sexual activity: Not on file   Lifestyle     Physical activity:     Days per week: Not on file     Minutes per session: Not on file     Stress: Not on file   Relationships     Social connections:     Talks on phone: Not on file     Gets together: Not on file     Attends Jain service: Not on file     Active member of club or organization: Not on file     Attends meetings of clubs or organizations: Not on file     Relationship status: Not on file     Intimate partner violence:     Fear of current or ex partner: Not on file     Emotionally abused: Not on file     Physically abused: Not on file     Forced sexual activity: Not on file   Other Topics Concern     Parent/sibling w/ CABG, MI or angioplasty before 65F 55M? Not Asked   Social History Narrative     Not on file     Continues to smoke on average a pack lasts her 3 to 4 days.  She is trying to quit.  She used to drink alcohol previously but now only drinks occasionally.  Her last alcohol use was last year.  She lives with her .  She is not working.    Physical Exam:  /81   Pulse 87   Temp 98.5  F (36.9  C) (Oral)   Ht 1.549 m (5' 1\")   Wt 80.2 kg (176 lb 12.8 oz)   SpO2 94%   BMI 33.41 kg/m     Wt Readings from Last 4 Encounters:   02/06/20 80.2 kg (176 lb 12.8 oz)   01/31/20 78.9 kg (174 lb)   12/17/19 78.9 kg (174 lb)   12/10/19 80.2 kg (176 lb 11.2 oz)     CONSTITUTIONAL: no acute distress  EYES: PERRLA, no palor or icterus.   ENT/MOUTH: no mouth lesions. Ears normal  CVS: s1s2 systolic murmur is heard.    RESPIRATORY: She has bilateral crackles throughout her lung fields.  GI: Abdomen is soft.  She has mild tenderness in the right side and across the lower abdomen.  I was unable to feel " hepatosplenomegaly.   NEURO: AAOX3  Grossly non focal neuro exam  INTEGUMENT: no obvious rashes  LYMPHATIC: no palpable cervical, supraclavicular, axillary or inguinal LAD  MUSCULOSKELETAL: Unremarkable. No bony tenderness.   EXTREMITIES: no edema.  Clubbing of fingers.  PSYCH: Mentation, mood and affect are normal. Decision making capacity is intact    Laboratory/Imaging Studies      Reviewed  Results for ROBBIE RAWLS (MRN [id number]) as of 2/6/2020 07:33   Ref. Range 1/31/2020 14:41   Sodium Latest Ref Range: 133 - 144 mmol/L 135   Potassium Latest Ref Range: 3.4 - 5.3 mmol/L 3.5   Chloride Latest Ref Range: 94 - 109 mmol/L 103   Carbon Dioxide Latest Ref Range: 20 - 32 mmol/L 27   Urea Nitrogen Latest Ref Range: 7 - 30 mg/dL 8   Creatinine Latest Ref Range: 0.52 - 1.04 mg/dL 0.49 (L)   GFR Estimate Latest Ref Range: >60 mL/min/1.73_m2 >90   GFR Estimate If Black Latest Ref Range: >60 mL/min/1.73_m2 >90   Calcium Latest Ref Range: 8.5 - 10.1 mg/dL 9.1   Anion Gap Latest Ref Range: 3 - 14 mmol/L 5   Albumin Latest Ref Range: 3.4 - 5.0 g/dL 3.4   Protein Total Latest Ref Range: 6.8 - 8.8 g/dL 8.0   Bilirubin Total Latest Ref Range: 0.2 - 1.3 mg/dL 1.1   Alkaline Phosphatase Latest Ref Range: 40 - 150 U/L 149   ALT Latest Ref Range: 0 - 50 U/L 24   AST Latest Ref Range: 0 - 45 U/L 28   Aldolase Latest Ref Range: 1.5 - 8.1 U/L 5.3   Alpha Fetoprotein Latest Ref Range: 0 - 8 ug/L 4.5   Bilirubin Direct Latest Ref Range: 0.0 - 0.2 mg/dL 0.3 (H)   CK Total Latest Ref Range: 30 - 225 U/L 60   CRP Inflammation Latest Ref Range: 0.0 - 8.0 mg/L <2.9   Cyclic Citrullinated Peptide Antibody, IgG Latest Ref Range: <7 U/mL 2   Rheumatoid Factor Latest Ref Range: <20 IU/mL 377 (H)   Glucose Latest Ref Range: 70 - 99 mg/dL 166 (H)   Aspergillus Fumagatis 1 Antibody Latest Ref Range: None Detected  None Detected   Aspergillus Fumagatis 6 Antibody Latest Ref Range: None Detected  None Detected   Aureo Pullulans Latest Ref Range:  None Detected  None Detected   [place] serum Latest Ref Range: None Detected  None Detected   Micropolyspora Faeni Latest Ref Range: None Detected  None Detected   WBC Latest Ref Range: 4.0 - 11.0 10e9/L 3.7 (L)   Hemoglobin Latest Ref Range: 11.7 - 15.7 g/dL 13.3   Hematocrit Latest Ref Range: 35.0 - 47.0 % 39.6   Platelet Count Latest Ref Range: 150 - 450 10e9/L 51 (L)   RBC Count Latest Ref Range: 3.8 - 5.2 10e12/L 3.91   MCV Latest Ref Range: 78 - 100 fl 101 (H)   MCH Latest Ref Range: 26.5 - 33.0 pg 34.0 (H)   MCHC Latest Ref Range: 31.5 - 36.5 g/dL 33.6   RDW Latest Ref Range: 10.0 - 15.0 % 13.5   Sed Rate Latest Ref Range: 0 - 30 mm/h 35 (H)   INR Latest Ref Range: 0.86 - 1.14  1.09     EXAM: MR ABD W/WO IV CONT  LOCATION: [facility]  DATE/TIME: 11/22/2019 11:43 AM    INDICATION: Hepatocellular carcinoma, monitor; f/u TACE and MWA to liver mass 10/10/19 and 10/11/19  COMPARISON: CTs 10/17/2019, 08/18/2018, MRI 06/10/2019  TECHNIQUE: Routine MRI abdomen protocol including T1 in/out phase, diffusion, multiplane T2, and dynamic T1 with IV contrast.   CONTRAST: GADOBENATE DIMEGLUMINE 529 MG/ML IV SOLN 10 mL    FINDINGS: Mass in hepatic segment 3 with intrinsically T1 hyperintense rim measures 2.9 x 2.6 cm, on original MRI 2.2 x 1.9 cm, however it is now nonenhancing, and this is corroborated on subtraction images, although there is minor degree of misregistration on subtraction images. This demonstrates heterogeneous T2 signal. The T1 hyperintense rim does demonstrate restricted diffusion, however this is favored to be due to coagulative necrosis from interval treatment, showing much more marked restricted diffusion than the original viable tumor on original MRI. No suspicious enhancing hepatic lesions. Heterogeneous hepatic parenchyma with enlarged left lateral segment consistent with cirrhosis, with mild drop in signal on out of phase images consistent with mild fatty liver. There is a large accessory or  replaced left hepatic artery arising from the left gastric artery. Dilated main portal vein measuring 17 mm. Patent hepatic vasculature. Splenomegaly measuring 15.5 cm. Varices left upper quadrant and recanalized umbilical vein. No ascites or adenopathy. Stable mild biliary ductal dilatation after cholecystectomy, likely reservoir effect. Benign cysts left kidney inferior pole. Pancreas, right kidney, and adrenal glands negative. Interstitial opacities at the lung bases likely some degree of fibrosis, possibly combined emphysema and pulmonary fibrosis.    IMPRESSION:  1.  Excellent treatment effect of hepatic segment 3 hepatocellular carcinoma with no evidence of enhancing, viable tumor. No suspicious liver lesions.  2.  Cirrhosis and stigmata of portal hypertension with splenomegaly and portosystemic shunting.  3.  Mild fatty liver.    LI-RADS 5Treated: Treated HCC.    MM MAMMOGRAM SCREENING BILAT W 3D BARRIE W CAD performed on 12/6/19     Compared to: 08/24/2018 MM Mammogram Screening Bilat W Barrie W CAD,   08/21/2017 MM Mammogram Screening Bilat W CAD, and 12/26/2013 MAMMOGRAM   SCREENING BILATERAL*     FINDINGS: Bilateral screening mammogram was performed with the assistance   of Computer-Aided Detection and breast tomosynthesis. The breasts are   heterogeneously dense, which may obscure small masses.    There is no radiographic evidence of malignancy    IMPRESSION:   ACR BI-RADS Category 1: Negative    RECOMMENDATION: Follow Up Imaging in 12 months - Bilateral    ASSESSMENT/PLAN:  HCC- 2.2 cm left hepatic lobe HCC in the setting of HCV cirrhosis-  Treated with TACE/MWA in Oct 2019 and had no evidence of residual disease on MRI from Nov 2019. AFP was normal.    She had a recent US Abdomen on 1/31/2020 which showed the treated lesion in left hepatic lobe but a new hypoechoic lesion in segment 2 without correlate on  previous MRI, measuring 1.5 cm was also seen. She will have MRI Abdomen tomorrow to follow up on  this.     She is scheduled to get MRI tomorrow. We will determine follow up accordingly. She might need to see IR again depending on what it shows.       She is getting evaluated for liver transplant.cont to follow with Hepatology.      Thrombocytopenia- likely in the setting of Hep C cirrhosis, portal HTN and splenomegaly. Will check smear, B12/folate.    Leukopenia- check smear and B12/folate- likely due to underlying cirrhosis and splenomegaly.      ILD- likely smoking related. Follow with pulmonology.     Discussion regarding smoking cessation.  We discussed importance of smoking cessation.  I offered her referral to smoking cessation program but she mentions that she has received a referral last week.  She has not contacted them as of yet.      Discussion regarding alcohol.  Her last alcohol use was last year when she had wine.  I strongly encouraged her to completely quit alcohol.  We will determine the follow-up after the MRI from tomorrow.    All questions answered. She is agreeable with the plan.    Dhruv Childress MD

## 2020-02-07 NOTE — Clinical Note
Good afternoon Vannessa,Can you get her set up with IR for HCC treatment. She had previous treatment done at [facility] and is now with us for transplant and now also HCC treatment.I just spoke with her and told her about this. She is ready for your, or IR, call to set up appt. Thanks, tk

## 2020-02-07 NOTE — PROGRESS NOTES
2/7/20  1.) 1.7 cm 5a  2.) additional LR-3 lesions      recs  - IR for treatment  - discuss on Tuesday for listing if ready

## 2020-02-07 NOTE — NURSING NOTE
Pt was seen for neuropsychological evaluation at the request of Dr. Kayli Bergman for the purposes of diagnostic clarification and treatment planning. 197 minutes of test administration and scoring were provided by this writer. Please see Dr. Reji Esparza's report for a full interpretation of the findings.    Dany Lorenz  Psychometrist

## 2020-02-10 NOTE — NURSING NOTE
Labs drawn in clinic per provider request.  Patient tolerated well without incident.      See flow sheets.     Jaimee Stern, CMA

## 2020-02-13 NOTE — TELEPHONE ENCOUNTER
Patient Call: General  Route to LPN    Reason for call: Please connect with pt regarding evaluation    Call back needed? Yes    Return Call Needed  Same as documented in contacts section  When to return call?: Greater than one day: Route standard priority

## 2020-02-14 NOTE — TELEPHONE ENCOUNTER
VM Full    Tobacco Treatment Program at the [facility] attempted to reach Ms. Albert on 2/14/2020 regarding the tobacco cessation program to help Ms. Albert to quit smoking. We will attempt to reach Ms. Albert another time.     Tg Morales [facility]  Tobacco Treatment Specialist  PH: [phone]

## 2020-02-18 NOTE — TELEPHONE ENCOUNTER
previsit call to pt for new consult HCC    Called pt's number however VM box full    Called daughter Dee, and left a VM asking that she return my call as I am not able to reach her mother.     Left my direct line.     Vnanessa LE RN, BSN  Interventional Radiology Nurse Coordinator   Phone: [phone]

## 2020-02-18 NOTE — COMMITTEE REVIEW
Abdominal Committee Review Note     Evaluation Date: 12/9/2019  Committee Review Date: 2/18/2020    Organ being evaluated for: Liver    Transplant Phase: Evaluation  Transplant Status: Active    Transplant Coordinator: Huy Anders Jr.  Transplant Surgeon:   Fly Mejía    Referring Physician: Robles Metz    Primary Diagnosis: Primary Liver Malignancy: Hepatoma (HCC) and Cirrhosis  Secondary Diagnosis: Cirrhosis: Type C    Committee Review Members:  Nutrition Cornelia Franz, RD   Pharmacy Mike Gallegos, [facility]    - Clinical Trish Knight, SCOUT, Winsome Shaikh, [facility]   Transplant Ernst Tavares MD, Jr Huy Anders RN, Kayli Bergman MD, Danyelle Guerrero MD, Kash Ramírez MD, Yancy Melton RN, Fly Mejía MD       Transplant Eligibility: Cirrhosis with MELD, HCV, HCC    Committee Review Decision: Needs Re-presentation    Relative Contraindications: None, re-discuss pending 3/2/20 appts    Absolute Contraindications: None    Committee Chair Danyelle Guerrero MD verbally attested to the committee's decision.    Committee Discussion Details: Re-discuss pending 3/2    ABG on 3/2    Patient aware by phone call.

## 2020-02-18 NOTE — TELEPHONE ENCOUNTER
Transplant Social Work Services Phone Call      Data: Dania is being evaluated for a liver transplant.  Intervention: I consulted with Dr. Esparza regarding patient's neuropsychological testing.  I contacted patient and discussed recommendation for health psychology.  Referral requested for [facility].  Assessment: Dania is not believed to have a neurodegenerative disease, and testing did not reveal a cognitive barrier to transplantation.  Mood and anxiety symptoms are moderately severe and patient should have additional mental health support.  Dania is in agreement with a referral to health psychology.  Further monitoring of patent's understanding of medical directives is recommended.  Education provided by SW: Adrianna Guerrero (patient declines any need for a referral at this time)  Plan: I will be seeing Dnaia in clinic on 3/2/19 for psychosocial follow up.  She will also be seeing Dr. Bergman.      SCOUT Rivera, [facility]  Liver Transplant   Phone [phone]  Pager [phone]

## 2020-02-19 NOTE — PROGRESS NOTES
TOBACCO TREATMENT  VISIT      Subjective:      Patient is a 54 year old White female that was contacted to participate in the Tobacco Treatment Program for Nicotine Dependence on 2/19/2020 by the Tobacco Treatment Specialist. Patient  reports that she has been smoking cigarettes. She has a 12.00 pack-year smoking history. She has never used smokeless tobacco.     Would like to quit for her health, doesn t like the smell of it. Pulmonary fibrosis. Dr encouraged her to quit smoking. Has reduced her tobacco use and doesn t feel she s been a heavy smoker. The most she s ever smoked was 1 PPD. Currently smokes 5 CPD. Just thinking about her health has helped to reduce her tobacco use. Would like to live longer and breathe easy. Has gone a month without smoking while in the hospital. Relapsed due to limited stress coping skills. Feels stressed at this time. Her triggers to smoke is coffee in the morning, after meals, stress. Has tried nicotine lozenge. Helped for the first few weeks. Side effects of upset stomach. Tried nicotine patch, but smoked while wearing patch.        Information:      Agreed to Participate in Program?: Yes    Referral Type: Proactive outreach    Patient age: 54    Gender: Female    Race: White    Cancer type: Liver    Year of cancer diagnosis: 12/11/19     Smoking Status: Every day smoker     Has attempted to quit in the last 30 days?: No     Previous Cessation Medication Used : 7mg nicotine patch;2mg nicotine lozenge     Tobacco Product Used : Cigarettes      Age started smoking : 11    Amount of Use (CPD) : 5     Time to First Use : <30min     Time of Last Cigarette: smoked cigarette today (at least one puff)     Barriers to quit: Limited stress coping tools    Visit Type: Phone    Active in Cancer Treatment?: No    Medication Treatment Plan: Already on cessation medication    Cessation Medication Used : 7mg nicotine patch         Summary of visit:      Dania Ramirezitez is still smoking/using  tobacco: Yes  These MI interventions were used: Expressed Empathy/Understanding, Supported Autonomy, Collaboration, Evocation, Permission to raise concern or advise, Open-ended questions, Reflections: simple and complex and Change talk (evoked)  We discussed: Benefits of quitting  Ways to cope with cravings and manage triggers  Hints for managing nicotine withdrawal  Ways to prepare for a quit date  Ways to reduce stress to prevent relapse  Total abstinence  Patient is ready to quit smoking or continue to stay 100% smoke-free.  Advice to quit and impact of smoking provided to patient: 4 D's to quitting smoking, cessation medication options,         Plan:      1. Patient will focus efforts on stress coping skills to reduce tobacco use.      MTM Consult Referral: declined    Follow-up arranged? Yes  Amount of time spent counselin mins    NITA Sky  Tobacco Treatment Specialist

## 2020-02-24 NOTE — TELEPHONE ENCOUNTER
DIAGNOSIS: Consult per [facility]   DATE RECEIVED: 3.2.20   NOTES STATUS DETAILS   OFFICE NOTE from referring provider Internal 2.7.20 Dr. Kayli Bergman   OFFICE NOTE from other specialist Internal 2.6.20 Dr. Childress   OPERATIVE REPORT N/A    MEDICATION LIST Internal    PERTINENT LABS Internal    CTA (CT ANGIOGRAPHY) In Pacs 10.25.19   CT Internal NM Bone Scan- 12.11.19  CT Abd/Pel- 10.17.19  CT Liver-10.11.19  CT Chest- 8.5.19     MRI Internal MR Abd- 2.7.20, 11.22.19   ULTRASOUND Internal US Abd- 1.31.20

## 2020-02-26 NOTE — TELEPHONE ENCOUNTER
Called pt and asked if she can come in at 1230pm for updated labs prior to seeing Dr. Jones.     She states that she can.     Did provide details but she will check in at the information desk to find labs.     Vannessa LE RN, BSN  Interventional Radiology Nurse Coordinator   Phone: [phone]

## 2020-02-27 NOTE — LETTER
LIVER TRANSPLANT  EVALUATION SCHEDULE    Patient:   Tawny Albert  MR#:    [id number]  Coordinator:  Luís      [phone]  :     Kayli                 [phone]  [facility]  Date(s):    March 2, 2020          This is your evaluation schedule, please follow dates and times.  You will   receive reminder phone calls for other tests, but please follow this schedule  only!  If you have any questions about dates and times, please call us on  number listed above.  Thank you, Transplant Services       Day/Date:    Monday, March 2, 2020  Time Location Activity   10:45 AM Imaging and Lab testing  ([facility]st [facility]) Blood tests    11:00 AM [facility]   (3rd [facility]) Blood gas draw   1:00 PM Medicine Specialties  (3rd floor [facility]) Appointment with Dr. Bregman,  Hepatologist   1:30 PM Transplant Services  (3rd [facility]) Appointment with Trish Knight,     2:20 PM Surgical Interventional Radiology  (3rd [facility]) Appointment with Dr. Jones,  Interventional Radiologist                     Day/Date:    Every Thursday *OPTIONAL*  Time Location Activity   12:00 PM-1:30 PM Liver Transplant Support Group  Hospital Station [facility]  Room 7-120 Every Thursday *OPTIONAL*     *IF ON LINE CHECK IN IS NOT DONE, YOU WILL NEED TO CHECK IN 15 MINUTES EARLIER THEN THE FIRST SCHEDULED APPOINTMENT*

## 2020-02-27 NOTE — PROGRESS NOTES
NAME: Dania Albert   MRN: [id number]  : 1965  [place]: 2020  Neuropsychology Laboratory  [address] [address] [phone]    NEUROPSYCHOLOGICAL EVALUATION    RELEVANT HISTORY AND REASON FOR REFERRAL    This is a report of neuropsychological consultation regarding Dania Albert, a [age]-year-old, right-handed woman with 11 years of formal education. Her medical history includes hepatocellular carcinoma, hepatitis C, diabetes, obstructive sleeping apnea, depression, and anxiety. She is under consideration for liver transplant, and there were concerns about her comprehension and memory during a visit with a  on the transplant team. She is referred for neuropsychological assessment of brain functioning in this context by Dr. Kayli Bergman, in order to better understand cognitive and psychosocial factors that affect transplant candidacy.    In today s interview, Ms. Albert demonstrates a fair understanding of her medical condition and the reasons to consider transplant. She tells me that hepatitis C was diagnosed two years ago and she has received treatment, but she still has other downstream effects of hepatitis to deal with. Her  reminds her that she has had hepatocellular carcinoma. She readily acknowledges this and provides meaningful information after the reminder. She says transplant has been a part of the conversation with medical providers for about the last two years. She says this is her last specialty visit before moving forward.    She has general treatment goals, such as being able to stay healthy. She says being able return to work would be nice. She understands that the procedures carry a risk of death and there is a risk of organ rejection, but she does not enumerate any other specific risks she might be facing.    Her  (second marriage, been together 26 years now) and her two children are among her core  support group. At this time, she and her  are not sure if there would be anybody who could stay at home to provide care and oversight if there was a complicated postsurgical recovery.    She reports a history of depression coming on after her sister passed about three years ago. This event was followed quickly by the hepatitis diagnosis. A year later, she lost another sister. Both sisters had problems with their livers. She has not been engaged in psychotherapy. She has not required any psychiatric hospitalizations. She is currently treated with sertraline, which she thinks is helpful. She denies any real issues with her mood at this time. Her sleep is chronically poor, and she is constantly tired, no matter what. She thinks she might have one medication that is aimed at helping with sleep. She denies any suicidal ideation. She is not having hallucinatory experiences.    She reports no alcohol use and no history of alcohol problems (CAGE-AID = 0). She denies any illicit drug use. She smokes currently, finishing a pack of cigarettes about every 3-4 days. She has had past attempts to quit, using patches and lozenges.    She denies any legal trouble, problematic gambling behaviors, or any addictive/compulsive style behaviors.    She sees some concerns with her memory, but she feels they are due to normal aging. She lives with her , and she is the one who mostly does the household financial management. She denies any trouble in these regards. She is independent for managing her medications, and she denies trouble there. She has not seen a need to change or limit her driving behaviors.    She says there is no history of special educational needs or special academic supports. She always felt like an average student. She left school after 11th grade. She did take some GED classes but never finished them. 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 She last worked about three years ago. She is not on disability.    Neurologic history is rather unremarkable. She had a head injury around age 17 in which she needed stitches, but there was no apparent loss of consciousness or obvious sequelae. She has never had a stroke, seizure, balance/coordination problem, unilateral weakness or numbness, tremor/parkinsonism, migraines/chronic headaches, or significant changes to her senses of smell, taste, hearing, or vision. There are no neuroimaging studies that I can see in the electronic records. Reported family history is negative for neurologic diseases.    BEHAVIORAL OBSERVATIONS    Ms. Albert was polite and cooperative with the evaluation. Her insight was okay. She needed some reminders from her  about significant mental medical topics (e.g. the presence of hepatocellular carcinoma), but upon receiving reminders, she was able to demonstrate appropriate knowledge. She appeared apprehensive, tense, and a bit tearful at times, but her demeanor was open, friendly, and good-humored. Speech production was not aphasic. Comprehension was generally normal. She needed some rather minor simplifications of questions during the interview. Immediate comprehension of test instructions was normal, but she did need some reminders of what she was doing mid-task. She tended to be slow in her approach to most tasks but could also be fairly careless or impulsive. She seemed to be quick to give up, and there was some instances of subjectively suboptimal effort. She had limited energy. She was napping in the lobby when we went to get her. She was alert and attentive through the interview, but she became progressively drowsier as the testing session went on. However, her overall effort and persistence were adequate for the purposes of the evaluation. Some lower test results may not perfectly replicate under  more ideal circumstances, but the current data are seen as reasonable reflections of her functional status.    MEASURES ADMINISTERED    The following measures were administered by a trained psychometrist, under my direct supervision:    Orientation: Time, Place, Basic Personal Information, Recent [place] Presidents; Wide Range Achievement Test 4: Word Reading; Wechsler Adult Intelligence Scale-IV: Similarities, Vocabulary, Matrix Reasoning, Digit Span, Coding; Controlled Oral Word Association Test; Animal Naming Test; Bantam Naming Test; Henry Visual Acuity Screen; Clock Drawing; Grooved Pegboard; Trail Making Test; Porteus Maze Test; Eric-Osterrieth Complex Figure Test; Ellington Verbal Learning Test, Revised; Wechsler Memory Scale-IV: Logical Memory, Visual Reproduction, Symbol Span; Hess Depression Inventory-II; State-Trait Anxiety Inventory.    RESULTS AND INTERPRETATION    Orientation: Orientation was within normal expectations for time, place, basic personal information, and basic cultural information.    Intellectual Abilities: Performance on a reading and pronunciation test that is validated for estimating premorbid intelligence was in the borderline impaired to below average ranges. On measures of current intellectual functioning, abstract verbal analogical reasoning was low average. Demonstrating vocabulary knowledge was low average. Visual reasoning through pattern identification was borderline impaired.     Language & Related Skills: As noted above, basic reading and pronunciation skills were in the borderline to below average ranges. Confrontation naming was borderline impaired. Semantic verbal fluency was low average. Letter-based verbal fluency was low average.    Visual Perceptual & Constructional Skills: Binocular, corrected, near-point visual acuity was 20/25 on Henry screening. Clock drawing was of borderline quality. Her copy of a complex geometric figure was impaired.    Motor Skills: Speeded  fine-motor dexterity for placing shaped pegs into holes was low average with the dominant right hand and borderline impaired with the nondominant left hand.    Mental Speed & Executive Functioning: As noted above, speeded verbal fluency performances were low average. Speeded graphomotor clerical substitution was borderline impaired. Speeded visual scanning and graphomotor sequencing under simple conditions was borderline impaired for speed and had one error. Scanning and sequencing under greater executive demands to control divided attention was impaired for speed and had two errors. Planning, foresight, and learning from errors were mildly below average on an unspeeded maze-solving test.    Attention & Working Memory: Immediate auditory attention and working memory were borderline impaired for repeating and rearranging digit strings. Immediate visual attention and working memory were borderline impaired to below average for reproducing sequences of designs.    Learning & Anterograde Memory: A few minutes after the initial copy, incidental free recall of the complex figure was low average. After 30 minutes, free recall of the figure remained low average, while recognition of individual figure elements was in the impaired range. On another test, immediate visual memory for simple designs was below average. Delayed free recall of the simple designs was middle average, but recognition of them was impaired. Immediate verbal memory for short stories was borderline impaired. Delayed story recall was borderline impaired, but recognition of story details was average. Learning a word list over repeated readings was low average. Delayed free recall of the list was in the average range, and recognition of the list was perfect.    Emotional, Behavioral, & Personality Functioning: On a brief self-report inventory, she endorsed symptoms consistent with a major depressive episode at least moderate in severity (BDI-II = 22). She  rated her general, day-to-day anxiety experiences as moderately-to-severely elevated (Trait Anxiety = 99th percentile). She demonstrated significant tension in the testing session, but she seemed to be more relaxed than her usual state, as she rated her in-the-moment feelings of anxiety in the normal range (State Anxiety = 31st percentile). Broader assessment of psychological and personality functioning via MMPI-2-RF had to be deferred, due to her low score on the assessment of basic reading skills.    IMPRESSIONS AND RECOMMENDATIONS    Cognitively, Ms. Albert appears to have a low average to below average general baseline, and she seems to be operating at or just a little bit below lifelong levels. Low average to borderline impaired performances are seen across measures of intellect, language skills, attention, concentration, mental speed, psychomotor speed, executive functioning, and memory formation. Situational factors like fatigue and anxiety may be pulling down her scores a little bit, but I do not think they are the primary  of the neuropsychological profile. I am not concerned about the presence of a neurodegenerative disease. I suspect that we are measuring her baseline abilities, plus a mild degree of downstream effects from her systemic illnesses and chronically insufficient sleep. Her memory test performances indicate inefficiency on both the learning and retrieval sides, but not any issues with rapid forgetting or gross amnesia. She is going to need some extra simplifications and repetitions of critical medical information within and across visits, but I do not see her cognitive functioning as a barrier to considering transplant.    Mood and anxiety symptoms are moderately severe at this time. She is receiving some clinical attention, but there seems to be plenty of room for improvement, and her insight into the severity of her symptoms seems low. These, too, are not a barrier to transplant, but  I think increased clinical attention is warranted. A good plan needs to be put in place to seek improvements (e.g., looking at medication adjustments, adding psychotherapeutic pursuits) while awaiting transplant. It would be good to have a concrete plan in place for regular monitoring and assessment of her emotional functioning after transplant, in case she has adjustment problems.     A neuropsychological baseline has been obtained. O or my colleagues will be happy to see her again, if ever clinically indicated.    Reji Esparza, PhD, LP, ABPP-CN  Board Certified in Clinical Neuropsychology  Licensed Psychologist [id number]     Department of Rehabilitation Medicine  Division of Adult Neuropsychology  [facility]      Time spent: One hour neurobehavioral status exam including interview, clinical assessment by licensed and board-certified neuropsychologist (CPT 68091). One hour neuropsychological testing evaluation by licensed and board-certified neuropsychologist, including integration of patient data, interpretation of standardized test results and clinical data, clinical decision-making, treatment planning, report, and interactive feedback to the patient, first hour (CPT 63363). One hour of neuropsychological testing evaluation by licensed and board-certified neuropsychologist, including integration of patient data, interpretation of standardized test results and clinical data, clinical decision-making, treatment planning, report, and interactive feedback to the patient, subsequent hours (CPT 90318). 30 minutes of psychological and neuropsychological test administration and scoring by technician, first 30 minutes (CPT 90478). 167 minutes psychological or neuropsychological test administration and scoring by technician, subsequent 30-minute intervals (CPT 29182). Diagnoses: R41.3, F33.1, F41.9, Z01.818

## 2020-02-27 NOTE — TELEPHONE ENCOUNTER
Spoke with the patient and confirmed Lab Draw, ABG, Hepatology, Social Work, and Interventional Radiology appointments on 3/2/20.  Informed patient an itinerary can be accessed on [facility] [facility], and will be sent via [employer].

## 2020-02-28 NOTE — TELEPHONE ENCOUNTER
Was the patient contacted by phone and reminded of the upcoming visit? message left    Was the patient instructed to bring a current list of all medications to the appointment or instructed to bring in all medication bottles? Yes     Was the patient instructed to arrive prior to the appointment time to have ordered labs drawn? Yes     Were the needed lab orders placed? Yes    Was lab appointment made 1 hour or more prior to visit? Yes    Patient instructed to arrive early for check-in    Oxana Mcintosh [facility]  2/27/2020 6:02 PM

## 2020-03-02 NOTE — NURSING NOTE
Vascular Rooming Note     Dania Albert's goals for this visit include:   Chief Complaint   Patient presents with     Consult     Dania, is being seen today for a consult HCC, feeling good but fatigued, sharp shooting pain in middle of stomach, left pain abdomen when lying down, as reported by patient.     Kristel De Leon LPN

## 2020-03-02 NOTE — LETTER
3/2/2020       RE: Dania Albert  [address] [facility] [address]     Dear Colleague,    Thank you for referring your patient, Dania Albert, to the [facility] HEPATOLOGY at [facility]. Please see a copy of my visit note below.    [facility] Liver Transplant Evaluation     Requesting Provider and Health System: Yadira Graf NP , Rodolfo Cast MD [facility]  Liver Disease: HV     A/P  54 year old female with HCV cirrhosis and HCC. MELD 7 ABO O  CIRRHOSIS.2/2 HCV. Excellent synthetic function   HCV treated and obtained SVR 2017     THROMBOCYTOPENIA. 2/2 cirrhosis. Plt ~ 50  HCC. S/p MWA with recent MRI showing active disease. Will see IR today.  VARICEAL SCREENING. UTD EGD 10/29/19 grade 1 varices     DIARRHEA Chronic . Suggested Imodium. Prescribed Lomotil     DM On insulin     SOCIAL SUPPORT.   and daughter here today and seem very supportive, engaged.  FUNCTIONAL STATUS.Good  LIVER TRANSPLANT CANDIDACY.  Pending pulmonary. She is still smoking.    PULMONARY ABG ordered to assess clubbing, she is smoking. She has a possible diagnosis of pulmonary fibrosis. See pulmonary note from 1/31/20.  Addendum: discussed the case at ILD conference on 2/3/2020. We reviewed clinical history, available labs, and imaging. Most likely this represents a smoking related ILD, and due to the presence of centrilobular nodules it falls into the alternative diagnosis category for IPF. Emphysema is also present on the CT, we can't exclude CPFE. There has been some mild progression over the last 4 years.      Her fibrosis may continue to mildly progress over time. The prognosis for smoing related ILD is unknown, it can still progress even after smoking cessation. Overall, she is at some risk of progressive ILD, however, we are unable to quantify that risk. Will plan to see her back in clinic in 3 months with pulmonary function testing           Kayli Bergman  MD  Hepatology/Liver Transplantation  Medical Director, Liver Transplantation  [facility]  ========================================================================     Subjective:  54 y M with HCV cirrhosis and HCC. First diagnosed with liver disease a couple years ago. She saw her doctor and had some testing done. She was diagnosed with Hepatitis C at that time.      She feels ok. She has chronic diarrhea for years. Never has taken anything for it. Goes away when she has taken narcotic pain medication. Sometimes loses continence.      Has occasional twinges of pain in her RUQ. They don't last. No NV.     HCV  S/p treatment with Sovaldi 2017     HCC  Dx 6/2019 3.1x1.9x2.8 cm  CE and MWA at [facility] 10/2019  MRI 11/22/19 no active HCC  MRI 2/7/20 1.7 cm lesion seg 3 with pseudocapsule     Ascites no  HE no     EGD 10/28/19 grade 1 varices  COLONOSCOPY 12/8/16 due 2026  KIDNEY FUNCTION normal  BONE DENSITY DEXA pending     Portal vein assessment: patent on US   Diabetes: yes  Abdominal surgery: yes     HBV neg core ancelmo and s Ag    Lab Test 03/02/20  1043   PROTTOTAL 8.0   ALBUMIN 3.4   BILITOTAL 1.0   ALKPHOS 177*   AST 26   ALT 24     Lab Test 03/02/20  1115 03/02/20  1043   WBC  --  3.0*   RBC  --  3.81   HGB 13.0 12.9   HCT  --  38.8   MCV  --  102*   MCH  --  33.9*   MCHC  --  33.2   RDW  --  14.0   PLT  --  41*     MELD-Na score: 7 at 3/2/2020 10:43 AM  MELD score: 7 at 3/2/2020 10:43 AM  Calculated from:  Serum Creatinine: 0.61 mg/dL (Rounded to 1 mg/dL) at 3/2/2020 10:43 AM  Serum Sodium: 139 mmol/L (Rounded to 137 mmol/L) at 3/2/2020 10:43 AM  Total Bilirubin: 1.0 mg/dL at 3/2/2020 10:43 AM  INR(ratio): 1.05 at 3/2/2020 10:43 AM  Age: 54 years      PAST MEDICAL HISTORY  Asuncion  R hernia repair  ORIF L and R wrists  Bowel obstruction no details known   Chronic diarrhea not on any medication  Botox shot in kidney for urinary frequency about 2018  SOCIAL HISTORY    Lives with   Currently is not  working. 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 Worked in medical equipment assembly.  Smoking: Smokes 8-10 cig/d for 25 years  Alcohol  FAMILY HISTORY  M CAD, glaucoma, DM  F CAD  Bro kidney disease  DM in multiple sisters     ROS 10 point ROS neg other than the symptoms noted above in the HPI.     EXAM  /76   Pulse 82   Temp 98.2  F (36.8  C) (Oral)   Wt 78.7 kg (173 lb 8 oz)   SpO2 96%   BMI 32.78 kg/m       Gen: No acute distress  Head: Normocephalic atraumatic  Eyes: Sclera anicteric  Neck: No cervical lymphadenopathy  Respiratory: Clear to auscultation bilaterally, no overt wheezing or rales  CV: RRR   Abdomen:  Soft, nontender, nondistended, normal bowel sounds  Extrem: No edema  Skin: No jaundice, spider angiomata or palmar erythema.  No rashes.   Neuro: Alert and oriented. No asterixis.    MSK: Grossly Intact  Psych: Normal speech, normal affect         Again, thank you for allowing me to participate in the care of your patient.      Sincerely,    Kayli Bergman MD

## 2020-03-02 NOTE — PATIENT INSTRUCTIONS
We will call you with the appointment details of your Liver ablation.    Please check into the Pre-op Area, 3rd floor , at [facility] located at [address], [place] [address].    Reminders:    -Nothing to eat or drink after midnight.    -Please have a  as you will be going home afterwards.    -Please do take your morning medications as indicated with just enough water to swallow, with the exception of the medications listed below:     -PLEASE DO NOT TAKE ANY ASPIRIN 5 DAYS PRIOR TO THIS PROCEDURE AS DISCUSSED.    -LANTUS; ONLY TAKE 80% OF THIS MEDICATION THE NIGHT BEFORE OR MORNING OF.      -WHAT TO DO INCASE OF THE FOLLOWING SYMPTOMS:       Please call me during business hours (M-F 8-4PM)  should you develop the following symptoms:     1. Fever over 102, that does not come down with over the counter medications (EX. Tylenol, ibuprofen)  2. Swelling in the lower legs.  3. Vomiting, Nausea- We will give you anti-nausea medication  4. Uncontrollable pain, with the use of pain medications  5. Or may have any other questions that I can assist you with    However, if it is after-hours or on the weekend,  please call the hospital's main number: [phone] and ask for the Interventional Radiologist On-call.     *After the procedure I will call and follow up with you 2-3 days afterwards and our  will call you with a 1 month follow up appointment with imaging and lab work.    Should you have any further questions, please call us at anytime. It has been a pleasure to see you today.      Thank you,     Vannessa LE RN, BSN  Interventional Radiology Nurse Coordinator   Phone: [phone]

## 2020-03-02 NOTE — LETTER
3/2/2020      RE: Dania Albert  [address] [facility] [address]       [facility] Liver Transplant Evaluation     Requesting Provider and Health System: Yadira Graf NP , Rodolfo Cast MD [facility]  Liver Disease: HV     A/P  54 year old female with HCV cirrhosis and HCC. MELD 7 ABO O  CIRRHOSIS.2/2 HCV. Excellent synthetic function   HCV treated and obtained SVR 2017     THROMBOCYTOPENIA. 2/2 cirrhosis. Plt ~ 50  HCC. S/p MWA with recent MRI showing active disease. Will see IR today.  VARICEAL SCREENING. UTD EGD 10/29/19 grade 1 varices     DIARRHEA Chronic . Suggested Imodium. Prescribed Lomotil     DM On insulin     SOCIAL SUPPORT.   and daughter here today and seem very supportive, engaged.  FUNCTIONAL STATUS.Good  LIVER TRANSPLANT CANDIDACY.  Pending pulmonary. She is still smoking.    PULMONARY ABG ordered to assess clubbing, she is smoking. She has a possible diagnosis of pulmonary fibrosis. See pulmonary note from 1/31/20.  Addendum: discussed the case at ILD conference on 2/3/2020. We reviewed clinical history, available labs, and imaging. Most likely this represents a smoking related ILD, and due to the presence of centrilobular nodules it falls into the alternative diagnosis category for IPF. Emphysema is also present on the CT, we can't exclude CPFE. There has been some mild progression over the last 4 years.      Her fibrosis may continue to mildly progress over time. The prognosis for smoing related ILD is unknown, it can still progress even after smoking cessation. Overall, she is at some risk of progressive ILD, however, we are unable to quantify that risk. Will plan to see her back in clinic in 3 months with pulmonary function testing           Kayli Bergman MD  Hepatology/Liver Transplantation  Medical Director, Liver Transplantation  [facility]  ========================================================================     Subjective:  54 y M with HCV  cirrhosis and HCC. First diagnosed with liver disease a couple years ago. She saw her doctor and had some testing done. She was diagnosed with Hepatitis C at that time.      She feels ok. She has chronic diarrhea for years. Never has taken anything for it. Goes away when she has taken narcotic pain medication. Sometimes loses continence.      Has occasional twinges of pain in her RUQ. They don't last. No NV.     HCV  S/p treatment with Sovaldi 2017     HCC  Dx 6/2019 3.1x1.9x2.8 cm  CE and MWA at [facility] 10/2019  MRI 11/22/19 no active HCC  MRI 2/7/20 1.7 cm lesion seg 3 with pseudocapsule     Ascites no  HE no     EGD 10/28/19 grade 1 varices  COLONOSCOPY 12/8/16 due 2026  KIDNEY FUNCTION normal  BONE DENSITY DEXA pending     Portal vein assessment: patent on US   Diabetes: yes  Abdominal surgery: yes     HBV neg core ancelmo and s Ag    Lab Test 03/02/20  1043   PROTTOTAL 8.0   ALBUMIN 3.4   BILITOTAL 1.0   ALKPHOS 177*   AST 26   ALT 24     Lab Test 03/02/20  1115 03/02/20  1043   WBC  --  3.0*   RBC  --  3.81   HGB 13.0 12.9   HCT  --  38.8   MCV  --  102*   MCH  --  33.9*   MCHC  --  33.2   RDW  --  14.0   PLT  --  41*     MELD-Na score: 7 at 3/2/2020 10:43 AM  MELD score: 7 at 3/2/2020 10:43 AM  Calculated from:  Serum Creatinine: 0.61 mg/dL (Rounded to 1 mg/dL) at 3/2/2020 10:43 AM  Serum Sodium: 139 mmol/L (Rounded to 137 mmol/L) at 3/2/2020 10:43 AM  Total Bilirubin: 1.0 mg/dL at 3/2/2020 10:43 AM  INR(ratio): 1.05 at 3/2/2020 10:43 AM  Age: 54 years      PAST MEDICAL HISTORY  [place]ion  R hernia repair  ORIF L and R wrists  Bowel obstruction no details known   Chronic diarrhea not on any medication  Botox shot in kidney for urinary frequency about 2018  SOCIAL HISTORY    Lives with   Currently is not working. 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 Worked in medical equipment assembly.  Smoking: Smokes 8-10 cig/d for 25 years  Alcohol  FAMILY HISTORY  M CAD, glaucoma, DM  F CAD  Bro kidney  disease  DM in multiple sisters     ROS 10 point ROS neg other than the symptoms noted above in the HPI.     EXAM  /76   Pulse 82   Temp 98.2  F (36.8  C) (Oral)   Wt 78.7 kg (173 lb 8 oz)   SpO2 96%   BMI 32.78 kg/m       Gen: No acute distress  Head: Normocephalic atraumatic  Eyes: Sclera anicteric  Neck: No cervical lymphadenopathy  Respiratory: Clear to auscultation bilaterally, no overt wheezing or rales  CV: RRR   Abdomen:  Soft, nontender, nondistended, normal bowel sounds  Extrem: No edema  Skin: No jaundice, spider angiomata or palmar erythema.  No rashes.   Neuro: Alert and oriented. No asterixis.    MSK: Grossly Intact  Psych: Normal speech, normal affect         Kayli Bergman MD

## 2020-03-02 NOTE — PROGRESS NOTES
Interventional Radiology Clinic Visit    Date of this visit: 3/2/2020    Dania Albert  is referred for treatment recommendations. The patient requires evaluation for diagnosis of Hepatocellular carcinoma.     Primary Physician: Micaela Gould      History Of Present Illness:    Dania Albert is a 54 year old female who presents with diagnosis of new segment 3 HCC on a background of HCV infection. Previous TACE ablate segment 3 at an outside hospital. The new lesion is distant from the old treatment zone. She has history of HCV infection, first heard of liver disease 5 years ago. She has been jaundiced one time, never had confusion, never had paracentesis. She has recently noticed abdominal fluid accumulation. She has had 1 remote episode of GI bleeding.     Review of Systems:    As above    Past Medical/Surgical History:    Past Medical History:   Diagnosis Date     Alcohol abuse      Chronic hepatitis C without hepatic coma (H) 10/23/2019    SVR     Cirrhosis of liver with ascites (H) 10/23/2019     COPD (chronic obstructive pulmonary disease) (H)      Depressive disorder      Diabetes (H)     Type 1 DM, Takes Insulin      Emphysema lung (H)      H/O esophageal varices      HCC (hepatocellular carcinoma) (H) 10/23/2019     History of blood transfusion     [facility] in 1983     ERICKA (obstructive sleep apnea)      Past Surgical History:   Procedure Laterality Date     ABDOMEN SURGERY      Gallbladder Removed      COLONOSCOPY      [place]     CYSTOSCOPY, INJECT BOTOX      detrusor injection     GI SURGERY      Upper Endoscopy at [facility]      HERNIA REPAIR      Patient doesnt remember if mesh was used. [facility]      OPEN REDUCTION INTERNAL FIXATION WRIST Bilateral        Current Medications:    Current Outpatient Medications   Medication Sig Dispense Refill     acetaminophen (TYLENOL) 500 MG tablet Take 1,000 mg by mouth       albuterol (PROAIR HFA) 108 (90 Base) MCG/ACT inhaler  INHALE 2 PUFFS 4 TIMES DAILY       blood glucose monitoring (ONE TOUCH ULTRA MINI) meter device kit Use as directed to test glucose three times daily. Pharmacy dispense brand based on insurance.  Indications: Diabetes       calcium carbonate-vitamin D (OSCAL W/D) 500-200 MG-UNIT tablet Take 1 tablet by mouth 2 times daily (with meals) 60 tablet 3     cholecalciferol (VITAMIN D3) 1000 units (25 mcg) capsule Take 1 capsule (1,000 Units) by mouth daily 30 capsule 3     insulin glargine (LANTUS SOLOSTAR) 100 UNIT/ML pen Inject 38 Units Subcutaneous       OneTouch Delica Lancets 33G MISC Change lancet one time daily as directed.       pantoprazole (PROTONIX) 40 MG EC tablet TAKE ONE TABLET BY MOUTH EVERY DAY       QUEtiapine (SEROQUEL) 50 MG tablet Take 50 mg by mouth At Bedtime  0     sertraline (ZOLOFT) 100 MG tablet TAKE 2 TABLETS BY MOUTH EVERY DAY       TRULICITY 0.75 MG/0.5ML pen INJECT 0.5 ml's SUBCUTANEOUSLY ONCE WEEKLY  2     benzonatate (TESSALON) 100 MG capsule TAKE 1 CAPSULE BY MOUTH 3 TIMES DAILY AS NEEDED FOR COUGH  2     diphenoxylate-atropine (LOMOTIL) 2.5-0.025 MG tablet Take 1 tablet by mouth 4 times daily as needed for diarrhea (Patient not taking: Reported on 2/6/2020) 120 tablet 0     HYDROXYZINE HCL PO Take 10 mg by mouth every 4 hours as needed for itching       Omega-3 Fatty Acids (OMEGA-3 FISH OIL PO)        order for DME Abdominal Binder - small (Patient not taking: Reported on 2/6/2020) 1 each 1     OXYCODONE HCL PO Take 5 mg by mouth every 4 hours as needed       vitamin D2 (ERGOCALCIFEROL) 38770 units (1250 mcg) capsule TAKE ONE CAPSULE BY MOUTH ONCE every WEEK  0       Allergies:    Bee venom; Hydrocodone; Nickel; and Wool fiber    Family History:    Family History   Problem Relation Age of Onset     Coronary Artery Disease Mother      Coronary Artery Disease Father      No Known Problems Brother      Meniere's disease Sister      Diabetes Sister      No Known Problems Son      No Known  Problems Daughter      Diabetes Sister      Liver Disease Sister      Chronic Obstructive Pulmonary Disease Sister        Social History:      Social History     Socioeconomic History     Marital status:      Spouse name: None     Number of children: None     Years of education: None     Highest education level: None   Occupational History     None   Social Needs     Financial resource strain: None     Food insecurity:     Worry: None     Inability: None     Transportation needs:     Medical: None     Non-medical: None   Tobacco Use     Smoking status: Current Every Day Smoker     Packs/day: 0.30     Years: 40.00     Pack years: 12.00     Types: Cigarettes     Smokeless tobacco: Never Used   Substance and Sexual Activity     Alcohol use: Not Currently     Comment: Rarely drank alcohol.      Drug use: Not Currently     Sexual activity: None   Lifestyle     Physical activity:     Days per week: None     Minutes per session: None     Stress: None   Relationships     Social connections:     Talks on phone: None     Gets together: None     Attends Pentecostalism service: None     Active member of club or organization: None     Attends meetings of clubs or organizations: None     Relationship status: None     Intimate partner violence:     Fear of current or ex partner: None     Emotionally abused: None     Physically abused: None     Forced sexual activity: None   Other Topics Concern     Parent/sibling w/ CABG, MI or angioplasty before 65F 55M? Not Asked   Social History Narrative     None       Physical Exam:    /76 (BP Location: Right arm, Patient Position: Chair, Cuff Size: Adult Large)   Pulse 82   SpO2 96%      GENERAL APPEARANCE: healthy, alert and no distress  PSYCHIATRIC: mentation appears normal and affect normal.  EYES: No jaundice.  SKIN: No jaundice.     Laboratory Studies:    Lab Results   Component Value Date    HGB 13.0 03/02/2020     Lab Results   Component Value Date    PLT 41 03/02/2020      Lab Results   Component Value Date    WBC 3.0 03/02/2020       Lab Results   Component Value Date    INR 1.05 03/02/2020       Lab Results   Component Value Date    PROTTOTAL 8.0 03/02/2020      Lab Results   Component Value Date    ALBUMIN 3.4 03/02/2020     Lab Results   Component Value Date    BILITOTAL 1.0 03/02/2020     No results found for: BILICONJ   Lab Results   Component Value Date    ALKPHOS 177 03/02/2020     Lab Results   Component Value Date    AST 26 03/02/2020     Lab Results   Component Value Date    ALT 24 03/02/2020       Lab Results   Component Value Date    CR 0.61 03/02/2020     Lab Results   Component Value Date    BUN 8 03/02/2020       Alpha Fetoprotein   Date Value Ref Range Status   03/02/2020 5.6 0 - 8 ug/L Final     Comment:     Reference ranges apply to non-pregnant females only.  Assay Method:  Chemiluminescence using Siemens Centaur XP         Imaging:     I reviewed the MRI from 2/7/2020: 1.7 cm HCC in segment 3.  Posttreatment zone in segment 3 without evidence of viable tumor.      ASSESSMENT:      Dania Albert is a 54 year old female with new hepatocellular carcinoma in segment 3 in the setting of HCV infection. Prior segment 3 TACE/ ablate at an outside hospital. The prior treatment zone looks good with the new lesion distant from it.   Child-Su class: A  ECOG performance status: 0    I believe the patient is a candidate for a Microwave ablation procedure.   Smoking cessation was encouraged.     I showed her the imaging and discussed the findings. I discussed with her that the goal of the procedure is local cure of the lesion with a survival benefit rather than a cure of the overall disease. Alternatives were reviewed, including surgery. I explained that ablation is performed under general anesthesia. We discussed what will happen before, during and after the procedure; what to expect in the post procedure recovery period; and what the follow-up will be. We discussed the  risks of the procedure which include, but are not limited to infection, bleeding, thermal injury to adjacent structures, pneumothorax that may require a chest tube, and incomplete treatment. We also discussed post-ablation syndrome which may occur and consists of varying degrees of pain and fever in the first few days post procedure. The patient also understands that new tumors may develop elsewhere given the nature of the disease. Most patients go home the same day, but occasionally circumstances are such that a longer admission may be required. I also informed the patient that I will be assisted during the procedure by a fellow and/or resident, and that sometimes a medical student may observe. All of the her questions were answered and the patient agreed to proceed.     PLAN:    We will schedule the patient for the ablation procedure.    A total of 45 minutes was spent in care for the patient, of which >50% was spent in counseling and co-ordination of care.    It was a pleasure seeing the patient.     Tom Foley and Vamshi Jones M.D.  Department of Interventional Radiology  [facility]  Patient Care Team:  Micaela Gould as PCP - General (Family Practice)  Tyrell Cody MD as Referring Physician  Domenic Faye MD as MD (Surgery)  Bryant Atkins MD as MD (Oncology)  Dhruv Childress MD as MD (Hematology & Oncology)  Yamile Dunham, ANJANA as Specialty Care Coordinator (Hematology & Oncology)  SELF, REFERRED    I, Dr Vamshi Jones, was present with the fellow during the history and exam. I discussed the case with the fellow and agree with the findings as documented in the assessment and plan.

## 2020-03-02 NOTE — LETTER
3/2/2020       RE: Dania Albert  [address]  [place] [address]     Dear Colleague,    Thank you for referring your patient, Dania Albert, to the [facility] VASCULAR CLINIC at [facility]. Please see a copy of my visit note below.        Interventional Radiology Clinic Visit    Date of this visit: 3/2/2020    Dania Albert  is referred for treatment recommendations. The patient requires evaluation for diagnosis of Hepatocellular carcinoma.     Primary Physician: Micaela Gould      History Of Present Illness:    Dania Albert is a 54 year old female who presents with diagnosis of new segment 3 HCC on a background of HCV infection. Previous TACE ablate segment 3 at an outside hospital. The new lesion is distant from the old treatment zone. She has history of HCV infection, first heard of liver disease 5 years ago. She has been jaundiced one time, never had confusion, never had paracentesis. She has recently noticed abdominal fluid accumulation. She has had 1 remote episode of GI bleeding.     Review of Systems:    As above    Past Medical/Surgical History:    Past Medical History:   Diagnosis Date     Alcohol abuse      Chronic hepatitis C without hepatic coma (H) 10/23/2019    SVR     Cirrhosis of liver with ascites (H) 10/23/2019     COPD (chronic obstructive pulmonary disease) (H)      Depressive disorder      Diabetes (H)     Type 1 DM, Takes Insulin      Emphysema lung (H)      H/O esophageal varices      HCC (hepatocellular carcinoma) (H) 10/23/2019     History of blood transfusion     [facility] in 1983     ERICKA (obstructive sleep apnea)      Past Surgical History:   Procedure Laterality Date     ABDOMEN SURGERY      Gallbladder Removed      COLONOSCOPY      [place]     CYSTOSCOPY, INJECT BOTOX      detrusor injection     GI SURGERY      Upper Endoscopy at [facility]      HERNIA REPAIR      Patient doesnt remember if mesh was used. [facility]       OPEN REDUCTION INTERNAL FIXATION WRIST Bilateral        Current Medications:    Current Outpatient Medications   Medication Sig Dispense Refill     acetaminophen (TYLENOL) 500 MG tablet Take 1,000 mg by mouth       albuterol (PROAIR HFA) 108 (90 Base) MCG/ACT inhaler INHALE 2 PUFFS 4 TIMES DAILY       blood glucose monitoring (ONE TOUCH ULTRA MINI) meter device kit Use as directed to test glucose three times daily. Pharmacy dispense brand based on insurance.  Indications: Diabetes       calcium carbonate-vitamin D (OSCAL W/D) 500-200 MG-UNIT tablet Take 1 tablet by mouth 2 times daily (with meals) 60 tablet 3     cholecalciferol (VITAMIN D3) 1000 units (25 mcg) capsule Take 1 capsule (1,000 Units) by mouth daily 30 capsule 3     insulin glargine (LANTUS SOLOSTAR) 100 UNIT/ML pen Inject 38 Units Subcutaneous       OneTouch Delica Lancets 33G MISC Change lancet one time daily as directed.       pantoprazole (PROTONIX) 40 MG EC tablet TAKE ONE TABLET BY MOUTH EVERY DAY       QUEtiapine (SEROQUEL) 50 MG tablet Take 50 mg by mouth At Bedtime  0     sertraline (ZOLOFT) 100 MG tablet TAKE 2 TABLETS BY MOUTH EVERY DAY       TRULICITY 0.75 MG/0.5ML pen INJECT 0.5 ml's SUBCUTANEOUSLY ONCE WEEKLY  2     benzonatate (TESSALON) 100 MG capsule TAKE 1 CAPSULE BY MOUTH 3 TIMES DAILY AS NEEDED FOR COUGH  2     diphenoxylate-atropine (LOMOTIL) 2.5-0.025 MG tablet Take 1 tablet by mouth 4 times daily as needed for diarrhea (Patient not taking: Reported on 2/6/2020) 120 tablet 0     HYDROXYZINE HCL PO Take 10 mg by mouth every 4 hours as needed for itching       Omega-3 Fatty Acids (OMEGA-3 FISH OIL PO)        order for DME Abdominal Binder - small (Patient not taking: Reported on 2/6/2020) 1 each 1     OXYCODONE HCL PO Take 5 mg by mouth every 4 hours as needed       vitamin D2 (ERGOCALCIFEROL) 01897 units (1250 mcg) capsule TAKE ONE CAPSULE BY MOUTH ONCE every WEEK  0       Allergies:    Bee venom; Hydrocodone; Nickel; and Wool  fiber    Family History:    Family History   Problem Relation Age of Onset     Coronary Artery Disease Mother      Coronary Artery Disease Father      No Known Problems Brother      Meniere's disease Sister      Diabetes Sister      No Known Problems Son      No Known Problems Daughter      Diabetes Sister      Liver Disease Sister      Chronic Obstructive Pulmonary Disease Sister        Social History:      Social History     Socioeconomic History     Marital status:      Spouse name: None     Number of children: None     Years of education: None     Highest education level: None   Occupational History     None   Social Needs     Financial resource strain: None     Food insecurity:     Worry: None     Inability: None     Transportation needs:     Medical: None     Non-medical: None   Tobacco Use     Smoking status: Current Every Day Smoker     Packs/day: 0.30     Years: 40.00     Pack years: 12.00     Types: Cigarettes     Smokeless tobacco: Never Used   Substance and Sexual Activity     Alcohol use: Not Currently     Comment: Rarely drank alcohol.      Drug use: Not Currently     Sexual activity: None   Lifestyle     Physical activity:     Days per week: None     Minutes per session: None     Stress: None   Relationships     Social connections:     Talks on phone: None     Gets together: None     Attends Zoroastrianism service: None     Active member of club or organization: None     Attends meetings of clubs or organizations: None     Relationship status: None     Intimate partner violence:     Fear of current or ex partner: None     Emotionally abused: None     Physically abused: None     Forced sexual activity: None   Other Topics Concern     Parent/sibling w/ CABG, MI or angioplasty before 65F 55M? Not Asked   Social History Narrative     None       Physical Exam:    /76 (BP Location: Right arm, Patient Position: Chair, Cuff Size: Adult Large)   Pulse 82   SpO2 96%      GENERAL APPEARANCE: healthy,  alert and no distress  PSYCHIATRIC: mentation appears normal and affect normal.  EYES: No jaundice.  SKIN: No jaundice.     Laboratory Studies:    Lab Results   Component Value Date    HGB 13.0 03/02/2020     Lab Results   Component Value Date    PLT 41 03/02/2020     Lab Results   Component Value Date    WBC 3.0 03/02/2020       Lab Results   Component Value Date    INR 1.05 03/02/2020       Lab Results   Component Value Date    PROTTOTAL 8.0 03/02/2020      Lab Results   Component Value Date    ALBUMIN 3.4 03/02/2020     Lab Results   Component Value Date    BILITOTAL 1.0 03/02/2020     No results found for: BILICONJ   Lab Results   Component Value Date    ALKPHOS 177 03/02/2020     Lab Results   Component Value Date    AST 26 03/02/2020     Lab Results   Component Value Date    ALT 24 03/02/2020       Lab Results   Component Value Date    CR 0.61 03/02/2020     Lab Results   Component Value Date    BUN 8 03/02/2020       Alpha Fetoprotein   Date Value Ref Range Status   03/02/2020 5.6 0 - 8 ug/L Final     Comment:     Reference ranges apply to non-pregnant females only.  Assay Method:  Chemiluminescence using Siemens Centaur XP         Imaging:     I reviewed the MRI from 2/7/2020: 1.7 cm HCC in segment 3.  Posttreatment zone in segment 3 without evidence of viable tumor.      ASSESSMENT:      Dania Albert is a 54 year old female with new hepatocellular carcinoma in segment 3 in the setting of HCV infection. Prior segment 3 TACE/ ablate at an outside hospital. The prior treatment zone looks good with the new lesion distant from it.   Child-Su class: A  ECOG performance status: 0    I believe the patient is a candidate for a Microwave ablation procedure.   Smoking cessation was encouraged.     I showed her the imaging and discussed the findings. I discussed with her that the goal of the procedure is local cure of the lesion with a survival benefit rather than a cure of the overall disease. Alternatives were  reviewed, including surgery. I explained that ablation is performed under general anesthesia. We discussed what will happen before, during and after the procedure; what to expect in the post procedure recovery period; and what the follow-up will be. We discussed the risks of the procedure which include, but are not limited to infection, bleeding, thermal injury to adjacent structures, pneumothorax that may require a chest tube, and incomplete treatment. We also discussed post-ablation syndrome which may occur and consists of varying degrees of pain and fever in the first few days post procedure. The patient also understands that new tumors may develop elsewhere given the nature of the disease. Most patients go home the same day, but occasionally circumstances are such that a longer admission may be required. I also informed the patient that I will be assisted during the procedure by a fellow and/or resident, and that sometimes a medical student may observe. All of the her questions were answered and the patient agreed to proceed.     PLAN:    We will schedule the patient for the ablation procedure.    A total of 45 minutes was spent in care for the patient, of which >50% was spent in counseling and co-ordination of care.    It was a pleasure seeing the patient.     Tom Foley and Vamshi Jones M.D.  Department of Interventional Radiology  [facility]    CC  Patient Care Team:  Micaela Gould as PCP - General (Family Practice)  Tyrell Cody MD as Referring Physician  Domenic Faye MD as MD (Surgery)  Bryant Atkins MD as MD (Oncology)  Dhruv Childress MD as MD (Hematology & Oncology)  Yamile Dunham, ANJANA as Specialty Care Coordinator (Hematology & Oncology)    I, Dr Vamshi Jones, was present with the fellow during the history and exam. I discussed the case with the fellow and agree with the findings as documented in the assessment and plan.

## 2020-03-02 NOTE — PROGRESS NOTES
[facility] Liver Transplant Evaluation     Requesting Provider and Health System: Yadira Graf NP , Rodolfo Cast MD [facility]  Liver Disease: HV     A/P  54 year old female with HCV cirrhosis and HCC. MELD 7 ABO O  CIRRHOSIS.2/2 HCV. Excellent synthetic function   HCV treated and obtained SVR 2017     THROMBOCYTOPENIA. 2/2 cirrhosis. Plt ~ 50  HCC. S/p MWA with recent MRI showing active disease. Will see IR today.  VARICEAL SCREENING. UTD EGD 10/29/19 grade 1 varices     DIARRHEA Chronic. Suggested Imodium. Prescribed Lomotil     DM On insulin     SOCIAL SUPPORT.  and daughter here today and seem very supportive, engaged.  FUNCTIONAL STATUS.Good  LIVER TRANSPLANT CANDIDACY. Pending pulmonary. She is still smoking.    PULMONARY ABG ordered to assess clubbing, she is smoking. She has a possible diagnosis of pulmonary fibrosis. See pulmonary note from 1/31/20.  Addendum: discussed the case at ILD conference on 2/3/2020. We reviewed clinical history, available labs, and imaging. Most likely this represents a smoking related ILD, and due to the presence of centrilobular nodules it falls into the alternative diagnosis category for IPF. Emphysema is also present on the CT, we can't exclude CPFE. There has been some mild progression over the last 4 years.      Her fibrosis may continue to mildly progress over time. The prognosis for smoing related ILD is unknown, it can still progress even after smoking cessation. Overall, she is at some risk of progressive ILD, however, we are unable to quantify that risk. Will plan to see her back in clinic in 3 months with pulmonary function testing           Kayli Bergman MD  Hepatology/Liver Transplantation  Medical Director, Liver Transplantation  [facility]  ========================================================================     Subjective:  54 y M with HCV cirrhosis and HCC. First diagnosed with liver disease a couple years ago.  She saw her doctor and had some testing done. She was diagnosed with Hepatitis C at that time.      She feels ok. She has chronic diarrhea for years. Never has taken anything for it. Goes away when she has taken narcotic pain medication. Sometimes loses continence.      Has occasional twinges of pain in her RUQ. They don't last. No NV.     HCV  S/p treatment with Sovaldi 2017     HCC  Dx 6/2019 3.1x1.9x2.8 cm  CE and MWA at [facility] 10/2019  MRI 11/22/19 no active HCC  MRI 2/7/20 1.7 cm lesion seg 3 with pseudocapsule     Ascites no  HE no     EGD 10/28/19 grade 1 varices  COLONOSCOPY 12/8/16 due 2026  KIDNEY FUNCTION normal  BONE DENSITY DEXA pending     Portal vein assessment: patent on US   Diabetes: yes  Abdominal surgery: yes     HBV neg core ancelmo and s Ag    Lab Test 03/02/20  1043   PROTTOTAL 8.0   ALBUMIN 3.4   BILITOTAL 1.0   ALKPHOS 177*   AST 26   ALT 24     Lab Test 03/02/20  1115 03/02/20  1043   WBC  --  3.0*   RBC  --  3.81   HGB 13.0 12.9   HCT  --  38.8   MCV  --  102*   MCH  --  33.9*   MCHC  --  33.2   RDW  --  14.0   PLT  --  41*     MELD-Na score: 7 at 3/2/2020 10:43 AM  MELD score: 7 at 3/2/2020 10:43 AM  Calculated from:  Serum Creatinine: 0.61 mg/dL (Rounded to 1 mg/dL) at 3/2/2020 10:43 AM  Serum Sodium: 139 mmol/L (Rounded to 137 mmol/L) at 3/2/2020 10:43 AM  Total Bilirubin: 1.0 mg/dL at 3/2/2020 10:43 AM  INR(ratio): 1.05 at 3/2/2020 10:43 AM  Age: 54 years      PAST MEDICAL HISTORY  Asuncion  R hernia repair  ORIF L and R wrists  Bowel obstruction no details known   Chronic diarrhea not on any medication  Botox shot in kidney for urinary frequency about 2018  SOCIAL HISTORY    Lives with   Currently is not working. 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 Worked in medical equipment assembly.  Smoking: Smokes 8-10 cig/d for 25 years  Alcohol  FAMILY HISTORY  M CAD, glaucoma, DM  F CAD  Bro kidney disease  DM in multiple sisters     ROS 10 point ROS neg other than the  symptoms noted above in the HPI.     EXAM  /76   Pulse 82   Temp 98.2  F (36.8  C) (Oral)   Wt 78.7 kg (173 lb 8 oz)   SpO2 96%   BMI 32.78 kg/m      Gen: No acute distress  Head: Normocephalic atraumatic  Eyes: Sclera anicteric  Neck: No cervical lymphadenopathy  Respiratory: Clear to auscultation bilaterally, no overt wheezing or rales  CV: RRR   Abdomen:  Soft, nontender, nondistended, normal bowel sounds  Extrem: No edema  Skin: No jaundice, spider angiomata or palmar erythema.  No rashes.   Neuro: Alert and oriented. No asterixis.    MSK: Grossly Intact  Psych: Normal speech, normal affect

## 2020-03-03 NOTE — COMMITTEE REVIEW
Abdominal Committee Review Note     Evaluation Date: 12/9/2019  Committee Review Date: 3/3/2020    Organ being evaluated for: Liver    Transplant Phase: Evaluation  Transplant Status: Active    Transplant Coordinator: Huy Anders Jr.  Transplant Surgeon:   Fly Mejía    Referring Physician: Robles Metz    Primary Diagnosis: Primary Liver Malignancy: Hepatoma (HCC) and Cirrhosis  Secondary Diagnosis: Cirrhosis: Type C    Committee Review Members:  Nutrition Cornelia Franz, RD   Pharmacy Mike Gallegos, [facility]    - Clinical Trish Knight, SCOUT, Winsome Shaikh, [facility]   Transplant Jr Huy Anders RN, Kayli Bergman MD, Erlinda Issa, APRN CNP, Danyelle Guerrero MD, Kash Ramírez MD, Yancy Melton RN, Thomas M. Leventhal, MD, Conrad Hartmann MD       Transplant Eligibility: Cirrhosis with MELD, HCV, HCC    Committee Review Decision: Needs Re-presentation    Relative Contraindications: Other, smoking cessation and pulmonary clearance    Absolute Contraindications: None    Committee Chair Leventhal, Thomas Michael, MD verbally attested to the committee's decision.    Committee Discussion Details:     Re-discuss pending, but not limited too, the following:    - Must quit smoking and pulmonary clearance  - confirm psychosocial plan with SW

## 2020-03-03 NOTE — TELEPHONE ENCOUNTER
Tobacco Treatment Program at the [facility] attempted to reach Ms. Albert on 3/3/2020 for scheduled tobacco cessation visit to help Ms. Albert to quit smoking. We will attempt to reach Ms. Albert another time.     [facility] Morales [facility]  Tobacco Treatment Specialist  PH: [phone]

## 2020-03-04 NOTE — PROGRESS NOTES
Transplant Social Work Services Progress Note      Date of Initial Social Work Evaluation: 12/10/19  Collaborated with: Liver Transplant Team    Data: Dania is being evaluated for a liver transplant.  Intervention: I met with patient, her  Zena and their daughter Mary for psychosocial follow up.  Assessment: Dania reports being profoundly fatigued, which limits her motivation to leave her house or do much activity.  Her  and daughter are concerned about her low activity and lack of motivation to leave the house.  Mary is hopeful having individual therapy will give her an outing each week.  We discussed the importance of establishing care with a mental health therapy provider.  Since patient's appointment with Dr. Desai has not yet been scheduled here at [facility] I will mail patient a list of options closer to home.  In regards to post transplant care giving, Zena and Mary together will provide the care giving Dania requires.  They have options for staying locally (daughter Mary lives in [place], Zena's mother).  [place] Medicaid may also provide some financial assistance if requested.  Education provided by SW: Liver Transplant Support Group, Coping with Advanced Liver Disease  Plan: I have instructed Dania to establish care with a mental health therapist for her depression and anxiety symptoms.  I will mail her a list of providers/clinics closer to her home.           SCOUT Rivera, [facility]  Liver Transplant   Phone [phone]  Pager [phone]

## 2020-03-05 NOTE — TELEPHONE ENCOUNTER
Called pt to inform her that we have her scheduled for her liver ablation on Tues 3/17. With Dr. Jones    She is to check into 3rd floor Surgery for her procedure.     She is to check in at 11am for a 1pm start time.     Prep provided.     I will send a letter    I also informed her that she will also need to meet with her PCP for an History and Physical to clear her for general anesthesia.     I asked her to make this appt now.   She states that she will.   She is also interested if the Liver team had their meeting as someone was supposed to get back to her.     I will send a msg to Hepatology to have them follow up.    Vannessa LE RN, BSN  Interventional Radiology Nurse Coordinator   Phone: [phone]

## 2020-03-16 NOTE — ANESTHESIA PREPROCEDURE EVALUATION
"Anesthesia Pre-Procedure Evaluation    Patient: Dania Albert   MRN:     [id number] Gender:   female   Age:    54 year old :      1965        Preoperative Diagnosis: Hepatocellular carcinoma (H) [C22.0]   Procedure(s):  ANESTHESIA OUT OF OR CT Guided Liver Ablation @1300     LABS:  CBC:   Lab Results   Component Value Date    WBC 3.0 (L) 2020    WBC 2.4 (L) 2020    HGB 13.0 2020    HGB 12.9 2020    HCT 38.8 2020    HCT 35.9 2020    PLT 41 (LL) 2020    PLT 43 (LL) 2020     BMP:   Lab Results   Component Value Date     2020     2020    POTASSIUM 3.3 (L) 2020    POTASSIUM 3.5 2020    CHLORIDE 106 2020    CHLORIDE 103 2020    CO2 27 2020    CO2 27 2020    BUN 8 2020    BUN 8 2020    CR 0.61 2020    CR 0.49 (L) 2020     (H) 2020     (H) 2020     COAGS:   Lab Results   Component Value Date    INR 1.05 2020     POC:   Lab Results   Component Value Date    HCGS Negative 2019     OTHER:   Lab Results   Component Value Date    PH 7.45 2020    A1C 7.5 (H) 2019    ELTY 8.9 2020    ALBUMIN 3.4 2020    PROTTOTAL 8.0 2020    ALT 24 2020    AST 26 2020    ALKPHOS 177 (H) 2020    BILITOTAL 1.0 2020    TSH 3.41 2019    CRP <2.9 2020    SED 35 (H) 2020        Preop Vitals    BP Readings from Last 3 Encounters:   20 117/76   20 117/76   20 131/81    Pulse Readings from Last 3 Encounters:   20 82   20 82   20 87      Resp Readings from Last 3 Encounters:   20 18   12/10/19 16    SpO2 Readings from Last 3 Encounters:   20 96%   20 96%   20 94%      Temp Readings from Last 1 Encounters:   20 36.8  C (98.2  F) (Oral)    Ht Readings from Last 1 Encounters:   20 1.549 m (5' 1\")      Wt Readings from Last 1 Encounters: " "  03/02/20 78.7 kg (173 lb 8 oz)    Estimated body mass index is 32.78 kg/m  as calculated from the following:    Height as of 2/6/20: 1.549 m (5' 1\").    Weight as of 3/2/20: 78.7 kg (173 lb 8 oz).     LDA:  Peripheral IV 12/11/19 Right (Active)   Number of days: 96        Past Medical History:   Diagnosis Date     Alcohol abuse      Chronic hepatitis C without hepatic coma (H) 10/23/2019    SVR     Cirrhosis of liver with ascites (H) 10/23/2019     COPD (chronic obstructive pulmonary disease) (H)      Depressive disorder      Diabetes (H)     Type 1 DM, Takes Insulin      Emphysema lung (H)      H/O esophageal varices      HCC (hepatocellular carcinoma) (H) 10/23/2019     History of blood transfusion     [facility]     ERICKA (obstructive sleep apnea)       Past Surgical History:   Procedure Laterality Date     ABDOMEN SURGERY      Gallbladder Removed      COLONOSCOPY      [place]     CYSTOSCOPY, INJECT BOTOX      detrusor injection     GI SURGERY      Upper Endoscopy at [facility]      HERNIA REPAIR      Patient doesnt remember if [facility]      OPEN REDUCTION INTERNAL FIXATION WRIST Bilateral       Allergies   Allergen Reactions     Bee Venom Other (See Comments) and Swelling     Hand swelled up badly.       Hydrocodone Other (See Comments)     nausea     Nickel Rash     Wool Fiber Hives and Rash        Anesthesia Evaluation     .             ROS/MED HX    ENT/Pulmonary:     (+)sleep apnea, tobacco use, Past use Quit one week ago packs/day  mild COPD, doesn't use CPAP , . .    Neurologic:       Cardiovascular:     (+) ----. : . . . :. . Previous cardiac testing Echodate:12/11/19results:Biventricular size, thickness and global systolic function WNL. LVEF 60-65% No evidence of ischemiadate: results:ECG reviewed date: 12/10/19 results:SR @ 70 date: results:          METS/Exercise Tolerance:  4 - Raking leaves, gardening   Hematologic: Comments: Thrombocytopenia.  Platelets 51  " 3/17/20        Musculoskeletal:         GI/Hepatic: Comment: Cirrhosis with ascites.  H/o esophageal varices  H/p alcohol abuse.  GERD controlled with meds    (+) GERD Asymptomatic on medication, hepatitis type C, liver disease,       Renal/Genitourinary:         Endo:     (+) type II DM Obesity, .      Psychiatric: Comment: H/o alcoholism    (+) psychiatric history depression      Infectious Disease:         Malignancy:   (+) Malignancy History of Other  Other CA hepatocelluar carcinoma status post         Other: Comment: Post-menopausal   (+) No chance of pregnancy                        PHYSICAL EXAM:   Mental Status/Neuro: A/A/O; Age Appropriate   Airway: Facies: Feasible  Mallampati: I  Mouth/Opening: Limited  TM distance: > 6 cm  Neck ROM: Full   Respiratory: Auscultation: CTAB     Resp. Rate: Normal     Resp. Effort: Normal      CV: Rhythm: Regular   Comments:                      Assessment:   ASA SCORE: 3    H&P: History and physical reviewed and following examination; no interval change.   Smoking Status:  Active Smoker   NPO Status: NPO Appropriate     Plan:   Anes. Type:  General   Pre-Medication: Midazolam   Induction:  IV (Standard)   Airway: ETT; Oral   Access/Monitoring: PIV   Maintenance: Balanced     Postop Plan:   Postop Pain: Opioids  Postop Sedation/Airway: Not planned  Disposition: Inpatient/Admit     PONV Management:   Adult Risk Factors: Female, Postop Opioids   Prevention: Ondansetron     CONSENT: Direct conversation      Blood Products: Consented (ALL Blood Products)       Comments for Plan/Consent:  Plan:  GA/ETT, Standard ASA monitors.  IV induction. Maintenance with anesthetic gas &/or IV meds.  PACU after extubation.    Discussed anesthetic procedure and risks  with the patient which include, but are not limited to:  sore throat, PONV, dental/oral trauma, (and if T&S done), administration of blood products.  Questions welcomed.  Verbalized understanding and agrees to proceed.                  Gricelda Melendez MD

## 2020-03-17 NOTE — ANESTHESIA POSTPROCEDURE EVALUATION
Anesthesia POST Procedure Evaluation    Patient: Dania Albert   MRN:     [id number] Gender:   female   Age:    54 year old :      1965        Preoperative Diagnosis: Hepatocellular carcinoma (H) [C22.0]   Procedure(s):  ANESTHESIA OUT OF OR CT Guided Liver Ablation @1300   Postop Comments: No value filed.     Anesthesia Type: General       Disposition: Admission   Postop Pain Control: Uneventful            Sign Out: Well controlled pain   PONV: No   Neuro/Psych: Uneventful            Sign Out: Acceptable/Baseline neuro status   Airway/Respiratory: Uneventful            Sign Out: Acceptable/Baseline resp. status   CV/Hemodynamics: Uneventful            Sign Out: Acceptable CV status   Other NRE: NONE   DID A NON-ROUTINE EVENT OCCUR? No    Event details/Postop Comments:  Pt doing well in PACU. Denies: recall, N/V         Last Anesthesia Record Vitals:  CRNA VITALS  3/17/2020 1454 - 3/17/2020 1554      3/17/2020             Pulse:  96    SpO2:  (!) 88 %          Last PACU Vitals:  Vitals Value Taken Time   /69 3/17/2020  4:43 PM   Temp 36.7  C (98  F) 3/17/2020  4:30 PM   Pulse 79 3/17/2020  4:43 PM   Resp 16 3/17/2020  4:30 PM   SpO2 94 % 3/17/2020  4:53 PM   Temp src     NIBP     Pulse     SpO2     Resp     Temp     Ht Rate     Temp 2     Vitals shown include unvalidated device data.      Electronically Signed By: Sarah Wachter, MD, 2020, 4:56 PM

## 2020-03-17 NOTE — OR NURSING
Patient arrived to pre op with severe shooting abdominal pain. She stated that it felt like when she had a bowel obstruction. Patient was unable to get in bed and was doubled over in the chair. [facility] and IR MD notified. Dr. Pettit(LEÓN) stopped by. Dr. Hansel Washington from IR came to evaluate the patient. The patient said her pain was from gas and that the pain had subsided since using the bathroom. Plan to move forward with the liver ablation today.

## 2020-03-17 NOTE — BRIEF OP NOTE
[facility], [place]    Brief Operative Note    Pre-operative diagnosis: Hepatocellular carcinoma (H) [C22.0]  Post-operative diagnosis Same as pre-operative diagnosis    Procedure: Procedure(s):  ANESTHESIA OUT OF OR CT Guided Liver Ablation @1300  Surgeon: Surgeon(s) and Role:     * GENERIC ANESTHESIA PROVIDER - Primary     * Vamshi Jones MD - Assisting   Fellow: Hansel Washington MD  Anesthesia: General; 10 mL 1% lidocaine  Estimated blood loss: Less than 10 ml  Drains: None  Specimens: None  Findings:   Image-guided ablation of left hepatic lobe HCC.  Complications: None.  Implants: * No implants in log *

## 2020-03-17 NOTE — DISCHARGE INSTRUCTIONS
1. Remove dressing PM 3/18.  Can shower thereafter.  Do not get spray directly into incision.  2. No lifting more than 10 pounds for one week.  3. No soaking/bathing for two weeks.  4. IR nurse coordinator to contact patient regarding follow-up.  5. Regular diet.  6. Activity ad berkley.      [facility]  Same-Day Surgery   Adult Discharge Orders & Instructions     For 24 hours after surgery    1. Get plenty of rest.  A responsible adult must stay with you for at least 24 hours after you leave the hospital.   2. Do not drive or use heavy equipment.  If you have weakness or tingling, don't drive or use heavy equipment until this feeling goes away.  3. Do not drink alcohol.  4. Avoid strenuous or risky activities.  Ask for help when climbing stairs.   5. You may feel lightheaded.  IF so, sit for a few minutes before standing.  Have someone help you get up.   6. If you have nausea (feel sick to your stomach): Drink only clear liquids such as apple juice, ginger ale, broth or 7-Up.  Rest may also help.  Be sure to drink enough fluids.  Move to a regular diet as you feel able.  7. You may have a slight fever. Call the doctor if your fever is over 100 F (37.7 C) (taken under the tongue) or lasts longer than 24 hours.  8. You may have a dry mouth, a sore throat, muscle aches or trouble sleeping.  These should go away after 24 hours.  9. Do not make important or legal decisions.   Call your doctor for any of the followin.  Signs of infection (fever, growing tenderness at the surgery site, a large amount of drainage or bleeding, severe pain, foul-smelling drainage, redness, swelling).    2. It has been over 8 to 10 hours since surgery and you are still not able to urinate (pass water).    3.  Headache for over 24 hours.      To contact a doctor:        [phone] and ask for the resident on call for   _Surgical Oncology_ (answered 24 hours a day)      Emergency  Department:    [place]: [phone]       ([place] for hearing impaired: [phone])    [place]: [phone]       (TTY for hearing impaired: [phone])

## 2020-03-17 NOTE — PROGRESS NOTES
Patient Name: Dania Albert  Medical Record Number: [id number]  Today's Date: 3/17/2020    Procedure: CT liver ablation.  Proceduralist: MD Robert    Procedure Start: 1400  Procedure end: ***  Sedation medications administered: Per anesthesia.     Report given to: ***  : n/a    Other Notes: Pt arrived to IR room 1 from . Consent reviewed. Pt denies any questions or concerns regarding procedure. Pt positioned supine and monitored per protocol. Pt tolerated procedure without any noted complications. Pt transferred back to PACU.    Katie Crawley, RN

## 2020-03-17 NOTE — ANESTHESIA CARE TRANSFER NOTE
Patient: Dania Albert    Procedure(s):  ANESTHESIA OUT OF OR CT Guided Liver Ablation @1300    Diagnosis: Hepatocellular carcinoma (H) [C22.0]  Diagnosis Additional Information: No value filed.    Anesthesia Type:   General     Note:  Anesthesia Care Transfer Notewriter    Vitals: (Last set prior to Anesthesia Care Transfer)    CRNA VITALS  3/17/2020 1454 - 3/17/2020 1532      3/17/2020             Pulse:  96    SpO2:  (!) 88 %        VSS. Breathing spont. O2 sats to 92% after instructed to deep breath. Report to RN at bedside.        Electronically Signed By: DEUCE Salas CRNA  March 17, 2020  3:32 PM

## 2020-03-20 NOTE — TELEPHONE ENCOUNTER
Returning page to pt.    I informed her that I did consult with Dr. Jones regarding pain meds    He did talk with Dr. Washington in which he did refill another 20 tabs.     The script Is at the hospital discharge pharmacy    She states that she lives 2 hours away    We talked about some ways in which how she can get the medication to her.     She will see if her daughter and family members can help out.    I've asked her to call me on Monday 3/23.    Vannessa LE RN, BSN  Interventional Radiology Nurse Coordinator   Phone: [phone]

## 2020-03-20 NOTE — TELEPHONE ENCOUNTER
Pt is having lots of pain after his liver ablation    She states that she did call last night and was told to take 2 pain meds due to the amount of pain.     She states that she is having pain  Pain is located left side of where the ablation.     Asked about fevers: denies    N/V: not too much, but more so nauesea    She was taking two pain pills last night    It does help but she is almost out at this time. She was only given 10 tablets for pain medication.     She is also taking tylenol also. It is not helping      Pain started Wednesday and it's gotten worse.     I informed her that I'll consult with Dr. Jones and then get back to her.     I did inform her that if he does refill a pain med then she will need to come down to get the pain meds as its a narcotic.     She states that she lives 2 hours away.     I can try to mail it out but she won't get the script til next week.   She states that she'll try to call her PCP as it's closer to where she lives.     Dr. Jones notified.       Vannessa LE RN, BSN  Interventional Radiology Nurse Coordinator   Phone: [phone]

## 2020-03-24 NOTE — TELEPHONE ENCOUNTER
"Pt calling to inform us that she Is having lots of pain.     She states that the pain medication is not helping her at all.     She is still having sharp pain on her left side.   She states that it hurts more when she takes a deep breath or moves a certain way.    Fevers? Unsure  N/V? Nausea but no vomiting.     She states that it feels like \"someone kicked her in the ribs\". She states that she can't go to the ER until her  gets home.     This time would be around 12pm or so .    I did ask if someone can bring her to the ER. This is due to ongoing pain not relieved with pain meds x 1 week.     She states that her sister can possibly bring her today. I also informed her that she is to tell them her treatment. They should be able to see our notes via care everywhere.     I will update Dr. Jones.     I've asked her to keep me updated as well.   She verbalized understanding     Vannessa LE RN, BSN  Interventional Radiology Nurse Coordinator   Phone: [phone]                "

## 2020-03-24 NOTE — TELEPHONE ENCOUNTER
Pt calling  Having severe pain at site of liver  Where they did a procedure  1 1/2 wk ago Pain meds are not working  Sent her to ar[facility] #

## 2020-04-10 NOTE — TELEPHONE ENCOUNTER
[facility] Call Center    Phone Message    May a detailed message be left on voicemail: yes     Reason for Call: Other: Would like the care team to call her she has a few questions.      Action Taken: Message routed to:  [facility] ([facility] lois vas    Travel Screening: Not Applicable

## 2020-04-13 NOTE — TELEPHONE ENCOUNTER
Returning pt's phone call.   She states that she's still having abdominal pain as if someone kicked her.     She states that she is in a lot of pain still;.     I informed her that we are still working on her f/up appt in which we had to move some patients due to rescheduling of the clinics.     Will see if we can bring her in sooner for an appt.     She agrees to plan.     Vannessa LE RN, BSN  Interventional Radiology Nurse Coordinator   Phone: [phone]

## 2020-04-14 NOTE — TELEPHONE ENCOUNTER
Tobacco Treatment Program at the [facility] attempted to reach Ms. Albert on 4/14/2020 regarding the tobacco cessation program to help Ms. Albert to quit smoking. We will attempt to reach Ms. Albert another time.     Tg [facility]ales [facility]  Tobacco Treatment Specialist  PH: [phone]

## 2020-04-15 NOTE — TELEPHONE ENCOUNTER
Called pt to inquire on her imaging to be done closer to home at [place].     She states that her MRI was rescheduled to May in which after looking at her appt it is not linked with her May 8th.     She would like to have her MRI closer to home if she can't have it on 5/8 also.     We will see what we can do. She agrees to plan.     Vannessa LE RN, BSN  Interventional Radiology Nurse Coordinator   Phone: [phone]

## 2020-05-08 NOTE — DISCHARGE INSTRUCTIONS
MRI Contrast Discharge Instructions    The IV contrast you received today will pass out of your body in your  urine. This will happen in the next 24 hours. You will not feel this process.  Your urine will not change color.    Drink at least 4 extra glasses of water or juice today (unless your doctor  has restricted your fluids). This reduces the stress on your kidneys.  You may take your regular medicines.    If you are on dialysis: It is best to have dialysis today.    If you have a reaction: Most reactions happen right away. If you have  any new symptoms after leaving the hospital (such as hives or swelling),  call your hospital at the correct number below. Or call your family doctor.  If you have breathing distress or wheezing, call 911.    Special instructions: ***    I have read and understand the above information.    Signature:______________________________________ Date:___________    Staff:__________________________________________ Date:___________     Time:__________    [facility] Departments:    ___[place]: [phone]  ___[facility]: [phone]  ___[place]: [phone] ___[facility]: [phone]  ___[facility]: [phone]  ___[facility]: [phone]  ___[phone]  ___[facility]: [phone]  ___[phone]

## 2020-05-11 NOTE — PROGRESS NOTES
"Dania Albert is a 54 year old female who is being evaluated via a billable telephone visit.      The patient has been notified of following:     \"This telephone visit will be conducted via a call between you and your physician/provider. We have found that certain health care needs can be provided without the need for a physical exam.  This service lets us provide the care you need with a short phone conversation.  If a prescription is necessary we can send it directly to your pharmacy.  If lab work is needed we can place an order for that and you can then stop by our lab to have the test done at a later time.    Telephone visits are billed at different rates depending on your insurance coverage. During this emergency period, for some insurers they may be billed the same as an in-person visit.  Please reach out to your insurance provider with any questions.    If during the course of the call the physician/provider feels a telephone visit is not appropriate, you will not be charged for this service.\"    Patient has given verbal consent for Telephone visit?  Yes    What phone number would you like to be contacted at? [phone]    How would you like to obtain your AVS? Mail a copy     SUBJECTIVE: Dania Albert is a 54 year old female who presents following a ablation on 3/17 of a segment 3 HCC on a background of HCV infection. She had previously undergone a TACE ablate segment 3 at an outside hospital. Patient reports soreness after her ablation, which lasted upto a month. Felt a little nauseous however did not have any vomiting. No evidence of residual disease at the ablation site. Patient reports increased tiredness. She has been taking more vitamins/ probiotics. She started antibiotics for Lyme's disease on 5/8/2020, she found a deer tick at the neck and there was swelling around it.     Patient Active Problem List   Diagnosis     Chronic hepatitis C without hepatic coma (H)     Cirrhosis (H)     HCC " (hepatocellular carcinoma) (H)     Type 2 diabetes mellitus without complication, with long-term current use of insulin (H)     Preoperative examination      Current Outpatient Medications   Medication Sig     acetaminophen (TYLENOL) 500 MG tablet Take 1,000 mg by mouth     albuterol (PROAIR HFA) 108 (90 Base) MCG/ACT inhaler INHALE 2 PUFFS 4 TIMES DAILY     benzonatate (TESSALON) 100 MG capsule TAKE 1 CAPSULE BY MOUTH 3 TIMES DAILY AS NEEDED FOR COUGH     blood glucose monitoring (ONE TOUCH ULTRA MINI) meter device kit Use as directed to test glucose three times daily. Pharmacy dispense brand based on insurance.  Indications: Diabetes     cholecalciferol (VITAMIN D3) 1000 units (25 mcg) capsule Take 1 capsule (1,000 Units) by mouth daily     HYDROXYZINE HCL PO Take 10 mg by mouth every 4 hours as needed for itching     insulin glargine (LANTUS SOLOSTAR) 100 UNIT/ML pen Inject 38 Units Subcutaneous     OneTouch Delica Lancets 33G MISC Change lancet one time daily as directed.     order for DME Abdominal Binder - small     pantoprazole (PROTONIX) 40 MG EC tablet TAKE ONE TABLET BY MOUTH EVERY DAY     QUEtiapine (SEROQUEL) 50 MG tablet Take 50 mg by mouth At Bedtime     TRULICITY 0.75 MG/0.5ML pen INJECT 0.5 ml's SUBCUTANEOUSLY ONCE WEEKLY     calcium carbonate-vitamin D (OSCAL W/D) 500-200 MG-UNIT tablet Take 1 tablet by mouth 2 times daily (with meals) (Patient not taking: Reported on 5/11/2020)     diphenoxylate-atropine (LOMOTIL) 2.5-0.025 MG tablet Take 1 tablet by mouth 4 times daily as needed for diarrhea (Patient not taking: Reported on 2/6/2020)     oxyCODONE (ROXICODONE) 5 MG tablet Take 1 tablet (5 mg) by mouth every 6 hours as needed for severe pain (Patient not taking: Reported on 5/11/2020)     oxyCODONE (ROXICODONE) 5 MG tablet Take 1 tablet (5 mg) by mouth every 4 hours as needed for severe pain (Patient not taking: Reported on 5/11/2020)     sertraline (ZOLOFT) 100 MG tablet TAKE 2 TABLETS BY MOUTH  EVERY DAY     vitamin D2 (ERGOCALCIFEROL) 97836 units (1250 mcg) capsule TAKE ONE CAPSULE BY MOUTH ONCE every WEEK     No current facility-administered medications for this visit.      Social History     Tobacco Use     Smoking status: Current Every Day Smoker     Packs/day: 0.30     Years: 40.00     Pack years: 12.00     Types: Cigarettes     Smokeless tobacco: Never Used   Substance Use Topics     Alcohol use: Not Currently     Comment: Rarely drank alcohol.      Drug use: Not Currently      REVIEW OF SYSTEMS: As above    OBJECTIVE:  MRI 5/8/2020   IMPRESSION:    1. Cirrhotic liver morphology with evidence of portal hypertension  including splenic vein and portosystemic collaterals.  2. The lesion previously treated via TACE in segment 3 and the lesion  recently treated via microwave ablation demonstrates posttreatment  changes without any evidence of viable tumor (LR-TR nonviable).   3. Increasing and growing size of numerous arterial enhancing foci  scattered throughout the liver parenchyma. There is dramatic increase  in the number of these lesions since 2/7/2020. Although these arterial  enhancing foci do not demonstrate any significant washout or  pseudocapsule to definitely diagnose HCC, many of these demonstrate  suspicious imaging findings on T2-weighted and DWI series. These are  technically designated as  LIRADS 4, however findings overall  worrisome for multifocal hepatocellular carcinomas.  4. New partially occlusive bland thrombus in the left main portal vein  5. Based on this exam only, the patient is not within [place] criteria.  6. Subpleural reticular opacities concerning for interstitial lung  disease.      ASSESSMENT AND PLAN: Dania Albert is a 54 year old female 1 month post ablation of HCC in segment 3 in the setting of HCV infection. Prior segment 3 TACE/ ablate at an outside hospital. Status 1 month following liver ablation for HCC, with no residual/ recurrent disease at that site. MRI  reports many increasing and growing size of numerous arterial enhancing foci, LIRADS 4.She recently started antibiotics for Lyme's disease on the day of MRI based on suspicion.  These are indeterminate.     -We will discuss her MRI on Friday tumor conference and give her a call, the options following which may be biopsy or close follow up MRI     Phone call duration: 10 minutes    Vamshi Jones MD

## 2020-05-11 NOTE — TELEPHONE ENCOUNTER
LM for patient regarding telephone visit today at 12:40 with Dr. Jones.    Will try back in a 5 mins to start the visit.    Call back number was provided.

## 2020-05-11 NOTE — NURSING NOTE
Vascular Rooming Note     Dania Albert's goals for this visit include:   Chief Complaint   Patient presents with     NIKHIL Najera, is participating in a telephone visit today for a follow up HCC, feeling fine, no concerns at this time, as reported by patient.     Kristel De Leon LPN

## 2020-05-15 NOTE — PROGRESS NOTES
5/8/20 MRI  1.) 1.9 cm LR-4  2.) post treatment changes  3.) multiple lesions with growth and high suspicious for multi-focal HCC. Calling them LR-4 but worrisome.     Recs:   - needs biopsy   - IR team setting up biopsy

## 2020-05-18 NOTE — TELEPHONE ENCOUNTER
Called pt and informed her that I am following up on a phone call conversation that Dr. Jones called her last week about.     Informed her that this is for a liver biopsy     Informed her that we have this procedure scheduled for Thurs 5/21.     She is to check into [facility] waiting room at 11am for 1230pm start time    All prep informed to her.     She is to have a  as well as to wear a mask when entering the hospital.     She is to only take 80% of her Lantus.     She did ask if her daughter can come with her in which I informed her that there are no visitors allowed.     She verbalized understanding     Vannessa LE RN, BSN  Interventional Radiology Nurse Coordinator   Phone: [phone]

## 2020-05-21 NOTE — PROGRESS NOTES
Patient reporting ongoing abdominal pain that radiates to back.  MD notified and saw patient at bedside.  CT abdomen ordered and completed.  5mg oxycodone po given x1.  Awaiting CT results to determine disposition.  -1635 CT scan negative.  Per MD, ok to discharge.  Patient requesting additional pain medication for discharge.  -1645 MD at bedside.  Prescription given for oxycodone and brought to pharmacy.

## 2020-05-21 NOTE — PROCEDURES
[facility], [place]    Procedure: IR Procedure Note    Date/Time: 5/21/2020 3:01 PM  Performed by: Jose Junior MD  Authorized by: Jose Junior MD   IR Fellow Physician:  Other(s) attending procedure: Gemma    UNIVERSAL PROTOCOL   Site Marked: NA  Prior Images Obtained and Reviewed:  Yes  Required items: Required blood products, implants, devices and special equipment available    Patient identity confirmed:  Verbally with patient, arm band, provided demographic data and hospital-assigned identification number  Patient was reevaluated immediately before administering moderate or deep sedation or anesthesia  Confirmation Checklist:  Patient's identity using two indicators, relevant allergies, procedure was appropriate and matched the consent or emergent situation and correct equipment/implants were available  Time out: Immediately prior to the procedure a time out was called    Universal Protocol: the Joint Commission Universal Protocol was followed    Preparation: Patient was prepped and draped in usual sterile fashion           ANESTHESIA    Anesthesia: Local infiltration  Local Anesthetic:  Lidocaine 1% without epinephrine      SEDATION    Patient Sedated: Yes    Sedation Type:  Moderate (conscious) sedation  Sedation:  Fentanyl, midazolam and see MAR for details  Vital signs: Vital signs monitored during sedation    See dictated procedure note for full details.  Findings: - posterior right hepatic lobe lesion visible under CT    Specimens: core needle biopsy specimens sent for pathological analysis    Complications: None    Condition: Stable    Plan: - 2 hours bedrest  - Core tissue to pathology    PROCEDURE   Patient Tolerance:  Patient tolerated the procedure well with no immediate complications  Describe Procedure: - CT guided right posterior liver lesion biopsy  Length of time physician/provider present for 1:1 monitoring during sedation: 30

## 2020-05-21 NOTE — DISCHARGE INSTRUCTIONS
[facility]    Interventional Radiology  Patient Instructions Following Liver Biopsy    AFTER YOU GO HOME  ? If you were given sedation DO NOT drive or operate machinery at home or at work for at least 24 hours  ? DO relax and take it easy for 48 hours, no strenuous activity for 24 hours  ? DO drink plenty of fluids  ? DO resume your regular diet, unless otherwise instructed by your Primary Physician  ? Keep the dressing dry and in place for 24 hours.  ? DO NOT SMOKE FOR AT LEAST 24 HOURS, if you have been given any medications that were to help you relax or sedate you during your procedure  ? DO NOT drink alcoholic beverages the day of your procedure  ? DO NOT do any strenuous exercise or lifting (> 10 lbs) for at least 7 days following your procedure  ? DO NOT take a bath or shower for at least 12 hours following your procedure  ? Remove dressing after shower the next day. Replace with Band aid for 2 days.  Never leave a wet dressing in place.  ? DO NOT make any important or legal decisions for 24 hours following your procedure  ? There should be minimum drainage from the biopsy site    CALL THE PHYSICIAN IF:  ? You start bleeding from the procedure site.  If you do start to bleed from that site, lie down flat and hold pressure on the site for a minimum of 10 minutes.  Your physician will tell you if you need to return to the hospital  ? You develop nausea or vomiting  ? You have excessive swelling, redness, or tenderness at the site  ? You have drainage that looks like it is infected.  ? You experience severe pain  ? You develop hives or a rash or unexplained itching  ? You develop shortness of breath  ? You develop a temperature of 101 degrees F or greater    [facility] INTERVENTIONAL RADIOLOGY DEPARTMENT  Procedure Physician: Dr. Junior and Dr. Menendez                                    Date of procedure: May 21, 2020  Telephone Numbers: [phone] Monday-Friday 8:00 am to 4:30  pm  [phone] After 4:30 pm Monday-Friday, Weekends & Holidays.   Ask for the Interventional Radiologist on call.  Someone is on call 24 hrs/day  [facility] toll free number: [phone] Monday-Friday 8:00 am to 4:30 pm  [facility] Emergency Dept: [phone]

## 2020-05-21 NOTE — PROGRESS NOTES
Patient arrived on 2A for liver biopsy.  IV placed, labs sent.  Platelets 38 - platelets ordered, will have on hand for biopsy.  Consent complete.  Awaiting platelets, otherwise prep complete.

## 2020-05-21 NOTE — PRE-PROCEDURE
GENERAL PRE-PROCEDURE:   Procedure:  Image guided liver lesion biopsy.   Date/Time:  5/21/2020 12:37 PM    Verbal consent obtained?: Yes    Risks and benefits: Risks, benefits and alternatives were discussed    Consent given by:  Patient  Patient states understanding of procedure being performed: Yes    Patient's understanding of procedure matches consent: Yes    Procedure consent matches procedure scheduled: Yes    Expected level of sedation:  Moderate  Appropriately NPO:  Yes  ASA Class:  Class 2- mild systemic disease, no acute problems, no functional limitations  Mallampati  :  Grade 2- soft palate, base of uvula, tonsillar pillars, and portion of posterior pharyngeal wall visible  Lungs:  Lungs clear with good breath sounds bilaterally  Heart:  Normal heart sounds and rate  History & Physical reviewed:  History and physical reviewed and no updates needed  Statement of review:  I have reviewed the lab findings, diagnostic data, medications, and the plan for sedation

## 2020-05-21 NOTE — PROGRESS NOTES
Patient returned to 2A post liver biopsy.  Vitally stable, except requiring 1-2 L nasal cannula to maintain O2 sats >92%.  Reporting nausea and pain.  Zofran 4 mg and Dilaudid 0.5 mg given.  RUQ site C/D/I, no hematoma.  Water at bedside, declined other po intake at this time.

## 2020-05-21 NOTE — IP AVS SNAPSHOT
[address] [address]                                    After Visit Summary   5/21/2020    Dania Albert    MRN: [id number]           After Visit Summary Signature Page    I have received my discharge instructions, and my questions have been answered. I have discussed any challenges I see with this plan with the nurse or doctor.    ..........................................................................................................................................  Patient/Patient Representative Signature      ..........................................................................................................................................  Patient Representative Print Name and Relationship to Patient    ..................................................               ................................................  Date                                   Time    ..........................................................................................................................................  Reviewed by Signature/Title    ...................................................              ..............................................  Date                                               Time          22EPIC Rev 08/18

## 2020-05-21 NOTE — IP AVS SNAPSHOT
MRN:[id number]                      After Visit Summary   5/21/2020    Dania Albert    MRN: [id number]           Visit Information        Department      5/21/2020 10:37 AM [address] [facility]          Review of your medicines      UNREVIEWED medicines. Ask your doctor about these medicines       Dose / Directions   acetaminophen 500 MG tablet  Commonly known as:  TYLENOL      Dose:  1,000 mg  Take 1,000 mg by mouth  Refills:  0     calcium carbonate-vitamin D 500-200 MG-UNIT tablet  Commonly known as:  OSCAL w/D  Used for:  Low bone density      Dose:  1 tablet  Take 1 tablet by mouth 2 times daily (with meals)  Quantity:  60 tablet  Refills:  3     cholecalciferol 1000 units (25 mcg) capsule  Commonly known as:  VITAMIN D3  Used for:  Low bone density      Dose:  1 capsule  Take 1 capsule (1,000 Units) by mouth daily  Quantity:  30 capsule  Refills:  3     doxycycline monohydrate 100 MG capsule  Commonly known as:  MONODOX      TAKE 1 CAPSULE BY MOUTH TWICE DAILY FOR 21 DAYS  Refills:  0     Lantus SoloStar 100 UNIT/ML pen  Generic drug:  insulin glargine      Dose:  38 Units  Inject 38 Units Subcutaneous  Refills:  0     * oxyCODONE 5 MG tablet  Commonly known as:  ROXICODONE  Used for:  HCC (hepatocellular carcinoma) (H)  Ask about: Should I take this medication?      Dose:  5 mg  Take 1 tablet (5 mg) by mouth every 4 hours as needed for severe pain  Quantity:  10 tablet  Refills:  0     * oxyCODONE 5 MG tablet  Commonly known as:  ROXICODONE  Used for:  RUQ abdominal pain  Ask about: Should I take this medication?      Dose:  5 mg  Take 1 tablet (5 mg) by mouth every 4 hours as needed for pain  Quantity:  20 tablet  Refills:  0     * oxyCODONE 5 MG tablet  Commonly known as:  ROXICODONE  Used for:  RUQ abdominal pain  Ask about: Should I take this medication?      Dose:  5 mg  Take 1 tablet (5 mg) by mouth every 4 hours as needed for severe pain  Quantity:  20 tablet  Refills:  0      pantoprazole 40 MG EC tablet  Commonly known as:  PROTONIX      TAKE ONE TABLET BY MOUTH EVERY DAY  Refills:  0     ProAir  (90 Base) MCG/ACT inhaler  Generic drug:  albuterol      INHALE 2 PUFFS 4 TIMES DAILY  Refills:  0     QUEtiapine 50 MG tablet  Commonly known as:  SEROquel      Dose:  50 mg  Take 50 mg by mouth At Bedtime  Refills:  0     sertraline 100 MG tablet  Commonly known as:  ZOLOFT      TAKE 2 TABLETS BY MOUTH EVERY DAY  Refills:  0     Trulicity 0.75 MG/0.5ML pen  Generic drug:  dulaglutide      INJECT 0.5 ml's SUBCUTANEOUSLY ONCE WEEKLY  Refills:  2         * This list has 3 medication(s) that are the same as other medications prescribed for you. Read the directions carefully, and ask your doctor or other care provider to review them with you.            START taking       Dose / Directions   * oxyCODONE 5 MG tablet  Commonly known as:  ROXICODONE  Used for:  Liver lesion      Dose:  5 mg  Take 1 tablet (5 mg) by mouth every 6 hours as needed (breakthrough post procedure pain)  Quantity:  5 tablet  Refills:  0         * This list has 1 medication(s) that are the same as other medications prescribed for you. Read the directions carefully, and ask your doctor or other care provider to review them with you.            CONTINUE these medicines which have NOT CHANGED       Dose / Directions   blood glucose monitoring meter device kit      Use as directed to test glucose three times daily. Pharmacy dispense brand based on insurance.  Indications: Diabetes  Refills:  0     OneTouch Delica Lancets 33G Misc      Change lancet one time daily as directed.  Refills:  0     order for DME  Used for:  Abdominal pain, generalized      Abdominal Binder - small  Quantity:  1 each  Refills:  1           Where to get your medicines      Some of these will need a paper prescription and others can be bought over the counter. Ask your nurse if you have questions.    Bring a paper prescription for each of these  medications  oxyCODONE 5 MG tablet           Prescriptions were sent or printed at these locations (1 Prescription)            Other Prescriptions                Printed at Department/Unit printer (1 of 1)         oxyCODONE (ROXICODONE) 5 MG tablet                Protect others around you: Learn how to safely use, store and throw away your medicines at www.disposemymeds.org.       Follow-ups after your visit       Your next 10 appointments already scheduled    May 22, 2020  4:20 PM CDT  Telephone Visit with Mack Breen MD  [facility] ([facility]) [address] [place] [id number] [phone]   [facility]  Note: this is not an onsite visit; there is no need to come to the facility.  Please have a list of all current medications available for appointment.      May 29, 2020  8:00 AM CDT  Video Visit with Dhruv Childress MD  [facility] ([facility]) [address] [place] [id number] [phone]   [facility]  Note: this is not an onsite visit; there is no need to come to the facility.  Please have a list of all current medications available for appointment.      Jun 05, 2020  9:30 AM CDT  Lab with  LAB  [facility] ([facility]) [address] [place] [id number] [phone]   Located in the [facility] at [address] [place] [address]. We're taking every precaution to prevent the spread of COVID-19. Our top priority is to protect and care for our patients, community members, and our staff. For that reason, we are suspending  services until further notice. Please self-park your vehicle before entering our facility. For [facility] please use the [address] at [address] [place] [phone].     Before you lab test  (cholesterol test): Unless we tell you otherwise, you may only have water before your test. Stop all other food and drink 8 hours before the test (no juice, tea, coffee, soda, soft foods etc.) If you take medicine in the morning, take it with water.     Jun 05, 2020 10:30 AM CDT  (Arrive by 10:15 AM)  Return General Liver with Kayli Bergman MD  [facility] ([facility]) [address] [place] [address] [phone]         Care Instructions       Further instructions from your care team       [facility]    Interventional Radiology  Patient Instructions Following Liver Biopsy    AFTER YOU GO HOME  ? If you were given sedation DO NOT drive or operate machinery at home or at work for at least 24 hours  ? DO relax and take it easy for 48 hours, no strenuous activity for 24 hours  ? DO drink plenty of fluids  ? DO resume your regular diet, unless otherwise instructed by your Primary Physician  ? Keep the dressing dry and in place for 24 hours.  ? DO NOT SMOKE FOR AT LEAST 24 HOURS, if you have been given any medications that were to help you relax or sedate you during your procedure  ? DO NOT drink alcoholic beverages the day of your procedure  ? DO NOT do any strenuous exercise or lifting (> 10 lbs) for at least 7 days following your procedure  ? DO NOT take a bath or shower for at least 12 hours following your procedure  ? Remove dressing after shower the next day. Replace with Band aid for 2 days.  Never leave a wet dressing in place.  ? DO NOT make any important or legal decisions for 24 hours following your procedure  ? There should be minimum drainage from the biopsy site    CALL THE PHYSICIAN IF:  ? You start bleeding from the procedure site.  If you do start to bleed from that site, lie down flat and hold pressure on the site for a minimum of 10 minutes.  Your physician will tell you if you need to return to the hospital  ? You  develop nausea or vomiting  ? You have excessive swelling, redness, or tenderness at the site  ? You have drainage that looks like it is infected.  ? You experience severe pain  ? You develop hives or a rash or unexplained itching  ? You develop shortness of breath  ? You develop a temperature of 101 degrees F or greater    [facility] INTERVENTIONAL RADIOLOGY DEPARTMENT  Procedure Physician: Dr. Junior and Dr. Menendez                                    Date of procedure: May 21, 2020  Telephone Numbers: [phone] Monday-Friday 8:00 am to 4:30 pm  [phone] After 4:30 pm Monday-Friday, Weekends & Holidays.   Ask for the Interventional Radiologist on call.  Someone is on call 24 hrs/day  [facility] toll free number: [phone] Monday-Friday 8:00 am to 4:30 pm  [facility] Emergency Dept: [phone]          Information about OPIOIDS    PRESCRIPTION OPIOIDS: WHAT YOU NEED TO KNOW   We gave you an opioid (narcotic) pain medicine. It is important to manage your pain, but opioids are not always the best choice. You should first try all the other options your care team gave you. Take this medicine for as short a time (and as few doses) as possible.    Some activities can increase your pain, such as bandage changes or therapy sessions. It may help to take your pain medicine 30 to 60 minutes before these activities. Reduce your stress by getting enough sleep, working on hobbies you enjoy and practicing relaxation or meditation. Talk to your care team about ways to manage your pain beyond prescription opioids.    These medicines have risks:    DO NOT drive when on new or higher doses of pain medicine. These medicines can affect your alertness and reaction times, and you could be arrested for driving under the influence (DUI). If you need to use opioids long-term, talk to your care team about driving.    DO NOT operate heavy machinery    DO NOT do any other dangerous activities while taking these medicines.    DO NOT drink any alcohol  "while taking these medicines.     If the opioid prescribed includes acetaminophen, DO NOT take with any other medicines that contain acetaminophen. Read all labels carefully. Look for the word  acetaminophen  or  Tylenol.  Ask your pharmacist if you have questions or are unsure.    You can get addicted to pain medicines, especially if you have a history of addiction (chemical, alcohol or substance dependence). Talk to your care team about ways to reduce this risk.    All opioids tend to cause constipation. Drink plenty of water and eat foods that have a lot of fiber, such as fruits, vegetables, prune juice, apple juice and high-fiber cereal. Take a laxative (Miralax, milk of magnesia, Colace, Senna) if you don t move your bowels at least every other day. Other side effects include upset stomach, sleepiness, dizziness, throwing up, tolerance (needing more of the medicine to have the same effect), physical dependence and slowed breathing.    Store your pills in a secure place, locked if possible. We will not replace any lost or stolen medicine. If you don t finish your medicine, please throw away (dispose) as directed by your pharmacist. Medication waste collection kiosks are conveniently located at all [facility] retail pharmacy locations.  The [place] Pollution Control Agency has more information about safe disposal: https://[email or web address].[email or web address].[email or web address].us/living-green/managing-unwanted-medications       Additional Information About Your Visit       MyChart Information    [facility] lets you send messages to your doctor, view your test results, renew your prescriptions, schedule appointments and more. To sign up, go to [email or web address] . Click on \"Log in\" on the left side of the screen, which will take you to the Welcome page. Then click on \"Sign up Now\" on the right side of the page.     You will be asked to enter the access code listed below, as well as some personal information. Please follow the " "directions to create your username and password.     Your access code is: [email or web address]  Expires: 2020  6:30 AM     Your access code will  in 60 days. If you need help or a new code, please call your   [facility] clinic or [phone].       Care EveryWhere ID    This is your Care EveryWhere ID. This could be used by other organizations to access your [facility] medical records  [id number]       Your Vitals Were  Most recent update: 2020  5:34 PM    Blood Pressure   115/65 (BP Location: Left arm)          Pulse   70          Temperature   97.5  F (36.4  C) (Oral)          Respirations   18          Height   1.537 m (5' 0.5\")             Weight   78.5 kg (173 lb)    Pulse Oximetry   92%    BMI (Body Mass Index)   33.23 kg/m           Primary Care Provider Office Phone # Fax #    Micaela YUSUF Zayjasonharjindermario alberto [phone]      Equal Access to Services    ANETA YU : Hadii aad ku hadasho Soomaali, waaxda luqadaha, qaybta kaalmada adeegyada, waxay idiin hayhasmukh capellan . So [facility] [phone].    ATENCIÓN: Si habla español, tiene a ambrose disposición servicios gratuitos de asistencia lingüística. Llame al [phone].    We comply with applicable federal and state civil rights laws, including the [place] Human Rights Act. We do not discriminate on the basis of race, color, creed, Confucianist, national origin, marital status, age, disability, sex, sexual orientation, or gender identity.       Thank you!    Thank you for choosing [facility] for your care. Our goal is always to provide you with excellent care. Hearing back from our patients is one way we can continue to improve our services. Please take a few minutes to complete the written survey that you may receive in the mail after you visit with us. Thank you!            Medication List      Medications          Morning Afternoon Evening Bedtime As Needed    blood glucose monitoring meter device kit  INSTRUCTIONS:  Use as " directed to test glucose three times daily. Pharmacy dispense brand based on insurance.  Indications: Diabetes                     OneTouch Delica Lancets 33G Misc  INSTRUCTIONS:  Change lancet one time daily as directed.                     order for DME  INSTRUCTIONS:  Abdominal Binder - small                     * oxyCODONE 5 MG tablet  Also known as:  ROXICODONE  INSTRUCTIONS:  Take 1 tablet (5 mg) by mouth every 6 hours as needed (breakthrough post procedure pain)  LAST TAKEN:  Ask your nurse or doctor                        * This list has 1 medication(s) that are the same as other medications prescribed for you. Read the directions carefully, and ask your doctor or other care provider to review them with you.            ASK your doctor about these medications          Morning Afternoon Evening Bedtime As Needed    acetaminophen 500 MG tablet  Also known as:  TYLENOL  INSTRUCTIONS:  Take 1,000 mg by mouth                     calcium carbonate-vitamin D 500-200 MG-UNIT tablet  Also known as:  OSCAL w/D  INSTRUCTIONS:  Take 1 tablet by mouth 2 times daily (with meals)                     cholecalciferol 1000 units (25 mcg) capsule  Also known as:  VITAMIN D3  INSTRUCTIONS:  Take 1 capsule (1,000 Units) by mouth daily                     doxycycline monohydrate 100 MG capsule  Also known as:  MONODOX  INSTRUCTIONS:  TAKE 1 CAPSULE BY MOUTH TWICE DAILY FOR 21 DAYS                     Lantus SoloStar 100 UNIT/ML pen  INSTRUCTIONS:  Inject 38 Units Subcutaneous  Doctor's comments:  <!--EPICS-->If Lantus is not covered by insurance, may substitute Basaglar at same dose and frequency.  <!--EPICE-->  Generic drug:  insulin glargine                     * oxyCODONE 5 MG tablet  Also known as:  ROXICODONE  INSTRUCTIONS:  Take 1 tablet (5 mg) by mouth every 4 hours as needed for severe pain  LAST TAKEN:  Ask your nurse or doctor  Ask about: Should I take this medication?                     * oxyCODONE 5 MG tablet  Also  known as:  ROXICODONE  INSTRUCTIONS:  Take 1 tablet (5 mg) by mouth every 4 hours as needed for pain  LAST TAKEN:  Ask your nurse or doctor  Ask about: Should I take this medication?                     * oxyCODONE 5 MG tablet  Also known as:  ROXICODONE  INSTRUCTIONS:  Take 1 tablet (5 mg) by mouth every 4 hours as needed for severe pain  LAST TAKEN:  Ask your nurse or doctor  Ask about: Should I take this medication?                     pantoprazole 40 MG EC tablet  Also known as:  PROTONIX  INSTRUCTIONS:  TAKE ONE TABLET BY MOUTH EVERY DAY                     ProAir  (90 Base) MCG/ACT inhaler  INSTRUCTIONS:  INHALE 2 PUFFS 4 TIMES DAILY  Doctor's comments:  Pharmacy may dispense brand covered by insurance (Proair, or proventil or ventolin or generic albuterol inhaler)  Generic drug:  albuterol                     QUEtiapine 50 MG tablet  Also known as:  SEROquel  INSTRUCTIONS:  Take 50 mg by mouth At Bedtime                     sertraline 100 MG tablet  Also known as:  ZOLOFT  INSTRUCTIONS:  TAKE 2 TABLETS BY MOUTH EVERY DAY                     Trulicity 0.75 MG/0.5ML pen  INSTRUCTIONS:  INJECT 0.5 ml's SUBCUTANEOUSLY ONCE WEEKLY  Generic drug:  dulaglutide                        * This list has 3 medication(s) that are the same as other medications prescribed for you. Read the directions carefully, and ask your doctor or other care provider to review them with you.

## 2020-05-21 NOTE — IR NOTE
Interventional Radiology Intra-procedural Nursing Note    Patient Name: Dania Albert  Medical Record Number: [id number]  Today's Date: May 21, 2020    Procedure: Image guided liver biopsy  Proceduralist: Dr Junior, Dr Menendez   Sedation start time: 1450  Sedation end time: 1500  Sedation medications administered:   100 mcg of Fentanyl  2 mg of Versed  Total sedation time: 10 minutes    Procedure start time: 1435  Puncture time: 1436  Procedure end time: 1500    Report given to: ANJANA Kamara     Other Notes: Pt arrived to IR room CT2 from . Consent reviewed. Pt denies any questions or concerns regarding procedure. Pt positioned supine and monitored per protocol. Pt tolerated procedure without any noted complications. Pt transferred back to .    Luzmaria Andrew

## 2020-05-21 NOTE — PROGRESS NOTES
Patient ready for discharge per MD. VSS, RUQ abdomen site clean/dry/intact, no hematoma, and pain controlled with po medications. Patient tolerated PO fluids, declined food. Teaching was done and discharge instructions were given. Patient ambulated, voided, and PIV was removed.  Patient brought to pharmacy to  her prescription and was discharged from the hospital via wheel chair to home with her daughter @ 1810.

## 2020-05-22 NOTE — LETTER
"    5/22/2020         RE: Dania Albert  [address] [place] [address]        Dear Colleague,    Thank you for referring your patient, Dania Albert, to the [facility]. Please see a copy of my visit note below.    Dania Albert is a 54 year old female who is being evaluated via a billable telephone visit.      The patient has been notified of following:     \"This telephone visit will be conducted via a call between you and your physician/provider. We have found that certain health care needs can be provided without the need for a physical exam.  This service lets us provide the care you need with a short phone conversation.  If a prescription is necessary we can send it directly to your pharmacy.  If lab work is needed we can place an order for that and you can then stop by our lab to have the test done at a later time.    Telephone visits are billed at different rates depending on your insurance coverage. During this emergency period, for some insurers they may be billed the same as an in-person visit.  Please reach out to your insurance provider with any questions.    If during the course of the call the physician/provider feels a telephone visit is not appropriate, you will not be charged for this service.\"    Patient has given verbal consent for Telephone visit?  Yes    Pulmonary clinic follow up visit    Reason for visit: Follow up of possible smoking related ILD    Summary: 54-year-old woman with past medical history of hepatitis C cirrhosis and tobacco use who is followed for a possible smoking-related interstitial lung disease.  She was initially referred for pulmonary evaluation prior to potential liver transplant.  Her case was discussed at interstitial lung disease conference and she was felt to have a smoking-related ILD.  Smoking cessation was encouraged and she is now being evaluated for follow-up.    Interval History: Her breathing has been pretty good. She took " a 2 mile walk and she was fine. She is not coughing much. She denies any significant dyspnea, even going up hill.  She has cut down to 5 cigarettes per day. She is wondering about using Nicorette gum to quit.  She has tried sugar free hard candy, and reports she is working hard to quit.  She also tells me that she may not be a liver transplant candidate due to multiple liver lesions on the right side of her liver.  She is awaiting the biopsy results.    Review of systems: 10 point ROS neg other than the symptoms noted above in the HPI.    Medications:  Current Outpatient Medications   Medication     nicotine (NICODERM CQ) 14 MG/24HR 24 hr patch     nicotine (NICORETTE) 2 MG gum     acetaminophen (TYLENOL) 500 MG tablet     albuterol (PROAIR HFA) 108 (90 Base) MCG/ACT inhaler     blood glucose monitoring (ONE TOUCH ULTRA MINI) meter device kit     calcium carbonate-vitamin D (OSCAL W/D) 500-200 MG-UNIT tablet     cholecalciferol (VITAMIN D3) 1000 units (25 mcg) capsule     doxycycline monohydrate (MONODOX) 100 MG capsule     insulin glargine (LANTUS SOLOSTAR) 100 UNIT/ML pen     OneTouch Delica Lancets 33G MISC     order for DME     oxyCODONE (ROXICODONE) 5 MG tablet     pantoprazole (PROTONIX) 40 MG EC tablet     QUEtiapine (SEROQUEL) 50 MG tablet     sertraline (ZOLOFT) 100 MG tablet     TRULICITY 0.75 MG/0.5ML pen     No current facility-administered medications for this visit.          Social, surgical, past medical, and family history unchanged since last visit     Physical Exam:   None performed, this was a telephone visit    Labs:  No new labs     Imaging:  No new imaging     PFTs:  She had pulmonary function testing on 5/8/2020 and her FVC fell from 2.55 L to 2.26 L and her FEV1 fell from 2.26 L to 1.87 L.  Her total lung capacity was stable.    Assessment and plan:   54-year-old woman with a past medical history of hepatitis C cirrhosis, tobacco use, and a likely smoking-related interstitial lung disease who  is seen in follow-up for her interstitial lung disease and pulmonary evaluation for potential liver transplant.  She remains asymptomatic, however her pulmonary function testing was slightly worse on the most recent evaluation.  She continues to smoke up to 5 cigarettes/day, we discussed again today that it is absolutely essential that she stop smoking.  She is awaiting results from her liver biopsy, and if she is not a liver transplant candidate, she would likely not follow with us anymore, given her asymptomatic disease, this is reasonable.  Prior to stating that she does not have a progressive interstitial lung disease, we will need to see stability of imaging and pulmonary function testing.    - Follow-up in 3 months with repeat pulmonary function testing and CT of the chest  - Smoking cessation, I sent prescriptions for nicotine gum and a nicotine patch  - Will comment further on her lung function and candidacy for liver transplant after her appointment in 3 months    Patient was seen and staffed with Dr. Perlman David M. MacDonald, MD  Pulmonary/Critical Care Fellow     Phone call duration: 30 minutes    I discussed the case with the pulmonary fellow and reviewed all pertinent labs and imaging. I participated in a joint telephone call with the fellow and the patient.    My call time: 5 minutes.    David Perlman, M.D.    Pulmonary, Allergy and Critical Care Medicine  [facility]      Again, thank you for allowing me to participate in the care of your patient.        Sincerely,        Mack Breen MD

## 2020-05-22 NOTE — PROGRESS NOTES
"Dania Albert is a 54 year old female who is being evaluated via a billable telephone visit.      The patient has been notified of following:     \"This telephone visit will be conducted via a call between you and your physician/provider. We have found that certain health care needs can be provided without the need for a physical exam.  This service lets us provide the care you need with a short phone conversation.  If a prescription is necessary we can send it directly to your pharmacy.  If lab work is needed we can place an order for that and you can then stop by our lab to have the test done at a later time.    Telephone visits are billed at different rates depending on your insurance coverage. During this emergency period, for some insurers they may be billed the same as an in-person visit.  Please reach out to your insurance provider with any questions.    If during the course of the call the physician/provider feels a telephone visit is not appropriate, you will not be charged for this service.\"    Patient has given verbal consent for Telephone visit?  Yes    Pulmonary clinic follow up visit    Reason for visit: Follow up of possible smoking related ILD    Summary: 54-year-old woman with past medical history of hepatitis C cirrhosis and tobacco use who is followed for a possible smoking-related interstitial lung disease.  She was initially referred for pulmonary evaluation prior to potential liver transplant.  Her case was discussed at interstitial lung disease conference and she was felt to have a smoking-related ILD.  Smoking cessation was encouraged and she is now being evaluated for follow-up.    Interval History: Her breathing has been pretty good. She took a 2 mile walk and she was fine. She is not coughing much. She denies any significant dyspnea, even going up hill.  She has cut down to 5 cigarettes per day. She is wondering about using Nicorette gum to quit.  She has tried sugar free hard candy, and " reports she is working hard to quit.  She also tells me that she may not be a liver transplant candidate due to multiple liver lesions on the right side of her liver.  She is awaiting the biopsy results.    Review of systems: 10 point ROS neg other than the symptoms noted above in the HPI.    Medications:  Current Outpatient Medications   Medication     nicotine (NICODERM CQ) 14 MG/24HR 24 hr patch     nicotine (NICORETTE) 2 MG gum     acetaminophen (TYLENOL) 500 MG tablet     albuterol (PROAIR HFA) 108 (90 Base) MCG/ACT inhaler     blood glucose monitoring (ONE TOUCH ULTRA MINI) meter device kit     calcium carbonate-vitamin D (OSCAL W/D) 500-200 MG-UNIT tablet     cholecalciferol (VITAMIN D3) 1000 units (25 mcg) capsule     doxycycline monohydrate (MONODOX) 100 MG capsule     insulin glargine (LANTUS SOLOSTAR) 100 UNIT/ML pen     OneTouch Delica Lancets 33G MISC     order for DME     oxyCODONE (ROXICODONE) 5 MG tablet     pantoprazole (PROTONIX) 40 MG EC tablet     QUEtiapine (SEROQUEL) 50 MG tablet     sertraline (ZOLOFT) 100 MG tablet     TRULICITY 0.75 MG/0.5ML pen     No current facility-administered medications for this visit.          Social, surgical, past medical, and family history unchanged since last visit     Physical Exam:   None performed, this was a telephone visit    Labs:  No new labs     Imaging:  No new imaging     PFTs:  She had pulmonary function testing on 5/8/2020 and her FVC fell from 2.55 L to 2.26 L and her FEV1 fell from 2.26 L to 1.87 L.  Her total lung capacity was stable.    Assessment and plan:   54-year-old woman with a past medical history of hepatitis C cirrhosis, tobacco use, and a likely smoking-related interstitial lung disease who is seen in follow-up for her interstitial lung disease and pulmonary evaluation for potential liver transplant.  She remains asymptomatic, however her pulmonary function testing was slightly worse on the most recent evaluation.  She continues to  smoke up to 5 cigarettes/day, we discussed again today that it is absolutely essential that she stop smoking.  She is awaiting results from her liver biopsy, and if she is not a liver transplant candidate, she would likely not follow with us anymore, given her asymptomatic disease, this is reasonable.  Prior to stating that she does not have a progressive interstitial lung disease, we will need to see stability of imaging and pulmonary function testing.    - Follow-up in 3 months with repeat pulmonary function testing and CT of the chest  - Smoking cessation, I sent prescriptions for nicotine gum and a nicotine patch  - Will comment further on her lung function and candidacy for liver transplant after her appointment in 3 months    Patient was seen and staffed with Dr. Perlman David M. MacDonald, MD  Pulmonary/Critical Care Fellow     Phone call duration: 30 minutes    I discussed the case with the pulmonary fellow and reviewed all pertinent labs and imaging. I participated in a joint telephone call with the fellow and the patient.    My call time: 5 minutes.    David Perlman, M.D.    Pulmonary, Allergy and Critical Care Medicine  [facility]

## 2020-05-28 NOTE — NURSING NOTE
SYMPTOM QUESTIONNAIRE    Pain: no    Nausea/Vomiting: no    Mouth Sores: no    Shortness of Breath: no    Smoking: yes, cigarettes- about 1/3 ppd. She was prescribed nicotine gum and patches but has yet to start using them    Fever or Chills: no    Hard Stools: no    Soft Stools: chronic    Weight Loss: no    Weakness: yes, tripped and fell about 1 month ago- black eye    Burning, numbness or tingling in hands or feet: no    Problems with skin or swelling: no    Memory Loss: yes, worsening    Anxiety or Depression: both. Worsening. Denies counselor/therapist.     Trouble Sleeping: trouble staying asleep    Bambi Camarillo LPN on 5/28/2020 at 3:58 PM

## 2020-05-28 NOTE — PROGRESS NOTES
"Dania Albert is a 54 year old female who is being evaluated via a billable video visit.      The patient has been notified of following:     \"This video visit will be conducted via a call between you and your physician/provider. We have found that certain health care needs can be provided without the need for an in-person physical exam.  This service lets us provide the care you need with a video conversation.  If a prescription is necessary we can send it directly to your pharmacy.  If lab work is needed we can place an order for that and you can then stop by our lab to have the test done at a later time.    Video visits are billed at different rates depending on your insurance coverage.  Please reach out to your insurance provider with any questions.    If during the course of the call the physician/provider feels a video visit is not appropriate, you will not be charged for this service.\"    Patient has given verbal consent for Video visit? Yes    How would you like to obtain your AVS? Mail a copy    Patient would like the video invitation sent by: Text to cell phone: [phone]    Will anyone else be joining your video visit? No      Video-Visit Details    Type of service:  Video Visit- unable to connect to video so changed to phone visit      Total phone call time- 65 minutes    Dhruv Childress MD          "

## 2020-05-29 NOTE — PROGRESS NOTES
Dania Albert discussed at the multidisciplinary tumor board on 5/29/20. The attendees may consist of representatives from hepatology, oncology, radiation oncology, surgical subspecialty including liver transplant, pathology and radiology.    MRI and CT Imaging reviewed with biopsy proven HCC  Multifocal HCC    Plan/Recommendations:  Dr. Jones has communicated with patient and will discuss treatment plan with oncology, Dr. Childress, considering systemic as well as radioembolization. Patient is well compensated with MELD 8.  Outside [place]    MELANIA AngeloN, RN  Liver transplant coordinator  [phone] Office

## 2020-05-29 NOTE — PROGRESS NOTES
Oncology initial visit:  Date on this visit: 5/29/2020    Dania Albert  is referred by Dr.No dailey. provider found for an oncology consultation. She requires evaluation for new diagnosis of HCC    Primary Physician: Micaela Gould     Nursing Notes:   Bambi Camarillo, LPN  5/28/2020  3:59 PM  Signed  SYMPTOM QUESTIONNAIRE    Pain: no    Nausea/Vomiting: no    Mouth Sores: no    Shortness of Breath: no    Smoking: yes, cigarettes- about 1/3 ppd. She was prescribed nicotine gum and patches but has yet to start using them    Fever or Chills: no    Hard Stools: no    Soft Stools: chronic    Weight Loss: no    Weakness: yes, tripped and fell about 1 month ago- black eye    Burning, numbness or tingling in hands or feet: no    Problems with skin or swelling: no    Memory Loss: yes, worsening    Anxiety or Depression: both. Worsening. Denies counselor/therapist.     Trouble Sleeping: trouble staying asleep    Bambi Camarillo LPN on 5/28/2020 at 3:58 PM      History Of Present Illness:  Ms. Albert is a 54 year old female who presents with HCC. This is a telephone visit as she was unable to connect via video link.    She has Hep C cirrhosis. Hep C was previously treated with Sovaldi/ribavirin and achived SVR.  She was found to have HCC in June 2019 and had TACE/Ablation for S3 lesion on Oct 2019 and then found to have a new S3 lesion for which she had a MWA on 3/17/2020.  Bone Scan in Dec 2019 was negative for metastatic disease.   CTA Chest in October 2019 was negative for metastatic disease.    A follow up MRI on 5/8/2020 showed no resdual viable cancer in the treatment zones but it found dramatic increase in number and sizes of numerous arterial enhancing foci scattered throughout the liver parenchyma which was very suspicious for multifocal HCC. None of these were diagnostic of HC so she had liver biopsy on which confirmed moderately differentiated HCC.    As she was not deemed a candidate for any further liver  directed therapy, she was referred to medical oncology.    She has constant pain in the RUQ region/beneath the lower right rib cage since the MWA procedure in March. She has not taken anything for the pain. Sometimes it is worse than at other times. It is not pleuritic. No SOB. She has some nausea and takes zofran 4mg s/l as needed. BMs are overall fine. She is taking probiotics. No bleeding. No new swellings. She recently had a tick bite and she will be completing a 21 day course of Doxycycline in 6 days. No other infections. No new skin problems. No neuropathy. She feels fatigued but able to do usual stuff.         ECOG 1    ROS:  A comprehensive ROS was otherwise neg      Past Medical/Surgical History:  Past Medical History:   Diagnosis Date     Alcohol abuse      Chronic hepatitis C without hepatic coma (H) 10/23/2019    SVR     Cirrhosis of liver with ascites (H) 10/23/2019     COPD (chronic obstructive pulmonary disease) (H)      Depressive disorder      Diabetes (H)     Type 1 DM, Takes Insulin      Emphysema lung (H)      H/O esophageal varices      HCC (hepatocellular carcinoma) (H) 10/23/2019     History of blood transfusion     [facility] in 1983     ERICKA (obstructive sleep apnea)      Past Surgical History:   Procedure Laterality Date     ABDOMEN SURGERY      Gallbladder Removed      COLONOSCOPY      [place]     CYSTOSCOPY, INJECT BOTOX      detrusor injection     GI SURGERY      Upper Endoscopy at [facility]      HERNIA REPAIR      Patient doesnt remember if [facility] was used[facility]      IR FLUORO 0-1 HOUR  3/17/2020     IR FLUORO 0-1 HOUR  3/17/2020     OPEN REDUCTION INTERNAL FIXATION WRIST Bilateral      Cancer History:   As above    Allergies:  Allergies as of 05/29/2020 - Reviewed 05/28/2020   Allergen Reaction Noted     Bee venom Other (See Comments) and Swelling 07/06/2012     Hydrocodone Other (See Comments) 02/18/2013     Nickel Rash 03/25/2016     Wool fiber Hives and Rash  03/25/2016     Current Medications:  Current Outpatient Medications   Medication Sig Dispense Refill     acetaminophen (TYLENOL) 500 MG tablet Take 1,000 mg by mouth       albuterol (PROAIR HFA) 108 (90 Base) MCG/ACT inhaler INHALE 2 PUFFS 4 TIMES DAILY       blood glucose monitoring (ONE TOUCH ULTRA MINI) meter device kit Use as directed to test glucose three times daily. Pharmacy dispense brand based on insurance.  Indications: Diabetes       calcium carbonate-vitamin D (OSCAL W/D) 500-200 MG-UNIT tablet Take 1 tablet by mouth 2 times daily (with meals) 60 tablet 3     cholecalciferol (VITAMIN D3) 1000 units (25 mcg) capsule Take 1 capsule (1,000 Units) by mouth daily 30 capsule 3     doxycycline monohydrate (MONODOX) 100 MG capsule TAKE 1 CAPSULE BY MOUTH TWICE DAILY FOR 21 DAYS       insulin glargine (LANTUS SOLOSTAR) 100 UNIT/ML pen Inject 38 Units Subcutaneous       OneTouch Delica Lancets 33G MISC Change lancet one time daily as directed.       order for DME Abdominal Binder - small 1 each 1     pantoprazole (PROTONIX) 40 MG EC tablet TAKE ONE TABLET BY MOUTH EVERY DAY       QUEtiapine (SEROQUEL) 50 MG tablet Take 50 mg by mouth At Bedtime  0     sertraline (ZOLOFT) 100 MG tablet TAKE 2 TABLETS BY MOUTH EVERY DAY       TRULICITY 0.75 MG/0.5ML pen INJECT 0.5 ml's SUBCUTANEOUSLY ONCE WEEKLY  2     nicotine (NICODERM CQ) 14 MG/24HR 24 hr patch Place 1 patch onto the skin every 24 hours (Patient not taking: Reported on 5/28/2020) 30 patch 3     nicotine (NICORETTE) 2 MG gum Place 1 each (2 mg) inside cheek as needed for smoking cessation (Patient not taking: Reported on 5/28/2020) 60 each 3      Family History:  Family History   Problem Relation Age of Onset     Coronary Artery Disease Mother      Coronary Artery Disease Father      No Known Problems Brother      Meniere's disease Sister      Diabetes Sister      No Known Problems Son      No Known Problems Daughter      Diabetes Sister      Liver Disease Sister       Chronic Obstructive Pulmonary Disease Sister    no hx of cancers- 2 kids- healthy    Social History:  Social History     Socioeconomic History     Marital status:      Spouse name: Not on file     Number of children: Not on file     Years of education: Not on file     Highest education level: Not on file   Occupational History     Not on file   Social Needs     Financial resource strain: Not on file     Food insecurity     Worry: Not on file     Inability: Not on file     Transportation needs     Medical: Not on file     Non-medical: Not on file   Tobacco Use     Smoking status: Current Every Day Smoker     Packs/day: 0.30     Years: 40.00     Pack years: 12.00     Types: Cigarettes     Smokeless tobacco: Never Used   Substance and Sexual Activity     Alcohol use: Not Currently     Comment: Rarely drank alcohol.      Drug use: Not Currently     Sexual activity: Not on file   Lifestyle     Physical activity     Days per week: Not on file     Minutes per session: Not on file     Stress: Not on file   Relationships     Social connections     Talks on phone: Not on file     Gets together: Not on file     Attends Je[employer]ah's witness service: Not on file     Active member of club or organization: Not on file     Attends meetings of clubs or organizations: Not on file     Relationship status: Not on file     Intimate partner violence     Fear of current or ex partner: Not on file     Emotionally abused: Not on file     Physically abused: Not on file     Forced sexual activity: Not on file   Other Topics Concern     Parent/sibling w/ CABG, MI or angioplasty before 65F 55M? Not Asked   Social History Narrative     Not on file     Has been a chronic smoker for >40 years and now smoking 1/3 ppd. She used to drink alcohol in the past. Denies heavy alcohol use. Last alcohol use was long time ago. Lives with .   She also wants her daughter Mary [phone] closely involved in her care.    Physical Exam:  Ht 1.537 m  "(5' 0.5\")   Wt 78.5 kg (173 lb)   BMI 33.23 kg/m      Wt Readings from Last 4 Encounters:   05/28/20 78.5 kg (173 lb)   05/21/20 78.5 kg (173 lb)   03/17/20 78.7 kg (173 lb 8 oz)   03/02/20 78.7 kg (173 lb 8 oz)     Constitutional.  Does not seem to be in any acute distress.  Respiratory.  Speaking in full sentences.  No coughing noted.  Neurological.  Alert and oriented x3.  Psychiatric.  Mood, mentation and affect are normal.  Decision making capacity is intact.      The rest of a comprehensive physical examination is deferred due to Public Health Emergency telephone visit restrictions.    Laboratory/Imaging Studies      Results for ROBBIE RAWLS (MRN [id number]) as of 5/29/2020 08:21   Ref. Range 3/17/2020 09:46 5/8/2020 13:44 5/21/2020 11:55   WBC Latest Ref Range: 4.0 - 11.0 10e9/L 3.3 (L) 3.0 (L)    Hemoglobin Latest Ref Range: 11.7 - 15.7 g/dL 13.2 12.2 12.1   Hematocrit Latest Ref Range: 35.0 - 47.0 % 39.8 36.3    Platelet Count Latest Ref Range: 150 - 450 10e9/L 51 (L) 40 (LL) 38 (LL)   RBC Count Latest Ref Range: 3.8 - 5.2 10e12/L 3.91 3.56 (L)    MCV Latest Ref Range: 78 - 100 fl 102 (H) 102 (H)    MCH Latest Ref Range: 26.5 - 33.0 pg 33.8 (H) 34.3 (H)    MCHC Latest Ref Range: 31.5 - 36.5 g/dL 33.2 33.6    RDW Latest Ref Range: 10.0 - 15.0 % 14.0 14.7      Results for ROBBIE RAWLS (MRN [id number]) as of 5/29/2020 08:21   Ref. Range 5/8/2020 13:44   Sodium Latest Ref Range: 133 - 144 mmol/L 136   Potassium Latest Ref Range: 3.4 - 5.3 mmol/L 3.4   Chloride Latest Ref Range: 94 - 109 mmol/L 103   Carbon Dioxide Latest Ref Range: 20 - 32 mmol/L 26   Urea Nitrogen Latest Ref Range: 7 - 30 mg/dL 8   Creatinine Latest Ref Range: 0.52 - 1.04 mg/dL 0.49 (L)   GFR Estimate Latest Ref Range: >60 mL/min/1.73_m2 >90   GFR Estimate If Black Latest Ref Range: >60 mL/min/1.73_m2 >90   Calcium Latest Ref Range: 8.5 - 10.1 mg/dL 8.9   Anion Gap Latest Ref Range: 3 - 14 mmol/L 6   Albumin Latest Ref Range: 3.4 - " 5.0 g/dL 3.2 (L)   Protein Total Latest Ref Range: 6.8 - 8.8 g/dL 7.6   Bilirubin Total Latest Ref Range: 0.2 - 1.3 mg/dL 1.2   Alkaline Phosphatase Latest Ref Range: 40 - 150 U/L 151 (H)   ALT Latest Ref Range: 0 - 50 U/L 31   AST Latest Ref Range: 0 - 45 U/L 29   Alpha Fetoprotein Latest Ref Range: 0 - 8 ug/L 7.9   Bilirubin Direct Latest Ref Range: 0.0 - 0.2 mg/dL 0.4 (H)     Results for ROBBIE RAWLS (MRN [id number]) as of 5/29/2020 08:21   Ref. Range 12/9/2019 09:12 1/31/2020 14:41 3/2/2020 10:43 5/8/2020 13:44   Alpha Fetoprotein Latest Ref Range: 0 - 8 ug/L 4.0 4.5 5.6 7.9     MRI ABDOMEN 5/8/2020     Comparison study: 2/7/2020     CLINICAL HISTORY: Hepatocellular carcinoma, microwave ablation of  segment 3 lesion on 3/17/2020. Previous TACE of segment 3 lesion at  outside hospital.     TECHNIQUE: Images were acquired with and without intravenous contrast  through the abdomen. The following MR images were acquired: TrueFISP,  multiplanar T2 weighted, axial T1 in/out of phase, diffusion-weighted.  Multiplanar T1-weighted images with fat saturation were before  contrast administration and at multiple time points following the  administration of intravenous contrast. Contrast dose: 10.0mL Gadavist     FINDINGS:     Liver: Minimal surface nodularity. Heterogeneous reticular enhancement  on portal venous phase. Minimal T2 hyperintense hepatic parenchymal  reticulation. Overall findings are consistent with cirrhotic liver  morphology. No significant hepatic steatosis or iron deposition.     Lesion 1: Unchanged T1 hyperintense appearance of the lesion,  previously treated with TACE, in the medial aspect of segment 3  without any new or some enhancement to suggest recurrent tumor. LR-TR  nonviable.     Lesion 2: New ringlike circumferential T1 hyperintense area, recently  treated with microwave ablation, without any suspicious enhancement in  the arterial and more delayed sequences to indicate  residual  tumor.LR-TR nonviable.     Increasing and growing size of numerous arterial enhancing foci  scattered throughout the liver parenchyma. There is dramatic increase  in the number of these lesions since 2/7/2020. Although these arterial  enhancing foci do not demonstrate any significant washout or  pseudocapsule to definitely diagnose HCC, many of these demonstrate  suspicious imaging findings on T2-weighted and DWI series. These are  currently designated as  LIRADS 4 however highly worrisome for rapidly  developing multifocal HCC.     1.9 cm lesion in segment 8/IVb junction (series 16 image 17, series 7  image 7)  1.6 cm lesion in segment 6 (series 16 image 37, series 7 image 19)  1.2 cm lesion in segment 6 (16 image 54, series 7 image 28)     Gallbladder surgically removed. Postcholecystectomy dilated appearance  of common bile duct measuring 1 cm in diameter unchanged compared to  prior.     Spleen: Enlarged, measuring 16 cm in length.Small splenules posterior  to spleen.     Kidneys: No hydronephrosis. Unchanged bilobed 3.3 cm cyst in the left  kidney lower pole. No worrisome enhancing solid kidney lesion.     Adrenal glands: Normal.     Pancreas: The normal T1 hyperintense pancreatic parenchymal signal is  maintained. No significant pancreatic ductal dilation. No focal  pancreatic mass.     Bowel: No abnormally dilated loops of bowel.      Lymph nodes: Periportal/peripancreatic subcentimeter sized ovoid lymph  nodes, similar to prior, favored as reactive in nature.     Blood vessels: New partially occluding bland thrombus in the left main  portal vein (series 19 image 26). Remainder of the major abdominal  vasculature appears patent. Prominent left upper quadrant  portosystemic collaterals. Prominent umbilical vein.     Lung bases: Increased subpleural reticular appearance, similar to  prior chest CT from 10/25/2019, concerning for interstitial lung  disease.     Bones and soft tissues: No acute or  worrisome osseous lesions.     Mesentery and abdominal wall: Normal.     Ascites: None.                                                                         IMPRESSION:    1. Cirrhotic liver morphology with evidence of portal hypertension  including splenic vein and portosystemic collaterals.  2. The lesion previously treated via TACE in segment 3 and the lesion  recently treated via microwave ablation demonstrates posttreatment  changes without any evidence of viable tumor (LR-TR nonviable).   3. Increasing and growing size of numerous arterial enhancing foci  scattered throughout the liver parenchyma. There is dramatic increase  in the number of these lesions since 2/7/2020. Although these arterial  enhancing foci do not demonstrate any significant washout or  pseudocapsule to definitely diagnose HCC, many of these demonstrate  suspicious imaging findings on T2-weighted and DWI series. These are  technically designated as  LIRADS 4, however findings overall  worrisome for multifocal hepatocellular carcinomas.  4. New partially occlusive bland thrombus in the left main portal vein  5. Based on this exam only, the patient is not within Neel criteria.  6. Subpleural reticular opacities concerning for interstitial lung  disease.    ASSESSMENT/PLAN:    HCC in the setting of Hep C cirrhosis and initialy treated with TACE/ablation to S3 lesion in Oct 2019 and then had a new S3 lesion treated with MWA in March 2020. Unfortunately on repeat MRI in May 2020, she has now been found to have multifocal HCC- biopsy proven.    We discussed the situation in detail.  We discussed the incurable nature of the disease and that the treatments would be palliative in nature and not curative. I recommend starting Lenvatinib. We discussed the rationale, schedule and potential side efects of it in detail.    We also discussed the option of not trying aggressive anti-caner therapy and focusing all our efforts in making sure that she  remains comfortable and not suffer. This would be hospice approach.    Overall she is in a decent shape and I think trying treatments is reasonable.    I would like to check baseline CT Chest Abd and Pelvis and Bone Scan prior to start of treatment.    New partially occlusive bland thrombus in the left main portal vein- observe for now without anticoagulation as platelets are low and she will be at high risk of bleeding she seems asymptomatic from it.    RUQ Pain/rib cage area pain- I would like her to follow with palliative care but in the meantime I will give her oxycodone.     Thrombocytopenia- in the setting of Hep C Cirrhosis and portal HTN and splenomegaly- cont to observe.    Liver cirrhosis- cont to follow with Gastroenterologist Dr Cast from [facility].    Nausea- cont zofran.    Smoking. We discussed importance of smoking cessation. She has a prescription for nicotine patch and gum which she will try.     Discussion regarding health care directive  We discussed that it is important that she completes health care directive which would help in making sure that her wishes are followed about her treatment care in case she is not able to make a decision for herself.  We gave her information regarding that.    Schedule CT CAP and Bone Scan and start Lenvaitinib.  2 weeks after start of Lenvatinib see Yadi  See me 1 month after start of Lenvatinib.      All questions answered and she is agreeable and comfortable with the plan.  She wanted me to give her daughter, Mary, a call and I also discussed this with her.     Dhruv Childress MD    Total phone call time. 65 minutes

## 2020-05-29 NOTE — TELEPHONE ENCOUNTER
Telephone call placed to patient and introduced self as RN coordinator at [facility] with Dr. Childress.  Reviewed plan of care, that patient will need to have a CT scan, labs and then oral chemotherapy drug - Lenvatanib.  Patient would like to have her scans and labs done at [facility].  Advised that she had labs scheduled for 6/5 for a Dr. Bergman, however, patient was not aware.  Writer advised that the oral chemotherapy would need to be approved insurance wise and then we would let her know, pharmacy would give her a call.   Rx for Oxycodone sent in the mail to patient.   Will follow.

## 2020-05-29 NOTE — PATIENT INSTRUCTIONS
"Schedule CT Chest abdomen Pelvis and Bone Scan    Start Lenvatinib once available- se [place] 2 weeks after start of Lenvatinib    See me 1 month after start of Lenvatinib    Refer to palliative care    Stat oxycodone 5 mg every 6 hrs as needed for pain    Patient Education   Honoring Choices - Your Rights: Making Your Own Health Care Treatment Decisions  [place] Law:  [place] law allows you to inform others of your health care wishes. You have the right to state your wishes or appoint an agent in writing so that others will know what you want if you can't tell them because of illness or injury. The information that follows tells about health care directives and how to prepare them. It does not give every detail of the law.  What is a health care directive?  A health care directive is a written document that informs others of your wishes about health care. It allows you to name a person (\"agent\") to decide for you if you are unable to decide. It also allows you to name an agent if you want someone else to decide for you while you still have capacity. You must be at least 18 years old to make a health care directive.  Why have a health care directive?  A health care directive is important if your attending physician determines you can't communicate your health care choices (because of physical or mental incapacity). It is also important if you wish to have someone else make your health care decisions. In some circumstances, your directive may state that you want someone other than an attending physician to decide when you cannot make your own decisions.  Must I have a health care directive? What happens if I don't have one?  You don't have to have a health care directive. But, writing one helps to make sure your wishes are followed. You will still receive medical treatment if you don't have a written directive. Health care providers will listen to what people close to you say about your treatment preferences, but " the best way to be sure your wishes are followed is to have a health care directive.  How do I make a health care directive?  There are forms for health care directives. You don't have to use a form, but your health care directive must meet the following requirements to be legal:    Be in writing, dated, and state your name.    Be signed by you or someone you authorize to sign for you when you can understand and communicate your health care wishes.    Have your signature verified by a  or two witnesses (notaries and witnesses cannot also be named as agent).    Include the appointment of an agent to make health care decisions for you and/or instructions about the health care choices you wish to make.  Before you prepare or revise your directive, you should discuss your health care wishes with your doctor or other health care provider. Information about where to get health care directive forms is given at the end of this document.  What can I put in a health care directive?  You have many choices of what to put in your health care directive. For example, you may include:    The person you trust as your agent to make health care decisions for you. You can name alternate agents, in case the first agent is unavailable, or joint agents.    Your goals, values, preferences, and cultural beliefs about health care.    The types of medical treatment you would want (or not want).    How you want your agent or agents to decide.    Where you want to receive care.    Instructions about artificial nutrition and hydration.    Mental health treatments that use electroshock therapy or neuroleptic medications.    Instructions if you are pregnant.    Donation of organs, tissues and eyes.     arrangements.    Who you would like as your guardian or conservator if there is a court action.  You may be as specific or as general as you wish. You can choose which issues or treatments to deal with in your health care  directive.  Are there any limits to what I can put in my health care directive?  There are some limits about what you can put in your health care directive. For instance:    Your agent must be at least 18 years of age.    Your agent cannot be your health care provider, unless the health care provider is a family member or you give reasons for the naming of the agent in your directive.    You cannot request health care treatment that is outside of reasonable medical practice.    You cannot request assisted suicide.  How long does a health care directive last? Can I change it?  Your health care directive lasts until you change or cancel it. As long as the changes meet the health care directive requirements listed above, you may cancel your directive by any of the following:    A written statement saying you want to cancel it    Destroying it    Telling at least two other people you want to cancel it    Writing a new health care directive.  What should I do with my health care directive after I have signed it?  You should inform others of your health care directive and give people copies of it. You may wish to inform family members, your health care agent or agents, and your health care providers that you have a health care directive. You should give them a copy. It's a good idea to review and update your directive as your needs change. Keep it in a safe place where it is easily found.   We are committed to making your health care wishes known. You may give a copy of your directive to any care team member or bring or mail a copy to any of our locations, and we will keep it in your medical record.  What if I've already prepared a health care document? Is it still good?  Before August 1, 1998, [place] law provided for several other types of directives, including living fletcher, durable health care villalba of  and mental health declarations. The law changed so people can use one form for all their health care  instructions. Forms created before August 1, 1998 are still legal if they followed the law in effect when written. They are also legal if they meet the requirements of the new law (described above). You may want to review any existing documents to make sure they say what you want and meet all requirements.  I prepared my directive in another state. Is it still good?  Health care directives prepared in other states are legal if they meet the requirements of the other state's laws or the [place] requirements. But requests for assisted suicide will not be followed.  What if my health care provider refuses to follow my health care directive?  Your health care provider generally will follow your health care directive, or any instructions from your agent, as long as the health care follows reasonable medical practice. But, you or your agent cannot request treatment that will not help you or which the provider cannot provide. If the provider cannot follow the agent's directions about life-sustaining treatment, the provider must inform the agent. The provider must also document the notice in your medical record. The provider must allow the agency to arrange to transfer you to another provider who will follow the agent's directions.  What if I believe a health care provider has not followed health care directive requirements?  Complaints of this type can be filed with the Office of Health Facility Complaints at [phone] ([facility] area) or toll free at [phone].  What if I believe a health plan has not followed health care directive requirements?  Complaints of this type can be filed with the [place] Health Information Clearinghouse at [phone] or toll free at [phone].  How to obtain more information  Ask any care team member for information, materials or how to register for a free class on advance care planning and creating a health care directive. Or, visit [email or web address], or call  [phone] or [phone].   Also: [place] Board on Aging's Senior LinkAge Line, [phone]. Another health care directive form is at: www.mnaging.  For informational purposes only. Not to replace the advice of your health care provider.  Copyright   2012 [employer] All rights reserved. [employer] 1626 - REV 06/16.

## 2020-05-29 NOTE — TELEPHONE ENCOUNTER
In light of the COVID 19 pandemic, [facility] cares about your safety and we are calling to convert your scheduled in-person clinic visit to a phone visit on 6/5/20 date.  The appointment will be on the same date and time.  What is the best number for the provider to call you at?  [phone]--Pt states she cannot do Video Visit.

## 2020-06-02 NOTE — TELEPHONE ENCOUNTER
Writer contacted [facility] regarding getting patient set-up for images, labs and EKG.  Spoke with Radiology and Lab; will FAX orders to these departments and they will schedule patient for appointments.  For reference, the Radiology Dept. Contact number is [phone]; FAX is [phone].  Lab Dept - is [phone] & FAX is [phone]; EKG will be scheduled in Radiology. The respective departments will contact patient with a date and time for the appointments.  Patient is aware and agrees with plan.      Patient is also inquiring about getting injections done through Dr. Jones at the same time as she receives oral chemotherapy.  Writer messaged Dr. Childress regarding this and he was not aware.  Message was sent off to Dr. Jones's team in Interventional Radiology.    Faxes confirmed as received.

## 2020-06-03 NOTE — PROGRESS NOTES
"Oral Chemotherapy Monitoring Program  [facility]    Primary Oncologist: Dr. Islas  Primary Oncology Clinic:   Cancer Diagnosis: HCC    Drug: Lenvima 12mg daily  Start Date: ASAP  Expected duration of therapy: Until disease progression or unacceptable toxicity    Drug Interaction Assessment: DDI with Seroquel and Zoloft. Will monitor with EKG    Subjective/Objective:  Dania Albert is a 54 year old female contacted by phone for an initial visit for oral chemotherapy education.      No flowsheet data found.    Vitals:  BP:   BP Readings from Last 1 Encounters:   05/21/20 115/65     Wt Readings from Last 1 Encounters:   05/28/20 78.5 kg (173 lb)     Estimated body surface area is 1.83 meters squared as calculated from the following:    Height as of 5/28/20: 1.537 m (5' 0.5\").    Weight as of 5/28/20: 78.5 kg (173 lb).      Labs:  Lab Results   Component Value Date     05/08/2020      Lab Results   Component Value Date    POTASSIUM 3.4 05/08/2020     Lab Results   Component Value Date    LETY 8.9 05/08/2020     No results found for: MAG  No results found for: PHOS  Lab Results   Component Value Date    ALBUMIN 3.2 05/08/2020     Lab Results   Component Value Date    BUN 8 05/08/2020     Lab Results   Component Value Date    CR 0.49 05/08/2020       Lab Results   Component Value Date    AST 29 05/08/2020     Lab Results   Component Value Date    ALT 31 05/08/2020     Lab Results   Component Value Date    BILITOTAL 1.2 05/08/2020       Lab Results   Component Value Date    WBC 3.0 05/08/2020     Lab Results   Component Value Date    HGB 12.1 05/21/2020     Lab Results   Component Value Date    PLT 38 05/21/2020     Lab Results   Component Value Date    ANEU 1.7 02/06/2020         Assessment/Plan:  We've made many attempts over the past 3 days to contact patient for teaching. I was able to reach her today. During the conversation she was in and out and seemed to not comprehend our discussion. She could not recall " the need to have labs done and I reminded her we needed baseline labs and EKG. Inbasket sent to Dr. Childress that we will hold off on releasing meds until patient gets baseline labs to avoid her starting therapy beforehand.    Basic chemotherapy teaching was reviewed with the patient including indication, start date of therapy, dose, administration, adverse effects, missed doses, food and drug interactions, monitoring, side effect management, office contact information, and safe handling. Written materials were provided and all questions answered.    Follow-Up:  Once baseline labs/EKG obtained, release med, call 1 week from start     Sakina Gooden, Sania, BCPS, BCOP

## 2020-06-05 NOTE — PROGRESS NOTES
"Dania Albert is a 54 year old female who is being evaluated via a billable telephone visit.      The patient has been notified of following:     \"This telephone visit will be conducted via a call between you and your physician/provider. We have found that certain health care needs can be provided without the need for a physical exam.  This service lets us provide the care you need with a short phone conversation.  If a prescription is necessary we can send it directly to your pharmacy.  If lab work is needed we can place an order for that and you can then stop by our lab to have the test done at a later time.    Telephone visits are billed at different rates depending on your insurance coverage. During this emergency period, for some insurers they may be billed the same as an in-person visit.  Please reach out to your insurance provider with any questions.    If during the course of the call the physician/provider feels a telephone visit is not appropriate, you will not be charged for this service.\"    Patient has given verbal consent for Telephone visit?  Yes    What phone number would you like to be contacted at? [phone]    How would you like to obtain your AVS? Mail a copy    Phone call duration: minutes        "

## 2020-06-05 NOTE — LETTER
"    6/5/2020         RE: Dania Albert  [address] [address]        Dear Colleague,    Thank you for referring your patient, Dania Albert, to the [facility]. Please see a copy of my visit note below.    Called twice. No answer. Left voicemail. Below is for documentation only. No show appointment    Dania Albert is a 54 year old female who is being evaluated via a billable telephone visit during the COVID-19 pandemic and clinic response to limit in-person visits.  The patient has been notified of following:   \"This telephone visit will be conducted via a call between you and your physician/provider. We have found that certain health care needs can be provided without the need for a physical exam.  This service lets us provide the care you need with a short phone conversation.  If a prescription is necessary we can send it directly to your pharmacy.  If lab work is needed we can place an order for that and you can then stop by our lab to have the test done at a later time.  If during the course of the call the physician/provider feels a telephone visit is not appropriate, you will not be charged for this service.\" Telephone visits are billed at different rates depending on your insurance coverage. During this emergency period, for some insurers they may be billed the same as an in-person visit.  Please reach out to your insurance provider with any questions.  Consent has been obtained for this service by 1 care team member: yes  I have reviewed and updated the patient's Past Medical History, Social History, Family History and Medication List.    What phone number would you like to be contacted at?     Phone call contact time  Call Started at   Call Ended at   On phone patient      [facility] Liver Transplant Evaluation     Requesting Provider and Health System: Yadira Graf NP , Rodolfo Cast MD [facility]  Liver Disease: HV     A/P  54 year old female with HCV " cirrhosis and HCC. MELD 7 ABO O  Was listed for LT for HCC but unfortunately has developed multifocal HCC and is outside [place]. She has not completed eval and was not yet listed.    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 Excellent synthetic function   HCV treated and obtained SVR 2017     THROMBOCYTOPENIA. 2/2 cirrhosis. Plt ~ 50  HCC. S/p TACE 10/2019 MWA3/2020  with recent MRI showing multifocal active disease outside [place]. Started on lenvatinib by Dr. Childress.   VARICEAL SCREENING. UTD EGD 10/29/19 grade 1 varices     DIARRHEA Chronic. Suggested Imodium. Prescribed Lomotil     DM On insulin     SOCIAL SUPPORT.  and daughter  FUNCTIONAL STATUS.Good  LIVER TRANSPLANT CANDID[facility]Y. Did not get listed. Will defer further eval.    PULMONARY ABG ordered to assess clubbing, she is smoking. She has a possible diagnosis of pulmonary fibrosis. See pulmonary note from 1/31/20.  Addendum: discussed the case at ILD conference on 2/3/2020. We reviewed clinical history, available labs, and imaging. Most likely this represents a smoking related ILD, and due to the presence of centrilobular nodules it falls into the alternative diagnosis category for IPF. Emphysema is also present on the CT, we can't exclude CPFE. There has been some mild progression over the last 4 years.      Her fibrosis may continue to mildly progress over time. The prognosis for smoing related ILD is unknown, it can still progress even after smoking cessation. Overall, she is at some risk of progressive ILD, however, we are unable to quantify that risk. Will plan to see her back in clinic in 3 months with pulmonary function testing           Kayli Bergman MD  Hepatology/Liver Transplantation  Medical Director, Liver Transplantation  [facility]  ========================================================================     Subjective:  54 y M with HCV cirrhosis and HCC. First diagnosed with liver disease a couple years ago. She saw her doctor and had some  testing done. She was diagnosed with Hepatitis C at that time.        HCV  S/p treatment with Sovaldi 2017     HCC  Dx 6/2019 3.1x1.9x2.8 cm  CE and MWA at [facility] 10/2019  MRI 11/22/19 no active HCC  MRI 2/7/20 1.7 cm seg 3 with pseudocapsule  MRI 5/8/20  1. Cirrhotic liver morphology with evidence of portal hypertension  including splenic vein and portosystemic collaterals.  2. The lesion previously treated via TACE in segment 3 and the lesion  recently treated via microwave ablation demonstrates posttreatment  changes without any evidence of viable tumor (LR-TR nonviable).   3. Increasing and growing size of numerous arterial enhancing foci scattered throughout the liver parenchyma. There is dramatic increase  in the number of these lesions since 2/7/2020. Although these arterial enhancing foci do not demonstrate any significant washout or  pseudocapsule to definitely diagnose HCC, many of these demonstrate suspicious imaging findings on T2-weighted and DWI series. These are technically designated as  LIRADS 4, however findings overall worrisome for multifocal hepatocellular carcinomas.  4. New partially occlusive bland thrombus in the left main portal vein  5. Based on this exam only, the patient is not within [place] criteria.  6. Subpleural reticular opacities concerning for interstitial lung disease.     Ascites no  HE no     EGD 10/28/19 grade 1 varices  COLONOSCOPY 12/8/16 due 2026  KIDNEY FUNCTION normal  BONE DENSITY DEXA pending     Portal vein assessment: patent on US   Diabetes: yes  Abdominal surgery: yes     HBV neg core ancelmo and s Ag    Lab Test 03/02/20  1043   PROTTOTAL 8.0   ALBUMIN 3.4   BILITOTAL 1.0   ALKPHOS 177*   AST 26   ALT 24     Lab Test 03/02/20  1115 03/02/20  1043   WBC  --  3.0*   RBC  --  3.81   HGB 13.0 12.9   HCT  --  38.8   MCV  --  102*   MCH  --  33.9*   MCHC  --  33.2   RDW  --  14.0   PLT  --  41*     MELD-Na score: 8 at 5/8/2020  1:44 PM  MELD score: 8 at 5/8/2020  1:44  "PM  Calculated from:  Serum Creatinine: 0.49 mg/dL (Rounded to 1 mg/dL) at 5/8/2020  1:44 PM  Serum Sodium: 136 mmol/L at 5/8/2020  1:44 PM  Total Bilirubin: 1.2 mg/dL at 5/8/2020  1:44 PM  INR(ratio): 1.14 at 5/8/2020  1:44 PM  Age: 54 years 5 months      PAST MEDICAL HISTORY  [place]  R hernia repair  ORIF L and R wrists  Bowel obstruction no details known   Chronic diarrhea not on any medication  Botox shot in kidney for urinary frequency about 2018  SOCIAL HISTORY    Lives with   Currently is not working. 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 Worked in medical equipment assembly.  Smoking: Smokes 8-10 cig/d for 25 years  Alcohol  FAMILY HISTORY  M CAD, glaucoma, DM  F CAD  Bro kidney disease  DM in multiple sisters  ROS 10 point ROS neg other than the symptoms noted above in the HPI.     This patient was a no show for this scheduled appointment.    Dania Albert is a 54 year old female who is being evaluated via a billable telephone visit.      The patient has been notified of following:     \"This telephone visit will be conducted via a call between you and your physician/provider. We have found that certain health care needs can be provided without the need for a physical exam.  This service lets us provide the care you need with a short phone conversation.  If a prescription is necessary we can send it directly to your pharmacy.  If lab work is needed we can place an order for that and you can then stop by our lab to have the test done at a later time.    Telephone visits are billed at different rates depending on your insurance coverage. During this emergency period, for some insurers they may be billed the same as an in-person visit.  Please reach out to your insurance provider with any questions.    If during the course of the call the physician/provider feels a telephone visit is not appropriate, you will not be charged for this service.\"    Patient has given verbal consent for " Telephone visit?  Yes    What phone number would you like to be contacted at? [phone]    How would you like to obtain your AVS? Mail a copy    Phone call duration: minutes          Again, thank you for allowing me to participate in the care of your patient.        Sincerely,        Kayli Bergman MD

## 2020-06-05 NOTE — PROGRESS NOTES
"Called twice. No answer. Left voicemail. Below is for documentation only. No show appointment    Dania Albert is a 54 year old female who is being evaluated via a billable telephone visit during the COVID-19 pandemic and clinic response to limit in-person visits.  The patient has been notified of following:   \"This telephone visit will be conducted via a call between you and your physician/provider. We have found that certain health care needs can be provided without the need for a physical exam.  This service lets us provide the care you need with a short phone conversation.  If a prescription is necessary we can send it directly to your pharmacy.  If lab work is needed we can place an order for that and you can then stop by our lab to have the test done at a later time.  If during the course of the call the physician/provider feels a telephone visit is not appropriate, you will not be charged for this service.\" Telephone visits are billed at different rates depending on your insurance coverage. During this emergency period, for some insurers they may be billed the same as an in-person visit.  Please reach out to your insurance provider with any questions.  Consent has been obtained for this service by 1 care team member: yes  I have reviewed and updated the patient's Past Medical History, Social History, Family History and Medication List.    What phone number would you like to be contacted at?     Phone call contact time  Call Started at   Call Ended at   On phone patient      [facility] Liver Transplant Evaluation     Requesting Provider and Health System: Yadira Graf NP , Rodolfo Cast MD [facility]  Liver Disease: HV     A/P  54 year old female with HCV cirrhosis and HCC. MELD 7 ABO O  Was listed for LT for HCC but unfortunately has developed multifocal HCC and is outside [place]. She has not completed eval and was not yet listed.    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 Excellent synthetic function "   HCV treated and obtained SVR 2017     THROMBOCYTOPENIA. 2/2 cirrhosis. Plt ~ 50  HCC. S/p TACE 10/2019 MWA3/2020  with recent MRI showing multifocal active disease outside [place]. Started on lenvatinib by Dr. Childress.   VARICEAL SCREENING. UTD EGD 10/29/19 grade 1 varices     DIARRHEA Chronic. Suggested Imodium. Prescribed Lomotil     DM On insulin     SOCIAL SUPPORT.  and daughter  FUNCTIONAL STATUS.Good  LIVER TRANSPLANT CANDIDACY. Did not get listed. Will defer further eval.    PULMONARY ABG ordered to assess clubbing, she is smoking. She has a possible diagnosis of pulmonary fibrosis. See pulmonary note from 1/31/20.  Addendum: discussed the case at ILD conference on 2/3/2020. We reviewed clinical history, available labs, and imaging. Most likely this represents a smoking related ILD, and due to the presence of centrilobular nodules it falls into the alternative diagnosis category for IPF. Emphysema is also present on the CT, we can't exclude CPFE. There has been some mild progression over the last 4 years.      Her fibrosis may continue to mildly progress over time. The prognosis for smoing related ILD is unknown, it can still progress even after smoking cessation. Overall, she is at some risk of progressive ILD, however, we are unable to quantify that risk. Will plan to see her back in clinic in 3 months with pulmonary function testing           Kayli Bergman MD  Hepatology/Liver Transplantation  Medical Director, Liver Transplantation  [facility]  ========================================================================     Subjective:  54 y M with HCV cirrhosis and HCC. First diagnosed with liver disease a couple years ago. She saw her doctor and had some testing done. She was diagnosed with Hepatitis C at that time.        HCV  S/p treatment with Sovaldi 2017     HCC  Dx 6/2019 3.1x1.9x2.8 cm  CE and MWA at [facility] 10/2019  MRI 11/22/19 no active HCC  MRI 2/7/20 1.7 cm seg 3 with  pseudocapsule  MRI 5/8/20  1. Cirrhotic liver morphology with evidence of portal hypertension  including splenic vein and portosystemic collaterals.  2. The lesion previously treated via TACE in segment 3 and the lesion  recently treated via microwave ablation demonstrates posttreatment  changes without any evidence of viable tumor (LR-TR nonviable).   3. Increasing and growing size of numerous arterial enhancing foci scattered throughout the liver parenchyma. There is dramatic increase  in the number of these lesions since 2/7/2020. Although these arterial enhancing foci do not demonstrate any significant washout or  pseudocapsule to definitely diagnose HCC, many of these demonstrate suspicious imaging findings on T2-weighted and DWI series. These are technically designated as  LIRADS 4, however findings overall worrisome for multifocal hepatocellular carcinomas.  4. New partially occlusive bland thrombus in the left main portal vein  5. Based on this exam only, the patient is not within [place] criteria.  6. Subpleural reticular opacities concerning for interstitial lung disease.     Ascites no  HE no     EGD 10/28/19 grade 1 varices  COLONOSCOPY 12/8/16 due 2026  KIDNEY FUNCTION normal  BONE DENSITY DEXA pending     Portal vein assessment: patent on US   Diabetes: yes  Abdominal surgery: yes     HBV neg core ancelmo and s Ag    Lab Test 03/02/20  1043   PROTTOTAL 8.0   ALBUMIN 3.4   BILITOTAL 1.0   ALKPHOS 177*   AST 26   ALT 24     Lab Test 03/02/20  1115 03/02/20  1043   WBC  --  3.0*   RBC  --  3.81   HGB 13.0 12.9   HCT  --  38.8   MCV  --  102*   MCH  --  33.9*   MCHC  --  33.2   RDW  --  14.0   PLT  --  41*     MELD-Na score: 8 at 5/8/2020  1:44 PM  MELD score: 8 at 5/8/2020  1:44 PM  Calculated from:  Serum Creatinine: 0.49 mg/dL (Rounded to 1 mg/dL) at 5/8/2020  1:44 PM  Serum Sodium: 136 mmol/L at 5/8/2020  1:44 PM  Total Bilirubin: 1.2 mg/dL at 5/8/2020  1:44 PM  INR(ratio): 1.14 at 5/8/2020  1:44 PM  Age: 54  years 5 months      PAST MEDICAL HISTORY  [place]  R hernia repair  ORIF L and R wrists  Bowel obstruction no details known   Chronic diarrhea not on any medication  Botox shot in kidney for urinary frequency about 2018  SOCIAL HISTORY    Lives with   Currently is not working. 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 Worked in medical equipment assembly.  Smoking: Smokes 8-10 cig/d for 25 years  Alcohol  FAMILY HISTORY  M CAD, glaucoma, DM  F CAD  Bro kidney disease  DM in multiple sisters  ROS 10 point ROS neg other than the symptoms noted above in the HPI.     This patient was a no show for this scheduled appointment.

## 2020-06-08 NOTE — TELEPHONE ENCOUNTER
Contacted [facility] - Imaging Department.  Patient is scheduled for scans on June 12, as well as labs per confirmation with the patient.

## 2020-06-16 NOTE — TELEPHONE ENCOUNTER
Writer left message on patient's voicemail stating to call back - re:  Appointment scheduled for tomorrow will not be able to take place with our NP, Yadi (she is not able to see patients living in [place]) but we have her scans and labs as a baseline to get started on her oral chemotherapy.

## 2020-06-16 NOTE — TELEPHONE ENCOUNTER
Spoke with Dania to let her know information regarding provider visit as noted below.  Explained she would need to be seen face to face; patient will check with family about coming to [place] to be seen as she is interested in her scan results; this RN did not feel appropriate to review results with her.  Explained that Dr. Childress wanted baseline scans and labs and she could start.    Patient is able to come to [place] for appointment with Yadi, on 6/17 - face to face to review scan results.  Pharmacy notified - they can contact patient to review chemotherapy.

## 2020-06-18 NOTE — TELEPHONE ENCOUNTER
Writer reached out to patient re:  Missed follow-up face to face visit with ADI Grullon here at [place] to review scans (see previous note about [place] patients) but had to leave voicemail for call back.  Explained to patient that writer has reached out to the [facility] and check if there were any providers that are licensed to talk/follow patients on video from [place].  Request call back from patient.

## 2020-06-19 NOTE — TELEPHONE ENCOUNTER
Second attempt made to inquire about missed appointment Wednesday, 6/24.  Message left on voicemail for call back to inquire about patient thoughts - does she want to wait for  to return or can proceed with oral chemotherapy.

## 2020-06-23 NOTE — TELEPHONE ENCOUNTER
attempted to call patient for 3rd time with no call back from previous calls.  Advised patient if she could call back to writer's phone for her thoughts on oral chemotherapy.

## 2020-06-29 NOTE — PROGRESS NOTES
Reason for Visit: seen in f/u of hepatocellular carcinoma    Oncology HPI:   Ms. Albert is a 54 year old female who presents with HCC. This is a telephone visit as she was unable to connect via video link.     She has Hep C cirrhosis. Hep C was previously treated with Sovaldi/ribavirin and achived SVR.  She was found to have HCC in June 2019 and had TACE/Ablation for S3 lesion on Oct 2019 and then found to have a new S3 lesion for which she had a MWA on 3/17/2020.  Bone Scan in Dec 2019 was negative for metastatic disease.   CTA Chest in October 2019 was negative for metastatic disease.     A follow up MRI on 5/8/2020 showed no residual viable cancer in the treatment zones but it found dramatic increase in number and sizes of numerous arterial enhancing foci scattered throughout the liver parenchyma which was very suspicious for multifocal HCC. None of these were diagnostic of HC so she had liver biopsy on which confirmed moderately differentiated HCC.     As she was not deemed a candidate for any further liver directed therapy, she was referred to medical oncology    Interval history: Dania is seen in a face-to-face visit. Her daughter joined the visit by phone conference.  She has been having a lot of RUQ and rib pain. Has used oxycodone for this, but is out and is now using tylenol up to 5-6/day. Just received the lenvatinib, but hasn't started it as she isn't sure she wants to. Is anxious about side effects and inability to tolerate treatment, wants to know why liver directed therapy isn't an option.  No fevers/chills, cough, sob, palpitation, dizziness. Energy and appetite have been low. Bowels are regular. Urination wnl. No bleeding, bruising. No edema.     Current Outpatient Medications   Medication Sig Dispense Refill     acetaminophen (TYLENOL) 500 MG tablet Take 1,000 mg by mouth       albuterol (PROAIR HFA) 108 (90 Base) MCG/ACT inhaler INHALE 2 PUFFS 4 TIMES DAILY       blood glucose monitoring (ONE  TOUCH ULTRA MINI) meter device kit Use as directed to test glucose three times daily. Pharmacy dispense brand based on insurance.  Indications: Diabetes       calcium carbonate-vitamin D (OSCAL W/D) 500-200 MG-UNIT tablet Take 1 tablet by mouth 2 times daily (with meals) 60 tablet 3     cholecalciferol (VITAMIN D3) 1000 units (25 mcg) capsule Take 1 capsule (1,000 Units) by mouth daily 30 capsule 3             insulin glargine (LANTUS SOLOSTAR) 100 UNIT/ML pen Inject 40 Units Subcutaneous       Lenvatinib 12 MG, 3 x 4 mg capsules, (LENVIMA) 3 x 4 MG capsule Take 3 capsules (12 mg) by mouth daily 90 capsule 0     nicotine (NICODERM CQ) 14 MG/24HR 24 hr patch Place 1 patch onto the skin every 24 hours (Patient not taking: Reported on 5/28/2020) 30 patch 3     nicotine (NICORETTE) 2 MG gum Place 1 each (2 mg) inside cheek as needed for smoking cessation (Patient not taking: Reported on 5/28/2020) 60 each 3     ondansetron (ZOFRAN) 8 MG tablet Take 1 tablet (8 mg) by mouth daily Take prior to each lenvatinib dose and then every 8 hours as needed. 30 tablet 3     OneTouch Delica Lancets 33G MISC Change lancet one time daily as directed.       order for DME Abdominal Binder - small (Patient not taking: Reported on 6/5/2020) 1 each 1     oxyCODONE (ROXICODONE) 5 MG tablet Take 1 tablet (5 mg) by mouth every 6 hours as needed for severe pain (Patient not taking: Reported on 6/5/2020) 30 tablet 0     pantoprazole (PROTONIX) 40 MG EC tablet TAKE ONE TABLET BY MOUTH EVERY DAY       prochlorperazine (COMPAZINE) 10 MG tablet Take 1 tablet (10 mg) by mouth every 6 hours as needed (Nausea/Vomiting) 30 tablet 2     QUEtiapine (SEROQUEL) 50 MG tablet Take 50 mg by mouth At Bedtime  0     sertraline (ZOLOFT) 100 MG tablet TAKE 2 TABLETS BY MOUTH EVERY DAY       TRULICITY 0.75 MG/0.5ML pen INJECT 0.5 ml's SUBCUTANEOUSLY ONCE WEEKLY  2          Allergies   Allergen Reactions     Bee Venom Other (See Comments) and Swelling     Hand  swelled up badly.       Hydrocodone Other (See Comments)     nausea     Nickel Rash     Wool Fiber Hives and Rash         Exam: anxious, alert, There were no vitals taken for this visit.  Wt Readings from Last 4 Encounters:   05/28/20 78.5 kg (173 lb)   05/21/20 78.5 kg (173 lb)   03/17/20 78.7 kg (173 lb 8 oz)   03/02/20 78.7 kg (173 lb 8 oz)     Oropharynx is moist and without lesion. Neck supple and without adenopathy. Lungs:CTA. Heart: RRR, no murmur or rub. Abdomen: soft, nontender, distended  BS active. Tenderness along the costal margin on the R, extending up the lateral rib. Tenderness on the L clavicle. No skin erythema or rash or bruising on visualized skin.  Extremities: warm, no edema. Speech is clear. CN wnl. Gait/station wnl.    Labs:   6/12/20:  AFP 7.9  BMP wnl except K 3.4  LFT: ALK phos 166, T bili 1.0, AST 36, ALT 26  Albumin 3.1, protein 7.2    Imaging:   EXAM: NM BONE SCAN WHOLE BODY  LOCATION: [facility]  DATE/TIME: 6/12/2020 12:13 PM    INDICATION: Hepatocellular carcinoma.   COMPARISON: CT of the chest abdomen pelvis dated 06/12/2020  TECHNIQUE: 25.8 mCi technetium-99m MDP, IV. Anterior and posterior delayed whole-body images at 3 hours with additional spot images of the ribs.    FINDINGS: Focal radiotracer uptake in the anterior right fifth rib corresponding with a destructive expansile lesion and focal radiotracer uptake in the left clavicular head corresponding with a destructive lucent lesion suspicious for metastases. The remaining radiotracer uptake is in a pattern typical of degenerative change involving the shoulders, spine, knees, and ankles.    IMPRESSION:    1.  Findings suspicious for anterior right fifth rib and left clavicular head metastases.   Other Result Information   Interface, In Rad Results - 06/12/2020  4:15 PM CDT  EXAM: NM BONE SCAN WHOLE BODY  LOCATION: [facility]  DATE/TIME: 6/12/2020 12:13 PM    INDICATION: Hepatocellular carcinoma.   COMPARISON: CT of the  chest abdomen pelvis dated 06/12/2020  TECHNIQUE: 25.8 mCi technetium-99m MDP, IV. Anterior and posterior delayed whole-body images at 3 hours with additional spot images of the ribs.    FINDINGS: Focal radiotracer uptake in the anterior right fifth rib corresponding with a destructive expansile lesion and focal radiotracer uptake in the left clavicular head corresponding with a destructive lucent lesion suspicious for metastases. The remaining radiotracer uptake is in a pattern typical of degenerative change involving the shoulders, spine, knees, and ankles.    IMPRESSION:    1.  Findings suspicious for anterior right fifth rib and left clavicular head metastases.     EXAM: CT CHEST ABD PELVIS W IV CONT  LOCATION: [facility]  DATE/TIME: 6/12/2020 9:40 AM    INDICATION: Hepatocellular carcinoma follow-up. History of ablation March 2020.  COMPARISON: CT 03/24/2020  TECHNIQUE: Helical thin-section CT scan of the chest, abdomen, and pelvis was performed following injection of IV contrast. Multiplanar reformats were obtained. Dose reduction techniques were used.   CONTRAST: IOHEXOL 350 MG/ML IV SOLN 100 mL    FINDINGS:   LUNGS AND PLEURA: Apical the basilar reticular opacities primarily in a subpleural distribution compatible with fibrosis. Mild emphysematous changes present. No consolidation, pleural effusion, or pneumothorax. No bronchiectasis evident. The central airways are clear.    MEDIASTINUM/AXILLAE: Absent or atrophic left thyroid lobe. No lymphadenopathy of the neck. No mediastinal or hilar lymphadenopathy. Stable vasculature. Normal heart size. Moderate coronary arterial calcification. Axilla are normal.    HEPATOBILIARY: Multiple arterial enhancing lesions throughout the liver including one in the superior liver measuring 2.8 cm on image 63 of series 4 and 2.0 cm lesion in the inferior liver on image 87. There are lesions in both hepatic lobes. Decrease in size of the ablation zone in segment 3 of the  left hepatic lobe measuring 2.6 x 3.2 cm compared to 3.4 x 4.2 cm previously. Chemoembolization changes in segment 3 of the liver with stable size of the mass. Hepatic lesions equilibrate to background normal liver parenchyma on the portal venous and three-minute delayed phases and thus are not seen. No biliary dilation. Cholecystectomy.    PANCREAS: Normal.    SPLEEN: Splenomegaly measuring 17.1 cm in craniocaudal dimension.    ADRENAL GLANDS: Normal.    KIDNEYS/BLADDER: Inferior left renal cyst. No follow-up needed. No hydronephrosis or stone. Normal bladder.    BOWEL: Mild hernia with gastroesophageal junction varices. There is also recannulization of the umbilical artery. No bowel obstruction or focal inflammation evident.    LYMPH NODES: No lymphadenopathy.    VASCULATURE: Normal caliber aorta. Linear and round filling defects in the left portal vein concerning for.  .    PELVIC ORGANS: No pelvic masses.    MUSCULOSKELETAL: Stable unremarkable. No osseous lesion.    IMPRESSION:  1.  There are several arterial enhancing lesions throughout the liver in both the right and left hepatic lobe. One of the larger lesions measures 2.8 cm in the superior liver of segment 8. Although these lesions are concerning for metastatic disease, the lesions do not follow the enhancement pattern typical of HCC. These demonstrate arterial enhancement with equilibration on venous and delayed phases. This can be seen with different grades of regenerative nodules.  2.  Linear and round filling defects in the left portal vein compatible with nonocclusive thrombus.  3.  Stable changes of previous chemoembolization in segment 3 of the liver.  4.  Decrease in size of the ablation zone in segment 3 of the liver.  5.  Cirrhosis with evidence of portal venous hypertension.  6.  Diffuse chronic interstitial lung disease.    Abnormal findings requiring follow-up.  Recommend: Clinical follow-up regarding the study findings.     Recommendation: No  specific follow-up, ensure delivery of result to ordering provider.    Impression/plan:   Metastatic HCC  -I reviewed the findings of the CT and bone scan with Dania and her daughter.  I reviewed that the bone scan findings are consistent with metastatic disease. I don't have the ability to assess her progression in the liver since the last scan as I only have the report findings, but will request imaging to review. I reviewed that the report is consistent with multifocal disease within the liver as was identified previously.   -I reviewed why systemic treatment is more optimal than targeted treatment in the setting of metastatic disease  -I reviewed her concerns about toxicities of treatment and discussed ways she can moderate side effects including use of antiemetics  -reviewed the palliative goal of lenvatinib and side effects including QT prolongation, anorexia, fatigue, mucositis, hand/foot syndrome, diarrhea, N/V, hypocalcemia.   -Reviewed need for close monitoring as we start this medicine to assess her tolerance and avoid complication. She is willing to return for evaluation in a couple of weeks. Will check labs and EKG at that time.   -plan for 2 months of treatment prior to restaging  -discussed a palliative care consultation. 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 Will request insurance approval for zometa in the meantime    Pain s/t maligancy  -most of her pain is in the RUQ/flank and I think related to her liver tumors  -she has some mild pain in the L clavicle, which coincides with her bone metastases  -advised to cut back on tylenol to just a couple doses a day, refilled her oxycodone to take 5-10 mg every 6 hours prn severe pain, #60    Hep C related cirrhosis with  portal HTN and splenomegaly with associated  thrombocytopenia    60 minutes spent in counseling with the patient and her daughter today. TW

## 2020-06-29 NOTE — LETTER
6/29/2020         RE: Dania Albert  [address] [place] [id number]        Dear Colleague,    Thank you for referring your patient, Dania Albert, to the [facility]. Please see a copy of my visit note below.    Reason for Visit: seen in f/u of hepatocellular carcinoma    Oncology HPI:   Ms. Albert is a 54 year old female who presents with HCC. This is a telephone visit as she was unable to connect via video link.     She has Hep C cirrhosis. Hep C was previously treated with Sovaldi/ribavirin and achived SVR.  She was found to have HCC in June 2019 and had TACE/Ablation for S3 lesion on Oct 2019 and then found to have a new S3 lesion for which she had a MWA on 3/17/2020.  Bone Scan in Dec 2019 was negative for metastatic disease.   CTA Chest in October 2019 was negative for metastatic disease.     A follow up MRI on 5/8/2020 showed no residual viable cancer in the treatment zones but it found dramatic increase in number and sizes of numerous arterial enhancing foci scattered throughout the liver parenchyma which was very suspicious for multifocal HCC. None of these were diagnostic of HC so she had liver biopsy on which confirmed moderately differentiated HCC.     As she was not deemed a candidate for any further liver directed therapy, she was referred to medical oncology    Interval history: Dania is seen in a face-to-face visit. Her daughter joined the visit by phone conference.  She has been having a lot of RUQ and rib pain. Has used oxycodone for this, but is out and is now using tylenol up to 5-6/day. Just received the lenvatinib, but hasn't started it as she isn't sure she wants to. Is anxious about side effects and inability to tolerate treatment, wants to know why liver directed therapy isn't an option.  No fevers/chills, cough, sob, palpitation, dizziness. Energy and appetite have been low. Bowels are regular. Urination wnl. No bleeding, bruising. No edema.     Current Outpatient  Medications   Medication Sig Dispense Refill     acetaminophen (TYLENOL) 500 MG tablet Take 1,000 mg by mouth       albuterol (PROAIR HFA) 108 (90 Base) MCG/ACT inhaler INHALE 2 PUFFS 4 TIMES DAILY       blood glucose monitoring (ONE TOUCH ULTRA MINI) meter device kit Use as directed to test glucose three times daily. Pharmacy dispense brand based on insurance.  Indications: Diabetes       calcium carbonate-vitamin D (OSCAL W/D) 500-200 MG-UNIT tablet Take 1 tablet by mouth 2 times daily (with meals) 60 tablet 3     cholecalciferol (VITAMIN D3) 1000 units (25 mcg) capsule Take 1 capsule (1,000 Units) by mouth daily 30 capsule 3             insulin glargine (LANTUS SOLOSTAR) 100 UNIT/ML pen Inject 40 Units Subcutaneous       Lenvatinib 12 MG, 3 x 4 mg capsules, (LENVIMA) 3 x 4 MG capsule Take 3 capsules (12 mg) by mouth daily 90 capsule 0     nicotine (NICODERM CQ) 14 MG/24HR 24 hr patch Place 1 patch onto the skin every 24 hours (Patient not taking: Reported on 5/28/2020) 30 patch 3     nicotine (NICORETTE) 2 MG gum Place 1 each (2 mg) inside cheek as needed for smoking cessation (Patient not taking: Reported on 5/28/2020) 60 each 3     ondansetron (ZOFRAN) 8 MG tablet Take 1 tablet (8 mg) by mouth daily Take prior to each lenvatinib dose and then every 8 hours as needed. 30 tablet 3     OneTouch Delica Lancets 33G MISC Change lancet one time daily as directed.       order for DME Abdominal Binder - small (Patient not taking: Reported on 6/5/2020) 1 each 1     oxyCODONE (ROXICODONE) 5 MG tablet Take 1 tablet (5 mg) by mouth every 6 hours as needed for severe pain (Patient not taking: Reported on 6/5/2020) 30 tablet 0     pantoprazole (PROTONIX) 40 MG EC tablet TAKE ONE TABLET BY MOUTH EVERY DAY       prochlorperazine (COMPAZINE) 10 MG tablet Take 1 tablet (10 mg) by mouth every 6 hours as needed (Nausea/Vomiting) 30 tablet 2     QUEtiapine (SEROQUEL) 50 MG tablet Take 50 mg by mouth At Bedtime  0     sertraline  (ZOLOFT) 100 MG tablet TAKE 2 TABLETS BY MOUTH EVERY DAY       TRULICITY 0.75 MG/0.5ML pen INJECT 0.5 ml's SUBCUTANEOUSLY ONCE WEEKLY  2          Allergies   Allergen Reactions     Bee Venom Other (See Comments) and Swelling     Hand swelled up badly.       Hydrocodone Other (See Comments)     nausea     Nickel Rash     Wool Fiber Hives and Rash         Exam: anxious, alert, There were no vitals taken for this visit.  Wt Readings from Last 4 Encounters:   05/28/20 78.5 kg (173 lb)   05/21/20 78.5 kg (173 lb)   03/17/20 78.7 kg (173 lb 8 oz)   03/02/20 78.7 kg (173 lb 8 oz)     Oropharynx is moist and without lesion. Neck supple and without adenopathy. Lungs:CTA. Heart: RRR, no murmur or rub. Abdomen: soft, nontender, distended  BS active. Tenderness along the costal margin on the R, extending up the lateral rib. Tenderness on the L clavicle. No skin erythema or rash or bruising on visualized skin.  Extremities: warm, no edema. Speech is clear. CN wnl. Gait/station wnl.    Labs:   6/12/20:  AFP 7.9  BMP wnl except K 3.4  LFT: ALK phos 166, T bili 1.0, AST 36, ALT 26  Albumin 3.1, protein 7.2    Imaging:   EXAM: NM BONE SCAN WHOLE BODY  LOCATION: [facility]  DATE/TIME: 6/12/2020 12:13 PM    INDICATION: Hepatocellular carcinoma.   COMPARISON: CT of the chest abdomen pelvis dated 06/12/2020  TECHNIQUE: 25.8 mCi technetium-99m MDP, IV. Anterior and posterior delayed whole-body images at 3 hours with additional spot images of the ribs.    FINDINGS: Focal radiotracer uptake in the anterior right fifth rib corresponding with a destructive expansile lesion and focal radiotracer uptake in the left clavicular head corresponding with a destructive lucent lesion suspicious for metastases. The remaining radiotracer uptake is in a pattern typical of degenerative change involving the shoulders, spine, knees, and ankles.    IMPRESSION:    1.  Findings suspicious for anterior right fifth rib and left clavicular head metastases.    Other Result Information   Interface, In Rad Results - 06/12/2020  4:15 PM CDT  EXAM: NM BONE SCAN WHOLE BODY  LOCATION: [facility]  DATE/TIME: 6/12/2020 12:13 PM    INDICATION: Hepatocellular carcinoma.   COMPARISON: CT of the chest abdomen pelvis dated 06/12/2020  TECHNIQUE: 25.8 mCi technetium-99m MDP, IV. Anterior and posterior delayed whole-body images at 3 hours with additional spot images of the ribs.    FINDINGS: Focal radiotracer uptake in the anterior right fifth rib corresponding with a destructive expansile lesion and focal radiotracer uptake in the left clavicular head corresponding with a destructive lucent lesion suspicious for metastases. The remaining radiotracer uptake is in a pattern typical of degenerative change involving the shoulders, spine, knees, and ankles.    IMPRESSION:    1.  Findings suspicious for anterior right fifth rib and left clavicular head metastases.     EXAM: CT CHEST ABD PELVIS W IV CONT  LOCATION: [facility]  DATE/TIME: 6/12/2020 9:40 AM    INDICATION: Hepatocellular carcinoma follow-up. History of ablation March 2020.  COMPARISON: CT 03/24/2020  TECHNIQUE: Helical thin-section CT scan of the chest, abdomen, and pelvis was performed following injection of IV contrast. Multiplanar reformats were obtained. Dose reduction techniques were used.   CONTRAST: IOHEXOL 350 MG/ML IV SOLN 100 mL    FINDINGS:   LUNGS AND PLEURA: Apical the basilar reticular opacities primarily in a subpleural distribution compatible with fibrosis. Mild emphysematous changes present. No consolidation, pleural effusion, or pneumothorax. No bronchiectasis evident. The central airways are clear.    MEDIASTINUM/AXILLAE: Absent or atrophic left thyroid lobe. No lymphadenopathy of the neck. No mediastinal or hilar lymphadenopathy. Stable vasculature. Normal heart size. Moderate coronary arterial calcification. Axilla are normal.    HEPATOBILIARY: Multiple arterial enhancing lesions throughout the  liver including one in the superior liver measuring 2.8 cm on image 63 of series 4 and 2.0 cm lesion in the inferior liver on image 87. There are lesions in both hepatic lobes. Decrease in size of the ablation zone in segment 3 of the left hepatic lobe measuring 2.6 x 3.2 cm compared to 3.4 x 4.2 cm previously. Chemoembolization changes in segment 3 of the liver with stable size of the mass. Hepatic lesions equilibrate to background normal liver parenchyma on the portal venous and three-minute delayed phases and thus are not seen. No biliary dilation. Cholecystectomy.    PANCREAS: Normal.    SPLEEN: Splenomegaly measuring 17.1 cm in craniocaudal dimension.    ADRENAL GLANDS: Normal.    KIDNEYS/BLADDER: Inferior left renal cyst. No follow-up needed. No hydronephrosis or stone. Normal bladder.    BOWEL: Mild hernia with gastroesophageal junction varices. There is also recannulization of the umbilical artery. No bowel obstruction or focal inflammation evident.    LYMPH NODES: No lymphadenopathy.    VASCULATURE: Normal caliber aorta. Linear and round filling defects in the left portal vein concerning for.  .    PELVIC ORGANS: No pelvic masses.    MUSCULOSKELETAL: Stable unremarkable. No osseous lesion.    IMPRESSION:  1.  There are several arterial enhancing lesions throughout the liver in both the right and left hepatic lobe. One of the larger lesions measures 2.8 cm in the superior liver of segment 8. Although these lesions are concerning for metastatic disease, the lesions do not follow the enhancement pattern typical of HCC. These demonstrate arterial enhancement with equilibration on venous and delayed phases. This can be seen with different grades of regenerative nodules.  2.  Linear and round filling defects in the left portal vein compatible with nonocclusive thrombus.  3.  Stable changes of previous chemoembolization in segment 3 of the liver.  4.  Decrease in size of the ablation zone in segment 3 of the  liver.  5.  Cirrhosis with evidence of portal venous hypertension.  6.  Diffuse chronic interstitial lung disease.    Abnormal findings requiring follow-up.  Recommend: Clinical follow-up regarding the study findings.     Recommendation: No specific follow-up, ensure delivery of result to ordering provider.    Impression/plan:   Metastatic HCC  -I reviewed the findings of the CT and bone scan with Dania and her daughter.  I reviewed that the bone scan findings are consistent with metastatic disease. I don't have the ability to assess her progression in the liver since the last scan as I only have the report findings, but will request imaging to review. I reviewed that the report is consistent with multifocal disease within the liver as was identified previously.   -I reviewed why systemic treatment is more optimal than targeted treatment in the setting of metastatic disease  -I reviewed her concerns about toxicities of treatment and discussed ways she can moderate side effects including use of antiemetics  -reviewed the palliative goal of lenvatinib and side effects including QT prolongation, anorexia, fatigue, mucositis, hand/foot syndrome, diarrhea, N/V, hypocalcemia.   -Reviewed need for close monitoring as we start this medicine to assess her tolerance and avoid complication. She is willing to return for evaluation in a couple of weeks. Will check labs and EKG at that time.   -plan for 2 months of treatment prior to restaging  -discussed a palliative care consultation. 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 Will request insurance approval for zometa in the meantime    Pain s/t maligancy  -most of her pain is in the RUQ/flank and I think related to her liver tumors  -she has some mild pain in the L clavicle, which coincides with her  bone metastases  -advised to cut back on tylenol to just a couple doses a day, refilled her oxycodone to take 5-10 mg every 6 hours prn severe pain, #60    Hep C related cirrhosis with  portal HTN and splenomegaly with associated thrombocytopenia    60 minutes spent in counseling with the patient and her daughter today. TW        Again, thank you for allowing me to participate in the care of your patient.        Sincerely,        DEUCE Garcia CNP

## 2020-06-29 NOTE — NURSING NOTE
"Oncology Rooming Note    June 29, 2020 2:11 PM   Dania Albert is a 54 year old female who presents for:    Chief Complaint   Patient presents with     Oncology Clinic Visit     Pt is here for a rtn for      Initial Vitals: Blood Pressure 136/85   Pulse 78   Temperature 97.3  F (36.3  C) (Tympanic)   Respiration 16   Weight 79.2 kg (174 lb 11.2 oz)   Oxygen Saturation 96%   Body Mass Index 33.56 kg/m   Estimated body mass index is 33.56 kg/m  as calculated from the following:    Height as of 5/28/20: 1.537 m (5' 0.5\").    Weight as of this encounter: 79.2 kg (174 lb 11.2 oz). Body surface area is 1.84 meters squared.  Moderate Pain (5) Comment: Data Unavailable   No LMP recorded. Patient is postmenopausal.  Allergies reviewed: Yes  Medications reviewed: Yes    Medications: Medication refills not needed today.  Pharmacy name entered into [employer]:    [facility] - [place], [place] - [address] [facility] - [place], [place] - [address]  [facility] - [place], [place] - [address]    Clinical concerns: none       Radha Franklin MA            "

## 2020-06-30 NOTE — PROGRESS NOTES
RN Care Coordination Note  Incoming Call:   Patient's daughter wanted to notify us the patient has a dental appointment scheduled for tomorrow but the dentist wants to know what she needs to have done. Please advise and I can call clinic with recommendation.   Provider Response:  Thanks--She needs a dental evaluation to determine if there are concerns before we start zometa.     Marcy   Outgoing Call:   Left message with daughter relaying above information. Encouraged her to call with any questions or concerns.    Kassidy Aguero RN, BSN  Care Coordinator   [facility]

## 2020-07-06 NOTE — TELEPHONE ENCOUNTER
Call to patient to determine where she would like to receive care with a palliative care provider - she would like the [facility] in [place].    I will contact schedulers to call her.   She has started oral chemotherapy and denies any side effects.     She has some questions about sexual relations with her , she has had exposed sex with him and he is experiencing burning to his penis.  I have advised her not to have unprotected sex and instructed her to ensure her  is assessed by  a medical provider    I have discussed [place]ia with Ayana MARKHAM - she will ask the pharmacy team to call her and discuss her treatment.

## 2020-07-06 NOTE — PROGRESS NOTES
"Oral Chemotherapy Monitoring Program  Patient Follow Up    Primary Oncologist: Dr. Childress  Primary Oncology Clinic:   Cancer Diagnosis: HCC    Therapy History:  6/29/20: Lenvinma 12mg daily    Subjective/Objective:  [place]ia Albert is a 54 year old female contacted by phone for a follow-up visit for oral chemotherapy.    ORAL CHEMOTHERAPY 7/6/2020   Drug Name Lenvima (Lenvatinib)   Current Dosage (No Data)   Current Schedule Daily   Cycle Details Continuous   Start Date of Last Cycle 6/29/2020   Planned next cycle start date 7/29/2020   Doses missed in last 2 weeks 1   Adherence Assessment Adherent   Adverse Effects No AE identified during assessment   Home BPs all BPs<140/90   Any new drug interactions? No   Is the dose as ordered appropriate for the patient? Yes   Is the patient currently in pain? No   Has the patient been assessed within the past 6 months for depression? Yes   Has the patient missed any days of school, work, or other routine activity? No       Last PHQ-2 Score on record:   PHQ-2 ( [address] [facility]) 1/31/2020 12/17/2019   Q1: Little interest or pleasure in doing things 0 0   Q2: Feeling down, depressed or hopeless 0 0   PHQ-2 Score 0 0   Q1: Little interest or pleasure in doing things - -   Q2: Feeling down, depressed or hopeless - -   PHQ-2 Score - -       Patient does not report depression symptoms.      Vitals:  BP:   BP Readings from Last 1 Encounters:   06/29/20 136/85     Wt Readings from Last 1 Encounters:   06/29/20 79.2 kg (174 lb 11.2 oz)     Estimated body surface area is 1.84 meters squared as calculated from the following:    Height as of 5/28/20: 1.537 m (5' 0.5\").    Weight as of 6/29/20: 79.2 kg (174 lb 11.2 oz).    Labs:  Lab Results   Component Value Date     05/08/2020      Lab Results   Component Value Date    POTASSIUM 3.4 06/12/2020     Lab Results   Component Value Date    LETY 8.9 05/08/2020     Lab Results   Component Value Date    ALBUMIN 3.2 05/08/2020     No results " found for: MAG  No results found for: PHOS  Lab Results   Component Value Date    BUN 8 05/08/2020     Lab Results   Component Value Date    CR 0.58 06/12/2020       Lab Results   Component Value Date    AST 36 06/12/2020     Lab Results   Component Value Date    ALT 26 06/12/2020     Lab Results   Component Value Date    BILITOTAL 1.2 05/08/2020       Lab Results   Component Value Date    WBC 3.0 05/08/2020     Lab Results   Component Value Date    HGB 12.1 05/21/2020     Lab Results   Component Value Date    PLT 38 05/21/2020     Lab Results   Component Value Date    ANEU 1.7 02/06/2020         Assessment & Plan:  I spoke with Dania today. She reports starting therapy on 6/29 and is doing well. She has had some nausea which resolves with Zofran. She and her  had intercourse which led to some burning for him afterward. It has now resolved. I informed them to use contraception (barrier method) in the future as a safety measure. She verbalized understanding. She missed 1 dose since she started, and I educated her on adherence. I informed her regarding the need for D15 labs. She will be on 7/14 at 1000 for CMP. We will continue to follow.    Follow-Up:  In 1 week    Refill Due:  Due by 7/29    Sakina Gooden, PharmD, BCPS, BCOP

## 2020-07-06 NOTE — TELEPHONE ENCOUNTER
Patient was contacted by ANJANA Fregoso from Palliative Care regarding appointment.  Ildefonso communicated to writer that patient received her oral chemotherapy (Levatinib) in the mail and has started taking it for about a week now.  Patient would like her follow-up to be at the [facility], since she lives in [place]  and is followed by the [facility].   She also had some questions regarding the drug - she had unprotected sex with her spouse and her 's penis is burning now - she would like some information on this.  Of note, is that she has been difficult to reach in that writer has tried calling her several times without a call back.  Ildefonso advised that someone would be calling her with appointments; she will also be followed by Palliative Care.      Message forwarded to Pharmacy, Dr. Childress and RNCC, Isela Dunham at the [facility] via [employer].

## 2020-07-10 NOTE — PROGRESS NOTES
6/12/20 NM bone scan OSH  - very concerning for clavicle mets  - rib is fermin to tell, but concerning    Recs:  - should be on systemic treatment  - remove from txp evaluation

## 2020-07-14 NOTE — COMMITTEE REVIEW
Abdominal Committee Review Note     Evaluation Date: 12/9/2019  Committee Review Date: 7/14/2020    Organ being evaluated for: Liver    Transplant Phase: Evaluation  Transplant Status: Active    Transplant Coordinator: Huy Anders Jr.  Transplant Surgeon:   Fly Mejía    Referring Physician: Robles Metz    Primary Diagnosis: Primary Liver Malignancy: Hepatoma (HCC) and Cirrhosis  Secondary Diagnosis: Cirrhosis: Type C    Committee Review Members:  Nurse Practitioner Doreen Walton, NP   Nutrition Jaimee Murrell, RD   Pharmacy Mike Gallegos, [facility]    - Clinical Trish Knight, SCOUT, Winsome Shaikh, [facility]   Transplant Teri Vazquez, RN, Kait Funez MD, Regi Montoya, ANJANA, Oli Germain MD, Ernst Tavares MD, Jr Huy Anders RN, Kayli Bergman MD, Erlinda Issa, APRN CNP, Eduarda Owens, RN, Danyelle Guerrero MD, Kash Ramírez MD, Yancy Melton, ANJANA, Fly Mejía MD, Thomas M. Leventhal, MD, Warren Jamison MD, Conrad Hartmann MD       Transplant Eligibility: Cirrhosis with MELD, HCC, HCV    Committee Review Decision: Declined    Relative Contraindications: Other, see below    Absolute Contraindications: Uncontrolled extrahepatic malignancy (excl skin cancer, certain neuroendocrine tumors, Other, HCC outside of criteria    Committee Chair Kash Malin MD verbally attested to the committee's decision.    Committee Discussion Details:     Close evaluation    LVM for patient to discuss. Will not send letter until I speak with her.

## 2020-07-16 NOTE — ED TRIAGE NOTES
"Pt presents via EMS form [facility] for evaluation of altered LOC and low platelets. Pt has hx liver cancer, recent tx with chemo today oral. Pt A and O x4. New chemo last few weeks. Per report bg was \"normal\"at clinic.   "

## 2020-07-16 NOTE — NURSING NOTE
Chief Complaint   Patient presents with     Blood Draw     labs drawn via PIV placed by RN in lab     /77 (BP Location: Left arm, Patient Position: Chair, Cuff Size: Adult Regular)   Pulse 75   Temp 97.6  F (36.4  C) (Oral)   Resp 18   SpO2 94%     PIV placed right antecub by RN in lab for infusion and labs. Labs drawn and sent. Pt tolerated well.   Pt checked in for next appointment.    Marisa Stephens RN

## 2020-07-16 NOTE — NURSING NOTE
"Oncology Rooming Note    July 16, 2020 2:24 PM   Dania Albert is a 54 year old female who presents for:    Chief Complaint   Patient presents with     Blood Draw     labs drawn via PIV placed by RN in lab     Oncology Clinic Visit     HCC FOLLOW UP      Initial Vitals: /77 (BP Location: Left arm, Patient Position: Chair, Cuff Size: Adult Regular)   Pulse 75   Temp 97.6  F (36.4  C) (Oral)   Resp 18   SpO2 94%  Estimated body mass index is 33.56 kg/m  as calculated from the following:    Height as of 5/28/20: 1.537 m (5' 0.5\").    Weight as of 6/29/20: 79.2 kg (174 lb 11.2 oz). There is no height or weight on file to calculate BSA.  No Pain (0) Comment: Data Unavailable   No LMP recorded. Patient is postmenopausal.  Allergies reviewed: Yes  Medications reviewed: Yes    Medications: MEDICATION REFILLS NEEDED TODAY. Provider was notified. Patient needs a refill on Oxycodone.     Pharmacy name entered into [employer]:    [facility] - [place], [place] - [address] [place] [address]  [facility] - [place], [place] - [address]  [facility] - [place], [place] - [address]    Clinical concerns: Patient would like to have medical marijuana instead of Oxycodone but if this isn't an option then she needs a refill on Oxycodone.  Marcy Lombardo was notified.      Katie Cherry              "

## 2020-07-16 NOTE — PROGRESS NOTES
Oncology/Hematology Visit Note  Jul 16, 2020    Reason for Visit: Follow up of hepatocellular carcinoma    History of Present Illness:   Ms. Albert is a 54 year old female who presents with HCC.      She has Hep C cirrhosis. Hep C was previously treated with Sovaldi/ribavirin and achived SVR.  She was found to have HCC in June 2019 and had TACE/Ablation for S3 lesion on Oct 2019 and then found to have a new S3 lesion for which she had a MWA on 3/17/2020.  Bone Scan in Dec 2019 was negative for metastatic disease.   CTA Chest in October 2019 was negative for metastatic disease.     A follow up MRI on 5/8/2020 showed no residual viable cancer in the treatment zones but it found dramatic increase in number and sizes of numerous arterial enhancing foci scattered throughout the liver parenchyma which was very suspicious for multifocal HCC. None of these were diagnostic of HC so she had liver biopsy on which confirmed moderately differentiated HCC.     Bone scan 6/12/2020 with lesions on right 5th rib and left clavicular head, consistent with metastatic disease.    As she was not deemed a candidate for any further liver directed therapy, she was referred to medical oncology    Started Lenvima on 6/30/2020 and presents for follow up    Interval History:  Dania Albert presents for follow up with her daughter, Jessica. She is not feeling well.  - She started Lenvima on the 30th- noted some nausea after starting but is now noting progressive weakness and daughter is reporting some confusion and forgetfulness. This has worsened significantly over the past week. Dania is not providing a clear history, but is able to provide some information about how she is feeling. Her daughter is assisting with the rest of the history. There has not been somebody with Dania around the clock.   - In regards to the weakness/fatigue, this is described as whole body weakness. Dania does not feel one side is more weak than the other. This  "started with the initiation of Lenvima, but has now gotten progressively worse. She is also very tired and sleeping most of the day. She does not wake up to phone calls and Dania states she feels tired all the time. Dania mentioned that \"sometimes\" she has a headache, but says it is infrequent and not too bad. Dania denies any recent falls. Denies changes in vision or hearing.   - Jessica, Dania's daughter, mentioned the Dania has been very forgetful and repeating herself frequently. This is changes from her baseline. Dania states she does not feel confusion.    - She is noting frequent stools and urination. The stools are described as loose and occur whenever she eat. She also notes some frequent urination with some pain with urination. She denies blood in stool or urine. She has been wearing Depends and puts celio pads down on bed as she feels she tired/weak that she is not getting out of bed.   - Medication administration overall is unclear as Dania is unable to provide history. Dania thinks she is take 2 oxycodone pills in the morning, afternoon, and evening. She is unsure about her other medications.   - She is noting all over body pain, but worse on right shoulder, right side of abdomen, and lower abdomen. The pain makes it difficulty to walk.  - She thinks her shortness of breath is stable, but has not been moving around. She is noting a mostly dry, sometimes productive, cough. Unclear on when this developed or if it is changing. Notes occasional right sided chest pain, but unsure on duration/timing of this pain.   - Dania takes a tab that dissolves on her tongue for nausea- states this helps but is unsure how often she is taking it.   - She is noting some pain in her mouth. Carbonated drinks and a brat made it worse.   - She did receive dental clearance for Zometa. Has not met with palliative care.        Current Outpatient Medications   Medication Sig Dispense Refill     acetaminophen (TYLENOL) 500 MG tablet " Take 1,000 mg by mouth       albuterol (PROAIR HFA) 108 (90 Base) MCG/ACT inhaler INHALE 2 PUFFS 4 TIMES DAILY       blood glucose monitoring (ONE TOUCH ULTRA MINI) meter device kit Use as directed to test glucose three times daily. Pharmacy dispense brand based on insurance.  Indications: Diabetes       calcium carbonate-vitamin D (OSCAL W/D) 500-200 MG-UNIT tablet Take 1 tablet by mouth 2 times daily (with meals) 60 tablet 3     cholecalciferol (VITAMIN D3) 1000 units (25 mcg) capsule Take 1 capsule (1,000 Units) by mouth daily 30 capsule 3     insulin glargine (LANTUS SOLOSTAR) 100 UNIT/ML pen Inject 38 Units Subcutaneous       Lenvatinib 12 MG, 3 x 4 mg capsules, (LENVIMA) 3 x 4 MG capsule Take 3 capsules (12 mg) by mouth daily 90 capsule 0     nicotine (NICODERM CQ) 14 MG/24HR 24 hr patch Place 1 patch onto the skin every 24 hours (Patient not taking: Reported on 5/28/2020) 30 patch 3     nicotine (NICORETTE) 2 MG gum Place 1 each (2 mg) inside cheek as needed for smoking cessation (Patient not taking: Reported on 5/28/2020) 60 each 3     ondansetron (ZOFRAN) 8 MG tablet Take 1 tablet (8 mg) by mouth daily Take prior to each lenvatinib dose and then every 8 hours as needed. 30 tablet 3     OneTouch Delica Lancets 33G MISC Change lancet one time daily as directed.       order for DME Abdominal Binder - small (Patient not taking: Reported on 6/5/2020) 1 each 1     oxyCODONE (ROXICODONE) 5 MG tablet Take 1-2 tablets (5-10 mg) by mouth every 6 hours as needed for severe pain 60 tablet 0     pantoprazole (PROTONIX) 40 MG EC tablet TAKE ONE TABLET BY MOUTH EVERY DAY       prochlorperazine (COMPAZINE) 10 MG tablet Take 1 tablet (10 mg) by mouth every 6 hours as needed (Nausea/Vomiting) 30 tablet 2     QUEtiapine (SEROQUEL) 50 MG tablet Take 50 mg by mouth At Bedtime  0     sertraline (ZOLOFT) 100 MG tablet TAKE 2 TABLETS BY MOUTH EVERY DAY       TRULICITY 0.75 MG/0.5ML pen INJECT 0.5 ml's SUBCUTANEOUSLY ONCE WEEKLY  2        Physical Examination:  There were no vitals taken for this visit.  Wt Readings from Last 10 Encounters:   06/29/20 79.2 kg (174 lb 11.2 oz)   05/28/20 78.5 kg (173 lb)   05/21/20 78.5 kg (173 lb)   03/17/20 78.7 kg (173 lb 8 oz)   03/02/20 78.7 kg (173 lb 8 oz)   02/06/20 80.2 kg (176 lb 12.8 oz)   01/31/20 78.9 kg (174 lb)   12/17/19 78.9 kg (174 lb)   12/10/19 80.2 kg (176 lb 11.2 oz)   11/13/19 79.8 kg (176 lb)     Constitutional: Fatigued, slowed movement. Does not appear to be in acute distress.   Eyes: EOMI, PERRL. No scleral icterus.  ENT: Oral mucosa is moist without lesions or thrush.   Lymphatic: Neck is supple without cervical or supraclavicular lymphadenopathy. No axillary lymphadenopathy.  Cardiovascular: Regular rate and rhythm. No murmurs, gallops, or rubs. No peripheral edema.  Respiratory: Crackles throughout bilateral lung fields- some clear with cough. No wheezing.    Neurologic: Slowed movements, difficulty following commands. No asterixis noted. Generalized but equal weakness with arms and legs.   Skin: Clubbing of fingers with erythema to nail beds.     Laboratory Data:  Results for ROBBIE RAWLS (MRN [id number]) as of 7/16/2020 16:09   Ref. Range 7/16/2020 13:49   Sodium Latest Ref Range: 133 - 144 mmol/L 138   Potassium Latest Ref Range: 3.4 - 5.3 mmol/L 3.9   Chloride Latest Ref Range: 94 - 109 mmol/L 106   Carbon Dioxide Latest Ref Range: 20 - 32 mmol/L 27   Urea Nitrogen Latest Ref Range: 7 - 30 mg/dL 12   Creatinine Latest Ref Range: 0.52 - 1.04 mg/dL 0.56   GFR Estimate Latest Ref Range: >60 mL/min/1.73_m2 >90   GFR Estimate If Black Latest Ref Range: >60 mL/min/1.73_m2 >90   Calcium Latest Ref Range: 8.5 - 10.1 mg/dL 8.3 (L)   Anion Gap Latest Ref Range: 3 - 14 mmol/L 5   Albumin Latest Ref Range: 3.4 - 5.0 g/dL 3.2 (L)   Protein Total Latest Ref Range: 6.8 - 8.8 g/dL 7.5   Bilirubin Total Latest Ref Range: 0.2 - 1.3 mg/dL 1.0   Alkaline Phosphatase Latest Ref Range: 40 - 150  U/L 226 (H)   ALT Latest Ref Range: 0 - 50 U/L 35   AST Latest Ref Range: 0 - 45 U/L 45   Bilirubin Direct Latest Ref Range: 0.0 - 0.2 mg/dL 0.2   Glucose Latest Ref Range: 70 - 99 mg/dL 143 (H)   WBC Latest Ref Range: 4.0 - 11.0 10e9/L 2.3 (L)   Hemoglobin Latest Ref Range: 11.7 - 15.7 g/dL 12.9   Hematocrit Latest Ref Range: 35.0 - 47.0 % 38.2   Platelet Count Latest Ref Range: 150 - 450 10e9/L 33 (LL)   RBC Count Latest Ref Range: 3.8 - 5.2 10e12/L 3.74 (L)   MCV Latest Ref Range: 78 - 100 fl 102 (H)   MCH Latest Ref Range: 26.5 - 33.0 pg 34.5 (H)   MCHC Latest Ref Range: 31.5 - 36.5 g/dL 33.8   RDW Latest Ref Range: 10.0 - 15.0 % 15.1 (H)   INR Latest Ref Range: 0.86 - 1.14  1.26 (H)         Assessment and Plan:  1. Metastatic HCC, bone scan findings are consistent with metastatic disease.  - started Lenvima 12 mg on 6/30/2020 and now with progressive weakness and new AMS. Will send to ED for work up and potential admission. Would recommend holding Lenvima to assess status off drug. She is also noting some mouth irritation and nausea on drug, but the remainder of the history is unclear given her current mental state.   - EKG done today with QTc of 463.   - Plan for repeat scans after 2 months of therapy, but this is dependent on work up for AMS.   -discussed a palliative care consultation previously, will address at next OV.     2. Altered mental status, progressive weakness, fatigue.   - Unclear etiology. No obvious abnormality on blood work from today.  - Given thrombocytopenia, will need to rule out brain bleeding. She is also having urinary frequency, frequent stools, and a cough- so infection may be contributing. Hepatic encephalopathy is also in the differential. Unclear if any medications are contributing given limited history of meds from patient. Lenvima can cause RPLS, so if other work up is unremarkable, MRI brain should also be considered.   - Given the complexity of work up and need for head CT to  rule out bleed, will send to ED to initiate work up.      3. Bone mets to the rib and clavicle  - Planned to does Zometa, yet hold today as she is going to the ED.  - she has obtained dental clearance   - Will ensure she is taking Ca + D at next OV.      4. Pain s/t maligancy  -History of  RUQ/flank pain, likely related to mets/tumors, but now with diffuse body pain. She does feels 10 mg of oxycodone three times a day controls pain, but history is unclear today due to AMS.      5. Hep C related cirrhosis with  portal HTN and splenomegaly with associated thrombocytopenia    6. Diabetes, on insulin therapy. Blood glucose 143 today.     7. Mouth irritation, likely due to lenvima. No abnormalities noted on exam.    8. Nausea, lenvima contributing. Per pt report, Zofran helps with nausea.     Alesha Medina PA-C  [facility]  [address] [place] [address]  [phone]    The patient was seen in conjunction with Alesha Medina PA-C who served as a scribe for today's visit. I have reviewed the note and agree with the above findings and plan.   TW

## 2020-07-17 PROBLEM — R41.0 CONFUSION: Status: ACTIVE | Noted: 2020-01-01

## 2020-07-17 NOTE — PROGRESS NOTES
Nursing Focus: Admission    D: Arrived at 1230 from ED via cart. Patient accompanied by daughter and transport aides. Admitted for confusion. Complains of back pain with movement.      I: Admission process began.  Patient oriented to room, enviroment, call light.  Md. notified of patients arrival on unit.     A: Vital signs stable, afebrile.  Patient stable at this time.  Complaining of back pain with movement.     P: Implement plan of care when available. Continue to monitor patient. Nursing interventions as appropriate. Notify md with changes in pt status.

## 2020-07-17 NOTE — H&P
"      [facility], [place]    History and Physical - Hospitalist Service, Gold 11       Date of Admission:  7/16/2020    Assessment & Plan   Dania Albert is a 54 year old female with hepatocellular carcinoma and Hep C cirrhosis on lenvima for HCC recurrence with mets here with fatigue and confusion.    Confusion -  Head CT showed, \"Vague low-attenuation left cerebellar hemisphere. This could represent edema or be artifactual in nature. Recommend clinical correlation and a possible MRI brain for further evaluation.\"  Neurology did not feel the cerebellar changes explained sx, but said they would see in consultation and discuss further.  Consider MRI in am to evaluate further.  Electrolytes normal.  Ammonia not elevated and no asterixes on exam.  No hypoglycemia.  May have some mild delirium due to illness or infx.  Urinalysis pending.  No pulm infx on CT.  Blood cx pending.  Afebrile.  Evid of colitis on CT.  - zosyn pending further w/u cx results  - consider relation to CA or Lenvima given timing of sx  - d/w neuro MRI or further eval for cerebellar changes on CT.    Colitis - Patient with long h/o diarrhea and CT here showing \"mild bowel wall thickening involving the rectum and throughout the entirety of the colon as well as the distal small bowel. Findings can be seen with a diffuse infectious or inflammatory etiology. Underlying colitis could have this appearance\"  Daughter states that Dania has dx of ulcerative colitis, but I do not see this documented.  ED started Zosyn for possible infx colitis, will cont for now.  Stool for cdif neg.  Enteric pathogens pending.    - stool for SSCE  - consider GI consult  - zosyn     Hepatocellular Carcinoma  She was found to have HCC in June 2019 and had TACE/Ablation for S3 lesion on Oct 2019 and then found to have a new S3 lesion for which she had a MWA on 3/17/2020.  Bone Scan in Dec 2019 was negative for metastatic disease.  CTA Chest in " October 2019 was negative for metastatic disease.  A follow up MRI on 5/8/2020 showed no residual viable cancer in the treatment zones but it found dramatic increase in number and sizes of numerous arterial enhancing foci scattered throughout the liver parenchyma which was very suspicious for multifocal HCC. liver biopsy confirmed moderately differentiated HCC.  Started Lenvima on 6/30/20.    - d/w onc continuing Lenvima and if may relate to sx fatigue and cloudy thinking      Hep C Cirrhosis - previously treated with Sovaldi/ribavirin and achived sustained virologic response.    Diabetes Mellitus type 2 -   Usually takes 38 unit(s) lantus, will decrease to 20 unit(s) as not taking good po  Holding trulicity for now  Medium insulin resistance correction scale with meals    COPD -   Cont PTA albuterol prn    Depression - Continue home meds     Diet: regular  DVT Prophylaxis: Pneumatic Compression Devices  Valdes Catheter: not present  Code Status: Full         Disposition Plan   Expected discharge: 2 - 3 days, recommended to prior living arrangement once Confusion addressed.  Entered: Andrew Mack MD 07/17/2020, 3:16 AM     The patient's care was discussed with the Patient and Patient's Family.    Andrew Mack MD  [facility], [place]  Pager: [phone]  Please see sticky note for cross cover information  ______________________________________________________________________    Chief Complaint   Fatigue, confusion    History is obtained from the patient, daughter and medical record    History of Present Illness   Dania Albert is a 54 year old female who is undergoing trx with Lenvima for HCC who presented to clinic today for f/u.  Daughter reports that for the last 1 1/2 weeks Dania has been a little more forgetful and occasionally seemed a little foggy in her thinking.  They had attributed it to the chemo, but when showed up in clinic today they did not feel that was the case  and said she should come in.  She also reports feeling very weak.  Just total body fatigue.  No more on one side than another.  She also reports non-bloody diarrhea that she has attributed to her ulcerative colitis, but she thinks that is getting worse.  She does also c/o RUQ pain for which she takes oxycodone.      Review of Systems    The 10 point Review of Systems is negative other than noted in the HPI     Past Medical History    I have reviewed this patient's medical history and updated it with pertinent information if needed.   Past Medical History:   Diagnosis Date     Alcohol abuse      Bone metastasis (H) 7/15/2020     Chronic hepatitis C without hepatic coma (H) 10/23/2019    SVR     Cirrhosis of liver with ascites (H) 10/23/2019     COPD (chronic obstructive pulmonary disease) (H)      Depressive disorder      Diabetes (H)     Type 1 DM, Takes Insulin      Emphysema lung (H)      H/O esophageal varices      HCC (hepatocellular carcinoma) (H) 10/23/2019     History of blood transfusion     [facility] in 1983     ERICKA (obstructive sleep apnea)        Past Surgical History   I have reviewed this patient's surgical history and updated it with pertinent information if needed.  Past Surgical History:   Procedure Laterality Date     ABDOMEN SURGERY      Gallbladder Removed      COLONOSCOPY      [place]     CYSTOSCOPY, INJECT BOTOX      detrusor injection     GI SURGERY      Upper Endoscopy at [facility]      HERNIA REPAIR      Patient doesnt remember if mesh was used. [facility]      IR FLUORO 0-1 HOUR  3/17/2020     IR FLUORO 0-1 HOUR  3/17/2020     OPEN REDUCTION INTERNAL FIXATION WRIST Bilateral        Social History   I have reviewed this patient's social history and updated it with pertinent information if needed.      Family History   I have reviewed this patient's family history and updated it with pertinent information if needed.  Family History   Problem Relation Age of Onset     Coronary  Artery Disease Mother      Coronary Artery Disease Father      No Known Problems Brother      Meniere's disease Sister      Diabetes Sister      No Known Problems Son      No Known Problems Daughter      Diabetes Sister      Liver Disease Sister      Chronic Obstructive Pulmonary Disease Sister        Prior to Admission Medications   Prior to Admission Medications   Prescriptions Last Dose Informant Patient Reported? Taking?   Lenvatinib 12 MG, 3 x 4 mg capsules, (LENVIMA) 3 x 4 MG capsule 7/16/2020  No Yes   Sig: Take 3 capsules (12 mg) by mouth daily   OneTouch Delica Lancets 33G MISC 7/16/2020  Yes Yes   Sig: Change lancet one time daily as directed.   QUEtiapine (SEROQUEL) 50 MG tablet 7/16/2020  Yes Yes   Sig: Take 50 mg by mouth At Bedtime   TRULICITY 0.75 MG/0.5ML pen 7/16/2020  Yes Yes   Sig: INJECT 0.5 ml's SUBCUTANEOUSLY ONCE WEEKLY   acetaminophen (TYLENOL) 500 MG tablet 7/16/2020  Yes Yes   Sig: Take 1,000 mg by mouth   albuterol (PROAIR HFA) 108 (90 Base) MCG/ACT inhaler 7/16/2020  Yes Yes   Sig: INHALE 2 PUFFS 4 TIMES DAILY   blood glucose monitoring (ONE TOUCH ULTRA MINI) meter device kit 7/16/2020  Yes Yes   Sig: Use as directed to test glucose three times daily. Pharmacy dispense brand based on insurance.  Indications: Diabetes   calcium carbonate-vitamin D (OSCAL W/D) 500-200 MG-UNIT tablet 7/16/2020  No Yes   Sig: Take 1 tablet by mouth 2 times daily (with meals)   cholecalciferol (VITAMIN D3) 1000 units (25 mcg) capsule 7/16/2020  No Yes   Sig: Take 1 capsule (1,000 Units) by mouth daily   insulin glargine (LANTUS SOLOSTAR) 100 UNIT/ML pen 7/16/2020  Yes Yes   Sig: Inject 38 Units Subcutaneous   nicotine (NICODERM CQ) 14 MG/24HR 24 hr patch 7/16/2020  No Yes   Sig: Place 1 patch onto the skin every 24 hours   nicotine (NICORETTE) 2 MG gum 7/16/2020  No Yes   Sig: Place 1 each (2 mg) inside cheek as needed for smoking cessation   ondansetron (ZOFRAN) 8 MG tablet 7/16/2020  No Yes   Sig: Take 1  tablet (8 mg) by mouth daily Take prior to each lenvatinib dose and then every 8 hours as needed.   order for DME 7/16/2020  No Yes   Sig: Abdominal Binder - small   oxyCODONE (ROXICODONE) 5 MG tablet 7/16/2020  No Yes   Sig: Take 1-2 tablets (5-10 mg) by mouth every 6 hours as needed for severe pain   pantoprazole (PROTONIX) 40 MG EC tablet 7/16/2020  Yes Yes   Sig: TAKE ONE TABLET BY MOUTH EVERY DAY   prochlorperazine (COMPAZINE) 10 MG tablet 7/16/2020  No Yes   Sig: Take 1 tablet (10 mg) by mouth every 6 hours as needed (Nausea/Vomiting)   sertraline (ZOLOFT) 100 MG tablet 7/16/2020  Yes Yes   Sig: TAKE 2 TABLETS BY MOUTH EVERY DAY      Facility-Administered Medications: None     Allergies   Allergies   Allergen Reactions     Bee Venom Other (See Comments) and Swelling     Hand swelled up badly.       Hydrocodone Other (See Comments)     nausea     Nickel Rash     Wool Fiber Hives and Rash       Physical Exam   Vital Signs: Temp: 97.7  F (36.5  C) Temp src: Oral BP: (!) 142/88 Pulse: 60   Resp: 16 SpO2: 96 %      Weight: 174 lbs 9.67 oz    Tired appearing lying in bed  Lung CTA  Heart RRR 2/6 sys m  Abd nontender +BS  Ext well perfused, no asterixes  Neuro - tired, but awakens and is alert, oriented to person, place, situation, not sure on date, but knows it's Thursday, and July 2020    Data   Data reviewed today: I reviewed all medications, new labs and imaging results over the last 24 hours. I personally reviewed CT Chest and Abd    Most Recent 3 CBC's:  Recent Labs   Lab Test 07/16/20  1349 05/21/20  1155 05/08/20  1344 03/17/20  0946   WBC 2.3*  --  3.0* 3.3*   HGB 12.9 12.1 12.2 13.2   *  --  102* 102*   PLT 33* 38* 40* 51*     Most Recent 3 BMP's:  Recent Labs   Lab Test 07/16/20  1349 06/12/20 05/08/20  1344 03/17/20  0946     --  136 137   POTASSIUM 3.9 3.4* 3.4 3.9   CHLORIDE 106  --  103 105   CO2 27  --  26 26   BUN 12  --  8 12   CR 0.56 0.58 0.49* 0.59   ANIONGAP 5  --  6 6   LETY 8.3*   --  8.9 8.9   * 137* 160* 181*     Most Recent 2 LFT's:  Recent Labs   Lab Test 07/16/20  1349 06/12/20 05/08/20  1344   AST 45 36 29   ALT 35 26 31   ALKPHOS 226*  --  151*   BILITOTAL 1.0  --  1.2     Recent Results (from the past 24 hour(s))   CT Chest Pulmonary Embolism w Contrast    Narrative    EXAM: CT CHEST PULMONARY EMBOLISM W CONTRAST  LOCATION: [facility]  DATE/TIME: 7/17/2020 12:15 AM    INDICATION: Chest pain and shortness of breath in a patient with history of known hepatocellular carcinoma. History of prior ablations and chemotherapeutic treatment.    COMPARISON: 6/12/2020.    TECHNIQUE: CT chest pulmonary angiogram during arterial phase injection of IV contrast. Multiplanar reformats and MIP reconstructions were performed. Dose reduction techniques were used.     CONTRAST: 107 mL Isovue 370.     FINDINGS:  ANGIOGRAM CHEST: Pulmonary arteries are normal caliber and negative for pulmonary emboli. Normal caliber thoracic aorta without dissection. Atherosclerotic vascular calcification including moderate coronary artery calcification.    LUNGS AND PLEURA: Moderate emphysema. Diffuse interstitial peripheral fibrotic changes in both lungs. No evidence for acute focal alveolar infiltrate, consolidation or pleural fluid.    MEDIASTINUM/AXILLAE: Minimal sliding esophageal hiatal hernia with periesophageal varices. Normal heart size. No pericardial fluid. No lymphadenopathy.    UPPER ABDOMEN: Hepatic cirrhosis. Partially visualized splenomegaly. No ascites. Posttreatment changes related to prior ablation. Cholecystectomy.    MUSCULOSKELETAL: Minimal degenerative changes in the spine.      Impression    IMPRESSION:  1.  No pulmonary embolism.    2.  Normal caliber thoracic aorta without dissection. Moderate atherosclerotic vascular calcification involving the coronary arteries.    3.  Peripheral interstitial fibrotic changes in both lungs with underlying moderate emphysema. No evidence for  acute focal alveolar infiltrate, consolidation or pleural fluid.    4.  Hepatic cirrhosis with partially visualized splenomegaly. No ascites. Posttreatment changes related to prior ablation and known history of hepatocellular carcinoma.     Head CT w/o contrast    Narrative    EXAM: CT HEAD W/O CONTRAST  LOCATION: [facility]  DATE/TIME: 7/17/2020 12:15 AM    INDICATION: Confusion, cancer patient and thrombocytopenia.  COMPARISON: None.  TECHNIQUE: Routine without IV contrast. Multiplanar reformats. Dose reduction techniques were used.    FINDINGS:  INTRACRANIAL CONTENTS: No intracranial hemorrhage, extraaxial collection, or mass effect.  No CT evidence of acute infarct. There is ill-defined low-attenuation involving the left cerebellar hemisphere as seen on axial images #7 through 9. This could be   artifactual in nature or possibly represent edema. Recommend clinical correlation. Normal ventricles and sulci.     VISUALIZED ORBITS/SINUSES/MASTOIDS: No intraorbital abnormality. No paranasal sinus mucosal disease. No middle ear or mastoid effusion.    BONES/SOFT TISSUES: No acute abnormality.      Impression    IMPRESSION:  1.  Vague low-attenuation left cerebellar hemisphere. This could represent edema or be artifactual in nature. Recommend clinical correlation and a possible MRI brain for further evaluation.  2.  No evidence of a midline shift or hemorrhage.   CT Abdomen Pelvis w Contrast    Narrative    EXAM: CT ABDOMEN PELVIS W CONTRAST  LOCATION: [facility]  DATE/TIME: 7/17/2020 12:15 AM    INDICATION: Right-sided abdominal pain. History of hepatocellular carcinoma with prior ablation.    COMPARISON: 5/21/2020, 6/12/2020.    TECHNIQUE: CT scan of the abdomen and pelvis was performed following injection of IV contrast. Multiplanar reformats were obtained. Dose reduction techniques were used.    CONTRAST: 107 mL Isovue.    FINDINGS:   LOWER CHEST: Underlying peripheral interstitial  fibrotic changes involving both lower lungs. No focal infiltrate, consolidation or pleural fluid.    HEPATOBILIARY: No significant change in the high-density area of posttreatment related change involving the inferior aspect left hepatic lobe. Immediately superior and lateral to this a low-density lesion remains stable. Low-attenuation 1.4 cm lesion   medial aspect right hepatic lobe new since prior and could be related to treatment change given a prior lesion was present at this location on the study of 6/12/2020.  New low-attenuation area involving the anterior aspect of the right hepatic lobe measuring 1.5 cm. This was also present in an area of an enhancing arterial lesion on the prior study. Another low-attenuation 1.8 cm lesion is now present at an area of a   prior arterial enhancing 2.8 cm lesion on the prior study. Diffuse surface contour nodularity to the liver. Recanalization of the umbilical vein. Interval advancement of the amount of portal venous thrombus involving the main portal vein and extending   into the left intrahepatic portal venous system.    PANCREAS: No significant mass, duct dilatation, or inflammatory change.    SPLEEN: Stable splenomegaly with splenic vein patent.    ADRENAL GLANDS: No significant nodules.    KIDNEYS/BLADDER: Unchanged left lower pole renal cyst for which no further follow-up is necessary. No urinary collecting system dilatation or calculi. Bladder unremarkable.    BOWEL: Interval development of mild bowel wall thickening involving the rectum and throughout the colon. No small bowel dilatation or obstruction. Periesophageal varices with minimal sliding esophageal hiatal hernia. Stomach unremarkable. Duodenal   rotational abnormality resulting in right-sided small bowel and left-sided colonic placement.    LYMPH NODES: No lymphadenopathy.    VASCULATURE: Normal caliber abdominal aorta. Moderate atherosclerotic vascular calcification.    PELVIC ORGANS: No overt uterine or  adnexal abnormality.    MUSCULOSKELETAL: Chronic bilateral L5 pars interarticularis defects with grade 1 spondylolisthesis of L5 on S1 with minimal associated degenerative disc disease.      Impression    IMPRESSION:   1.  Interval advancement of portal venous thrombus involving the main portal vein and left intrahepatic portal venous system as detailed above.    2.  Interval development of mild bowel wall thickening involving the rectum and throughout the entirety of the colon as well as the distal small bowel. Findings can be seen with a diffuse infectious or inflammatory etiology. Underlying colitis could have   this appearance.    3.  Interval development of low-attenuation lesions and prior areas of enhancing lesions involving the liver as detailed above. These low-attenuation lesions are smaller than the arterial enhancing lesions on the study from June of 2020 and likely   reflect posttreatment change.    4.  Stable splenomegaly without ascites.    5.  Underlying unchanged peripheral interstitial fibrotic changes in the lower lungs.

## 2020-07-17 NOTE — PROGRESS NOTES
"CLINICAL NUTRITION SERVICES - ASSESSMENT NOTE     Nutrition Prescription    RECOMMENDATIONS FOR MDs/PROVIDERS TO ORDER:  Recommend checking Mg, PO4 and Zinc to determine need for replacement secondary to h/o diarrhea on admit    Malnutrition Status:    Unable to determine at this time    Recommendations already ordered by Registered Dietitian (RD):  - Order calorie ocunts starting 7/18 x 3 days to help quantify po adequacy  - Order prn supplements     Future/Additional Recommendations:  - If results of calorie count collection meeting < 75% of pt's estimated nutritional needs (< 1100 calories and 50 g PRO), recommend initiating TF therapy with placement of post-pyloric FT. If TF warranted, recommended ordering consult for RD to order TF. Recommendations as follows; Peptamen 1.5 @ 10 ml/hr x 8 hrs with adv by 10 ml every 8 hrs (pending status of K+, Mg and PO4) to goal 45 ml/hr. Regimen to provide 1080 ml/day,1620 kcals (29 kcal/kg/day), 73 g PRO (1.3 g/kg/day), 832 ml free H2O, 60 g Fat (70% from MCTs), 203 g CHO and no Fiber daily.       REASON FOR ASSESSMENT  Dania Albert is a/an 54 year old female assessed by the dietitian for Admission Nutrition Risk Screen for reduced oral intake over the last month    NUTRITION HISTORY  Unable to obtain from pt d/t confusion per RN. Per H&P, pt admitted with fatigue and confusion.    CURRENT NUTRITION ORDERS  Diet: Regular  Intake/Tolerance: Per RN, poor d/t lack of appetite per pt's daughter report in ED. Needs encouragement to eat.    LABS  No Mg or PO4 ordered    MEDICATIONS  Zofran ordered prn every 6 hrs (PO/IV) for N/V    ANTHROPOMETRICS  Height: 154.9 cm (5' 1\")  Most Recent Weight: 79.2 kg (174 lb 9.7 oz) - admit wt (7/16)   IBW: 47.7 kg  BMI: Obesity Grade I BMI 30-34.9  Weight History: No significant wt changes noted per review of EMR  Wt Readings from Last 10 Encounters:   07/16/20 79.2 kg (174 lb 9.7 oz)   06/29/20 79.2 kg (174 lb 11.2 oz)   05/28/20 78.5 kg " "(173 lb)   05/21/20 78.5 kg (173 lb)   03/17/20 78.7 kg (173 lb 8 oz)   03/02/20 78.7 kg (173 lb 8 oz)   02/06/20 80.2 kg (176 lb 12.8 oz)   01/31/20 78.9 kg (174 lb)   12/17/19 78.9 kg (174 lb)   12/10/19 80.2 kg (176 lb 11.2 oz)     Dosing Weight: 56 kg (adjusted based on admit wt and IBW)    ASSESSED NUTRITION NEEDS  Estimated Energy Needs: 4266-7860 kcals/day (25 - 30 kcals/kg)  Justification: Maintenance  Estimated Protein Needs: 67-84 grams protein/day (1.2 - 1.5 grams of pro/kg)  Justification: Repletion  Estimated Fluid Needs: (1 mL/kcal)   Justification: Maintenance    PHYSICAL FINDINGS  See malnutrition section below.  Per chart review, pt with h/o diarrhea and CT showing \"mild bowel wall thickening involving the rectum and throughout the  the entire colon as well as the distal small bowel.     MALNUTRITION  % Intake: Unable to assess  % Weight Loss: None noted  Subcutaneous Fat Loss: Unable to assess  Muscle Loss: Unable to assess  Fluid Accumulation/Edema: Unable to assess  Malnutrition Diagnosis: Unable to determine due to unable to complete all parameters of nutrition assessment at this time.    NUTRITION DIAGNOSIS  Inadequate oral intake related to question lack of appetite d/t confusion and fatigue as evidenced by RN report of reduced oral intakes over the past month.      INTERVENTIONS  Implementation  Nutrition Education: Not appropriate at this time due to patient condition   Collaboration with RN regarding nutritional referral  Medical food supplement therapy     Goals  1. Ave po intakes x 3 days to meet at least 75% of pt's estimated nutritional needs (at least 1100 calories and 50 g PRO) vs need for TF support.     Monitoring/Evaluation  Progress toward goals will be monitored and evaluated per protocol.    Yenny De La Rosa RD,LD  7D pager [phone]  "

## 2020-07-17 NOTE — UTILIZATION REVIEW
[facility]  Admission Status; Secondary Review Determination   Admission date:  7/16/2020  6:48 PM    Under the authority of the Utilization Management Committee, the utilization review process indicated a secondary review on the above patient. The review outcome is based on review of the medical records, discussions with staff, and applying clinical experience noted on the date of the review.     (x) Inpatient Status Appropriate - This patient's medical care is consistent with medical management for inpatient care and reasonable inpatient medical practice.     RATIONALE FOR DETERMINATION   54-year-old female with a history of hepatitis C, hepatocellular carcinoma, and cirrhosis on Lenvima was admitted yesterday with encephalopathy and fatigue.  Head CT showed a low attenuation left cerebellar hemisphere lesion.  Neurology consult has been ordered.  MRI will be considered.  Patient also has diarrhea and colitis seen on imaging with gastroenterology consultation requested.  Currently on Zosyn.  Oncology consultation is also been requested due to history of hepatocellular carcinoma on treatment.  This patient has multiple ongoing problems, is extremely complicated, is at risk for clinical deterioration, will likely require a several day hospitalization, and is appropriate for inpatient hospitalization at the time of this review.  The severity of illness, intensity of service provided, expected LOS and risk for adverse outcome make the care complex, high risk and appropriate for hospital admission.    At the time of admission with the information available to the attending physician more than 2 nights Hospital complex care was anticipated, based on patient risk of adverse outcome if treated as outpatient and complex care required.  Inpatient admission is appropriate based on the Medicare guidelines.      The information on this document is developed by the utilization review team in order for the business office to ensure  compliance. This only denotes the appropriateness of proper admission status and does not reflect the quality of care rendered.   The definitions of Inpatient Status and Observation Status used in making the determination above are those provided in the CMS Coverage Manual, Chapter 1 and Chapter 6, section 70.4.     Sincerely,   Ernst Mcelroy DO MPH   Physician Advisor  Utilization Review  [facility]

## 2020-07-17 NOTE — PROGRESS NOTES
GASTROENTEROLOGY PROGRESS NOTE    Date: 07/17/2020  Admit Date: 7/16/2020       ASSESSMENT AND RECOMMENDATIONS:     ASSESSMENT:  54 year old female with a history of HCV cirrhosis complicated by esophageal varices, metastatic HCC diagnosed in 2019 status post TACE and MWA, on Lenvima and chronic diarrhea who was admitted with altered mental status.      Non-occlusive thrombus in Lt and main portal vein  HCV cirrhosis  Metastatic HCC status post TACE and MWA  Diffuse colonic thickening ~terminal ileum  Pt had diffuse thickening which can be medication induced or due to portal hypertension and underlying liver disease causing edema in the bowel, less likely to be infectious given negative infectious panel. If there is persistent diarrhea, may consider checking O&P in light of her leukopenia. If there is evidence of rectal bleeding we may consider flex sig but as pt had normal colonoscopy, chances of abnormality would be less likely.    Pt has bland thrombus in Lt and main portal vein, given her chemotherapy and metastatic HCC risks vs benefits of anticoagulation may need further discussion but would leave it as per oncology.    MELD-Na score: 11 at 7/18/2020  6:06 AM  MELD score: 11 at 7/18/2020  6:06 AM  Calculated from:  Serum Creatinine: 0.79 mg/dL (Rounded to 1 mg/dL) at 7/18/2020  6:06 AM  Serum Sodium: 141 mmol/L (Rounded to 137 mmol/L) at 7/18/2020  6:06 AM  Total Bilirubin: 1.6 mg/dL at 7/18/2020  6:06 AM  INR(ratio): 1.29 at 7/18/2020  6:06 AM  Age: 54 years 7 months        RECOMMENDATIONS:  --Recommend checking urine culture  --Recommend checking O&P   --Recommend to start on Rifaximin 550mg BID given encephaloptahy  --Recommend monitoring stool output for amount, color and consistency  --Low threshold to start on low dose diuretics to decrease bowel edema and help with diarrhea, can start Spironolactone 50mg and later add Lasix 20mg    Gastroenterology follow up recommendations: Pending clinical  course.      Thank you for involving us in this patient's care. Please do not hesitate to contact the GI service with any questions or concerns.      Pt care plan discussed with Dr. Leventhal, GI staff physician.    This note was created with voice recognition software, and while reviewed for accuracy, typos may remain.    Elena Buck MD  GI Fellow  Pager: [phone]  _______________________________________________________________    Subjective\events within the 24 hours:     Pt was seen in am, answering few questions, c/o LLQ abd pain    4 point ROS performed and negative unless noted above.      Medications:     Current Facility-Administered Medications   Medication     acetaminophen (TYLENOL) tablet 1,000 mg     albuterol (PROAIR HFA/PROVENTIL HFA/VENTOLIN HFA) 108 (90 Base) MCG/ACT inhaler 2 puff     calcium carbonate-vitamin D (OSCAL w/D) per tablet 1 tablet     glucose gel 15-30 g    Or     dextrose 50 % injection 25-50 mL    Or     glucagon injection 1 mg     insulin aspart (NovoLOG) injection (RAPID ACTING)     insulin aspart (NovoLOG) injection (RAPID ACTING)     insulin glargine (LANTUS PEN) injection 20 Units     [Held by provider] Lenvatinib 12 MG (3 x 4 mg capsules) (LENVIMA) capsule 12 mg     lidocaine (LMX4) cream     lidocaine 1 % 0.1-1 mL     melatonin tablet 1 mg     naloxone (NARCAN) injection 0.1-0.4 mg     nicotine (NICODERM CQ) 14 MG/24HR 24 hr patch 1 patch     nicotine (NICORETTE) gum 2 mg     nicotine Patch in Place     ondansetron (ZOFRAN-ODT) ODT tab 4 mg    Or     ondansetron (ZOFRAN) injection 4 mg     oxyCODONE (ROXICODONE) tablet 5-10 mg     pantoprazole (PROTONIX) EC tablet 40 mg     piperacillin-tazobactam (ZOSYN) 3.375 g vial to attach to  mL bag     prochlorperazine (COMPAZINE) tablet 10 mg     QUEtiapine (SEROquel) tablet 50 mg     sertraline (ZOLOFT) tablet 25 mg     sodium chloride (PF) 0.9% PF flush 3 mL     sodium chloride (PF) 0.9% PF flush 3 mL     thiamine (B-1) 500 mg  "in sodium chloride 0.9 % 50 mL intermittent infusion     [START ON 7/18/2020] vitamin B1 (THIAMINE) tablet 100 mg     Vitamin D3 (CHOLECALCIFEROL) tablet 25 mcg       Physical Exam     Vital Signs:  BP (!) 150/89 (BP Location: Left arm)   Pulse 73   Temp 97.2  F (36.2  C) (Oral)   Resp 18   Ht 1.549 m (5' 1\")   Wt 79 kg (174 lb 2.6 oz)   SpO2 93%   BMI 32.91 kg/m       Gen: A&Ox2  HEENT: ncat, perrl, eomi  Neck: supple  CV: RRR, S1, S2 heard  Lungs: CTA b/l  Abd: +bs, soft, nd, LLQ tender  Skin: no jaundice  Extremities: 1+  edema  MS: appropriate muscle mass for age  Neuro: non focal       Data   LABS:  BMP  Recent Labs   Lab 07/17/20  0840 07/16/20  1349    138   POTASSIUM 3.6 3.9   CHLORIDE 108 106   LETY 8.5 8.3*   CO2 25 27   BUN 10 12   CR 0.62 0.56   * 143*     CBC  Recent Labs   Lab 07/17/20  0840 07/16/20  1349   WBC 1.6* 2.3*   RBC 3.61* 3.74*   HGB 12.5 12.9   HCT 37.8 38.2   * 102*   MCH 34.6* 34.5*   MCHC 33.1 33.8   RDW 15.3* 15.1*   PLT 27* 33*     INR  Recent Labs   Lab 07/16/20  1349   INR 1.26*     LFTs  Recent Labs   Lab 07/17/20  0840 07/16/20  1349   ALKPHOS 119 226*   AST 40 45   ALT 36 35   BILITOTAL 1.6* 1.0   PROTTOTAL 6.8 7.5   ALBUMIN 2.9* 3.2*      PANC  Recent Labs   Lab 07/16/20  1936   LIPASE 85     CT ABD:  IMPRESSION:   1.  Interval advancement of portal venous thrombus involving the main portal vein and left intrahepatic portal venous system as detailed above.     2.  Interval development of mild bowel wall thickening involving the rectum and throughout the entirety of the colon as well as the distal small bowel. Findings can be seen with a diffuse infectious or inflammatory etiology. Underlying colitis could have   this appearance.     3.  Interval development of low-attenuation lesions and prior areas of enhancing lesions involving the liver as detailed above. These low-attenuation lesions are smaller than the arterial enhancing lesions on the study from " June of 2020 and likely   reflect posttreatment change.     4.  Stable splenomegaly without ascites.     5.  Underlying unchanged peripheral interstitial fibrotic changes in the lower lungs.

## 2020-07-17 NOTE — CONSULTS
[facility], [place]    Hematology/Oncology Consult Note  Patient Name: Dania Albert   MRN: [id number]  YOB: 1965      Date of Service: July 17, 2020  Admission Date: 7/16/2020  Hospital Day # 0  Cancer Diagnosis: Hepatocellular carcinoma  Primary Outpatient Oncologist: Dr. Childress  Current Treatment Plan: Lenvatinib        Reason for Consult: HCC should Lenvima be continued and if may relate to sx fatigue and cloudy thinking      Assessment & Plan   Dania Albert is a 54 year old female with a PMH of HCC, hepatitis C with cirrhosis, COPD, DM type II, and depression who was admitted on 7/16/2020 with encephalopathy and weakness.     Patient presents with 2 weeks history of worsening encephalopathy and gait changes per daughter which started since starting lenvatinib. CT head with abnormality noted in left cerebellar hemisphere with unclear significance. Although rare PRES can be a side effect of VEGF-R1 inhibitors such as lenvatinib. Given this concern agree with neurology recommendations of brain MRI, also lenvatinib should be held. (Discussed holding medication with patient and daughter who agree).  In addition to this other causes of acute encephalopathy should be excluding, including complete infectious work up. Additionally, CT abdomen on admission with evidence of increase in known portal vein thrombus. In the setting of hepatocellular carcinoma suspect thrombus is secondary to tumor burden and do not recommend anticoagulation at this time. Despite the fact that clot is not amendable to anticoagulation, cannot exclude that increase clot burden is potentially contributing to a decompensated picture and hepatic encephalopathy despite ammonia of 53.     Hematology/Oncology Problem list:   # Acute Encephalopathy, concern for PRES in the setting of VEGF inhibitor  # Multifocal Hepatocellular carcinoma  # Partially occlusive thrombus main portal vein and left  intrahepatic portal venous system  # Thrombocytopenia secondary to Hep C Cirrhosis, portal HTN, and splenomegaly    Summary of Recommendations:    Hold levatinib given concern of contribution to acute encephalopathy (orders placed for your convenience)    Agree with recommendations by urology to complete brain MRI..    Agree with broad infectious work up, UA/UC not yet collected, if infectious work up negative and no other cause clear could give consideration to work up of colitis    Will defer to primary team to assess whether component of hepatic encephalopathy is contributing.     Will arrange oncology follow up prior to discharge and communicate with primary providers about certifying patient for [facility] for pain relief and appetite       Thank you for involving us in the care of this patient. We will continue to follow during the hospitalization.    Patient was seen and plan of care developed with .    April Mattson MD MS  Hematology Oncology Transplant Fellow      ______________________________________________________________________      History of Present Illness   Dania Albert is a 54 year old female with a PMH of HCC, hepatitis C with cirrhosis, COPD, DM type II, and depression who was admitted on 7/16/2020 with encephalopathy and weakness.     Daughter present for interview.   Seen in oncology clinic 7/16 and was found to have confusion and forgetfulness that progressively worsened over the course of 1 week. Weakness is describe as whole body weakness and is not worse on one side compared to the other. This was noted when starting lenvatinib. She reports being tired and sleeping most of the day. Per clinic documentation reported urinary frequency, frequent loose stools after eating, and cough. Patient has been noted to be forgetful and her daughter often needs to repeat herself frequently which is a change from baseline.     Oncology History:  - History of Hep C and cirrhosis  previously treated with sofosburvir and ribavirin and achieved sustained viral resopnse  -06/2019 Found to have HCC  - 10/2019 TACE/Ablation of S3 lesion  -12/2019 Bone can negative for metastatic disease  -3/2020 Found to have new S3 lesion and had microwave ablation  -5/8/2020 MRI showed no residual viable cancer treatment zones, despite dramatic increase in number and sizes ofarterial enhancing foci scattered throughout the liver parenchyma suspicious for multifocal HCC. Liver biopsy confirmed moderately differentiated HCC  - 5/29/20: Referred to medical oncology  -6/30/20: Started levatinib      Review of Systems    The 10 point Review of Systems is negative other than noted in the HPI or here.  (Completed with assistance of daughter)    Past Medical History    Past Medical History:   Diagnosis Date     Alcohol abuse      Bone metastasis (H) 7/15/2020     Chronic hepatitis C without hepatic coma (H) 10/23/2019    SVR     Cirrhosis of liver with ascites (H) 10/23/2019     COPD (chronic obstructive pulmonary disease) (H)      Depressive disorder      Diabetes (H)     Type 1 DM, Takes Insulin      Emphysema lung (H)      H/O esophageal varices      HCC (hepatocellular carcinoma) (H) 10/23/2019     History of blood transfusion     [facility] in 1983     ERICKA (obstructive sleep apnea)        Past Surgical History   Past Surgical History:   Procedure Laterality Date     ABDOMEN SURGERY      Gallbladder Removed      COLONOSCOPY      [place]     CYSTOSCOPY, INJECT BOTOX      detrusor injection     GI SURGERY      Upper Endoscopy at [facility]      HERNIA REPAIR      [facility] doesnt remember if [facility]      IR FLUORO 0-1 HOUR  3/17/2020     IR FLUORO 0-1 HOUR  3/17/2020     OPEN REDUCTION INTERNAL FIXATION WRIST Bilateral        Social History   Social History     Tobacco Use     Smoking status: Current Every Day Smoker     Packs/day: 0.30     Years: 40.00     Pack years: 12.00     Types:  Cigarettes     Smokeless tobacco: Never Used   Substance Use Topics     Alcohol use: Not Currently     Comment: Rarely drank alcohol.      Drug use: Not Currently       Family History   Family History   Problem Relation Age of Onset     Coronary Artery Disease Mother      Coronary Artery Disease Father      No Known Problems Brother      Meniere's disease Sister      Diabetes Sister      No Known Problems Son      No Known Problems Daughter      Diabetes Sister      Liver Disease Sister      Chronic Obstructive Pulmonary Disease Sister        Prior to Admission Medications   Prior to Admission Medications   Prescriptions Last Dose Informant Patient Reported? Taking?   Lenvatinib 12 MG, 3 x 4 mg capsules, (LENVIMA) 3 x 4 MG capsule 7/16/2020  No Yes   Sig: Take 3 capsules (12 mg) by mouth daily   OneTouch Delica Lancets 33G MISC Unknown at Unknown time  Yes No   Sig: Change lancet one time daily as directed.   QUEtiapine (SEROQUEL) 50 MG tablet Past Week at Unknown time  Yes Yes   Sig: Take 50 mg by mouth At Bedtime   TRULICITY 0.75 MG/0.5ML pen 7/16/2020  Yes Yes   Sig: INJECT 0.5 ml's SUBCUTANEOUSLY ONCE WEEKLY   acetaminophen (TYLENOL) 500 MG tablet Past Week at Unknown time  Yes Yes   Sig: Take 500 mg by mouth every 8 hours as needed    albuterol (PROAIR HFA) 108 (90 Base) MCG/ACT inhaler Past Month at Unknown time  Yes Yes   Sig: Inhale 2 puffs into the lungs every 6 hours as needed    blood glucose monitoring (ONE TOUCH ULTRA MINI) meter device kit Unknown at Unknown time  Yes No   Sig: Use as directed to test glucose three times daily. Pharmacy dispense brand based on insurance.  Indications: Diabetes   calcium carbonate-vitamin D (OSCAL W/D) 500-200 MG-UNIT tablet 7/16/2020  No Yes   Sig: Take 1 tablet by mouth 2 times daily (with meals)   cholecalciferol (VITAMIN D3) 1000 units (25 mcg) capsule 7/16/2020  No Yes   Sig: Take 1 capsule (1,000 Units) by mouth daily   insulin glargine (LANTUS SOLOSTAR) 100  UNIT/ML pen Past Week at Unknown time  Yes Yes   Sig: Inject 38 Units Subcutaneous At Bedtime    nicotine (NICODERM CQ) 14 MG/24HR 24 hr patch 7/16/2020  No Yes   Sig: Place 1 patch onto the skin every 24 hours   nicotine (NICORETTE) 2 MG gum Unknown at Unknown time  No No   Sig: Place 1 each (2 mg) inside cheek as needed for smoking cessation   ondansetron (ZOFRAN-ODT) 8 MG ODT tab 7/16/2020 at Unknown time  Yes Yes   Sig: Take 8 mg by mouth every 8 hours as needed for nausea And take 8mg prior to each dose of Levanitib.   order for DME Unknown at Unknown time  No No   Sig: Abdominal Binder - small   oxyCODONE (ROXICODONE) 5 MG tablet Past Week at Unknown time  No Yes   Sig: Take 1-2 tablets (5-10 mg) by mouth every 6 hours as needed for severe pain   pantoprazole (PROTONIX) 40 MG EC tablet 7/16/2020  Yes Yes   Sig: TAKE ONE TABLET BY MOUTH EVERY DAY   sertraline (ZOLOFT) 100 MG tablet 7/16/2020  Yes Yes   Sig: Take 200 mg by mouth daily       Facility-Administered Medications: None       Allergies      Allergies   Allergen Reactions     Bee Venom Other (See Comments) and Swelling     Hand swelled up badly.       Hydrocodone Other (See Comments)     nausea     Nickel Rash     Wool Fiber Hives and Rash       Physical Exam   Vital Signs: Temp: 96.7  F (35.9  C) Temp src: Oral BP: (!) 158/79 Pulse: 65   Resp: 18 SpO2: 93 % O2 Device: None (Room air)    Weight: 174 lbs 9.67 oz      GENERAL: Alert, interactive, NAD  HEENT: AT/NC,  EOMI, OP clear with MMM,  RESP: Comfortable breathing on room air, no use of accessory muscles of respiration, symmetrical chest rise  ABDOMEN: Soft, nontender,+BS  EXTREMITIES: Digital clubbing, No LE edema,  SKIN: Warm and dry, no jaundice or rash on exposed skin  NEURO: Able to ambulate unassisted. Speech clear and fluent, some subtle confusion in conversation needing frequent redirection and assurance        Data     Results for orders placed or performed during the hospital encounter of  07/16/20 (from the past 24 hour(s))   Ammonia   Result Value Ref Range    Ammonia 53 (H) 10 - 50 umol/L   Blood culture    Specimen: Arm, Right; Blood    Right Arm   Result Value Ref Range    Specimen Description Blood Right Arm     Culture Micro No growth after 9 hours    Partial thromboplastin time   Result Value Ref Range    PTT 27 22 - 37 sec   Troponin I   Result Value Ref Range    Troponin I ES <0.015 0.000 - 0.045 ug/L   Lipase   Result Value Ref Range    Lipase 85 73 - 393 U/L   Lactic acid whole blood   Result Value Ref Range    Lactic Acid 1.5 0.7 - 2.0 mmol/L   Blood culture    Specimen: Hand, Right; Blood    Right Arm   Result Value Ref Range    Specimen Description Blood Right Arm     Culture Micro No growth after 8 hours    Blood gas venous   Result Value Ref Range    Ph Venous 7.43 7.32 - 7.43 pH    PCO2 Venous 44 40 - 50 mm Hg    PO2 Venous 35 25 - 47 mm Hg    Bicarbonate Venous 29 (H) 21 - 28 mmol/L    Base Excess Venous 4.2 mmol/L    FIO2 21    EKG 12-lead, tracing only   Result Value Ref Range    Interpretation ECG Click View Image link to view waveform and result    Enteric Bacteria and Virus Panel by MARC Stool    Specimen: Feces   Result Value Ref Range    Campylobacter group by MARC Not Detected NDET^Not Detected    Salmonella species by MARC Not Detected NDET^Not Detected    Shigella species by MARC Not Detected NDET^Not Detected    Vibrio group by MARC Not Detected NDET^Not Detected    Rotavirus A by MARC Not Detected NDET^Not Detected    Shiga toxin 1 gene by MARC Not Detected NDET^Not Detected    Shiga toxin 2 gene by MARC Not Detected NDET^Not Detected    Norovirus I and II by MARC Not Detected NDET^Not Detected    Yersinia enterocolitica by MARC Not Detected NDET^Not Detected    Enteric pathogen comment       Testing performed by multiplexed, qualitative PCR using the [employer] Enteric   Pathogens Nucleic Acid Test. Results should not be used as the sole basis for diagnosis,   treatment,  or other patient management decisions.     Clostridium difficile toxin B PCR    Specimen: Feces   Result Value Ref Range    Specimen Description Feces     C Diff Toxin B PCR Negative NEG^Negative   Symptomatic COVID-19 Virus (Coronavirus) by PCR    Specimen: Nasopharyngeal   Result Value Ref Range    COVID-19 Virus PCR to [facility] - Source Nasopharyngeal     COVID-19 Virus PCR to [facility] - Result       Test received-See reflex to IDDL test SARS CoV2 (COVID-19) Virus RT-PCR   SARS-CoV-2 COVID-19 Virus (Coronavirus) RT-PCR Nasopharyngeal    Specimen: Nasopharyngeal   Result Value Ref Range    SARS-CoV-2 Virus Specimen Source Nasopharyngeal     SARS-CoV-2 PCR Result NEGATIVE     SARS-CoV-2 PCR Comment       Testing was performed using the [employer] X[employer] SARS-CoV-2 Assay on the Cepheid Gene-Xpert   Instrument Systems. Additional information about this Emergency Use Authorization (EUA)   assay can be found via the Lab Guide.     CT Chest Pulmonary Embolism w Contrast    Narrative    EXAM: CT CHEST PULMONARY EMBOLISM W CONTRAST  LOCATION: [facility]  DATE/TIME: 7/17/2020 12:15 AM    INDICATION: Chest pain and shortness of breath in a patient with history of known hepatocellular carcinoma. History of prior ablations and chemotherapeutic treatment.    COMPARISON: 6/12/2020.    TECHNIQUE: CT chest pulmonary angiogram during arterial phase injection of IV contrast. Multiplanar reformats and MIP reconstructions were performed. Dose reduction techniques were used.     CONTRAST: 107 mL Isovue 370.     FINDINGS:  ANGIOGRAM CHEST: Pulmonary arteries are normal caliber and negative for pulmonary emboli. Normal caliber thoracic aorta without dissection. Atherosclerotic vascular calcification including moderate coronary artery calcification.    LUNGS AND PLEURA: Moderate emphysema. Diffuse interstitial peripheral fibrotic changes in both lungs. No evidence for acute focal alveolar infiltrate, consolidation or pleural  fluid.    MEDIASTINUM/AXILLAE: Minimal sliding esophageal hiatal hernia with periesophageal varices. Normal heart size. No pericardial fluid. No lymphadenopathy.    UPPER ABDOMEN: Hepatic cirrhosis. Partially visualized splenomegaly. No ascites. Posttreatment changes related to prior ablation. Cholecystectomy.    MUSCULOSKELETAL: Minimal degenerative changes in the spine.      Impression    IMPRESSION:  1.  No pulmonary embolism.    2.  Normal caliber thoracic aorta without dissection. Moderate atherosclerotic vascular calcification involving the coronary arteries.    3.  Peripheral interstitial fibrotic changes in both lungs with underlying moderate emphysema. No evidence for acute focal alveolar infiltrate, consolidation or pleural fluid.    4.  Hepatic cirrhosis with partially visualized splenomegaly. No ascites. Posttreatment changes related to prior ablation and known history of hepatocellular carcinoma.     Head CT w/o contrast    Narrative    EXAM: CT HEAD W/O CONTRAST  LOCATION: [facility]  DATE/TIME: 7/17/2020 12:15 AM    INDICATION: Confusion, cancer patient and thrombocytopenia.  COMPARISON: None.  TECHNIQUE: Routine without IV contrast. Multiplanar reformats. Dose reduction techniques were used.    FINDINGS:  INTRACRANIAL CONTENTS: No intracranial hemorrhage, extraaxial collection, or mass effect.  No CT evidence of acute infarct. There is ill-defined low-attenuation involving the left cerebellar hemisphere as seen on axial images #7 through 9. This could be   artifactual in nature or possibly represent edema. Recommend clinical correlation. Normal ventricles and sulci.     VISUALIZED ORBITS/SINUSES/MASTOIDS: No intraorbital abnormality. No paranasal sinus mucosal disease. No middle ear or mastoid effusion.    BONES/SOFT TISSUES: No acute abnormality.      Impression    IMPRESSION:  1.  Vague low-attenuation left cerebellar hemisphere. This could represent edema or be artifactual in nature.  Recommend clinical correlation and a possible MRI brain for further evaluation.  2.  No evidence of a midline shift or hemorrhage.   CT Abdomen Pelvis w Contrast    Narrative    EXAM: CT ABDOMEN PELVIS W CONTRAST  LOCATION: [facility]  DATE/TIME: 7/17/2020 12:15 AM    INDICATION: Right-sided abdominal pain. History of hepatocellular carcinoma with prior ablation.    COMPARISON: 5/21/2020, 6/12/2020.    TECHNIQUE: CT scan of the abdomen and pelvis was performed following injection of IV contrast. Multiplanar reformats were obtained. Dose reduction techniques were used.    CONTRAST: 107 mL Isovue.    FINDINGS:   LOWER CHEST: Underlying peripheral interstitial fibrotic changes involving both lower lungs. No focal infiltrate, consolidation or pleural fluid.    HEPATOBILIARY: No significant change in the high-density area of posttreatment related change involving the inferior aspect left hepatic lobe. Immediately superior and lateral to this a low-density lesion remains stable. Low-attenuation 1.4 cm lesion   medial aspect right hepatic lobe new since prior and could be related to treatment change given a prior lesion was present at this location on the study of 6/12/2020.  New low-attenuation area involving the anterior aspect of the right hepatic lobe measuring 1.5 cm. This was also present in an area of an enhancing arterial lesion on the prior study. Another low-attenuation 1.8 cm lesion is now present at an area of a   prior arterial enhancing 2.8 cm lesion on the prior study. Diffuse surface contour nodularity to the liver. Recanalization of the umbilical vein. Interval advancement of the amount of portal venous thrombus involving the main portal vein and extending   into the left intrahepatic portal venous system.    PANCREAS: No significant mass, duct dilatation, or inflammatory change.    SPLEEN: Stable splenomegaly with splenic vein patent.    ADRENAL GLANDS: No significant  nodules.    KIDNEYS/BLADDER: Unchanged left lower pole renal cyst for which no further follow-up is necessary. No urinary collecting system dilatation or calculi. Bladder unremarkable.    BOWEL: Interval development of mild bowel wall thickening involving the rectum and throughout the colon. No small bowel dilatation or obstruction. Periesophageal varices with minimal sliding esophageal hiatal hernia. Stomach unremarkable. Duodenal   rotational abnormality resulting in right-sided small bowel and left-sided colonic placement.    LYMPH NODES: No lymphadenopathy.    VASCULATURE: Normal caliber abdominal aorta. Moderate atherosclerotic vascular calcification.    PELVIC ORGANS: No overt uterine or adnexal abnormality.    MUSCULOSKELETAL: Chronic bilateral L5 pars interarticularis defects with grade 1 spondylolisthesis of L5 on S1 with minimal associated degenerative disc disease.      Impression    IMPRESSION:   1.  Interval advancement of portal venous thrombus involving the main portal vein and left intrahepatic portal venous system as detailed above.    2.  Interval development of mild bowel wall thickening involving the rectum and throughout the entirety of the colon as well as the distal small bowel. Findings can be seen with a diffuse infectious or inflammatory etiology. Underlying colitis could have   this appearance.    3.  Interval development of low-attenuation lesions and prior areas of enhancing lesions involving the liver as detailed above. These low-attenuation lesions are smaller than the arterial enhancing lesions on the study from June of 2020 and likely   reflect posttreatment change.    4.  Stable splenomegaly without ascites.    5.  Underlying unchanged peripheral interstitial fibrotic changes in the lower lungs.     CBC with platelets differential   Result Value Ref Range    WBC 1.6 (L) 4.0 - 11.0 10e9/L    RBC Count 3.61 (L) 3.8 - 5.2 10e12/L    Hemoglobin 12.5 11.7 - 15.7 g/dL    Hematocrit 37.8  35.0 - 47.0 %     (H) 78 - 100 fl    MCH 34.6 (H) 26.5 - 33.0 pg    MCHC 33.1 31.5 - 36.5 g/dL    RDW 15.3 (H) 10.0 - 15.0 %    Platelet Count 27 (LL) 150 - 450 10e9/L    Diff Method Automated Method     % Neutrophils 54.0 %    % Lymphocytes 37.4 %    % Monocytes 4.9 %    % Eosinophils 2.5 %    % Basophils 0.6 %    % Immature Granulocytes 0.6 %    Nucleated RBCs 0 0 /100    Absolute Neutrophil 0.9 (L) 1.6 - 8.3 10e9/L    Absolute Lymphocytes 0.6 (L) 0.8 - 5.3 10e9/L    Absolute Monocytes 0.1 0.0 - 1.3 10e9/L    Absolute Eosinophils 0.0 0.0 - 0.7 10e9/L    Absolute Basophils 0.0 0.0 - 0.2 10e9/L    Abs Immature Granulocytes 0.0 0 - 0.4 10e9/L    Absolute Nucleated RBC 0.0    Comprehensive metabolic panel   Result Value Ref Range    Sodium 139 133 - 144 mmol/L    Potassium 3.6 3.4 - 5.3 mmol/L    Chloride 108 94 - 109 mmol/L    Carbon Dioxide 25 20 - 32 mmol/L    Anion Gap 6 3 - 14 mmol/L    Glucose 160 (H) 70 - 99 mg/dL    Urea Nitrogen 10 7 - 30 mg/dL    Creatinine 0.62 0.52 - 1.04 mg/dL    GFR Estimate >90 >60 mL/min/[1.73_m2]    GFR Estimate If Black >90 >60 mL/min/[1.73_m2]    Calcium 8.5 8.5 - 10.1 mg/dL    Bilirubin Total 1.6 (H) 0.2 - 1.3 mg/dL    Albumin 2.9 (L) 3.4 - 5.0 g/dL    Protein Total 6.8 6.8 - 8.8 g/dL    Alkaline Phosphatase 119 40 - 150 U/L    ALT 36 0 - 50 U/L    AST 40 0 - 45 U/L   Glucose by meter   Result Value Ref Range    Glucose 147 (H) 70 - 99 mg/dL   Glucose by meter   Result Value Ref Range    Glucose 151 (H) 70 - 99 mg/dL         I have personally reviewed the following labs/imaging:  CBC  Recent Labs   Lab 07/17/20  0840 07/16/20  1349   WBC 1.6* 2.3*   RBC 3.61* 3.74*   HGB 12.5 12.9   HCT 37.8 38.2   * 102*   MCH 34.6* 34.5*   MCHC 33.1 33.8   RDW 15.3* 15.1*   PLT 27* 33*     CMP  Recent Labs   Lab 07/17/20  0840 07/16/20  1349    138   POTASSIUM 3.6 3.9   CHLORIDE 108 106   CO2 25 27   ANIONGAP 6 5   * 143*   BUN 10 12   CR 0.62 0.56   GFRESTIMATED >90  >90   GFRESTBLACK >90 >90   LETY 8.5 8.3*   PROTTOTAL 6.8 7.5   ALBUMIN 2.9* 3.2*   BILITOTAL 1.6* 1.0   ALKPHOS 119 226*   AST 40 45   ALT 36 35     INR  Recent Labs   Lab 07/16/20  1349   INR 1.26*

## 2020-07-17 NOTE — ED PROVIDER NOTES
[place] EMERGENCY DEPARTMENT ([facility])  7/16/20    History     Chief Complaint   Patient presents with     Altered Mental Status     The history is provided by the patient, a relative and medical records. The history is limited by the condition of the patient (slightly confused and defensive about confusion).     Dania Albert is a 54 year old female with a PMH of chronic hepatitis C, cirrhosis of liver with ascites, hepatocellular carcinoma (currently on Lenvima and s/p TACE/ablation for S3 lesion in 10/2019 and s/p MWA in 3/2020 for new S3 lesion) with bone metastases, COPD, and DM 2 who presents to the ED via EMS from clinic for confusion.     History provided by patient and her daughter. Brought in by EMS from clinic after being found to be confused. She has a Hx of HCC, and is on oral chemotherapy agent for such. Was at clinic today, where they note she was previously treated w/ Sovaldi/Ribavirin for Hep C cirrhosis and achieved SVR. Found to have HCC last summer, no s/p TACE/ablation for S3 lesion in October last year and MWA march this year. MRI in May showed lesiosn concerning for multifocal HCC, which was confirmed on biopsy. Bony metastases found in June, and was started on Lenvima on 6/30.     Since then has had increased general weakness, some nausea, increasing confusion and trouble w/ memory. No traumas or falls. No fevers or chills. No CP. Has chronic cough in setting of COPD, but no CP or shortness of breath. Maybe some right sided chest/abd pain, and nonbloody diarrhea. Otherwise no chest or abdomonial sx's and no other changes to bowel/bladder. No HAs, no vision/hearing changes, no HEENT symptoms. No joint/MSK sx's. No rashes or skin changes. No other symptoms or complaints at this time. Please see ROS for further details.      NM Bone Scan Whole Body Findings 6/12/2020:  Findings suspicious for anterior right fifth rib and left clavicular head metastases.    CT Chest, Abdomen,  Pelvis w/Contrast Impressions 6/12/2020:  1.  There are several arterial enhancing lesions throughout the liver in both the right and left hepatic lobe. One of the larger lesions measures 2.8 cm in the superior liver of segment 8. Although these lesions are concerning for metastatic disease, the lesions do not follow the enhancement pattern typical of HCC. These demonstrate arterial enhancement with equilibration on venous and delayed phases. This can be seen with different grades of regenerative nodules.  2.  Linear and round filling defects in the left portal vein compatible with nonocclusive thrombus.  3.  Stable changes of previous chemoembolization in segment 3 of the liver.  4.  Decrease in size of the ablation zone in segment 3 of the liver.  5.  Cirrhosis with evidence of portal venous hypertension.  6.  Diffuse chronic interstitial lung disease.    I have reviewed the Medications, Allergies, Past Medical and Surgical History, and Social History in the [employer] system.    PAST MEDICAL HISTORY:   Past Medical History:   Diagnosis Date     Alcohol abuse      Bone metastasis (H) 7/15/2020     Chronic hepatitis C without hepatic coma (H) 10/23/2019    SVR     Cirrhosis of liver with ascites (H) 10/23/2019     COPD (chronic obstructive pulmonary disease) (H)      Depressive disorder      Diabetes (H)     Type 1 DM, Takes Insulin      Emphysema lung (H)      H/O esophageal varices      HCC (hepatocellular carcinoma) (H) 10/23/2019     History of blood transfusion     [facility] in 1983     ERICKA (obstructive sleep apnea)        PAST SURGICAL HISTORY:   Past Surgical History:   Procedure Laterality Date     ABDOMEN SURGERY      Gallbladder Removed      COLONOSCOPY      [place]     CYSTOSCOPY, INJECT BOTOX      detrusor injection     GI SURGERY      Upper Endoscopy at [facility]      HERNIA REPAIR      Patient doesnt remember if mesh was used. [facility]      IR FLUORO 0-1 HOUR  3/17/2020     IR FLUORO 0-1  HOUR  3/17/2020     OPEN REDUCTION INTERNAL FIXATION WRIST Bilateral        Past medical history, past surgical history, medications, and allergies were reviewed with the patient.     FAMILY HISTORY:   Family History   Problem Relation Age of Onset     Coronary Artery Disease Mother      Coronary Artery Disease Father      No Known Problems Brother      Meniere's disease Sister      Diabetes Sister      No Known Problems Son      No Known Problems Daughter      Diabetes Sister      Liver Disease Sister      Chronic Obstructive Pulmonary Disease Sister        SOCIAL HISTORY:   Social History     Tobacco Use     Smoking status: Current Every Day Smoker     Packs/day: 0.30     Years: 40.00     Pack years: 12.00     Types: Cigarettes     Smokeless tobacco: Never Used   Substance Use Topics     Alcohol use: Not Currently     Comment: Rarely drank alcohol.      Social history was reviewed with the patient.       Patient's Medications   New Prescriptions    No medications on file   Previous Medications    ACETAMINOPHEN (TYLENOL) 500 MG TABLET    Take 1,000 mg by mouth    ALBUTEROL (PROAIR HFA) 108 (90 BASE) MCG/ACT INHALER    INHALE 2 PUFFS 4 TIMES DAILY    BLOOD GLUCOSE MONITORING (ONE TOUCH ULTRA MINI) METER DEVICE KIT    Use as directed to test glucose three times daily. Pharmacy dispense brand based on insurance.  Indications: Diabetes    CALCIUM CARBONATE-VITAMIN D (OSCAL W/D) 500-200 MG-UNIT TABLET    Take 1 tablet by mouth 2 times daily (with meals)    CHOLECALCIFEROL (VITAMIN D3) 1000 UNITS (25 MCG) CAPSULE    Take 1 capsule (1,000 Units) by mouth daily    INSULIN GLARGINE (LANTUS SOLOSTAR) 100 UNIT/ML PEN    Inject 38 Units Subcutaneous    LENVATINIB 12 MG, 3 X 4 MG CAPSULES, (LENVIMA) 3 X 4 MG CAPSULE    Take 3 capsules (12 mg) by mouth daily    NICOTINE (NICODERM CQ) 14 MG/24HR 24 HR PATCH    Place 1 patch onto the skin every 24 hours    NICOTINE (NICORETTE) 2 MG GUM    Place 1 each (2 mg) inside cheek as needed  for smoking cessation    ONDANSETRON (ZOFRAN) 8 MG TABLET    Take 1 tablet (8 mg) by mouth daily Take prior to each lenvatinib dose and then every 8 hours as needed.    ONETOUCH DELICA LANCETS 33G MISC    Change lancet one time daily as directed.    ORDER FOR DME    Abdominal Binder - small    OXYCODONE (ROXICODONE) 5 MG TABLET    Take 1-2 tablets (5-10 mg) by mouth every 6 hours as needed for severe pain    PANTOPRAZOLE (PROTONIX) 40 MG EC TABLET    TAKE ONE TABLET BY MOUTH EVERY DAY    PROCHLORPERAZINE (COMPAZINE) 10 MG TABLET    Take 1 tablet (10 mg) by mouth every 6 hours as needed (Nausea/Vomiting)    QUETIAPINE (SEROQUEL) 50 MG TABLET    Take 50 mg by mouth At Bedtime    SERTRALINE (ZOLOFT) 100 MG TABLET    TAKE 2 TABLETS BY MOUTH EVERY DAY    TRULICITY 0.75 MG/0.5ML PEN    INJECT 0.5 ml's SUBCUTANEOUSLY ONCE WEEKLY   Modified Medications    No medications on file   Discontinued Medications    No medications on file          Allergies   Allergen Reactions     Bee Venom Other (See Comments) and Swelling     Hand swelled up badly.       Hydrocodone Other (See Comments)     nausea     Nickel Rash     Wool Fiber Hives and Rash        Review of Systems   Constitutional: Positive for fatigue. Negative for chills, diaphoresis and fever.   Eyes: Negative for visual disturbance.   Respiratory: Positive for cough (chronic, unchnaged). Negative for shortness of breath.    Cardiovascular: Negative for chest pain.   Gastrointestinal: Positive for abdominal pain (right sided) and diarrhea. Negative for anal bleeding, blood in stool, nausea and vomiting.   Genitourinary: Negative.    Musculoskeletal: Negative for back pain, neck pain and neck stiffness.   Skin: Negative for color change and rash.   Neurological: Negative for syncope, weakness, light-headedness, numbness and headaches.   Psychiatric/Behavioral: Positive for confusion.   All other systems reviewed and are negative.        Physical Exam   BP: (!) 137/91  Pulse:  "68  Temp: 97.7  F (36.5  C)  Resp: 16  Height: 154.9 cm (5' 1\")  Weight: 79.2 kg (174 lb 9.7 oz)  SpO2: 96 %      Physical Exam  CONSTITUTIONAL: Well-developed and well-nourished. Awake and alert. Non-toxic appearance. No acute distress.   HENT:   - Head: Normocephalic and atraumatic.   - Ears: Hearing and external ear grossly normal.   - Nose: Nose normal. No rhinorrhea. No epistaxis.   - Mouth/Throat: MMM  EYES: Conjunctivae and lids are normal. No scleral icterus. PERRL.   NECK: Normal range of motion and phonation normal. Neck supple.  No tracheal deviation, no stridor. No edema or erythema noted. No rigidity/stiffness  CARDIOVASCULAR: Normal rate, regular rhythm and no appreciable abnormal heart sounds.  PULMONARY/CHEST: Normal work of breathing. No accessory muscle usage or stridor. No respiratory distress.  No appreciable abnormal breath sounds.  ABDOMEN: Soft, non-distended. No tenderness on palpation. No rigidity, rebound or guarding.   MUSCULOSKELETAL: Extremities warm and seemingly well perfused. No edema or calf tenderness.  NEUROLOGIC: Awake, alert. Slightly confused but normal level of alertness She displays no atrophy and no tremor. Normal tone. No seizure activity. GCS 15. CNs intact. No clear focal strength/sensory deficits.   SKIN: Skin is warm and dry. No rash noted. No diaphoresis. No pallor.   PSYCHIATRIC: Normal mood and affect. Speech and behavior normal. Thought processes linear. Cognition and memory are normal.     ED Course     ED Course as of Jul 17 0444 Fri Jul 17, 2020   0110 Neuro says ok to do MR in morning, unlikely to  tonight.             Assessments & Plan (with Medical Decision Making)   IMPRESSION: 54 year old female w/ PMH notable for  chronic hepatitis C, cirrhosis of liver with ascites, hepatocellular carcinoma (currently on Lenvima and s/p TACE/ablation for S3 lesion in 10/2019 and s/p MWA in 3/2020 for new S3 lesion) with bone metastases, COPD, and DM 2 " who presents to the ED via EMS from clinic for confusion, as described further above in the HPI/ROS.     Clinically, patient appears nontoxic, NAD, vitals WNL.  Otherwise on examination, no acute focal findings, but does seem a bit confused (normal level of alertness, just confused and some issues w/ memory).     Ddx includes, but not limited to, hepatic encephalopathy, ICH or other intracranial event, delirium from infection, medicaiton effection, electrolyte abnormality/metabolic derrangement, unlikely ACS, et al.    PLAN: Labs, urine and stool samples, head imaging (given confusion and thrombocytopenia, eval for ICH),   - Risks/benefits of pursuing imaging reviewed and accepted.     RESULTS:  - Labs: Ammonia 53, trop negative, lactate normal, normal pH  --- Stool studies ordered, no sample yet provided  - Urine: No sample yet provided  - Imaging: CT head shows cerebellar abnormality that could be true vs. Artifact. Plan w/ Neuro is to get MR in the AM. CT C/A/P pending.     INTERVENTIONS:   - IV zosyn for possible colitis    RE-EVALUATION:  - Still somewhat confused  - Pt otherwise continues to do well here in the ED, no acute issues or apparent concerning changes in vitals or clinical appearance.    DISCUSSIONS:  - w/ Neuro: reviewed case and imaging. Ok to wait for AM for MRI brain as wouldn't  emergently tonight and doesn't really explain pts presenting sx's. They can be formally consulted as part of admission as well.   - w/ IM & Onc: Reviewed patient/presentation, current state of workup/any pending studies. They will admit for further evaluation/management, F/U pending studies as needed, coordinate w/ consulting services as needed. No additional requests/recommendations for workup/management for in the ED at this time. Held off on anticoag for time being, awaiting brain MR  - w/ Patient: I have reviewed the available findings, plan with the patient and her daughter. They expressed  understanding and agreement with this plan. All questions answered to the best of our ability at this time.     DISPOSITION/PLANNING:  IMPRESSION: Confusion in setting of HCC  --- Abnormal cerebellar finding (possible artifact vs. Edema, et al)  --- Possible Colitis/bowel wall thickening  --- Extending portal vein thrombus  --- New abnormal liver lesions  - DISPOSITION: Admit to IM/Onc for further evaluation/management  - PENDING:  Finalized imaging reports  - RECOMMENDATIONS: Brain MR in AM (Neuro consult), further workup for confusion      ______________________________________________________________________        7/16/2020   [facility] [place], EMERGENCY DEPARTMENT     Patito Allison MD  07/18/20 [id number]

## 2020-07-17 NOTE — CONSULTS
[facility]  Neurology Consult  7/17/2020      Dania Albert MRN# [id number]   YOB: 1965 Age: 54 year old      Date of Admission:  7/16/2020    Primary care provider: Micaela Gould    Requesting physician: Dr. Mack    Reason for Consult: Encephalopathy         Assessment and Recommendations:   Assessment: Dania Albert is a 54 year old female with past medical history of hepatocellular carcinoma who recently started new vegF chemotherapeutic agent who neurology is consulted for encephalopathy.  Her encephalopathy is mild to moderate, but she is clearly below baseline.  She does not have any signs of meningitis.  There is no evidence that she is having seizures based on family's history.  She has had a recent negative TSH, and her ammonia was slightly high.  I have not found much data of her chemotherapy causing encephalopathy, except in the presence of posterior reversible encephalopathy syndrome.  This in combination with her possible left cerebellar hyperdensity and left leg ataxia I do think warrants an MRI of the brain.  Would also trend her ammonia which was slightly high and get a vitamin B12.  Would recommend delirium precautions including scheduling melatonin and limiting CNS acting medications, most notably in this patient would be her oxycodone (she has not received any thus far but is ordered).  She does not have any eye movement abnormalities but in the setting of can would recommend checking whole blood thiamine and giving single dose of high-dose thiamine followed by oral thiamine as suspicion for Wernicke's is low.  If work-up above unremarkable she will need neuropsych testing as an outpatient due to some concern for neurodegenerative disease with her more slow decline.    Recommendations:  - MRI brain with and without contrast  -Vitamin B12  - Trend ammonia  - Whole blood thiamine (ordered for you)  - Thiamine 500mg IV tonight, followed by  100mg daily.   - Delirium precautions including scheduling melatonin 8PM  - Limit CNS acting medications.   - Neuropsych testing as outpatient.      Jewel Case DO   of Neurology              Chief Complaint:   Confusion       History is obtained from the patient and/or family and medical record      Dania Albert is a 54 year old female with past medical history of hepatocellular carcinoma who recently started a new chemotherapy who is presenting for concern for encephalopathy.   History obtained from patient and daughter at bedside.  They report the patient is always been a little forgetful, and can have a tendency to repeat questions.  However over the last 10 days they have noticed it has become much more prominent.  More family members are knowing if she is forgetting recent conversations.  There have been no serious event such as leaving the stove on or getting lost in public, or not recognizing family member.  They do report that she is definitely below her baseline with regard to mental status.  They report that she probably has had a slow decline in her mentation over the last 4 to 5 years, but this more acute change is definitely off her baseline.    Of note she started a new chemotherapy Lenvima approximately 2 and half weeks ago which is a Vegf inhibitor.  Family denies any evidence of seizure activity or staring spells with these new symptoms.    SHe denies any family history of neurodegenerative diseases, but do state most family members have  in their 50s.  She is a smoker.  She states she has had 6 falls in the last 6 months, and had no falls prior to this.  She denies any fevers, neck stiffness, headaches, chest pain, headaches, vomiting.      CT head on admission showed a possible hypodensity in the left cerebellum.            Past Medical History:     Past Medical History:   Diagnosis Date     Alcohol abuse      Bone metastasis (H) 7/15/2020     Chronic hepatitis C  without hepatic coma (H) 10/23/2019    SVR     Cirrhosis of liver with ascites (H) 10/23/2019     COPD (chronic obstructive pulmonary disease) (H)      Depressive disorder      Diabetes (H)     Type 1 DM, Takes Insulin      Emphysema lung (H)      H/O esophageal varices      HCC (hepatocellular carcinoma) (H) 10/23/2019     History of blood transfusion     [facility]     ERICKA (obstructive sleep apnea)               Past Surgical History:     Past Surgical History:   Procedure Laterality Date     ABDOMEN SURGERY      Gallbladder Removed      COLONOSCOPY      [place]     CYSTOSCOPY, INJECT BOTOX      detrusor injection     GI SURGERY      Upper Endoscopy at [facility]      HERNIA REPAIR      Patient doesnt remember if mesh was used. [facility]      IR FLUORO 0-1 HOUR  3/17/2020     IR FLUORO 0-1 HOUR  3/17/2020     OPEN REDUCTION INTERNAL FIXATION WRIST Bilateral              Social History:     Social History     Socioeconomic History     Marital status:      Spouse name: Not on file     Number of children: Not on file     Years of education: Not on file     Highest education level: Not on file   Occupational History     Not on file   Social Needs     Financial resource strain: Not on file     Food insecurity     Worry: Not on file     Inability: Not on file     Transportation needs     Medical: Not on file     Non-medical: Not on file   Tobacco Use     Smoking status: Current Every Day Smoker     Packs/day: 0.30     Years: 40.00     Pack years: 12.00     Types: Cigarettes     Smokeless tobacco: Never Used   Substance and Sexual Activity     Alcohol use: Not Currently     Comment: Rarely drank alcohol.      Drug use: Not Currently     Sexual activity: Not on file   Lifestyle     Physical activity     Days per week: Not on file     Minutes per session: Not on file     Stress: Not on file   Relationships     Social connections     Talks on phone: Not on file     Gets together: Not on file      Attends [employer] service: Not on file     Active member of club or organization: Not on file     Attends meetings of clubs or organizations: Not on file     Relationship status: Not on file     Intimate partner violence     Fear of current or ex partner: Not on file     Emotionally abused: Not on file     Physically abused: Not on file     Forced sexual activity: Not on file   Other Topics Concern     Parent/sibling w/ CABG, MI or angioplasty before 65F 55M? Not Asked   Social History Narrative     Not on file             Family History:     Family History   Problem Relation Age of Onset     Coronary Artery Disease Mother      Coronary Artery Disease Father      No Known Problems Brother      Meniere's disease Sister      Diabetes Sister      No Known Problems Son      No Known Problems Daughter      Diabetes Sister      Liver Disease Sister      Chronic Obstructive Pulmonary Disease Sister              Immunizations:     There is no immunization history on file for this patient.         Allergies:      Allergies   Allergen Reactions     Bee Venom Other (See Comments) and Swelling     Hand swelled up badly.       Hydrocodone Other (See Comments)     nausea     Nickel Rash     Wool Fiber Hives and Rash             Medications:     Prescription Medications as of 7/17/2020       Rx Number Disp Refills Start End Last Dispensed Date Next Fill Date Owning Pharmacy    acetaminophen (TYLENOL) 500 MG tablet    10/12/2019        Sig: Take 500 mg by mouth every 8 hours as needed     Class: Historical    Route: Oral    albuterol (PROAIR HFA) 108 (90 Base) MCG/ACT inhaler    1/29/2018        Sig: Inhale 2 puffs into the lungs every 6 hours as needed     Class: Historical    Notes to Pharmacy: Pharmacy may dispense brand covered by insurance (Proair, or proventil or ventolin or generic albuterol inhaler)    Route: Inhalation    blood glucose monitoring (ONE TOUCH ULTRA MINI) meter device kit    8/26/2019        Sig: Use as  directed to test glucose three times daily. Pharmacy dispense brand based on insurance.  Indications: Diabetes    Class: Historical    calcium carbonate-vitamin D (OSCAL W/D) 500-200 MG-UNIT tablet  60 tablet 3 12/24/2019    [address] [facility] [address]    Sig: Take 1 tablet by mouth 2 times daily (with meals)    Class: E-Prescribe    Route: Oral    cholecalciferol (VITAMIN D3) 1000 units (25 mcg) capsule  30 capsule 3 12/24/2019    [address] [facility] [address]    Sig: Take 1 capsule (1,000 Units) by mouth daily    Class: E-Prescribe    Route: Oral    insulin glargine (LANTUS SOLOSTAR) 100 UNIT/ML pen    1/29/2018        Sig: Inject 38 Units Subcutaneous At Bedtime     Class: Historical    Notes to Pharmacy: If Lantus is not covered by insurance, may substitute Basaglar at same dose and frequency.      Route: Subcutaneous    Lenvatinib 12 MG, 3 x 4 mg capsules, (LENVIMA) 3 x 4 MG capsule  90 capsule 0 6/16/2020    [facility] - [place], [place] - [address]    Sig: Take 3 capsules (12 mg) by mouth daily    Class: E-Prescribe    Notes to Pharmacy: May administer with or without food.    Route: Oral    nicotine (NICODERM CQ) 14 MG/24HR 24 hr patch  30 patch 3 5/26/2020    [facility] [address] [place] [address] [place] [employer]    Sig: Place 1 patch onto the skin every 24 hours    Class: E-Prescribe    Route: Transdermal    nicotine (NICORETTE) 2 MG gum  60 each 3 5/26/2020    [facility] [address] [place] [address] [place] [employer]    Sig: Place 1 each (2 mg) inside cheek as needed for smoking cessation    Class: E-Prescribe    Route: Buccal    ondansetron (ZOFRAN-ODT) 8 MG ODT tab        [address] [facility] [address]    Sig: Take 8 mg by mouth every 8 hours as needed for nausea And take 8mg prior to each dose of Levanitib.    Class: Historical    Route: Oral    OneTouch Delica Lancets 33G MISC     8/15/2017        Sig: Change lancet one time daily as directed.    Class: Historical    order for DME  1 each 1 11/11/2015    [address] [facility]    Sig: Abdominal Binder - small    Class: Local Print    oxyCODONE (ROXICODONE) 5 MG tablet  60 tablet 0 6/29/2020    [place], [place] - [address] [place] [address] [phone]    Sig: Take 1-2 tablets (5-10 mg) by mouth every 6 hours as needed for severe pain    Class: E-Prescribe    Earliest Fill Date: 6/29/2020    Route: Oral    pantoprazole (PROTONIX) 40 MG EC tablet    1/29/2018        Sig: TAKE ONE TABLET BY MOUTH EVERY DAY    Class: Historical    QUEtiapine (SEROQUEL) 50 MG tablet   0 10/29/2019        Sig: Take 50 mg by mouth At Bedtime    Class: Historical    Route: Oral    sertraline (ZOLOFT) 100 MG tablet    1/29/2018        Sig: Take 200 mg by mouth daily     Class: Historical    Route: Oral    TRULICITY 0.75 MG/0.5ML pen   2 10/30/2019        Sig: INJECT 0.5 ml's SUBCUTANEOUSLY ONCE WEEKLY    Class: Historical      Hospital Medications as of 7/17/2020       Dose Frequency Start End    acetaminophen (TYLENOL) tablet 1,000 mg 1,000 mg EVERY 8 HOURS PRN 7/17/2020     Admin Instructions: Maximum acetaminophen dose from all sources = 75 mg/kg/day not to exceed 4 gram    Class: E-Prescribe    Route: Oral    albuterol (PROAIR HFA/PROVENTIL HFA/VENTOLIN HFA) 108 (90 Base) MCG/ACT inhaler 2 puff 2 puff 4 TIMES DAILY PRN 7/17/2020     Class: E-Prescribe    Route: Inhalation    calcium carbonate-vitamin D (OSCAL w/D) per tablet 1 tablet 1 tablet 2 TIMES DAILY WITH MEALS 7/17/2020     Class: E-Prescribe    Route: Oral    dextrose 50 % injection 25-50 mL 25-50 mL EVERY 15 MIN PRN 7/17/2020     Admin Instructions: Use if have IV access, BG less than 70 mg/dL and meet dose criteria below:<BR>Dose if conscious and alert (or disorientated) and NPO = 25 mL<BR>Dose if unconscious / not alert = 50 mL<BR><BR>Give  "first dose for initial blood glucose less than 70  mg/dL.  If blood glucose at 15 minute recheck is less than or equal to 100 mg/dL continue to administer carbohydrate treatment every 15 minutes, as needed, based on blood glucose and assessment parameters until blood glucose level is above 100 mg/dL.<BR>Vesicant. For ordered doses up to 25 g, give IV Push undiluted. Give each 5g over 1 minute.    Class: E-Prescribe    Route: Intravenous    Linked Group 1:  \"Or\" Linked Group Details        glucagon injection 1 mg 1 mg EVERY 15 MIN PRN 7/17/2020     Admin Instructions: May give SQ or IM. ONLY use glucagon IF patient has NO IV access AND is UNABLE to swallow AND blood glucose is LESS than or EQUAL to 50 mg/dL.<BR>If ordered IV, give IV Push over 1 minute. Reconstitute with 1mL sterile water.    Class: E-Prescribe    Route: Subcutaneous    Linked Group 1:  \"Or\" Linked Group Details        glucose gel 15-30 g 15-30 g EVERY 15 MIN PRN 7/17/2020     Admin Instructions: Give first dose for initial blood glucose less than 70 mg/dL per the dosing instructions below. <BR>If blood glucose at 15 minute rechecks is still less than or equal to 100 mg/dL, continue to administer doses per blood glucose parameters every 15 minutes, as needed, until blood glucose level is above 100 mg/dL.<BR><BR>Dosing Instructions:<BR>~If patient is conscious and able to swallow and NO enteral tube<BR>For initial BG 51-69mg/dL OR 15 minute recheck BG 51- 100 mg/dL - give 15 g<BR>For BG less than or equal to 50 mg/dL - give 30 g<BR>~ If Enteral tube<BR>For initial BG 51-69mg/dL OR 15 minute recheck BG 51- 100 mg/dL - give apple juice 120 mL (4 oz or 15 g of CHO) via enteral tube<BR>For BG less than or equal to 50 mg/dL - Give apple juice 240 mL (8 oz or 30 g of CHO) via enteral tube<BR>~Oral gel is preferable for conscious and able to swallow patient. <BR>~IF gel unavailable or patient refuses may provide apple juice per Enteral tube dosing " "instructions.<BR>Document juice on I and O flowsheet.    Class: E-Prescribe    Route: Oral    Linked Group 1:  \"Or\" Linked Group Details        insulin aspart (NovoLOG) injection (RAPID ACTING) 1-7 Units 3 TIMES DAILY BEFORE MEALS 7/17/2020     Admin Instructions: Correction Scale - MEDIUM INSULIN RESISTANCE DOSING   <BR>Do Not give Correction Insulin if Pre-Meal BG less than 140. <BR>For Pre-Meal  - 189 give 1 unit. <BR>For Pre-Meal  - 239 give 2 units. <BR>For Pre-Meal  - 289 give 3 units. <BR>For Pre-Meal  - 339 give 4 units. <BR>For Pre-Meal - 399 give 5 units. <BR>For Pre-Meal -449 give 6 units<BR>For Pre-Meal BG greater than or equal to 450 give 7 units. <BR>To be given with prandial insulin, and based on pre-meal blood glucose.  <BR>Notify provider if glucose greater than or equal to 350 mg/dL after administration of correction dose.<BR>If given at mealtime, administer within 30 minutes of start of meal    Class: E-Prescribe    Route: Subcutaneous    insulin aspart (NovoLOG) injection (RAPID ACTING) 1-5 Units AT BEDTIME 7/17/2020     Admin Instructions: MEDIUM INSULIN RESISTANCE DOSING  <BR>Do Not give Bedtime Correction Insulin if BG less than  200. <BR>For  - 249 give 1 units. <BR>For  - 299 give 2 units. <BR>For  - 349 give 3 units. <BR>For  -399 give 4 units. <BR>For BG greater than or equal to 400 give 5 units.<BR>Notify provider if glucose greater than or equal to 350 mg/dL after administration of correction dose.<BR>If given at mealtime, administer within 30 minutes of start of meal    Class: E-Prescribe    Route: Subcutaneous    insulin glargine (LANTUS PEN) injection 20 Units 20 Units AT BEDTIME 7/17/2020     Class: E-Prescribe    Route: Subcutaneous    iopamidol (ISOVUE-370) solution 107 mL (Completed) 107 mL ONCE 7/16/2020 7/17/2020    Class: E-Prescribe    Route: Intravenous    Lenvatinib 12 MG (3 x 4 mg capsules) (LENVIMA) capsule 12 mg " "([Held by provider] since 7/17/2020  3:56 PM) 12 mg DAILY 7/17/2020     Class: E-Prescribe    Route: Oral    lidocaine (LMX4) cream  EVERY 1 HOUR PRN 7/17/2020     Admin Instructions: Do NOT give if patient has a history of allergy to any local anesthetic or any \"torri\" product. <BR>Apply at least 30 minutes prior to VAD insertion or port access. In divided doses as needed for size of site for insertion with MAX Dose:  2.5 g (  of 5 g tube)    Class: E-Prescribe    Route: Topical    lidocaine 1 % 0.1-1 mL 0.1-1 mL EVERY 1 HOUR PRN 7/17/2020     Admin Instructions: Do NOT give if patient has a history of allergy to any local anesthetic or any \"torri\" product. MAX dose 1 mL subcutaneous OR intradermal in divided doses as needed for VAD insertion.    Class: E-Prescribe    Route: Other    melatonin tablet 1 mg 1 mg AT BEDTIME PRN 7/17/2020     Admin Instructions: Do not give unless at least 6 hours of uninterrupted sleep is expected.    Class: E-Prescribe    Route: Oral    naloxone (NARCAN) injection 0.1-0.4 mg 0.1-0.4 mg EVERY 2 MIN PRN 7/17/2020     Admin Instructions: For respiratory rate LESS than or EQUAL to 8.  Partial reversal dose:  0.1 mg titrated q 2 minutes for Analgesia Side Effects Monitoring Sedation Level of 3 (frequently drowsy, arousable, drifts to sleep during conversation).Full reversal dose:  0.4 mg bolus for Analgesia Side Effects Monitoring Sedation Level of 4 (somnolent, minimal or no response to stimulation).<BR>For ordered IV doses 0.1-2mg give IVP. Give each 0.4mg over 15 seconds in emergency situations. For non-emergent situations further dilute in 9mL of NS to facilitate titration of response.    Class: E-Prescribe    Route: Intravenous    nicotine (NICODERM CQ) 14 MG/24HR 24 hr patch 1 patch 1 patch EVERY 24 HOURS 7/17/2020     Admin Instructions: Reminder: Remove previous patch before applying new patch.    Class: E-Prescribe    Route: Transdermal    nicotine (NICORETTE) gum 2 mg 2 mg EVERY " "1 HOUR PRN 7/17/2020     Admin Instructions: Gum should be chewed slowly until it tingles, then placed between cheek and gum:  when tingle gone, repeat process until tingle gone (about 30 minutes).    Class: E-Prescribe    Route: Buccal    nicotine Patch in Place  EVERY 8 HOURS 7/17/2020     Admin Instructions: Chart every shift, confirming that patch is still in place on patient (no barcode scan needed). See patch order for dose information.    Class: E-Prescribe    Route: Transdermal    ondansetron (ZOFRAN) injection 4 mg 4 mg EVERY 6 HOURS PRN 7/17/2020     Admin Instructions: This is Step 1 of nausea and vomiting management.  If nausea not resolved in 15 minutes, go to Step 2 prochlorperazine (COMPAZINE).<BR>Irritant. For ordered IV doses 0.1-4 mg, give IV Push undiluted over 2-5 minutes.    Class: E-Prescribe    Route: Intravenous    Linked Group 2:  \"Or\" Linked Group Details        ondansetron (ZOFRAN-ODT) ODT tab 4 mg 4 mg EVERY 6 HOURS PRN 7/17/2020     Admin Instructions: This is Step 1 of nausea and vomiting management.  If nausea not resolved in 15 minutes, go to Step 2 prochlorperazine (COMPAZINE). Do not push through foil backing. Peel back foil and gently remove. Place on tongue immediately. Administration with liquid unnecessary<BR>With dry hands, peel back foil backing and gently remove tablet. Do not push oral disintegrating tablet through foil backing. Administer immediately on tongue and oral disintegrating tablet dissolves in seconds, then swallow with saliva. Liquid not required.    Class: E-Prescribe    Route: Oral    Linked Group 2:  \"Or\" Linked Group Details        oxyCODONE (ROXICODONE) tablet 5-10 mg 5-10 mg EVERY 6 HOURS PRN 7/17/2020     Route: Oral    pantoprazole (PROTONIX) EC tablet 40 mg 40 mg DAILY 7/17/2020     Admin Instructions: DO NOT CRUSH.    Class: E-Prescribe    Route: Oral    piperacillin-tazobactam (ZOSYN) 3.375 g vial to attach to  mL bag (Completed) 3.375 g ONCE " "7/17/2020 7/17/2020    Admin Instructions: Lactated Ringer's solution is not compatible with piperacillin-tazobactam for injection.     Class: E-Prescribe    Route: Intravenous    piperacillin-tazobactam (ZOSYN) 3.375 g vial to attach to  mL bag 3.375 g EVERY 6 HOURS 7/17/2020     Admin Instructions: Lactated Ringer's solution is not compatible with piperacillin-tazobactam for injection.     Class: E-Prescribe    Route: Intravenous    prochlorperazine (COMPAZINE) tablet 10 mg 10 mg EVERY 6 HOURS PRN 7/17/2020     Class: E-Prescribe    Route: Oral    QUEtiapine (SEROquel) tablet 50 mg 50 mg AT BEDTIME 7/17/2020     Class: E-Prescribe    Route: Oral    sertraline (ZOLOFT) tablet 25 mg 25 mg DAILY 7/17/2020     Class: E-Prescribe    Route: Oral    sodium chloride (PF) 0.9% PF flush 3 mL 3 mL EVERY 1 MIN PRN 7/17/2020     Class: E-Prescribe    Route: Intracatheter    sodium chloride (PF) 0.9% PF flush 3 mL 3 mL EVERY 8 HOURS 7/17/2020     Admin Instructions: And Q1H PRN, to lock peripheral IV dormant line.    Class: E-Prescribe    Route: Intracatheter    sodium chloride (PF) 0.9% PF flush 90 mL (Completed) 90 mL ONCE 7/16/2020 7/17/2020    Class: E-Prescribe    Route: Intravenous    Vitamin D3 (CHOLECALCIFEROL) tablet 25 mcg 25 mcg DAILY 7/17/2020     Admin Instructions: Tablet contains 1000 units Vitamin D3    Class: E-Prescribe    Route: Oral                Review of Systems:   The 10 point Review of Systems is negative other than noted in the HPI         Physical Exam:   BP (!) 150/89 (BP Location: Left arm)   Pulse 73   Temp 97.2  F (36.2  C) (Oral)   Resp 18   Ht 1.549 m (5' 1\")   Wt 79.2 kg (174 lb 9.7 oz)   SpO2 93%   BMI 32.99 kg/m     Physical Exam:   General: NAD, no nuchal rigidity, no neck stiffness.  Kernig's negative.   HEENT: sclera anicteric  Resp: Mild shortness of breath  CVS: Regular rate  Skin: warm and dry  Extremities: Mild edema, prominent clubbingNeurologic:   Mental Status Exam: " Alert, awake and oriented to person, place and time. 0/3 on recall.  Unable to spell world backwards, but can spell it forwards.  Able to tell me of any quarters in $1.75.  Cannot tell me the time on the clock.  Can name past 2 presidents but not Ashwin Gr.  Is able to follow three-step commands.   Cranial Nerves: PERRL, EOMs intact, no nystagmus, facial movements symmetric, facial sensation intact to light touch, hearing intact to conversation, tongue midline..    Motor: Normal tone in all four extremities, no atrophy or fasciculations. 5/5 strength bilaterally in shoulder abduction, elbow extensors and flexors, , hip flexors, knee extensors and flexors, dorsi- and plantarflexion. No tremors.   Sensory: Sensation intact to light touch on arms and legs bilaterally.  Vibration mildly reduced at big toes bilaterally   Coordination: Finger-nose-finger intact bilaterally with mild tremor.  She does perform worse on the left heel-to-shin compared to right.   Reflexes: Deep tendon reflexes present and symmetric.  She withdraws to plantar stimulation but toes appear downgoing   Gait: Rises without assist.  Mildly unsteady.  Does have assist of 1.          Data:   CBC:  Lab Results   Component Value Date    WBC 1.6 07/17/2020     Lab Results   Component Value Date    HGB 12.5 07/17/2020     Lab Results   Component Value Date    HCT 37.8 07/17/2020     Lab Results   Component Value Date    PLT 27 07/17/2020       Last Basic Metabolic Panel:  Lab Results   Component Value Date     07/17/2020      Lab Results   Component Value Date    POTASSIUM 3.6 07/17/2020     Lab Results   Component Value Date    CHLORIDE 108 07/17/2020     Lab Results   Component Value Date    LETY 8.5 07/17/2020     Lab Results   Component Value Date    CO2 25 07/17/2020     Lab Results   Component Value Date    BUN 10 07/17/2020     Lab Results   Component Value Date    CR 0.62 07/17/2020     Lab Results   Component Value Date      07/17/2020       Head CT:  Very questionable hypodensity in L cerebellar hemisphere.  Suspect artifact.  Mild frontal predominant atrophy    This note was transcribed with Dragon Dictation software.  Please excuse any typos.

## 2020-07-17 NOTE — PHARMACY-ADMISSION MEDICATION HISTORY
Admission medication history interview status for the 7/16/2020 admission is complete. See Epic admission navigator for allergy information, pharmacy, prior to admission medications and immunization status.     Medication history interview sources:   Patient's daughter (Mary) via [employer], [employer]    Changes made to PTA medication list (reason)  Added:  None  Deleted: prochlorperazine  Changed:  Ondansetron changed to ODT formulation    Additional medication history information (including reliability of information, actions taken by pharmacist):  Information was provided by patient's daughter, Mary, who was a good historian.  She will bring in the home supply of Levatinib if restarted.  Unsure of which day Trulicity is used.  Had a short conversation about medical marijuana which the patient is interested in using.      Prior to Admission medications    Medication Sig Last Dose Taking? Auth Provider   acetaminophen (TYLENOL) 500 MG tablet Take 500 mg by mouth every 8 hours as needed  Past Week at Unknown time Yes Reported, Patient   albuterol (PROAIR HFA) 108 (90 Base) MCG/ACT inhaler Inhale 2 puffs into the lungs every 6 hours as needed  Past Month at Unknown time Yes Reported, Patient   calcium carbonate-vitamin D (OSCAL W/D) 500-200 MG-UNIT tablet Take 1 tablet by mouth 2 times daily (with meals) 7/16/2020 Yes Kayli Bergman MD   cholecalciferol (VITAMIN D3) 1000 units (25 mcg) capsule Take 1 capsule (1,000 Units) by mouth daily 7/16/2020 Yes Kayli Bergman MD   insulin glargine (LANTUS SOLOSTAR) 100 UNIT/ML pen Inject 38 Units Subcutaneous At Bedtime  Past Week at Unknown time Yes Reported, Patient   Lenvatinib 12 MG, 3 x 4 mg capsules, (LENVIMA) 3 x 4 MG capsule Take 3 capsules (12 mg) by mouth daily 7/16/2020 Yes Dhruv Childress MD   nicotine (NICODERM CQ) 14 MG/24HR 24 hr patch Place 1 patch onto the skin every 24 hours 7/16/2020 Yes Perlman, David Morris, MD   ondansetron  (ZOFRAN-ODT) 8 MG ODT tab Take 8 mg by mouth every 8 hours as needed for nausea And take 8mg prior to each dose of Levanitib. 7/16/2020 at Unknown time Yes Unknown, Entered By History   oxyCODONE (ROXICODONE) 5 MG tablet Take 1-2 tablets (5-10 mg) by mouth every 6 hours as needed for severe pain Past Week at Unknown time Yes Marcy Lombardo APRN CNP   pantoprazole (PROTONIX) 40 MG EC tablet TAKE ONE TABLET BY MOUTH EVERY DAY 7/16/2020 Yes Reported, Patient   QUEtiapine (SEROQUEL) 50 MG tablet Take 50 mg by mouth At Bedtime Past Week at Unknown time Yes Reported, Patient   sertraline (ZOLOFT) 100 MG tablet Take 200 mg by mouth daily  7/16/2020 Yes Reported, Patient   TRULICITY 0.75 MG/0.5ML pen INJECT 0.5 ml's SUBCUTANEOUSLY ONCE WEEKLY 7/16/2020 Yes Reported, Patient   blood glucose monitoring (ONE TOUCH ULTRA MINI) meter device kit Use as directed to test glucose three times daily. Pharmacy dispense brand based on insurance.  Indications: Diabetes Unknown at Unknown time  Reported, Patient   nicotine (NICORETTE) 2 MG gum Place 1 each (2 mg) inside cheek as needed for smoking cessation Unknown at Unknown time  Perlman, David Morris, MD   OneTouch Delica Lancets 33G MISC Change lancet one time daily as directed. Unknown at Unknown time  Reported, Patient   order for DME Abdominal Binder - small Unknown at Unknown time  Domenic Faye MD         Medication history completed by:  Elizabeth Sampson [facility]

## 2020-07-18 NOTE — PLAN OF CARE
Shift:   VS: Temp: 96.4  F (35.8  C) Temp src: Oral BP: 103/57 Pulse: 74   Resp: 20 SpO2: (!) 88 % O2 Device: None (Room air)    Pain: Given oxycodone x1, stated pain is comfortable at this point  Neuro: A&Ox4, forgetful at times  Cardiac:   WNL  Respiratory: RA  GI/Diet/Appetite: Fair oral intake, no nausea reported  :  Adequate UO  LDA's: PIV TKO  Skin: No new deficit noted  Activity: Up with SBA  Tests/Procedures:   Pertinent Labs/Lab Collection: Abdomen US completed     Plan: Continue with cares and update MD with any changes.

## 2020-07-18 NOTE — PLAN OF CARE
[id number]    Alert and oriented with forgetfulness.  Lethargic, slept for most of night.  No c/o pain.   at 0200.  Occasional congested coughing.  IV Zosyn as ordered.  NPO since midnight for US abd.

## 2020-07-18 NOTE — PLAN OF CARE
[id number]    BP mildly elevated. Afebrile. Alert and Oriented x4, forgetful, and needs affirmation about upcoming plans. However, very cooperative and pleasant. LS diminished in the bases, on room air, sats maintaining. Hx: COPD, clubbing of the nails noted. Freq congested cough noted, Robitussin given. Denies SOB, and nausea. Pain noted in the back, manageable without intervention most of the day, but by bedtime requested something; PRN Oxy 5mg x1 and Aqua K pad given. Diabetic diet, not eating much, danisha counts initiated. NPO at midnight for abdominal ultrasound in the AM. MRI completed. PIV SL in-between ABX. Assist x1 w/ walker to the bathroom, bed alarm on. Stool and urine sample sent. Enteric precautions maintained for stool panel pending. Will continue with POC.

## 2020-07-18 NOTE — PROGRESS NOTES
ONCOLOGY CONSULT PROGRESS NOTE    July 18, 2020    ASSESSMENT/RECOMMENDATIONS    # HCC  Just started lenvatinib 6/30, stopped on admission due to acute issues. Clearly was a bit hard on her although didn't cause N/V. Spent about 20 minutes discussing with her daughter the hospitalization and evaluation thus far, the ddx for AMS/memory issues, etc., and plans moving forward with the lenvatinib. Not clearly making her worse - I think the timing is coincidental with the liver decompensation - see below - but both pt and daughter have expressed that QOL is critical and she's not had that in the last couple of weeks. After talking with her some more today, not clearly due to lenvatinib. I suppose there's the possibility of disease progression; we typically would be getting imaging in 6-8 weeks.   - continue to hold lenvatinib for now; discuss resumption and balancing QOL, etc., as outpatient.   - message sent to Marcy Lombardo DNP to see if she can get pt onto her schedule next week maybe Thurs or Fri; her schedule is currently full. Or can see if Dr. Childress has a spot    # AMS, most c/w hepatic encephalopathy  PRES ruled out on willie MRI fortunately, and no objective evidence of infection. So hepatic encephalopathy is the most likely culprit. GI following. Lenvatinib not contributing to this directly. Likely just had a bit of decompensation with increasing portal vein thrombosis (US reviewed as well), etc.   - per primary team    # Chronic diarrhea  GI consulted. Not clearly worse with lenvatinib (diarrhea is a side effect)  - per primary team    # Chronic thrombocytopenia  # Chronic macrocytic anemia  # Mild neutropenia  Plt count just a little bit lower than usual baseline. More macrocytic . TSH in June was ok. B12 in Feb ok. Not likely to be due to lenvatinib  - consider rechecking B12 and RBC folate  - consider repeating smear - can be done as outpatient  - please check WBC diff daily (ordered)  - neutropenic  "precautions - need to educate pt/daughter    # Dispo  Pt wants to go home today but not a good idea. Putting a lot of pressure on her daughter.    Lali Enciso M.D.  Oncology Attending    SUBJECTIVE: Sleeping during my visit and has been restless and even a bit agitated, which is very unlike her and not her baseline. Really wanting to go home. Daughter is there and provides history. No BM since last night actually. Ate some. Breathing fine.     OBJECTIVE:  /57 (BP Location: Left arm)   Pulse 74   Temp 96.4  F (35.8  C) (Oral)   Resp 20   Ht 1.549 m (5' 1\")   Wt 79 kg (174 lb 2.6 oz)   SpO2 (!) 88%   BMI 32.91 kg/m     24 h vitals reviewed    GEN: NAD  Results for ROBBIE RAWLS (MRN [id number]) as of 7/18/2020 16:06   7/18/2020 06:06   Sodium 141   Potassium 3.3 (L)   Chloride 111 (H)   Carbon Dioxide 26   Urea Nitrogen 8   Creatinine 0.79   GFR Estimate 83   GFR Estimate If Black >90   Calcium 8.3 (L)   Anion Gap 4   Albumin 2.6 (L)   Protein Total 6.2 (L)   Bilirubin Total 1.6 (H)   Alkaline Phosphatase 93   ALT 30   AST 35   Glucose 135 (H)   WBC 1.5 (L)   Hemoglobin 11.4 (L)   Hematocrit 34.4 (L)   Platelet Count 32 (LL)   RBC Count 3.28 (L)    (H)   MCH 34.8 (H)   MCHC 33.1   RDW 15.7 (H)   INR 1.29 (H)             "

## 2020-07-18 NOTE — PROGRESS NOTES
[facility], [facility] Progress Note - Hospitalist Service, Gold 11       Date of Admission:  7/16/2020  Assessment & Plan    Dania Albert is a 54 year old female with hepatocellular carcinoma and Hep C cirrhosis on lenvima for HCC recurrence with mets here with fatigue and confusion.     Confusion -  Brain MRI unremarkable. No PRES  .  Electrolytes normal.  Ammonia not elevated and no asterixes on exam.  No hypoglycemia.    Appreciate neurology consult. Minimizing polypharmacy including avoiding narcotics and sedatives.   Thiamine 500 iv x8h x 1 day now then 100 daily. No reported alcohol for years but will empirically cover in case of Wernickes.   Appreciate oncology consult regarding Lenvima. Holding for now. QTc acceptable.      Colitis -   GI consult appreciated. No definitive history of IBD.   -enteric panel negative, c.diff negative.   -rifaximin 550 bid now with spironolactone 50, followed by lasix  -lactulose d/w GI. Will hold for now as per loose stools and will start in am if improvement with rifaximin  -zosyn held today after 48h NGTD cultures and alternative diagnosis to infection.      Hepatocellular Carcinoma  She was found to have HCC in June 2019 and had TACE/Ablation for S3 lesion on Oct 2019 and then found to have a new S3 lesion for which she had a MWA on 3/17/2020.  Bone Scan in Dec 2019 was negative for metastatic disease.  CTA Chest in October 2019 was negative for metastatic disease.  A follow up MRI on 5/8/2020 showed no residual viable cancer in the treatment zones but it found dramatic increase in number and sizes of numerous arterial enhancing foci scattered throughout the liver parenchyma which was very suspicious for multifocal HCC. liver biopsy confirmed moderately differentiated HCC.  Started Lenvima on 6/30/20.    - d/w onc continuing Lenvima and if may relate to sx fatigue and cloudy thinking      Hep C Cirrhosis - previously treated with  Sovaldi/ribavirin and achived sustained virologic response.     Diabetes Mellitus type 2 -   Usually takes 38 unit(s) lantus, decreased to 20 unit(s) whiletaking good po  Holding trulicity for now  Medium insulin resistance correction scale with meals     COPD -   Cont PTA albuterol prn     Depression - Continue home meds       Diet: Combination Diet Regular Diet Adult  Calorie Counts  Snacks/Supplements Adult: Boost Breeze; With Meals    DVT Prophylaxis: Pneumatic Compression Devices  Valdes Catheter: not present  Code Status: Full Code           Disposition Plan   Expected discharge: 2 - 3 days, recommended to prior living arrangement once mental status at baseline.  Entered: Dave Francois MD 07/18/2020, 2:52 PM       The patient's care was discussed with the Patient and oncology, GI, neurology.    Dave Francois MD  Hospitalist Service, [address], [place]  Pager: [phone]  Please see sticky note for cross cover information  ______________________________________________________________________    Interval History   Still confused and sleepy this am  Received oxy prn this morning  Still reporting very loose and watery frequest stools, reportedly visualized by daughter    Data reviewed today: I reviewed all medications, new labs and imaging results over the last 24 hours. I personally reviewed the brain MRI image(s) showing unremarkable.    Physical Exam   Vital Signs: Temp: 96.4  F (35.8  C) Temp src: Oral BP: 103/57 Pulse: 74   Resp: 20 SpO2: (!) 88 % O2 Device: None (Room air)    Weight: 174 lbs 2.61 oz  General Appearance: Sleepy but arousable   Respiratory: CTAB   Cardiovascular: RRR no m  GI: soft nontender   Skin: wwp   Other: No asterixis. Difficulty with finger nose finger and confusion with commands. Moving four extremities spontaneously.      Data   Recent Results (from the past 24 hour(s))   MR Brain w/o & w Contrast    Narrative    Brain MRI without and  with contrast    History: Confusion, acute, unexplained; Altered level of consciousness  (LOC), unexplained.    Comparison: 7/16/2020    Technique: Axial FLAIR,  T1-weighted, and susceptibility images were  obtained without intravenous contrast. Following intravenous  gadolinium-based contrast administration, axial T2-weighted,  diffusion, FLAIR, and axial and coronal T1-weighted images were  obtained.     Dose: 7.5 cc Gadavist    Findings: Images are mildly degraded secondary to patient motion.  There is no mass effect, midline shift, or evidence of intracranial  hemorrhage.  The ventricles are not enlarged out of proportion to the  cerebral sulci. The gray-white matter differentiation of the cerebral  hemispheres is preserved. Mild scattered periventricular FLAIR  hyperintense foci which is nonspecific but may represent sequela of  infectious or inflammatory insults, vasculopathy or demyelination.  Postcontrast images demonstrate no abnormal intracranial parenchymal  or meningeal enhancement.     The major vascular flow-voids appear patent. The orbits, visualized  portions of paranasal sinuses, and mastoid air cells are relatively  clear.      Impression    Impression:  1. No intracranial hemorrhage, midline shift, or hydrocephalus.   2. No abnormal contrast enhancing lesions intracranially.    I have personally reviewed the examination and initial interpretation  and I agree with the findings.    ALFREDO ZAIDI MD   US Abdomen Limited w Abd/Pelvis Duplex Complete    Narrative    EXAMINATION: US ABDOMEN COMPLETE WITH DOPPLER on 7/18/2020 9:26 AM.     INDICATION: evaluation of periportal vasculature (due to advancement  of PV thrombus from prior scan)    COMPARISON: CT dated 7/17/2020.    TECHNIQUE: The abdomen was scanned in standard fashion with  specialized ultrasound transducer(s) using both gray-scale, color  Doppler, and spectral flow techniques.    FINDINGS:    Liver: The liver demonstrates normal  homogeneous echotexture. Multiple  hepatic lesions, including an irregular nonvascular hyperechoic  lesions in the left hepatic lobe measuring 1.5 x 2.3 x 1.3 cm and a  heterogeneously hyperechoic lesion measuring 1.3 x 2.7 x 1.4 cm. A  nonvascular hypoechoic lesion is seen in the right hepatic lobe    Extrahepatic portal vein flow is antegrade, measuring 36 cm/sec.  Nonocclusive thrombus. Measures 1.5 cm in diameter.  Right portal vein flow is antegrade, measuring 25 cm/sec. No thrombus.  Left portal vein flow is antegrade, measuring 18 cm/sec. Nonocclusive  thrombus.    Flow in the hepatic artery is towards the liver and:  67 cm/s peak systolic  0.77 resistive index.     Recanalized periumbilical vein.    The splenic vein is patent and flow is towards the liver.  The middle  and right hepatic veins are patent with flow towards the IVC. The left  hepatic vein is poorly visualized. The IVC is patent with flow towards  the heart.   The visualized aorta is not dilated.    Gallbladder: Cholecystectomy.    Bile Ducts: Intrahepatic biliary ducts are of normal caliber.  The  common bile duct measures 11 mm in diameter.    Pancreas: Poorly visualized.     Kidneys: The right kidney measures 10.6 cm in length..    Fluid: No evidence of ascites or pleural effusions.        Impression    IMPRESSION:   1.  Nonocclusive thrombus is seen in the main and left portal veins.  Antegrade flow is seen throughout the hepatoportal venous system.  2.  Dilated common bile duct is likely due to postcholecystectomy  reservoir effect.  3.  Hyperechoic nonvascular lesions are seen in the left hepatic lobe  in the area of treated HCC. A nonvascular hypoechoic lesion seen in  the right hepatic lobe. These are better seen on CT from 7/17/2020.  4.  Prominent paraumbilical collaterals, in keeping with portal  hypertension.    I have personally reviewed the examination and initial interpretation  and I agree with the findings.    COLEEN ROBERTSON MD

## 2020-07-19 NOTE — PLAN OF CARE
[id number]: VSS. Sats 90% on RA, Hx of COPD. Pt c/o generalized abdominal pain and lower back pain. Voltaren gel given x 1. Pt was tired this am but was more animated and awake this afternoon. A/O but forgetful. Pt very hopeful for discharge tomorrow. K 3.3 this am. 60 mEq given and recheck 4.2. Ammonia 29 this evening. Pt's daughter Jessica very involved in cares. UA sent. Lactulose given x1. 3 loose stools this shift. Ova and parasite still needed; pt had mixed urine and stool on three separate occurrences. Pt ate 100% of breakfast and lunch. Continue POC.

## 2020-07-19 NOTE — PLAN OF CARE
VSS, afebrile, A&Ox4. Denies nausea, SOB. Oxy given x 1 overnight for generalized pain. Lactulose held by provider d/t occurrence of loose stool yesterday evening. No BM this shift. Regular diet. SBA. No acute events. Continue to monitor.

## 2020-07-19 NOTE — PROGRESS NOTES
[facility], [facility] Progress Note - Hospitalist Service, Gold 11       Date of Admission:  7/16/2020  Assessment & Plan   Dania Albert is a 54 year old female with hepatocellular carcinoma and Hep C cirrhosis on lenvima for HCC recurrence with mets here with fatigue and confusion.     Confusion - improving   Hep C Cirrhosis   Suspect hepatic encephalopathy.   Rixamin/lactulose started yesterday. Goal to have 2-4 BM per day which she no longer is having.   Spironolactone started 7/18, lasix 7/19.     Brain MRI unremarkable. No PRES  Electrolytes normal.  Ammonia 54 and no asterixes on exam.  No hypoglycemia.   Appreciate neurology consult. Minimizing polypharmacy including avoiding narcotics and sedatives.   S/p thiamine 500 iv, now 100 daily.   Appreciate oncology consult regarding Lenvima. Continuing to hold. QTc acceptable.   HCV previously treated with Sovaldi/ribavirin with sustained virologic response.     Colitis -   GI consult appreciated. No definitive history of IBD.   -enteric panel negative, c.diff negative.  -zosyn held after 48h NGTD cultures and with alternative diagnosis to infection.      Hepatocellular Carcinoma  She was found to have HCC in June 2019 and had TACE/Ablation for S3 lesion on Oct 2019 and then found to have a new S3 lesion for which she had a MWA on 3/17/2020.  Bone Scan in Dec 2019 was negative for metastatic disease.  CTA Chest in October 2019 was negative for metastatic disease.  A follow up MRI on 5/8/2020 showed no residual viable cancer in the treatment zones but it found dramatic increase in number and sizes of numerous arterial enhancing foci scattered throughout the liver parenchyma which was very suspicious for multifocal HCC. liver biopsy confirmed moderately differentiated HCC.  Started Lenvima on 6/30/20, now held as above.              Diabetes Mellitus type 2 -   Usually takes 38 unit(s) lantus, decreased to 20 unit(s) while  not taking good po  Holding trulicity for now  Medium insulin resistance correction scale with meals     COPD -   Cont PTA albuterol prn     Depression - Continue home meds         Diet: Calorie Counts  Snacks/Supplements Adult: Boost Breeze; With Meals  Combination Diet Regular Diet Adult, Safe Tray - with utensils    DVT Prophylaxis: Pneumatic Compression Devices  Valdes Catheter: not present  Code Status: Full Code           Disposition Plan   Expected discharge: Tomorrow, recommended to prior living arrangement once mental status at baseline.  Entered: Dave Francois MD 07/19/2020, 11:30 AM       The patient's care was discussed with the Patient and oncology, GI, neurology.    Dave Francois MD  Hospitalist Service, [address], [place]  Pager: [phone]  Please see sticky note for cross cover information  ______________________________________________________________________    Interval History   Mental status improved from yesterday  No oxycodone  Ambulating independently  BM less frequent. Reported one loose stool this morning     Data reviewed today: I reviewed all medications, new labs and imaging results over the last 24 hours. I personally reviewed no images or EKG's today.    Physical Exam   Vital Signs: Temp: 97.2  F (36.2  C) Temp src: Oral BP: 101/43 Pulse: 73   Resp: 16 SpO2: 90 % O2 Device: None (Room air)    Weight: 174 lbs 2.61 oz  General Appearance: Alert with slowed cognition   Respiratory: clear  Cardiovascular: regular   GI: soft nontender   Skin: wwp. No asterixis   Other: moving four extremities spontaneously, ambulating independently     Data   No results found for this or any previous visit (from the past 24 hour(s)).

## 2020-07-19 NOTE — PLAN OF CARE
Pt daughter visited, assisted with providing full bath. Pt perseverates on going home. Spent time explaining and listening with daughter. Pt oriented to place and situation, year, not month. Lacks insight into disease progress. No BM this shift. Denies pain. IV right arm removed. Continue to monitor.

## 2020-07-19 NOTE — PROGRESS NOTES
Calorie Count  Intake recorded for: 7/18  Total Kcals: 0 Total Protein: 0g  Kcals from Hospital Food: 0   Protein: 0g  Kcals from Outside Food (average):0 Protein: 0g  # Meals Recorded: 2 meals ordered from kitchen, no intake recorded.   # Supplements Recorded: no intake recorded.

## 2020-07-19 NOTE — PROGRESS NOTES
"[facility]  General Neurology Progress Note    Patient Name:  Dania Albert  MRN:  [id number]    :  1965      Patient Summary:  Dania Albert is a 54 year old female with past medical history of hepatocellular carcinoma who recently started new vegF chemotherapeutic agent who neurology is consulted for encephalopathy.      Interval history:   No acute events overnight. Continues to remain forgetful and was sleepy in evaluation this AM but mental status appears to have improved slightly per daughter.    Physical Examination   Vitals: /54 (BP Location: Left arm)   Pulse 81   Temp 95.8  F (35.4  C) (Oral)   Resp 18   Ht 1.549 m (5' 1\")   Wt 79 kg (174 lb 2.6 oz)   SpO2 94%   BMI 32.91 kg/m    General: Adult, in NAD sleepy  HEENT: NC/AT  Chest: Normal work of breathing in RA    Neuro:  Sleepy but oriented. Speech is fluent, no dysarthria. Comprehension intact. Face appears symmetric. Eyes conjugate. No pronator drift. Moving all extremities spontaneously. Possible mild asterixis on exam.     Investigations   MRI Brain 20  Impression:  1. No intracranial hemorrhage, midline shift, or hydrocephalus.   2. No abnormal contrast enhancing lesions intracranially.    B12 - 957    Assessment and Plan:  Dania Albert is a 54 year old female with past medical history of hepatocellular carcinoma who recently started new vegF chemotherapeutic agent. Neurology was consulted to evaluate for encephalopathy.  Her encephalopathy is mild to moderate, but she is clearly below baseline. She does not have any signs of meningitis. There is no evidence that she is having seizures based on family's history. She has had a recent negative TSH, and her ammonia was slightly high. MRI Brain is also unremarkable. Appears to be slowing clearing from a mental status standpoint.     Recommendations:  - Trend ammonia   - Follow up whole blood thiamine level  - Continue Thiamine 100mg " daily.   - Delirium precautions including scheduling melatonin 8PM (increased to 3mg for you)  - Limit CNS acting medications.   - Neuropsych testing as outpatient.      Patient was seen and discussed with Dr. Case. Thank you for involving neurology in the care of Dania Albert.  The Neurology Service will sign off at this time. Please do not hesitate to call with questions/concerns (consult pager [phone]).      Leila Kaur  Neurology PGY4  P: [phone]

## 2020-07-19 NOTE — PLAN OF CARE
AVSS.  Pt alert, oriented x3, but not to time.  Endorsing generalized aching pain.   Given 1 dose of lactulose to facilitate BM.  MD notified pt requested oxy, adding WH assessment, and clarification on lactulose orders.  Responded to the oncoming nurse about clarifying the aforementioned page.

## 2020-07-19 NOTE — PROGRESS NOTES
ONCOLOGY CONSULT PROGRESS NOTE    July 19, 2020    ASSESSMENT/RECOMMENDATIONS    # HCC  Just started lenvatinib 6/30, stopped on admission due to acute issues. Clearly was a bit hard on her although didn't cause N/V. Spent about 20 minutes discussing with her daughter the hospitalization and evaluation thus far, the ddx for AMS/memory issues, etc., and plans moving forward with the lenvatinib. Not clearly making her worse -  timing is coincidental with the liver decompensation - see below - but both pt and daughter have expressed that QOL is critical and she's not had that in the last couple of weeks. After talking with her some more, not clearly due to lenvatinib.There's the possibility of disease progression; we typically would be getting imaging in 6-8 weeks.   - continue to hold lenvatinib for now; discuss resumption and balancing QOL, etc., as outpatient.   - message sent to Marcy Lombardo DNP to see if she can get pt onto her schedule next week maybe Thurs or Fri; her schedule is currently full. Or can see if Dr. Childress has a spot    # AMS, most c/w hepatic encephalopathy, improving  PRES ruled out on willie MRI fortunately, and no objective evidence of infection. So hepatic encephalopathy is the most likely culprit. GI following. Lenvatinib not contributing to this directly. Likely just had a bit of decompensation with increasing portal vein thrombosis (US reviewed as well), etc.   - per primary team    # Chronic diarrhea  GI consulted. Not clearly worse with lenvatinib (diarrhea is a side effect)  - per primary team    # Chronic thrombocytopenia  # Chronic macrocytic anemia  # Mild neutropenia  Plt count just a little bit lower than usual baseline. More macrocytic . TSH in June was ok. B12 in Feb ok. Not likely to be due to lenvatinib  -        April Mattson MD  Oncology fellow     SUBJECTIVE: Nursing notes reviewed. No acute events overnight. Patient reports feeling well.     OBJECTIVE:  /75   Pulse 80    "Temp 97.5  F (36.4  C) (Oral)   Resp 16   Ht 1.549 m (5' 1\")   Wt 79.4 kg (175 lb)   SpO2 90%   BMI 33.07 kg/m     24 h vitals reviewed    GEN: NAD, sitting up in bed eating breakfast  RESP:  Non labored breathing on RA. CTAB  CV: RRR no murmurs rubs gallops   ABD: + BS, non distended, non tender to palpation  EXT: warm and well perfused   "

## 2020-07-20 NOTE — CONSULTS
"Smoking Cessation Consult   2020    Patient: Dania Albert      :  1965                    MRN:[id number]      Confusion [R41.0] @HX    HPI: Dania Albert is a 54 year old female with hepatocellular carcinoma and Hep C cirrhosis on lenvima for HCC recurrence with mets here with fatigue and confusion       Ms. Albert is a current everyday smoker, currently averaging 5 cigs per day--or 1 pack per every 4 days. Asked patient if she would be interested in sharing about her tobacco use and in learning about more options and resources for quitting, staying quit, and in developing a relapse prevention plan; patient agreed. Patient willing to discuss current tobacco use.       Patient stated that she started smoking roughly around the age of 16.  Smoked a lot over the years but couldn't tell me exactly how much at the peak. Stated she attempted to cut down or even quite few times but the longest abstinence lasted three months. Tried patches few months ago after her PCP suggested it but then stopped it after few days.      Stated she's not currently experiencing cravings but thinks the 14mcg nicotine patch she's getting from the hosp is helping a bit.     When asked how important  it's for her to \"quit for good,\" patient stated it's very important--10/10. When asked how confident she feels about staying quit, he stated 5/10, higher with medication help.  Expressed that this time, she has increased sense of urgency to quit; stating: \"I want to live longer for my grand kids.\"  Congratulated and affirmed patient's commitment in cutting down smoking.     Expressed that she knows \"it's going to be really hard to finally quite.\"  Suggested that she restart or continue the 14mg nicotine patches in addition to nicotine gums. She is open to this regimen. Combination nicotine replacement therapies are proven to be most effective. She will also ask her PCP about chantix; she's strongly contemplating using it so long " as there no contraindications.      Provided motivation and encouragement. Shared that slipping up here and there doesn't necessarily mean she failed; rather, it's an opportunity to reflect and modify her behaviors and habits--employ alternate strategies to deal with strong triggers. Worked with patient to develop relapse prevention; see recommendations below.       Patients last cigarette/vape/e-cig/smokeless tobacco:  Last Tuesday, 7/14/20.     Patients main reason for smoking include: Enjoyment, helps with loneliness; dependence     Top Reasons to quit smoking:  Her help--currently deteriorating; wants to live longer for her grand kids.     Types of Tobacco and Amount   Cigarettes X   E-Cigs    Smokeless Tobacco    Cigars    Pipes    Waterpipes      Fagerstrom Test for Nicotine Dependence   How soon after waking do you smoke your first cigarette Within 5 minutes=3  5-30 minutes=2  31-60 minute=1  >61 minutes=0 1             Do you find it difficult to refrain from smoking in places where it is forbidden? e.g. Taoism, restaurants, etc? Yes=1  No=0        1   Which cigarette would you hate to give up? The first in the morning=1  Any other=0 0        How many cigarettes a day do you smoke? 10 or less=0  11-20=1  21-30=2  31 or more=3 0   Do you smoke more frequently in the morning?   Yes=1  No=0 1   Do you smoke even if you are sick in bed most of the day? Yes=1  No=0 1                                                                                                                                                 Total Score 4   SCORE 1-2 = Low Dependence           3-4 = Low to Mod Dependence     5-7 = Moderate Dependence            8+ = High Dependence           Stage of Behavior Change:   Pre-contemplation - No intention    Contemplation - Change on the horizon x   Preparation - Getting Ready    Action - Consistently changed (within 6 months)    Maintenance - Staying quit (more than 6 months)    Relapse - Recycling   "          Patients Motivation to Quit Scale    Importance (1-10): 10   Confidence  (1-10): 5   Can Patient Imagine a Future without Smoking: YES   Quit Attempt Date:  TBD   Final Quit Date:           Education/Recommendations    Education: Provided educational workbook, \"Quitting for Good with Treatment and Support\". Discussed health risks of continued smoking.     Recommendations: Encouraged patient to use workbook to help him/her understand why she smokes, come up with 5 reasons to quit, and to imagine what her future looks like without smoking. Encouraged her to develop strategies to distract herself to get past cravings.     -Developed Smoking Cessation Relapse Prevention Plan that includes recommendation of:   -Renewed consideration of Chantix (varenicline) if PCP deems appropriate given her current health status; to be used as prescribed after establishing Target Quit Date. PCP to monitor for side effects    -Use 7/14/21 mg patch daily for 4-6 weeks of smoking abstinence based on assessment of patients dependence level. Taper every 2-4 weeks in 7 mg steps as tolerated.     -Use 2/4 mg gum, take first thing in the morning and/as needed for cravings and urges to smoke. May be used every 1-2 hours.       Time Spent: I spent 60minutes with patient. Will notify provider of recommendations.    Gave contact information and will call 7 days after discharge to provide support.    Kunal Robins, RRT, CTTS  Chronic Pulmonary Disease Specialist  Cardiopulmonary Services   Office: [phone]  Pager: [phone]        "

## 2020-07-20 NOTE — PLAN OF CARE
[id number]:    AVSS. Afebrile. A&Ox4. Intermittently forgetful. Sepsis triggered, lactic 1.2. Pain partially controlled w/ lidocaine patches x 3 (remove this morning). Nicotine patch in place on right arm. Hypoglycemic this morning, BG 67. Patient drank a total of 12 oz apple juice. BG increased to 85, then 137. No further interventions needed. Up w/ SBA. Calling appropriately. Plan to discharge patient today.

## 2020-07-20 NOTE — PROGRESS NOTES
Hematology / Oncology  Daily Progress Note   Date of Service: 07/20/2020  Patient: Dania Albert  MRN: [id number]  Admission Date: 7/16/2020  Hospital Day # 3  Cancer Diagnosis:   Primary Outpatient Oncologist: Dr. Childress   Current Treatment Plan: Lenvatinib started 6/30     Recommendations:   - ok to discharge from oncology stand point   - requested follow up appointment for end of this week -- [place] with [place]. [place] [facility] [place]    Assessment & Plan:   Dania Albert is a 54 year old female with hepatocellular carcinoma and Hep C cirrhosis on lenvima for HCC recurrence with mets here with fatigue and confusion.    # HCC  Please see initial consult for full oncologic history. In brief she was diagnosed in June of 2019. She had a TACE/ ablation of S3 lesion. December 2019 Bone scan negative for metastatic disease. She subsequently had a microwave ablation March 2020. MRI in May 2020 showed no residual viable cancer treatment zones, despite dramatic increase in number and size of enhancing foci. Liver biopsy confirmed moderately differentiated HCC. She started Lenvatinib June 30 2020.    - continue to hold lenvatinib for now; discuss resuming at outpatient appointment.    - sent request for follow up, clinic will call patient when appointments have been scheduled.     # AMS, most c/w hepatic encephalopathy - improving  PRES ruled out on willie MRI. No objective evidence of infection. AMS likely due to hepatic encephalopathy. We do not feel that  Lenvatinib not contributing to this directly.    - per primary team      # Chronic diarrhea  GI consulted. Not clearly worse with lenvatinib (diarrhea is a side effect)  - per primary team     # Chronic thrombocytopenia  # Chronic macrocytic anemia  # Mild neutropenia  Platelet count slightly lower than usual baseline.  TSH in June was ok. B12 in Feb ok. Not likely to be due to lenvatinib      Patient's plan of care was discussed with attending  "physician Dr. Enciso     Thank you for the opportunity to partake in this patients plan of care. Please do not hesitate to page with questions. We will sign off as patient is discharging today.     Marlin Peterson PA-C   Hematology/Oncology   Pager: [phone]   ___________________________________________________________________    Subjective & Interval History:    No acute events noted overnight. Patient was resting comfortably in bed. Talked with daughter she had no further oncologic questions at this time and plans on having her mom stay with her for a couple of days before going back to her home. She told me that her mom has 24 hour care by her aunt at her house. Discussed follow up plans and daughter has no further questions.       Physical Exam:    Blood pressure 107/58, pulse 95, temperature 97.2  F (36.2  C), temperature source Oral, resp. rate 18, height 1.549 m (5' 1\"), weight 79.5 kg (175 lb 3.2 oz), SpO2 90 %.    General: lying in bed, no acute distress  HEENT: sclera anicteric, EOMI, MMM  CV: RRR, normal S1/S2, no m/r/g  Resp: CTAB, no wheezing/crackles, normal respiratory effort on ambient air  GI: soft, non-tender, non-distended, bowel sounds present and normoactive  MSK: warm and well-perfused, normal tone  Skin: no rashes on limited exam, no jaundice    Labs & Studies: I personally reviewed the following studies:  ROUTINE LABS (Last four results):  CMP  Recent Labs   Lab 07/20/20  0521 07/19/20  1702 07/19/20  0602 07/18/20  0606 07/17/20  0840     --  138 141 139   POTASSIUM 3.6 4.2 3.3* 3.3* 3.6   CHLORIDE 108  --  108 111* 108   CO2 25  --  25 26 25   ANIONGAP 5  --  5 4 6   *  --  212* 135* 160*   BUN 8  --  14 8 10   CR 0.67  --  0.84 0.79 0.62   GFRESTIMATED >90  --  77 83 >90   GFRESTBLACK >90  --  90 >90 >90   LETY 8.5  --  8.2* 8.3* 8.5   PROTTOTAL 6.1*  --  6.2* 6.2* 6.8   ALBUMIN 2.7*  --  2.6* 2.6* 2.9*   BILITOTAL 0.8  --  1.0 1.6* 1.6*   ALKPHOS 130  --  103 93 119   AST 26  " --  30 35 40   ALT 25  --  27 30 36     CBC  Recent Labs   Lab 07/20/20  0521 07/19/20  0602 07/18/20  0606 07/17/20  0840   WBC 1.8* 2.0* 1.5* 1.6*   RBC 2.92* 3.23* 3.28* 3.61*   HGB 10.2* 11.2* 11.4* 12.5   HCT 31.7* 34.4* 34.4* 37.8   * 107* 105* 105*   MCH 34.9* 34.7* 34.8* 34.6*   MCHC 32.2 32.6 33.1 33.1   RDW 15.3* 15.7* 15.7* 15.3*   PLT 30* 32* 32* 27*     INR  Recent Labs   Lab 07/18/20  0606 07/16/20  1349   INR 1.29* 1.26*       Medications list for reference:  Current Facility-Administered Medications   Medication     acetaminophen (TYLENOL) tablet 1,000 mg     albuterol (PROAIR HFA/PROVENTIL HFA/VENTOLIN HFA) 108 (90 Base) MCG/ACT inhaler 2 puff     benzonatate (TESSALON) capsule 100 mg     calcium carbonate-vitamin D (OSCAL w/D) per tablet 1 tablet     glucose gel 15-30 g    Or     dextrose 50 % injection 25-50 mL    Or     glucagon injection 1 mg     diclofenac (VOLTAREN) 1 % topical gel 2 g     furosemide (LASIX) tablet 20 mg     guaiFENesin (ROBITUSSIN) 20 mg/mL solution 10 mL     insulin aspart (NovoLOG) injection (RAPID ACTING)     insulin aspart (NovoLOG) injection (RAPID ACTING)     insulin glargine (LANTUS PEN) injection 20 Units     lactulose (CHRONULAC) solution 10 g     [Held by provider] Lenvatinib 12 MG (3 x 4 mg capsules) (LENVIMA) capsule 12 mg     Lidocaine (LIDOCARE) 4 % Patch 1-3 patch    And     lidocaine patch in PLACE     lidocaine (LMX4) cream     lidocaine 1 % 0.1-1 mL     melatonin tablet 3 mg     naloxone (NARCAN) injection 0.1-0.4 mg     nicotine (NICODERM CQ) 14 MG/24HR 24 hr patch 1 patch     nicotine (NICORETTE) gum 2 mg     nicotine Patch in Place     ondansetron (ZOFRAN-ODT) ODT tab 4 mg    Or     ondansetron (ZOFRAN) injection 4 mg     pantoprazole (PROTONIX) EC tablet 40 mg     potassium chloride (KLOR-CON) Packet 20-40 mEq     potassium chloride ER (KLOR-CON M) CR tablet 20-40 mEq     prochlorperazine (COMPAZINE) tablet 10 mg     QUEtiapine (SEROquel) tablet  50 mg     rifaximin (XIFAXAN) tablet 550 mg     sertraline (ZOLOFT) tablet 100 mg     sodium chloride (PF) 0.9% PF flush 3 mL     sodium chloride (PF) 0.9% PF flush 3 mL     spironolactone (ALDACTONE) tablet 50 mg     vitamin B1 (THIAMINE) tablet 100 mg     Vitamin D3 (CHOLECALCIFEROL) tablet 25 mcg

## 2020-07-20 NOTE — DISCHARGE SUMMARY
[facility], [place]  Hospitalist Discharge Summary      Date of Admission:  7/16/2020  Date of Discharge:  7/20/2020  Discharging Provider: Dave Francois MD  Discharge Team: Hospitalist Service, Gold     Discharge Diagnoses   Acute hepatic encephalopathy   Non-infectious colitis  HCV cirrhosis   Hepatocellular carcinoma       Follow-ups Needed After Discharge   Follow-up Appointments     Adult [facility] Follow-up and recommended labs and tests      Follow up with primary care provider, ARMANDO ARANGO, within 7 days   for hospital follow- up.  Follow up with oncology as planned.  Follow up with hepatology as planned.     Appointments on [place] and/or [facility] (with [facility] or [facility]   provider or service). Call [phone] if you haven't heard regarding   these appointments within 7 days of discharge.             Unresulted Labs Ordered in the Past 30 Days of this Admission     Date and Time Order Name Status Description    7/19/2020 1630 Urine Culture Aerobic Bacterial Preliminary     7/17/2020 1749 Vitamin B1 whole blood In process     7/16/2020 1911 Blood culture Preliminary     7/16/2020 1911 Blood culture Preliminary       These results will be followed up by GI and oncology.     Discharge Disposition   Discharged to home  Condition at discharge: Stable    Hospital Course   Dania Albert is a 54 year old female with hepatocellular carcinoma and Hep C cirrhosis on lenvima for HCC recurrence with mets here with fatigue and confusion.     Confusion - improving   Hep C Cirrhosis   C/w hepatic encephalopathy.   Rixamin/lactulose started 7/18. Goal to have 2-4 BM per day which she no longer is having.   Spironolactone started 7/18, lasix 7/19. Tolerating well.     Brain MRI unremarkable. No PRES  Electrolytes normal.  Ammonia 54 and no true asterixis on exam.  No hypoglycemia.   Appreciate neurology consult. Minimized polypharmacy including avoiding narcotics and  sedatives.   S/p thiamine 500 iv, now 100 po daily.   Appreciated oncology consult regarding Lenvima. QTc acceptable. This was held throughout admission and held on discharge, with evaluation to restart to be considered in clinic.   HCV previously treated with Sovaldi/ribavirin with sustained virologic response.     Colitis -   GI consult appreciated. No definitive history of IBD.   -enteric panel negative, c.diff negative.  -zosyn held after 48h NGTD cultures and with alternative diagnosis to infection.      Hepatocellular Carcinoma  She was found to have HCC in June 2019 and had TACE/Ablation for S3 lesion on Oct 2019 and then found to have a new S3 lesion for which she had a MWA on 3/17/2020.  Bone Scan in Dec 2019 was negative for metastatic disease.  CTA Chest in October 2019 was negative for metastatic disease.  A follow up MRI on 5/8/2020 showed no residual viable cancer in the treatment zones but it found dramatic increase in number and sizes of numerous arterial enhancing foci scattered throughout the liver parenchyma which was very suspicious for multifocal HCC. liver biopsy confirmed moderately differentiated HCC.  Started Lenvima on 6/30/20, now held as above.           Diabetes Mellitus type 2 -   Usually takes 38 unit(s) lantus, decreased to 20 unit(s) while not taking good po  Holding trulicity for now  Medium insulin resistance correction scale with meals     COPD -   Cont PTA albuterol prn     Depression - Continue home meds        Consultations This Hospital Stay   NEUROLOGY GENERAL ADULT IP CONSULT  GI HEPATOLOGY ADULT IP CONSULT  ONCOLOGY ADULT IP CONSULT  MEDICATION HISTORY IP PHARMACY CONSULT  ADVANCE DIRECTIVE IP CONSULT  VASCULAR ACCESS CARE ADULT IP CONSULT  SMOKING CESSATION PROGRAM IP CONSULT    Code Status   Full Code    Time Spent on this Encounter   IDave MD, personally saw the patient today and spent greater than 30 minutes discharging this patient.       Dave LOUISE  MD Priscila  [facility], [place]  ______________________________________________________________________    Physical Exam   Vital Signs: Temp: 97.2  F (36.2  C) Temp src: Oral BP: 107/58 Pulse: 95 Heart Rate: 73 Resp: 18 SpO2: 90 % O2 Device: None (Room air)    Weight: 175 lbs 3.2 oz  General Appearance: Alert but confused, overall much improved from admission   Respiratory: clear   Cardiovascular: regular   GI: soft nontender nondistended  Skin: wwp   Other: Mild tremor no flap        Primary Care Physician   ARMANDO ARANGO    Discharge Orders      Reason for your hospital stay    Acute hepatic encephalopathy  Non-infectious colitis  HCV cirrhosis  Hepatocellular carcinoma     Adult [facility] Follow-up and recommended labs and tests    Follow up with primary care provider, ARMANDO ARANGO, within 7 days for hospital follow- up.  Follow up with oncology as planned.  Follow up with hepatology as planned.     Appointments on [place] and/or [facility] (with [facility] or [facility] provider or service). Call [phone] if you haven't heard regarding these appointments within 7 days of discharge.     Activity    Your activity upon discharge: activity as tolerated and no driving for today     Diet    Follow this diet upon discharge: Orders Placed This Encounter      Calorie Counts      Snacks/Supplements Adult: Boost Breeze; With Meals      Combination Diet Regular Diet Adult, Safe Tray - with utensils       Significant Results and Procedures   Results for orders placed or performed during the hospital encounter of 07/16/20   Head CT w/o contrast    Narrative    EXAM: CT HEAD W/O CONTRAST  LOCATION: [facility]  DATE/TIME: 7/17/2020 12:15 AM    INDICATION: Confusion, cancer patient and thrombocytopenia.  COMPARISON: None.  TECHNIQUE: Routine without IV contrast. Multiplanar reformats. Dose reduction techniques were used.    FINDINGS:  INTRACRANIAL CONTENTS: No  intracranial hemorrhage, extraaxial collection, or mass effect.  No CT evidence of acute infarct. There is ill-defined low-attenuation involving the left cerebellar hemisphere as seen on axial images #7 through 9. This could be   artifactual in nature or possibly represent edema. Recommend clinical correlation. Normal ventricles and sulci.     VISUALIZED ORBITS/SINUSES/MASTOIDS: No intraorbital abnormality. No paranasal sinus mucosal disease. No middle ear or mastoid effusion.    BONES/SOFT TISSUES: No acute abnormality.      Impression    IMPRESSION:  1.  Vague low-attenuation left cerebellar hemisphere. This could represent edema or be artifactual in nature. Recommend clinical correlation and a possible MRI brain for further evaluation.  2.  No evidence of a midline shift or hemorrhage.   CT Chest Pulmonary Embolism w Contrast    Narrative    EXAM: CT CHEST PULMONARY EMBOLISM W CONTRAST  LOCATION: [facility]  DATE/TIME: 7/17/2020 12:15 AM    INDICATION: Chest pain and shortness of breath in a patient with history of known hepatocellular carcinoma. History of prior ablations and chemotherapeutic treatment.    COMPARISON: 6/12/2020.    TECHNIQUE: CT chest pulmonary angiogram during arterial phase injection of IV contrast. Multiplanar reformats and MIP reconstructions were performed. Dose reduction techniques were used.     CONTRAST: 107 mL Isovue 370.     FINDINGS:  ANGIOGRAM CHEST: Pulmonary arteries are normal caliber and negative for pulmonary emboli. Normal caliber thoracic aorta without dissection. Atherosclerotic vascular calcification including moderate coronary artery calcification.    LUNGS AND PLEURA: Moderate emphysema. Diffuse interstitial peripheral fibrotic changes in both lungs. No evidence for acute focal alveolar infiltrate, consolidation or pleural fluid.    MEDIASTINUM/AXILLAE: Minimal sliding esophageal hiatal hernia with periesophageal varices. Normal heart size. No pericardial  fluid. No lymphadenopathy.    UPPER ABDOMEN: Hepatic cirrhosis. Partially visualized splenomegaly. No ascites. Posttreatment changes related to prior ablation. Cholecystectomy.    MUSCULOSKELETAL: Minimal degenerative changes in the spine.      Impression    IMPRESSION:  1.  No pulmonary embolism.    2.  Normal caliber thoracic aorta without dissection. Moderate atherosclerotic vascular calcification involving the coronary arteries.    3.  Peripheral interstitial fibrotic changes in both lungs with underlying moderate emphysema. No evidence for acute focal alveolar infiltrate, consolidation or pleural fluid.    4.  Hepatic cirrhosis with partially visualized splenomegaly. No ascites. Posttreatment changes related to prior ablation and known history of hepatocellular carcinoma.     CT Abdomen Pelvis w Contrast    Narrative    EXAM: CT ABDOMEN PELVIS W CONTRAST  LOCATION: [facility]  DATE/TIME: 7/17/2020 12:15 AM    INDICATION: Right-sided abdominal pain. History of hepatocellular carcinoma with prior ablation.    COMPARISON: 5/21/2020, 6/12/2020.    TECHNIQUE: CT scan of the abdomen and pelvis was performed following injection of IV contrast. Multiplanar reformats were obtained. Dose reduction techniques were used.    CONTRAST: 107 mL Isovue.    FINDINGS:   LOWER CHEST: Underlying peripheral interstitial fibrotic changes involving both lower lungs. No focal infiltrate, consolidation or pleural fluid.    HEPATOBILIARY: No significant change in the high-density area of posttreatment related change involving the inferior aspect left hepatic lobe. Immediately superior and lateral to this a low-density lesion remains stable. Low-attenuation 1.4 cm lesion   medial aspect right hepatic lobe new since prior and could be related to treatment change given a prior lesion was present at this location on the study of 6/12/2020.  New low-attenuation area involving the anterior aspect of the right hepatic lobe  measuring 1.5 cm. This was also present in an area of an enhancing arterial lesion on the prior study. Another low-attenuation 1.8 cm lesion is now present at an area of a   prior arterial enhancing 2.8 cm lesion on the prior study. Diffuse surface contour nodularity to the liver. Recanalization of the umbilical vein. Interval advancement of the amount of portal venous thrombus involving the main portal vein and extending   into the left intrahepatic portal venous system.    PANCREAS: No significant mass, duct dilatation, or inflammatory change.    SPLEEN: Stable splenomegaly with splenic vein patent.    ADRENAL GLANDS: No significant nodules.    KIDNEYS/BLADDER: Unchanged left lower pole renal cyst for which no further follow-up is necessary. No urinary collecting system dilatation or calculi. Bladder unremarkable.    BOWEL: Interval development of mild bowel wall thickening involving the rectum and throughout the colon. No small bowel dilatation or obstruction. Periesophageal varices with minimal sliding esophageal hiatal hernia. Stomach unremarkable. Duodenal   rotational abnormality resulting in right-sided small bowel and left-sided colonic placement.    LYMPH NODES: No lymphadenopathy.    VASCULATURE: Normal caliber abdominal aorta. Moderate atherosclerotic vascular calcification.    PELVIC ORGANS: No overt uterine or adnexal abnormality.    MUSCULOSKELETAL: Chronic bilateral L5 pars interarticularis defects with grade 1 spondylolisthesis of L5 on S1 with minimal associated degenerative disc disease.      Impression    IMPRESSION:   1.  Interval advancement of portal venous thrombus involving the main portal vein and left intrahepatic portal venous system as detailed above.    2.  Interval development of mild bowel wall thickening involving the rectum and throughout the entirety of the colon as well as the distal small bowel. Findings can be seen with a diffuse infectious or inflammatory etiology. Underlying  colitis could have   this appearance.    3.  Interval development of low-attenuation lesions and prior areas of enhancing lesions involving the liver as detailed above. These low-attenuation lesions are smaller than the arterial enhancing lesions on the study from June of 2020 and likely   reflect posttreatment change.    4.  Stable splenomegaly without ascites.    5.  Underlying unchanged peripheral interstitial fibrotic changes in the lower lungs.     MR Brain w/o & w Contrast    Narrative    Brain MRI without and with contrast    History: Confusion, acute, unexplained; Altered level of consciousness  (LOC), unexplained.    Comparison: 7/16/2020    Technique: Axial FLAIR,  T1-weighted, and susceptibility images were  obtained without intravenous contrast. Following intravenous  gadolinium-based contrast administration, axial T2-weighted,  diffusion, FLAIR, and axial and coronal T1-weighted images were  obtained.     Dose: 7.5 cc Gadavist    Findings: Images are mildly degraded secondary to patient motion.  There is no mass effect, midline shift, or evidence of intracranial  hemorrhage.  The ventricles are not enlarged out of proportion to the  cerebral sulci. The gray-white matter differentiation of the cerebral  hemispheres is preserved. Mild scattered periventricular FLAIR  hyperintense foci which is nonspecific but may represent sequela of  infectious or inflammatory insults, vasculopathy or demyelination.  Postcontrast images demonstrate no abnormal intracranial parenchymal  or meningeal enhancement.     The major vascular flow-voids appear patent. The orbits, visualized  portions of paranasal sinuses, and mastoid air cells are relatively  clear.      Impression    Impression:  1. No intracranial hemorrhage, midline shift, or hydrocephalus.   2. No abnormal contrast enhancing lesions intracranially.    I have personally reviewed the examination and initial interpretation  and I agree with the findings.    ALFREDO  MYRA ZAIDI MD   US Abdomen Limited w Abd/Pelvis Duplex Complete    Narrative    EXAMINATION: US ABDOMEN COMPLETE WITH DOPPLER on 7/18/2020 9:26 AM.     INDICATION: evaluation of periportal vasculature (due to advancement  of PV thrombus from prior scan)    COMPARISON: CT dated 7/17/2020.    TECHNIQUE: The abdomen was scanned in standard fashion with  specialized ultrasound transducer(s) using both gray-scale, color  Doppler, and spectral flow techniques.    FINDINGS:    Liver: The liver demonstrates normal homogeneous echotexture. Multiple  hepatic lesions, including an irregular nonvascular hyperechoic  lesions in the left hepatic lobe measuring 1.5 x 2.3 x 1.3 cm and a  heterogeneously hyperechoic lesion measuring 1.3 x 2.7 x 1.4 cm. A  nonvascular hypoechoic lesion is seen in the right hepatic lobe    Extrahepatic portal vein flow is antegrade, measuring 36 cm/sec.  Nonocclusive thrombus. Measures 1.5 cm in diameter.  Right portal vein flow is antegrade, measuring 25 cm/sec. No thrombus.  Left portal vein flow is antegrade, measuring 18 cm/sec. Nonocclusive  thrombus.    Flow in the hepatic artery is towards the liver and:  67 cm/s peak systolic  0.77 resistive index.     Recanalized periumbilical vein.    The splenic vein is patent and flow is towards the liver.  The middle  and right hepatic veins are patent with flow towards the IVC. The left  hepatic vein is poorly visualized. The IVC is patent with flow towards  the heart.   The visualized aorta is not dilated.    Gallbladder: Cholecystectomy.    Bile Ducts: Intrahepatic biliary ducts are of normal caliber.  The  common bile duct measures 11 mm in diameter.    Pancreas: Poorly visualized.     Kidneys: The right kidney measures 10.6 cm in length..    Fluid: No evidence of ascites or pleural effusions.        Impression    IMPRESSION:   1.  Nonocclusive thrombus is seen in the main and left portal veins.  Antegrade flow is seen throughout the hepatoportal  venous system.  2.  Dilated common bile duct is likely due to postcholecystectomy  reservoir effect.  3.  Hyperechoic nonvascular lesions are seen in the left hepatic lobe  in the area of treated HCC. A nonvascular hypoechoic lesion seen in  the right hepatic lobe. These are better seen on CT from 7/17/2020.  4.  Prominent paraumbilical collaterals, in keeping with portal  hypertension.    I have personally reviewed the examination and initial interpretation  and I agree with the findings.    COLEEN ROBERTSON MD       Discharge Medications   Current Discharge Medication List      START taking these medications    Details   benzonatate (TESSALON) 100 MG capsule Take 1 capsule (100 mg) by mouth 3 times daily as needed for cough  Qty: 90 capsule, Refills: 0    Associated Diagnoses: Confusion      diclofenac (VOLTAREN) 1 % topical gel Place 2 g onto the skin 4 times daily  Qty: 100 g, Refills: 0    Associated Diagnoses: Confusion      furosemide (LASIX) 20 MG tablet Take 1 tablet (20 mg) by mouth daily  Qty: 30 tablet, Refills: 0    Associated Diagnoses: Confusion      lactulose (CHRONULAC) 10 GM/15ML solution Take 15 mLs (10 g) by mouth 4 times daily as needed (titrate to 2-4 BM per day)  Qty: 500 mL, Refills: 0    Associated Diagnoses: Confusion      rifaximin (XIFAXAN) 550 MG TABS tablet Take 1 tablet (550 mg) by mouth 2 times daily  Qty: 60 tablet, Refills: 0    Associated Diagnoses: Confusion      spironolactone (ALDACTONE) 50 MG tablet Take 1 tablet (50 mg) by mouth daily  Qty: 30 tablet, Refills: 0    Associated Diagnoses: Confusion      vitamin B1 (THIAMINE) 100 MG tablet Take 1 tablet (100 mg) by mouth daily  Qty: 30 tablet, Refills: 0    Associated Diagnoses: Confusion         CONTINUE these medications which have CHANGED    Details   oxyCODONE (ROXICODONE) 5 MG tablet Take 0.5 tablets (2.5 mg) by mouth every 6 hours as needed for severe pain  Qty: 5 tablet, Refills: 0    Associated Diagnoses: Hepatocellular  carcinoma (H)         CONTINUE these medications which have NOT CHANGED    Details   acetaminophen (TYLENOL) 500 MG tablet Take 500 mg by mouth every 8 hours as needed       albuterol (PROAIR HFA) 108 (90 Base) MCG/ACT inhaler Inhale 2 puffs into the lungs every 6 hours as needed     Comments: Pharmacy may dispense brand covered by insurance (Proair, or proventil or ventolin or generic albuterol inhaler)      calcium carbonate-vitamin D (OSCAL W/D) 500-200 MG-UNIT tablet Take 1 tablet by mouth 2 times daily (with meals)  Qty: 60 tablet, Refills: 3    Associated Diagnoses: Low bone density      cholecalciferol (VITAMIN D3) 1000 units (25 mcg) capsule Take 1 capsule (1,000 Units) by mouth daily  Qty: 30 capsule, Refills: 3    Associated Diagnoses: Low bone density      insulin glargine (LANTUS SOLOSTAR) 100 UNIT/ML pen Inject 38 Units Subcutaneous At Bedtime     Comments: If Lantus is not covered by insurance, may substitute Basaglar at same dose and frequency.        melatonin 3 MG tablet Take 1 tablet (3 mg) by mouth At Bedtime  Qty: 30 tablet, Refills: 0    Associated Diagnoses: Confusion      nicotine (NICODERM CQ) 14 MG/24HR 24 hr patch Place 1 patch onto the skin every 24 hours  Qty: 30 patch, Refills: 3    Associated Diagnoses: Nicotine dependence, uncomplicated, unspecified nicotine product type      ondansetron (ZOFRAN-ODT) 8 MG ODT tab Take 8 mg by mouth every 8 hours as needed for nausea And take 8mg prior to each dose of Levanitib.      pantoprazole (PROTONIX) 40 MG EC tablet TAKE ONE TABLET BY MOUTH EVERY DAY      QUEtiapine (SEROQUEL) 50 MG tablet Take 50 mg by mouth At Bedtime  Refills: 0      sertraline (ZOLOFT) 100 MG tablet Take 200 mg by mouth daily       TRULICITY 0.75 MG/0.5ML pen INJECT 0.5 ml's SUBCUTANEOUSLY ONCE WEEKLY  Refills: 2      blood glucose monitoring (ONE TOUCH ULTRA MINI) meter device kit Use as directed to test glucose three times daily. Pharmacy dispense brand based on insurance.   Indications: Diabetes      nicotine (NICORETTE) 2 MG gum Place 1 each (2 mg) inside cheek as needed for smoking cessation  Qty: 60 each, Refills: 3    Associated Diagnoses: Nicotine dependence, uncomplicated, unspecified nicotine product type      OneTouch Delica Lancets 33G MISC Change lancet one time daily as directed.      order for DME Abdominal Binder - small  Qty: 1 each, Refills: 1    Associated Diagnoses: Abdominal pain, generalized         STOP taking these medications       Lenvatinib 12 MG, 3 x 4 mg capsules, (LENVIMA) 3 x 4 MG capsule Comments:   Reason for Stopping:             Allergies   Allergies   Allergen Reactions     Bee Venom Other (See Comments) and Swelling     Hand swelled up badly.       Hydrocodone Other (See Comments)     nausea     Nickel Rash     Wool Fiber Hives and Rash

## 2020-07-20 NOTE — PLAN OF CARE
AVSS, Afebrile. A&Ox4. Intermittently forgetful. Nicotine patch in place on LEFT arm.  BG 90, No further interventions needed. Up w/ SBA. Calling appropriately. PIV removed.     Discharge  D: Orders for discharge and outpatient medications written.  I: Home medications and return to clinic schedule reviewed with patient. Discharge instructions and parameters for calling Health Care Provider reviewed. Patient left at 1425 accompanied by daughter.   A: Patient/family verbalized understanding and was ready for discharge.   P: Patient instructed to  medications in Pharmacy. Follow up as scheduled.

## 2020-07-20 NOTE — PROGRESS NOTES
Calorie Count    Intake recorded for: 7/19/2020  Total Kcals: 1370 Total Protein: 70g    Kcals from Hospital Food: 1380   Protein: 70g    Kcals from Outside Food (average):0 Protein: 0g    # Meals Recorded: 3 meals ordered, 2 recorded (first - 100% chicken & cheese quesadilla w/ salsa & sour cream)  (second - 100% omelet w/ cheddar cheese, onion, sausage, ham & ketchup, 1 piece toast w/ butter, apple juice)    # Supplements Recorded: no intake recorded.     Epic Food Record note shows pt consumed chips & guacamole with their quesadilla. Chips & guacamole were not ordered from kitchen. No brand information, or quantity consumed, provided. Not enough information to accurately calculate nutritionals.

## 2020-07-21 NOTE — PROGRESS NOTES
Date: 7/23/2020 Status: [facility]   Time: 4:00 PM Length: 30   Visit Type: TELEPHONE VISIT RETURN [280] ADELE: [id number]   Provider: Dhruv Childress MD Department:  ONCOLOGY ADULT     Patient has clinic visit within 24-48 hours of Discharge so no post DC follow up call is needed

## 2020-07-21 NOTE — PROGRESS NOTES
Called patient at [facility] home for Post hospital discharge. LVM requesting a return call to discuss how she is doing since discharge and to let her know that she has to go through her PCP to be enrolled in the medical cannabis program. Our provider is unable to register her because she does not live in [place].       Scheduled with Dr Childress for hospital follow up on 7/23

## 2020-07-21 NOTE — PROGRESS NOTES
Patient discharged on 7/20/20, please follow up per TCM workflow.    Jaimee Hernandez, CMA

## 2020-07-23 NOTE — PROGRESS NOTES
Chief Complaint   Patient presents with     Blood Draw     vpt blood draw, vitals taken, urine collected.     Venipuncture labs drawn from left arm.    Oxana Alegria MA

## 2020-07-23 NOTE — LETTER
7/23/2020         RE: Dania Albert  [address] [place] [id number]        Dear Colleague,    Thank you for referring your patient, Dania Albert, to the [facility]. Please see a copy of my visit note below.    Dania Albert is a 54 year old female who is being evaluated via a billable video visit.          Video-Visit Details    Type of service:  Video Visit    Video Start Time: 4:04 PM  Video End Time: 4:21 PM    Originating Location (pt. Location): Home    Distant Location (provider location):  [facility]     Platform used for Video Visit: Jaylen Childress MD        Oncology follow up visit:  Date on this visit: 7/23/2020     Dania Albert  is referred by Dr.No dailey. provider found for an oncology consultation. She requires evaluation for new diagnosis of HCC    Primary Physician: Micaela Gould       History Of Present Illness:  Please see my previous note for details.  I have copied and updated from prior note.    Ms. Albert is a 54 year old female who I initially evaluated in May 2020 for hepatocellular carcinoma .    She has Hep C cirrhosis. Hep C was previously treated with Sovaldi/ribavirin and achived SVR.  She was found to have HCC in June 2019 and had TACE/Ablation for S3 lesion on Oct 2019 and then found to have a new S3 lesion for which she had a MWA on 3/17/2020.  Bone Scan in Dec 2019 was negative for metastatic disease.   CTA Chest in October 2019 was negative for metastatic disease.    A follow up MRI on 5/8/2020 showed no resdual viable cancer in the treatment zones but it found dramatic increase in number and sizes of numerous arterial enhancing foci scattered throughout the liver parenchyma which was very suspicious for multifocal HCC. None of these were diagnostic of HC so she had liver biopsy on which confirmed moderately differentiated HCC.    As she was not deemed a candidate for any further liver directed therapy, she was  referred to medical oncology.    She has constant pain in the RUQ region/beneath the lower right rib cage since the MWA procedure in March 2020.   She had a tick bite and she completed a 21 day course of Doxycycline in early June 2020.    We got a baseline CT chest abdomen and pelvis on 6/12/2020.  Bone scan showed anterior right fifth rib and left clavicular head meta stasis.    She started lenvatinib on 6/30/2020.    She was admitted to the hospital from 7/16/2020 till 7/20/2020 for hepatic encephalopathy and noninfectious colitis.   She was initially started on antibiotics but later on it was a stopped.   MRI of the brain did not show any intracranial metastasis.  She was started on lactulose and rifaximin and her confusion improved.  Lenvatinib was held during the admission.    She had a repeat CT chest abdomen and pelvis which did not show any pulmonary embolism.  Interval advancement of portal venous thrombus involving the main portal vein and the left intrahepatic portal venous system was seen.  There was also interval development of low-attenuation lesions which were smaller than the arterial enhancing lesions seen on CT scan from June 2020.      Interval history.  This is video visit.  Her daughter, Mary is also available throughout the visit.    She is doing better. She last took Lenvatinib on 7/16/2020. She has mild nausea. Has 2-4 soft/lose BMs daily. She is taking lactulose/rifaximin. RUQ pain is better.  She has used oxycodone only once over the last few days.  No new swellings. No fevers. Denies dyspnea. Overall energy is the same tiredness. No neuropathy. Now she does not feel confused now and her balance is better now. No bleeding.  She mentions to me that her last cigarette was about a week ago.        ECOG 1    ROS:  Rest of the comprehensive review of the system was essentially unremarkable.    I reviewed other history in epic as below.    Past Medical/Surgical History:  Past Medical History:    Diagnosis Date     Alcohol abuse      Bone metastasis (H) 7/15/2020     Chronic hepatitis C without hepatic coma (H) 10/23/2019    SVR     Cirrhosis of liver with ascites (H) 10/23/2019     COPD (chronic obstructive pulmonary disease) (H)      Depressive disorder      Diabetes (H)     Type 1 DM, Takes Insulin      Emphysema lung (H)      H/O esophageal varices      HCC (hepatocellular carcinoma) (H) 10/23/2019     History of blood transfusion     [facility] in 1983     ERICKA (obstructive sleep apnea)      Past Surgical History:   Procedure Laterality Date     ABDOMEN SURGERY      Gallbladder Removed      COLONOSCOPY      [place]     CYSTOSCOPY, INJECT BOTOX      detrusor injection     GI SURGERY      Upper Endoscopy at [facility]      HERNIA REPAIR      Patient doesnt remember if mesh was used. [facility]      IR FLUORO 0-1 HOUR  3/17/2020     IR FLUORO 0-1 HOUR  3/17/2020     OPEN REDUCTION INTERNAL FIXATION WRIST Bilateral      Cancer History:   As above    Allergies:  Allergies as of 07/23/2020 - Reviewed 07/17/2020   Allergen Reaction Noted     Bee venom Other (See Comments) and Swelling 07/06/2012     Hydrocodone Other (See Comments) 02/18/2013     Nickel Rash 03/25/2016     Wool fiber Hives and Rash 03/25/2016     Current Medications:  Current Outpatient Medications   Medication Sig Dispense Refill     acetaminophen (TYLENOL) 500 MG tablet Take 500 mg by mouth every 8 hours as needed        albuterol (PROAIR HFA) 108 (90 Base) MCG/ACT inhaler Inhale 2 puffs into the lungs every 6 hours as needed        benzonatate (TESSALON) 100 MG capsule Take 1 capsule (100 mg) by mouth 3 times daily as needed for cough 90 capsule 0     blood glucose monitoring (ONE TOUCH ULTRA MINI) meter device kit Use as directed to test glucose three times daily. Pharmacy dispense brand based on insurance.  Indications: Diabetes       calcium carbonate-vitamin D (OSCAL W/D) 500-200 MG-UNIT tablet Take 1 tablet by mouth 2  times daily (with meals) 60 tablet 3     cholecalciferol (VITAMIN D3) 1000 units (25 mcg) capsule Take 1 capsule (1,000 Units) by mouth daily 30 capsule 3     diclofenac (VOLTAREN) 1 % topical gel Place 2 g onto the skin 4 times daily 100 g 0     furosemide (LASIX) 20 MG tablet Take 1 tablet (20 mg) by mouth daily 30 tablet 0     insulin glargine (LANTUS SOLOSTAR) 100 UNIT/ML pen Inject 38 Units Subcutaneous At Bedtime        lactulose (CHRONULAC) 10 GM/15ML solution Take 15 mLs (10 g) by mouth 4 times daily as needed (titrate to 2-4 BM per day) 500 mL 0     melatonin 3 MG tablet Take 1 tablet (3 mg) by mouth At Bedtime 30 tablet 0     nicotine (NICODERM CQ) 14 MG/24HR 24 hr patch Place 1 patch onto the skin every 24 hours 30 patch 3     nicotine (NICORETTE) 2 MG gum Place 1 each (2 mg) inside cheek as needed for smoking cessation 60 each 3     ondansetron (ZOFRAN-ODT) 8 MG ODT tab Take 8 mg by mouth every 8 hours as needed for nausea And take 8mg prior to each dose of Levanitib.       OneTouch Delica Lancets 33G MISC Change lancet one time daily as directed.       order for DME Abdominal Binder - small 1 each 1     oxyCODONE (ROXICODONE) 5 MG tablet Take 0.5 tablets (2.5 mg) by mouth every 6 hours as needed for severe pain 5 tablet 0     pantoprazole (PROTONIX) 40 MG EC tablet TAKE ONE TABLET BY MOUTH EVERY DAY       QUEtiapine (SEROQUEL) 50 MG tablet Take 50 mg by mouth At Bedtime  0     rifaximin (XIFAXAN) 550 MG TABS tablet Take 1 tablet (550 mg) by mouth 2 times daily 60 tablet 0     sertraline (ZOLOFT) 100 MG tablet Take 200 mg by mouth daily        spironolactone (ALDACTONE) 50 MG tablet Take 1 tablet (50 mg) by mouth daily 30 tablet 0     TRULICITY 0.75 MG/0.5ML pen INJECT 0.5 ml's SUBCUTANEOUSLY ONCE WEEKLY  2     vitamin B1 (THIAMINE) 100 MG tablet Take 1 tablet (100 mg) by mouth daily 30 tablet 0      Family History:  Family History   Problem Relation Age of Onset     Coronary Artery Disease Mother       Coronary Artery Disease Father      No Known Problems Brother      Meniere's disease Sister      Diabetes Sister      No Known Problems Son      No Known Problems Daughter      Diabetes Sister      Liver Disease Sister      Chronic Obstructive Pulmonary Disease Sister    no hx of cancers- 2 kids- healthy    Social History:  Social History     Socioeconomic History     Marital status:      Spouse name: Not on file     Number of children: Not on file     Years of education: Not on file     Highest education level: Not on file   Occupational History     Not on file   Social Needs     Financial resource strain: Not on file     Food insecurity     Worry: Not on file     Inability: Not on file     Transportation needs     Medical: Not on file     Non-medical: Not on file   Tobacco Use     Smoking status: Current Every Day Smoker     Packs/day: 0.30     Years: 40.00     Pack years: 12.00     Types: Cigarettes     Smokeless tobacco: Never Used   Substance and Sexual Activity     Alcohol use: Not Currently     Comment: Rarely drank alcohol.      Drug use: Not Currently     Sexual activity: Not on file   Lifestyle     Physical activity     Days per week: Not on file     Minutes per session: Not on file     Stress: Not on file   Relationships     Social connections     Talks on phone: Not on file     Gets together: Not on file     Attends [employer] service: Not on file     Active member of club or organization: Not on file     Attends meetings of clubs or organizations: Not on file     Relationship status: Not on file     Intimate partner violence     Fear of current or ex partner: Not on file     Emotionally abused: Not on file     Physically abused: Not on file     Forced sexual activity: Not on file   Other Topics Concern     Parent/sibling w/ CABG, MI or angioplasty before 65F 55M? Not Asked   Social History Narrative     Not on file     Has been a chronic smoker for >40 years and now smoking 1/3 ppd. She used to  drink alcohol in the past. Denies heavy alcohol use. Last alcohol use was long time ago. Lives with .   She also wants her daughter Mary [phone] closely involved in her care.    Physical Exam:  There were no vitals taken for this visit.    Wt Readings from Last 4 Encounters:   07/20/20 79.5 kg (175 lb 3.2 oz)   06/29/20 79.2 kg (174 lb 11.2 oz)   05/28/20 78.5 kg (173 lb)   05/21/20 78.5 kg (173 lb)       Constitutional.  Looks well and in no apparent distress.   Eyes.  Without eye redness or apparent jaundice.   Respiratory.  Non labored breathing. Speaking in full sentences.    Skin.  No concerning skin rashes on the skin visualized.   Neurological.  Is alert and oriented.  Psychiatric.  Mood and affect seem appropriate.      The rest of a comprehensive physical examination is deferred due to [facility] video visit restrictions.      Laboratory/Imaging Studies    Reviewed        ASSESSMENT/PLAN:    HCC in the setting of Hep C cirrhosis and initialy treated with TACE/ablation to S3 lesion in Oct 2019 and then had a new S3 lesion treated with MWA in March 2020. Unfortunately on repeat MRI in May 2020, she has now been found to have multifocal HCC- biopsy proven.  She also has probably bone metastasis with right anterior 5th rib lesion and left clavicular head lesion.     Started Lenvatinib on 6/30/2020 and held during admission for hepatic encephalopathy on 7/16/2020.    Now she is doing better and I recommend that she restart lenvatinib.  We will follow very closely in the clinic and I would like that she gets reevaluated in a couple of weeks.    Hepatic Encephalopathy/cirrhosis-she is doing better.  Cont lactulose/Rifaximin.  She follws with Gastroenterologist Dr Cast from [facility].    Portal vein thrombus.  She has thrombus in the main and left portal veins.  Because of significant thrombocytopenia, I believe that risk of bleeding would be very high if we start her on systemic  anticoagulation so I am not planning to do that.  At this time I am not certain whether she has any symptoms from this or not.  She does have right upper quadrant pain but this could be related to the underlying malignancy..  For now pain is under good control.      RUQ Pain/rib cage area pain-her daughter mentions that over the last few days she has not required much pain medications.  I asked her to take oxycodone half a tablet each time to see if it helps with the pain when it is needed.  Patients with underlying liver problems can have issues with mental confusion with opiates so we need to be really careful about this.  Going forward I would like her to be followed by palliative care.  She has an appointment with palliative care on 9/4/2020.      Thrombocytopenia-platelets are 44.  This is from cirrhosis, portal hypertension and splenomegaly.  Continue to monitor.   Bone metastasis.  She has evidence of bone metastasis to the left clavicular head and right anterior fifth rib.  She mentions that she has got dental clearance to start Zometa.  We will schedule this in a couple of weeks.  I will plan to give it every 3 months.  Continue calcium and vitamin D.    Nausea- cont zofran.    Smoking.  It is wonderful that she has not smoked cigarettes for the last 1 week.  I congratulated her on that and encouraged her not to restart this.      We did not address the following today.  Discussion regarding health care directive  We discussed that it is important that she completes health care directive which would help in making sure that her wishes are followed about her treatment care in case she is not able to make a decision for herself.  We gave her information regarding that.    I answered all of her and Mary's questions to their satisfaction.  They are agreeable and comfortable with the plan.      Dhruv Childress MD        See MD Note        Again, thank you for allowing me to participate in the care of your  patient.        Sincerely,        Dhruv Childress MD

## 2020-07-23 NOTE — PROGRESS NOTES
"Dania Albert is a 54 year old female who is being evaluated via a billable video visit.      The patient has been notified of following:     \"This video visit will be conducted via a call between you and your physician/provider. We have found that certain health care needs can be provided without the need for an in-person physical exam.  This service lets us provide the care you need with a video conversation.  If a prescription is necessary we can send it directly to your pharmacy.  If lab work is needed we can place an order for that and you can then stop by our lab to have the test done at a later time.    Video visits are billed at different rates depending on your insurance coverage.  Please reach out to your insurance provider with any questions.    If during the course of the call the physician/provider feels a video visit is not appropriate, you will not be charged for this service.\"    Patient has given verbal consent for Video visit? Yes  How would you like to obtain your AVS? MyChart  If you are dropped from the video visit, the video invite should be resent to: Text to cell phone: [phone] (daughter's phone)  Will anyone else be joining your video visit? No      Video-Visit Details    Type of service:  Video Visit    Video Start Time: 4:04 PM  Video End Time: 4:21 PM    Originating Location (pt. Location): Home    Distant Location (provider location):  [facility]     Platform used for Video Visit: [employer]    Dhruv Childress MD        Oncology follow up visit:  Date on this visit: 7/23/2020     Dania Albert  is referred by  ref. provider found for an oncology consultation. She requires evaluation for new diagnosis of HCC    Primary Physician: Micaela Gould       History Of Present Illness:  Please see my previous note for details.  I have copied and updated from prior note.    Ms. Albert is a 54 year old female who I initially evaluated in May 2020 for " hepatocellular carcinoma .    She has Hep C cirrhosis. Hep C was previously treated with Sovaldi/ribavirin and achived SVR.  She was found to have HCC in June 2019 and had TACE/Ablation for S3 lesion on Oct 2019 and then found to have a new S3 lesion for which she had a MWA on 3/17/2020.  Bone Scan in Dec 2019 was negative for metastatic disease.   CTA Chest in October 2019 was negative for metastatic disease.    A follow up MRI on 5/8/2020 showed no resdual viable cancer in the treatment zones but it found dramatic increase in number and sizes of numerous arterial enhancing foci scattered throughout the liver parenchyma which was very suspicious for multifocal HCC. None of these were diagnostic of HC so she had liver biopsy on which confirmed moderately differentiated HCC.    As she was not deemed a candidate for any further liver directed therapy, she was referred to medical oncology.    She has constant pain in the RUQ region/beneath the lower right rib cage since the MWA procedure in March 2020.   She had a tick bite and she completed a 21 day course of Doxycycline in early June 2020.    We got a baseline CT chest abdomen and pelvis on 6/12/2020.  Bone scan showed anterior right fifth rib and left clavicular head meta stasis.    She started lenvatinib on 6/30/2020.    She was admitted to the hospital from 7/16/2020 till 7/20/2020 for hepatic encephalopathy and noninfectious colitis.   She was initially started on antibiotics but later on it was a stopped.   MRI of the brain did not show any intracranial metastasis.  She was started on lactulose and rifaximin and her confusion improved.  Lenvatinib was held during the admission.    She had a repeat CT chest abdomen and pelvis which did not show any pulmonary embolism.  Interval advancement of portal venous thrombus involving the main portal vein and the left intrahepatic portal venous system was seen.  There was also interval development of low-attenuation  lesions which were smaller than the arterial enhancing lesions seen on CT scan from June 2020.      Interval history.  This is video visit.  Her daughter, Mary is also available throughout the visit.    She is doing better. She last took Lenvatinib on 7/16/2020. She has mild nausea. Has 2-4 soft/lose BMs daily. She is taking lactulose/rifaximin. RUQ pain is better.  She has used oxycodone only once over the last few days.  No new swellings. No fevers. Denies dyspnea. Overall energy is the same tiredness. No neuropathy. Now she does not feel confused now and her balance is better now. No bleeding.  She mentions to me that her last cigarette was about a week ago.        ECOG 1    ROS:  Rest of the comprehensive review of the system was essentially unremarkable.    I reviewed other history in epic as below.    Past Medical/Surgical History:  Past Medical History:   Diagnosis Date     Alcohol abuse      Bone metastasis (H) 7/15/2020     Chronic hepatitis C without hepatic coma (H) 10/23/2019    SVR     Cirrhosis of liver with ascites (H) 10/23/2019     COPD (chronic obstructive pulmonary disease) (H)      Depressive disorder      Diabetes (H)     Type 1 DM, Takes Insulin      Emphysema lung (H)      H/O esophageal varices      HCC (hepatocellular carcinoma) (H) 10/23/2019     History of blood transfusion     [facility]     ERICKA (obstructive sleep apnea)      Past Surgical History:   Procedure Laterality Date     ABDOMEN SURGERY      Gallbladder Removed      COLONOSCOPY      [place]     CYSTOSCOPY, INJECT BOTOX      detrusor injection     GI SURGERY      Upper Endoscopy at [facility]      HERNIA REPAIR      Patient doesnt remember if mesh was used. [facility]      IR FLUORO 0-1 HOUR  3/17/2020     IR FLUORO 0-1 HOUR  3/17/2020     OPEN REDUCTION INTERNAL FIXATION WRIST Bilateral      Cancer History:   As above    Allergies:  Allergies as of 07/23/2020 - Reviewed 07/17/2020   Allergen Reaction  Noted     Bee venom Other (See Comments) and Swelling 07/06/2012     Hydrocodone Other (See Comments) 02/18/2013     Nickel Rash 03/25/2016     Wool fiber Hives and Rash 03/25/2016     Current Medications:  Current Outpatient Medications   Medication Sig Dispense Refill     acetaminophen (TYLENOL) 500 MG tablet Take 500 mg by mouth every 8 hours as needed        albuterol (PROAIR HFA) 108 (90 Base) MCG/ACT inhaler Inhale 2 puffs into the lungs every 6 hours as needed        benzonatate (TESSALON) 100 MG capsule Take 1 capsule (100 mg) by mouth 3 times daily as needed for cough 90 capsule 0     blood glucose monitoring (ONE TOUCH ULTRA MINI) meter device kit Use as directed to test glucose three times daily. Pharmacy dispense brand based on insurance.  Indications: Diabetes       calcium carbonate-vitamin D (OSCAL W/D) 500-200 MG-UNIT tablet Take 1 tablet by mouth 2 times daily (with meals) 60 tablet 3     cholecalciferol (VITAMIN D3) 1000 units (25 mcg) capsule Take 1 capsule (1,000 Units) by mouth daily 30 capsule 3     diclofenac (VOLTAREN) 1 % topical gel Place 2 g onto the skin 4 times daily 100 g 0     furosemide (LASIX) 20 MG tablet Take 1 tablet (20 mg) by mouth daily 30 tablet 0     insulin glargine (LANTUS SOLOSTAR) 100 UNIT/ML pen Inject 38 Units Subcutaneous At Bedtime        lactulose (CHRONULAC) 10 GM/15ML solution Take 15 mLs (10 g) by mouth 4 times daily as needed (titrate to 2-4 BM per day) 500 mL 0     melatonin 3 MG tablet Take 1 tablet (3 mg) by mouth At Bedtime 30 tablet 0     nicotine (NICODERM CQ) 14 MG/24HR 24 hr patch Place 1 patch onto the skin every 24 hours 30 patch 3     nicotine (NICORETTE) 2 MG gum Place 1 each (2 mg) inside cheek as needed for smoking cessation 60 each 3     ondansetron (ZOFRAN-ODT) 8 MG ODT tab Take 8 mg by mouth every 8 hours as needed for nausea And take 8mg prior to each dose of Levanitib.       OneTouch Delica Lancets 33G MISC Change lancet one time daily as  directed.       order for DME Abdominal Binder - small 1 each 1     oxyCODONE (ROXICODONE) 5 MG tablet Take 0.5 tablets (2.5 mg) by mouth every 6 hours as needed for severe pain 5 tablet 0     pantoprazole (PROTONIX) 40 MG EC tablet TAKE ONE TABLET BY MOUTH EVERY DAY       QUEtiapine (SEROQUEL) 50 MG tablet Take 50 mg by mouth At Bedtime  0     rifaximin (XIFAXAN) 550 MG TABS tablet Take 1 tablet (550 mg) by mouth 2 times daily 60 tablet 0     sertraline (ZOLOFT) 100 MG tablet Take 200 mg by mouth daily        spironolactone (ALDACTONE) 50 MG tablet Take 1 tablet (50 mg) by mouth daily 30 tablet 0     TRULICITY 0.75 MG/0.5ML pen INJECT 0.5 ml's SUBCUTANEOUSLY ONCE WEEKLY  2     vitamin B1 (THIAMINE) 100 MG tablet Take 1 tablet (100 mg) by mouth daily 30 tablet 0      Family History:  Family History   Problem Relation Age of Onset     Coronary Artery Disease Mother      Coronary Artery Disease Father      No Known Problems Brother      Meniere's disease Sister      Diabetes Sister      No Known Problems Son      No Known Problems Daughter      Diabetes Sister      Liver Disease Sister      Chronic Obstructive Pulmonary Disease Sister    no hx of cancers- 2 kids- healthy    Social History:  Social History     Socioeconomic History     Marital status:      Spouse name: Not on file     Number of children: Not on file     Years of education: Not on file     Highest education level: Not on file   Occupational History     Not on file   Social Needs     Financial resource strain: Not on file     Food insecurity     Worry: Not on file     Inability: Not on file     Transportation needs     Medical: Not on file     Non-medical: Not on file   Tobacco Use     Smoking status: Current Every Day Smoker     Packs/day: 0.30     Years: 40.00     Pack years: 12.00     Types: Cigarettes     Smokeless tobacco: Never Used   Substance and Sexual Activity     Alcohol use: Not Currently     Comment: Rarely drank alcohol.      Drug  use: Not Currently     Sexual activity: Not on file   Lifestyle     Physical activity     Days per week: Not on file     Minutes per session: Not on file     Stress: Not on file   Relationships     Social connections     Talks on phone: Not on file     Gets together: Not on file     Attends [employer] service: Not on file     Active member of club or organization: Not on file     Attends meetings of clubs or organizations: Not on file     Relationship status: Not on file     Intimate partner violence     Fear of current or ex partner: Not on file     Emotionally abused: Not on file     Physically abused: Not on file     Forced sexual activity: Not on file   Other Topics Concern     Parent/sibling w/ CABG, MI or angioplasty before 65F 55M? Not Asked   Social History Narrative     Not on file     Has been a chronic smoker for >40 years and now smoking 1/3 ppd. She used to drink alcohol in the past. Denies heavy alcohol use. Last alcohol use was long time ago. Lives with .   She also wants her daughter Mary [phone] closely involved in her care.    Physical Exam:  There were no vitals taken for this visit.    Wt Readings from Last 4 Encounters:   07/20/20 79.5 kg (175 lb 3.2 oz)   06/29/20 79.2 kg (174 lb 11.2 oz)   05/28/20 78.5 kg (173 lb)   05/21/20 78.5 kg (173 lb)       Constitutional.  Looks well and in no apparent distress.   Eyes.  Without eye redness or apparent jaundice.   Respiratory.  Non labored breathing. Speaking in full sentences.    Skin.  No concerning skin rashes on the skin visualized.   Neurological.  Is alert and oriented.  Psychiatric.  Mood and affect seem appropriate.      The rest of a comprehensive physical examination is deferred due to Public Health Emergency video visit restrictions.      Laboratory/Imaging Studies    Reviewed        ASSESSMENT/PLAN:    HCC in the setting of Hep C cirrhosis and initialy treated with TACE/ablation to S3 lesion in Oct 2019 and then had a new S3  lesion treated with MWA in March 2020. Unfortunately on repeat MRI in May 2020, she has now been found to have multifocal HCC- biopsy proven.  She also has probably bone metastasis with right anterior 5th rib lesion and left clavicular head lesion.     Started Lenvatinib on 6/30/2020 and held during admission for hepatic encephalopathy on 7/16/2020.    Now she is doing better and I recommend that she restart lenvatinib.  We will follow very closely in the clinic and I would like that she gets reevaluated in a couple of weeks.    Hepatic Encephalopathy/cirrhosis-she is doing better.  Cont lactulose/Rifaximin.  She follws with Gastroenterologist Dr Cast from [facility].    Portal vein thrombus.  She has thrombus in the main and left portal veins.  Because of significant thrombocytopenia, I believe that risk of bleeding would be very high if we start her on systemic anticoagulation so I am not planning to do that.  At this time I am not certain whether she has any symptoms from this or not.  She does have right upper quadrant pain but this could be related to the underlying malignancy..  For now pain is under good control.      RUQ Pain/rib cage area pain-her daughter mentions that over the last few days she has not required much pain medications.  I asked her to take oxycodone half a tablet each time to see if it helps with the pain when it is needed.  Patients with underlying liver problems can have issues with mental confusion with opiates so we need to be really careful about this.  Going forward I would like her to be followed by palliative care.  She has an appointment with palliative care on 9/4/2020.      Thrombocytopenia-platelets are 44.  This is from cirrhosis, portal hypertension and splenomegaly.  Continue to monitor.   Bone metastasis.  She has evidence of bone metastasis to the left clavicular head and right anterior fifth rib.  She mentions that she has got dental clearance to start Zometa.  We  will schedule this in a couple of weeks.  I will plan to give it every 3 months.  Continue calcium and vitamin D.    Nausea- cont zofran.    Smoking.  It is wonderful that she has not smoked cigarettes for the last 1 week.  I congratulated her on that and encouraged her not to restart this.      We did not address the following today.  Discussion regarding health care directive  We discussed that it is important that she completes health care directive which would help in making sure that her wishes are followed about her treatment care in case she is not able to make a decision for herself.  We gave her information regarding that.    I answered all of her and Mary's questions to their satisfaction.  They are agreeable and comfortable with the plan.      Dhruv Childress MD

## 2020-07-23 NOTE — PATIENT INSTRUCTIONS
Restart Lenvatinib    RTC in 2 week sto see Elo. Labs the same day. Zometa to follow.    Cont Calcium and Vit D    Cont Lactulose/Rifaximin

## 2020-08-11 NOTE — NURSING NOTE
Chief Complaint   Patient presents with     Blood Draw     labs drawn with vpt by rn.  vs taken     Labs drawn with vpt by rn.  Pt tolerated well.  VS taken.  Pt checked in for next appt.    Chari Valdez RN

## 2020-08-11 NOTE — PROGRESS NOTES
Infusion Nursing Note:  Dania Albert presents today for Zometa.    Patient seen by provider today: Yes: DEVON Miles    Note: Pt arrived to infusion visibly anxious and stating she has no idea what she is here for.  Throughout attempt at assessment by this RN, pt moving around, making poor eye contact, unable to recall or concentrate on reviewing any of her home medications.  Pt states she stopped taking lactulose 2-4 days ago 2/2 amount of stool and she didn't like the way it made her feel (pt described as the sticky liquid that glued her to the toilet).  Pt w/ c/o new pain in spine and L calf 9/10 x2-3 days, started after she woke up.  Unable to recall if has taken any medication to help with pain or if she has gotten relief from any medications.  Pt did not recognize that she had had labs drawn and repeated asked or was surprised that she was in clinic for an infusion.      Pt A&Ox2.  Knows name and birthday, stated month was June 2020, then corrected self to August 17th, 2020 and stated in [facility] [place] ([facility] [place].    With pt permission, spoke with pt's daughter, Jessica, who states pt stayed with her about a week after previous discharge, but pt has been home for with her  since 7/23.  At this time, pt's daughter believes that she stopped taking her lactulose.  Pt and pt's daughter verbalized difficult communication between themselves and maintaining prescribed POC.  Dr. Childress also endorsed this.    TORB 8/11/2020 1350 Dr. Childress/JOSE DAVID Portillo, RN: Pt needs to be assessed in person.  Ok to add ammonia level and CBC    VORB 8/11/2020 1520 DEVON Krishnamurthy/JOSE DAVID Portillo, RN: Hold Zometa.  Send for US today or ASAP, does not need to wait for results    Intravenous Access:  Peripheral IV placed by vascular access with US.    Treatment Conditions:  Lab Results   Component Value Date    HGB 13.2 08/11/2020     Lab Results   Component Value Date    WBC 2.3  08/11/2020      Lab Results   Component Value Date    ANEU 1.3 08/11/2020     Lab Results   Component Value Date    PLT 31 08/11/2020      Lab Results   Component Value Date     08/11/2020                   Lab Results   Component Value Date    POTASSIUM 3.4 08/11/2020           Lab Results   Component Value Date    MAG 2.0 07/23/2020            Lab Results   Component Value Date    CR 0.81 08/11/2020                   Lab Results   Component Value Date    LETY 8.5 08/11/2020                Lab Results   Component Value Date    BILITOTAL 1.2 08/11/2020           Lab Results   Component Value Date    ALBUMIN 3.1 08/11/2020                    Lab Results   Component Value Date    ALT 38 08/11/2020           Lab Results   Component Value Date    AST 45 08/11/2020     AMMONIA: 84     Post Infusion Assessment:  Access discontinued per protocol.    Discharge Plan:   Patient declined prescription refills--lactulose and oxy sent to local pharmacy.  Discharge instructions reviewed with: Patient.  Patient and/or family verbalized understanding of discharge instructions and all questions answered.  AVS to patient via [employer].  Patient will return 8/13/2020 for next appointment.   Patient discharged in stable condition accompanied by: daughter.  Departure Mode: Ambulatory.  Face to Face time: 10 minutes.    Augusta Portillo RN

## 2020-08-11 NOTE — PROGRESS NOTES
Oncology follow up visit:  Date on this visit: Aug 11, 2020    Reason for visit: assess mentation changes in the setting of HCC    History Of Present Illness:   Ms. Albert is a 54 year old female who I initially evaluated in May 2020 for hepatocellular carcinoma .    She has Hep C cirrhosis. Hep C was previously treated with Sovaldi/ribavirin and achived SVR.  She was found to have HCC in June 2019 and had TACE/Ablation for S3 lesion on Oct 2019 and then found to have a new S3 lesion for which she had a MWA on 3/17/2020.  Bone Scan in Dec 2019 was negative for metastatic disease.   CTA Chest in October 2019 was negative for metastatic disease.    A follow up MRI on 5/8/2020 showed no resdual viable cancer in the treatment zones but it found dramatic increase in number and sizes of numerous arterial enhancing foci scattered throughout the liver parenchyma which was very suspicious for multifocal HCC. None of these were diagnostic of HC so she had liver biopsy on which confirmed moderately differentiated HCC.    As she was not deemed a candidate for any further liver directed therapy, she was referred to medical oncology.    She has constant pain in the RUQ region/beneath the lower right rib cage since the MWA procedure in March 2020.   She had a tick bite and she completed a 21 day course of Doxycycline in early June 2020.    We got a baseline CT chest abdomen and pelvis on 6/12/2020.  Bone scan showed anterior right fifth rib and left clavicular head meta stasis.    She started lenvatinib on 6/30/2020.    She was admitted to the hospital from 7/16/2020 till 7/20/2020 for hepatic encephalopathy and noninfectious colitis.   She was initially started on antibiotics but later on it was a stopped.   MRI of the brain did not show any intracranial metastasis.  She was started on lactulose and rifaximin and her confusion improved.  Lenvatinib was held during the admission.    She had a repeat CT chest abdomen and pelvis  which did not show any pulmonary embolism.  Interval advancement of portal venous thrombus involving the main portal vein and the left intrahepatic portal venous system was seen.  There was also interval development of low-attenuation lesions which were smaller than the arterial enhancing lesions seen on CT scan from June 2020.      I was asked to see her today to evaluate her mentation.    Interval history:  Patient reports feeling tired and has been sleeping poorly due to pain in her collar bone, neck, and low back. She thinks she ran out of oxycodone 2 days ago. She had been taking 2/day recently. She stopped the lactulose about 2 weeks ago as she was having very frequent watery stools. She was not able to quantify this, but notes that she spent most of her day on the toilet. She notes that her bottom became very sore due to the frequent stooling. She also had abdominal cramping associated with the stools. She is now having 2 formed stools/day. She denies fevers. She has had some urinary urgency and frequency with no dysuria or blood in the urine. She is unsure when this started. She denies dyspnea or chest pain. She has been eating less lately due to a low appetite. She also notes pain in her left calf. She denies swelling or redness and does not know when the pain started.     Current Medications:  Current Outpatient Medications   Medication Sig Dispense Refill     lactulose (CHRONULAC) 10 GM/15ML solution Take 15 mLs (10 g) by mouth 3 times daily 500 mL 0     oxyCODONE (ROXICODONE) 5 MG tablet Take 0.5-1 tablets (2.5-5 mg) by mouth every 6 hours as needed for severe pain 30 tablet 0     acetaminophen (TYLENOL) 500 MG tablet Take 500 mg by mouth every 8 hours as needed        albuterol (PROAIR HFA) 108 (90 Base) MCG/ACT inhaler Inhale 2 puffs into the lungs every 6 hours as needed        benzonatate (TESSALON) 100 MG capsule Take 1 capsule (100 mg) by mouth 3 times daily as needed for cough 90 capsule 0      blood glucose monitoring (ONE TOUCH ULTRA MINI) meter device kit Use as directed to test glucose three times daily. Pharmacy dispense brand based on insurance.  Indications: Diabetes       calcium carbonate-vitamin D (OSCAL W/D) 500-200 MG-UNIT tablet Take 1 tablet by mouth 2 times daily (with meals) 60 tablet 3     cholecalciferol (VITAMIN D3) 1000 units (25 mcg) capsule Take 1 capsule (1,000 Units) by mouth daily 30 capsule 3     diclofenac (VOLTAREN) 1 % topical gel Place 2 g onto the skin 4 times daily 100 g 0     furosemide (LASIX) 20 MG tablet Take 1 tablet (20 mg) by mouth daily 30 tablet 0     insulin glargine (LANTUS SOLOSTAR) 100 UNIT/ML pen Inject 38 Units Subcutaneous At Bedtime        Lenvatinib 12 MG, 3 x 4 mg capsules, (LENVIMA) 3 x 4 MG capsule Take 3 capsules (12 mg) by mouth daily 90 capsule 0     melatonin 3 MG tablet Take 1 tablet (3 mg) by mouth At Bedtime 30 tablet 0     nicotine (NICODERM CQ) 14 MG/24HR 24 hr patch Place 1 patch onto the skin every 24 hours 30 patch 3     nicotine (NICORETTE) 2 MG gum Place 1 each (2 mg) inside cheek as needed for smoking cessation 60 each 3     ondansetron (ZOFRAN-ODT) 8 MG ODT tab Take 8 mg by mouth every 8 hours as needed for nausea And take 8mg prior to each dose of Levanitib.       OneTouch Delica Lancets 33G MISC Change lancet one time daily as directed.       order for DME Abdominal Binder - small (Patient not taking: Reported on 7/23/2020) 1 each 1     pantoprazole (PROTONIX) 40 MG EC tablet TAKE ONE TABLET BY MOUTH EVERY DAY       QUEtiapine (SEROQUEL) 50 MG tablet Take 50 mg by mouth At Bedtime  0     rifaximin (XIFAXAN) 550 MG TABS tablet Take 1 tablet (550 mg) by mouth 2 times daily 60 tablet 0     sertraline (ZOLOFT) 100 MG tablet Take 200 mg by mouth daily        spironolactone (ALDACTONE) 50 MG tablet Take 1 tablet (50 mg) by mouth daily 30 tablet 0     TRULICITY 0.75 MG/0.5ML pen INJECT 0.5 ml's SUBCUTANEOUSLY ONCE WEEKLY  2     vitamin B1  (THIAMINE) 100 MG tablet Take 1 tablet (100 mg) by mouth daily 30 tablet 0     Physical Exam:  General: The patient is a pleasant female in no acute distress.  Vital Signs 8/11/2020   Systolic 100   Diastolic 65   Pulse 78   Temperature 98.1   Respirations 16   Weight (LB) 167 lb   O2 90     Wt Readings from Last 10 Encounters:   08/11/20 75.8 kg (167 lb)   07/23/20 75.8 kg (167 lb)   07/20/20 79.5 kg (175 lb 3.2 oz)   06/29/20 79.2 kg (174 lb 11.2 oz)   05/28/20 78.5 kg (173 lb)   05/21/20 78.5 kg (173 lb)   03/17/20 78.7 kg (173 lb 8 oz)   03/02/20 78.7 kg (173 lb 8 oz)   02/06/20 80.2 kg (176 lb 12.8 oz)   01/31/20 78.9 kg (174 lb)   HEENT: EOMI. Sclerae are anicteric.  Lymph: Neck is supple with no lymphadenopathy in the cervical or supraclavicular areas. Clavicles are moderately tender bilaterally.  Heart: Regular rate and rhythm.   Lungs: Clear to auscultation bilaterally.   Abdomen: Bowel sounds present, soft, mild suprapubic tenderness, remainder nontender with no palpable masses in a seated position.   Extremities: No lower extremity edema noted bilaterally. Left posterior calf is moderately tender. No associated skin changes noted.  Neuro: Patient is able to give much of the history, but is not as clear on timelines. She is mildly combative with her daughter.   Skin: No rashes, petechiae, or bruising noted on exposed skin.  Musculoskeletal: Lower lumbar spine is mildly tender.    Laboratory/Imaging Studies   8/11/2020 13:01   Sodium 136   Potassium 3.4   Chloride 103   Carbon Dioxide 24   Urea Nitrogen 12   Creatinine 0.81   GFR Estimate 81   GFR Estimate If Black >90   Calcium 8.5   Anion Gap 8   Albumin 3.1 (L)   Protein Total 7.3   Bilirubin Total 1.2   Alkaline Phosphatase 120   ALT 38   AST 45   Glucose 273 (H)       ASSESSMENT/PLAN:    Hepatic encephalopathy. Ammonia level is elevated. Will attempt outpatient management with resuming lactulose at 15 ml tid. She will stay with her daughter for the  next few days until she clears. If her cognition worsens, her daughter will take her to the ED. She will have close follow-up in clinic in 2 days.     Urinary frequency and urgency. Will check a UA.    Left calf pain. Will get an ultrasound to rule out DVT. Given chronic thrombocytopenia, will not plan to anticoagulate if has a DVT. Would need to consider an IVC filter.     HCC. On Lenvima. Held during recent hospital stay, but now resumed.     Elo Jeffers PA-C  [facility]  [address], [place] [address] [phone]    Addendum: No DVT noted on ultrasound and UA is not consistent with UTI.

## 2020-08-11 NOTE — LETTER
8/11/2020      RE: Dania Albert  [address] [facility] [address]       Oncology follow up visit:  Date on this visit: Aug 11, 2020    Reason for visit: assess mentation changes in the setting of HCC    History Of Present Illness:   Ms. Albert is a 54 year old female who I initially evaluated in May 2020 for hepatocellular carcinoma .    She has Hep C cirrhosis. Hep C was previously treated with Sovaldi/ribavirin and achived SVR.  She was found to have HCC in June 2019 and had TACE/Ablation for S3 lesion on Oct 2019 and then found to have a new S3 lesion for which she had a MWA on 3/17/2020.  Bone Scan in Dec 2019 was negative for metastatic disease.   CTA Chest in October 2019 was negative for metastatic disease.    A follow up MRI on 5/8/2020 showed no resdual viable cancer in the treatment zones but it found dramatic increase in number and sizes of numerous arterial enhancing foci scattered throughout the liver parenchyma which was very suspicious for multifocal HCC. None of these were diagnostic of HC so she had liver biopsy on which confirmed moderately differentiated HCC.    As she was not deemed a candidate for any further liver directed therapy, she was referred to medical oncology.    She has constant pain in the RUQ region/beneath the lower right rib cage since the MWA procedure in March 2020.   She had a tick bite and she completed a 21 day course of Doxycycline in early June 2020.    We got a baseline CT chest abdomen and pelvis on 6/12/2020.  Bone scan showed anterior right fifth rib and left clavicular head meta stasis.    She started lenvatinib on 6/30/2020.    She was admitted to the hospital from 7/16/2020 till 7/20/2020 for hepatic encephalopathy and noninfectious colitis.   She was initially started on antibiotics but later on it was a stopped.   MRI of the brain did not show any intracranial metastasis.  She was started on lactulose and rifaximin and her confusion improved.  Lenvatinib was held  during the admission.    She had a repeat CT chest abdomen and pelvis which did not show any pulmonary embolism.  Interval advancement of portal venous thrombus involving the main portal vein and the left intrahepatic portal venous system was seen.  There was also interval development of low-attenuation lesions which were smaller than the arterial enhancing lesions seen on CT scan from June 2020.      I was asked to see her today to evaluate her mentation.    Interval history:  Patient reports feeling tired and has been sleeping poorly due to pain in her collar bone, neck, and low back. She thinks she ran out of oxycodone 2 days ago. She had been taking 2/day recently. She stopped the lactulose about 2 weeks ago as she was having very frequent watery stools. She was not able to quantify this, but notes that she spent most of her day on the toilet. She notes that her bottom became very sore due to the frequent stooling. She also had abdominal cramping associated with the stools. She is now having 2 formed stools/day. She denies fevers. She has had some urinary urgency and frequency with no dysuria or blood in the urine. She is unsure when this started. She denies dyspnea or chest pain. She has been eating less lately due to a low appetite. She also notes pain in her left calf. She denies swelling or redness and does not know when the pain started.     Current Medications:  Current Outpatient Medications   Medication Sig Dispense Refill     lactulose (CHRONULAC) 10 GM/15ML solution Take 15 mLs (10 g) by mouth 3 times daily 500 mL 0     oxyCODONE (ROXICODONE) 5 MG tablet Take 0.5-1 tablets (2.5-5 mg) by mouth every 6 hours as needed for severe pain 30 tablet 0     acetaminophen (TYLENOL) 500 MG tablet Take 500 mg by mouth every 8 hours as needed        albuterol (PROAIR HFA) 108 (90 Base) MCG/ACT inhaler Inhale 2 puffs into the lungs every 6 hours as needed        benzonatate (TESSALON) 100 MG capsule Take 1 capsule  (100 mg) by mouth 3 times daily as needed for cough 90 capsule 0     blood glucose monitoring (ONE TOUCH ULTRA MINI) meter device kit Use as directed to test glucose three times daily. Pharmacy dispense brand based on insurance.  Indications: Diabetes       calcium carbonate-vitamin D (OSCAL W/D) 500-200 MG-UNIT tablet Take 1 tablet by mouth 2 times daily (with meals) 60 tablet 3     cholecalciferol (VITAMIN D3) 1000 units (25 mcg) capsule Take 1 capsule (1,000 Units) by mouth daily 30 capsule 3     diclofenac (VOLTAREN) 1 % topical gel Place 2 g onto the skin 4 times daily 100 g 0     furosemide (LASIX) 20 MG tablet Take 1 tablet (20 mg) by mouth daily 30 tablet 0     insulin glargine (LANTUS SOLOSTAR) 100 UNIT/ML pen Inject 38 Units Subcutaneous At Bedtime        Lenvatinib 12 MG, 3 x 4 mg capsules, (LENVIMA) 3 x 4 MG capsule Take 3 capsules (12 mg) by mouth daily 90 capsule 0     melatonin 3 MG tablet Take 1 tablet (3 mg) by mouth At Bedtime 30 tablet 0     nicotine (NICODERM CQ) 14 MG/24HR 24 hr patch Place 1 patch onto the skin every 24 hours 30 patch 3     nicotine (NICORETTE) 2 MG gum Place 1 each (2 mg) inside cheek as needed for smoking cessation 60 each 3     ondansetron (ZOFRAN-ODT) 8 MG ODT tab Take 8 mg by mouth every 8 hours as needed for nausea And take 8mg prior to each dose of Levanitib.       OneTouch Delica Lancets 33G MISC Change lancet one time daily as directed.       order for DME Abdominal Binder - small (Patient not taking: Reported on 7/23/2020) 1 each 1     pantoprazole (PROTONIX) 40 MG EC tablet TAKE ONE TABLET BY MOUTH EVERY DAY       QUEtiapine (SEROQUEL) 50 MG tablet Take 50 mg by mouth At Bedtime  0     rifaximin (XIFAXAN) 550 MG TABS tablet Take 1 tablet (550 mg) by mouth 2 times daily 60 tablet 0     sertraline (ZOLOFT) 100 MG tablet Take 200 mg by mouth daily        spironolactone (ALDACTONE) 50 MG tablet Take 1 tablet (50 mg) by mouth daily 30 tablet 0     TRULICITY 0.75 MG/0.5ML  pen INJECT 0.5 ml's SUBCUTANEOUSLY ONCE WEEKLY  2     vitamin B1 (THIAMINE) 100 MG tablet Take 1 tablet (100 mg) by mouth daily 30 tablet 0     Physical Exam:  General: The patient is a pleasant female in no acute distress.  Vital Signs 8/11/2020   Systolic 100   Diastolic 65   Pulse 78   Temperature 98.1   Respirations 16   Weight (LB) 167 lb   O2 90     Wt Readings from Last 10 Encounters:   08/11/20 75.8 kg (167 lb)   07/23/20 75.8 kg (167 lb)   07/20/20 79.5 kg (175 lb 3.2 oz)   06/29/20 79.2 kg (174 lb 11.2 oz)   05/28/20 78.5 kg (173 lb)   05/21/20 78.5 kg (173 lb)   03/17/20 78.7 kg (173 lb 8 oz)   03/02/20 78.7 kg (173 lb 8 oz)   02/06/20 80.2 kg (176 lb 12.8 oz)   01/31/20 78.9 kg (174 lb)   HEENT: EOMI. Sclerae are anicteric.  Lymph: Neck is supple with no lymphadenopathy in the cervical or supraclavicular areas. Clavicles are moderately tender bilaterally.  Heart: Regular rate and rhythm.   Lungs: Clear to auscultation bilaterally.   Abdomen: Bowel sounds present, soft, mild suprapubic tenderness, remainder nontender with no palpable masses in a seated position.   Extremities: No lower extremity edema noted bilaterally. Left posterior calf is moderately tender. No associated skin changes noted.  Neuro: Patient is able to give much of the history, but is not as clear on timelines. She is mildly combative with her daughter.   Skin: No rashes, petechiae, or bruising noted on exposed skin.  Musculoskeletal: Lower lumbar spine is mildly tender.    Laboratory/Imaging Studies   8/11/2020 13:01   Sodium 136   Potassium 3.4   Chloride 103   Carbon Dioxide 24   Urea Nitrogen 12   Creatinine 0.81   GFR Estimate 81   GFR Estimate If Black >90   Calcium 8.5   Anion Gap 8   Albumin 3.1 (L)   Protein Total 7.3   Bilirubin Total 1.2   Alkaline Phosphatase 120   ALT 38   AST 45   Glucose 273 (H)       ASSESSMENT/PLAN:    Hepatic encephalopathy. Ammonia level is elevated. Will attempt outpatient management with resuming  lactulose at 15 ml tid. She will stay with her daughter for the next few days until she clears. If her cognition worsens, her daughter will take her to the ED. She will have close follow-up in clinic in 2 days.     Urinary frequency and urgency. Will check a UA.    Left calf pain. Will get an ultrasound to rule out DVT. Given chronic thrombocytopenia, will not plan to anticoagulate if has a DVT. Would need to consider an IVC filter.     HCC. On Lenvima. Held during recent hospital stay, but now resumed.     Elo Jeffers PA-C  [facility]  [address] [place] [address]  [phone]    Addendum: No DVT noted on ultrasound and UA is not consistent with UTI.    Elo Jeffers PA-C

## 2020-08-12 NOTE — PROGRESS NOTES
RN CARE COORDINATION NOTE          Outgoing:  Placed call to daughter, Dania, and patient to give test results from Elo Jeffers PA-C  below. LVMM for both of them.     Please let patient and her daughter know her urine looks fine and no DVT on the ultrasound    Isela Dunham MSN, RN, OCN  RN Care Coordinator  [facility]  [phone]

## 2020-08-12 NOTE — PROGRESS NOTES
Oncology/Hematology Visit Note  Aug 13, 2020    Reason for Visit: Follow up of  hepatocellular carcinoma    History of Present Illness:    Initially evaluated in May 2020 for hepatocellular carcinoma .     She has Hep C cirrhosis. Hep C was previously treated with Sovaldi/ribavirin and achived SVR.  She was found to have HCC in June 2019 and had TACE/Ablation for S3 lesion on Oct 2019 and then found to have a new S3 lesion for which she had a MWA on 3/17/2020.  Bone Scan in Dec 2019 was negative for metastatic disease.   CTA Chest in October 2019 was negative for metastatic disease.     A follow up MRI on 5/8/2020 showed no resdual viable cancer in the treatment zones but it found dramatic increase in number and sizes of numerous arterial enhancing foci scattered throughout the liver parenchyma which was very suspicious for multifocal HCC. None of these were diagnostic of HC so she had liver biopsy on which confirmed moderately differentiated HCC.     As she was not deemed a candidate for any further liver directed therapy, she was referred to medical oncology.     She has constant pain in the RUQ region/beneath the lower right rib cage since the MWA procedure in March 2020.     Baseline CT chest abdomen and pelvis on 6/12/2020.  Bone scan showed anterior right fifth rib and left clavicular head meta stasis.     She started lenvatinib on 6/30/2020.     She was admitted to the hospital from 7/16/2020 till 7/20/2020 for hepatic encephalopathy and noninfectious colitis. MRI of the brain did not show any intracranial metastasis. She was started on lactulose and rifaximin and her confusion improved.  Lenvatinib was held during the admission.     Repeat CT chest abdomen and pelvis which did not show any pulmonary embolism.  Interval advancement of portal venous thrombus involving the main portal vein and the left intrahepatic portal venous system was seen.  There was also interval development of low-attenuation lesions  "which were smaller than the arterial enhancing lesions seen on CT scan from June 2020.      Lenvima resumed 7/23/2020       Interval History:  Dania Albert presents for follow up with her daughter. She started the lactulose as advised and at one point mentioned she is going 3-4 times a day- which daughter also agreed with, but at a later point mentioned she is \"always on the toilet.\" Denies any pain to rectal region at this time. Per her daughter, she has noted a slight improvement in mentation since her visit earlier this week. Feels she is able to care for her mother at home.   - Dania denies fevers, chills, new headache. States urinary issues have improved (was having some frequency). Admits to feeling dry and mild lightheadedness. Notes chronic, stable cough and dyspnea on exertion. Continues to smoke. No chest pain. Leg pain has improved. No nausea. Admits to eating less than usual because of her loose stools.       Current Outpatient Medications   Medication Sig Dispense Refill     acetaminophen (TYLENOL) 500 MG tablet Take 500 mg by mouth every 8 hours as needed        albuterol (PROAIR HFA) 108 (90 Base) MCG/ACT inhaler Inhale 2 puffs into the lungs every 6 hours as needed        benzonatate (TESSALON) 100 MG capsule Take 1 capsule (100 mg) by mouth 3 times daily as needed for cough 90 capsule 0     blood glucose monitoring (ONE TOUCH ULTRA MINI) meter device kit Use as directed to test glucose three times daily. Pharmacy dispense brand based on insurance.  Indications: Diabetes       calcium carbonate-vitamin D (OSCAL W/D) 500-200 MG-UNIT tablet Take 1 tablet by mouth 2 times daily (with meals) 60 tablet 3     cholecalciferol (VITAMIN D3) 1000 units (25 mcg) capsule Take 1 capsule (1,000 Units) by mouth daily 30 capsule 3     diclofenac (VOLTAREN) 1 % topical gel Place 2 g onto the skin 4 times daily 100 g 0     furosemide (LASIX) 20 MG tablet Take 1 tablet (20 mg) by mouth daily 30 tablet 0     insulin " glargine (LANTUS SOLOSTAR) 100 UNIT/ML pen Inject 38 Units Subcutaneous At Bedtime        lactulose (CHRONULAC) 10 GM/15ML solution Take 15 mLs (10 g) by mouth 3 times daily 500 mL 0     Lenvatinib 12 MG, 3 x 4 mg capsules, (LENVIMA) 3 x 4 MG capsule Take 3 capsules (12 mg) by mouth daily 90 capsule 0     melatonin 3 MG tablet Take 1 tablet (3 mg) by mouth At Bedtime 30 tablet 0     nicotine (NICODERM CQ) 14 MG/24HR 24 hr patch Place 1 patch onto the skin every 24 hours 30 patch 3     nicotine (NICORETTE) 2 MG gum Place 1 each (2 mg) inside cheek as needed for smoking cessation 60 each 3     ondansetron (ZOFRAN-ODT) 8 MG ODT tab Take 8 mg by mouth every 8 hours as needed for nausea And take 8mg prior to each dose of Levanitib.       OneTouch Delica Lancets 33G MISC Change lancet one time daily as directed.       order for DME Abdominal Binder - small (Patient not taking: Reported on 7/23/2020) 1 each 1     oxyCODONE (ROXICODONE) 5 MG tablet Take 0.5-1 tablets (2.5-5 mg) by mouth every 6 hours as needed for severe pain 30 tablet 0     pantoprazole (PROTONIX) 40 MG EC tablet TAKE ONE TABLET BY MOUTH EVERY DAY       QUEtiapine (SEROQUEL) 50 MG tablet Take 50 mg by mouth At Bedtime  0     rifaximin (XIFAXAN) 550 MG TABS tablet Take 1 tablet (550 mg) by mouth 2 times daily 60 tablet 0     sertraline (ZOLOFT) 100 MG tablet Take 200 mg by mouth daily        spironolactone (ALDACTONE) 50 MG tablet Take 1 tablet (50 mg) by mouth daily 30 tablet 0     TRULICITY 0.75 MG/0.5ML pen INJECT 0.5 ml's SUBCUTANEOUSLY ONCE WEEKLY  2     vitamin B1 (THIAMINE) 100 MG tablet Take 1 tablet (100 mg) by mouth daily 30 tablet 0       Physical Examination:  There were no vitals taken for this visit.  Wt Readings from Last 10 Encounters:   08/11/20 75.8 kg (167 lb)   07/23/20 75.8 kg (167 lb)   07/20/20 79.5 kg (175 lb 3.2 oz)   06/29/20 79.2 kg (174 lb 11.2 oz)   05/28/20 78.5 kg (173 lb)   05/21/20 78.5 kg (173 lb)   03/17/20 78.7 kg (173 lb  8 oz)   03/02/20 78.7 kg (173 lb 8 oz)   02/06/20 80.2 kg (176 lb 12.8 oz)   01/31/20 78.9 kg (174 lb)     Constitutional:Female in no acute distress. Mild confusion with occasional repeats of questions. Frustrated with daughter during visit but redirectable   Eyes: EOMI, PERRL. No scleral icterus.  ENT: Oral mucosa is moist without lesions or thrush.   Lymphatic: Neck is supple without cervical or supraclavicular lymphadenopathy.  Cardiovascular: Regular rate and rhythm. No murmurs, gallops, or rubs. No peripheral edema.  Respiratory: Mild wheeze to bilateral lower lung fields.  Gastrointestinal: Bowel sounds present. Abdomen soft, non-tender. No palpable hepatosplenomegaly or masses.   Neurologic: Cranial nerves II through XII are grossly intact.  Skin: No rashes, petechiae, or bruising noted on exposed skin.    Laboratory Data:  Results for ROBBIE RAWLS (MRN [id number]) as of 8/13/2020 16:11   Ref. Range 8/13/2020 10:40 8/13/2020 10:41   Sodium Latest Ref Range: 133 - 144 mmol/L 139    Potassium Latest Ref Range: 3.4 - 5.3 mmol/L 3.2 (L)    Chloride Latest Ref Range: 94 - 109 mmol/L 105    Carbon Dioxide Latest Ref Range: 20 - 32 mmol/L 27    Urea Nitrogen Latest Ref Range: 7 - 30 mg/dL 11    Creatinine Latest Ref Range: 0.52 - 1.04 mg/dL 0.77    GFR Estimate Latest Ref Range: >60 mL/min/1.73_m2 86    GFR Estimate If Black Latest Ref Range: >60 mL/min/1.73_m2 >90    Calcium Latest Ref Range: 8.5 - 10.1 mg/dL 8.7    Anion Gap Latest Ref Range: 3 - 14 mmol/L 7    Albumin Latest Ref Range: 3.4 - 5.0 g/dL 3.2 (L)    Protein Total Latest Ref Range: 6.8 - 8.8 g/dL 7.5    Bilirubin Total Latest Ref Range: 0.2 - 1.3 mg/dL 1.5 (H)    Alkaline Phosphatase Latest Ref Range: 40 - 150 U/L 120    ALT Latest Ref Range: 0 - 50 U/L 35    AST Latest Ref Range: 0 - 45 U/L 43    Alpha Fetoprotein Latest Ref Range: 0 - 8 ug/L  8.8 (H)   Ammonia Latest Ref Range: 10 - 50 umol/L 71 (H)    Glucose Latest Ref Range: 70 - 99 mg/dL 155  (H)    WBC Latest Ref Range: 4.0 - 11.0 10e9/L  2.7 (L)   Hemoglobin Latest Ref Range: 11.7 - 15.7 g/dL  13.0   Hematocrit Latest Ref Range: 35.0 - 47.0 %  38.2   Platelet Count Latest Ref Range: 150 - 450 10e9/L  36 (LL)   RBC Count Latest Ref Range: 3.8 - 5.2 10e12/L  3.69 (L)   MCV Latest Ref Range: 78 - 100 fl  104 (H)   MCH Latest Ref Range: 26.5 - 33.0 pg  35.2 (H)   MCHC Latest Ref Range: 31.5 - 36.5 g/dL  34.0   RDW Latest Ref Range: 10.0 - 15.0 %  15.5 (H)   Diff Method Unknown  Automated Method   % Neutrophils Latest Units: %  63.4   % Lymphocytes Latest Units: %  28.8   % Monocytes Latest Units: %  4.8   % Eosinophils Latest Units: %  2.6   % Basophils Latest Units: %  0.4   % Immature Granulocytes Latest Units: %  0.0   Nucleated RBCs Latest Ref Range: 0 /100  0   Absolute Neutrophil Latest Ref Range: 1.6 - 8.3 10e9/L  1.7   Absolute Lymphocytes Latest Ref Range: 0.8 - 5.3 10e9/L  0.8   Absolute Monocytes Latest Ref Range: 0.0 - 1.3 10e9/L  0.1   Absolute Eosinophils Latest Ref Range: 0.0 - 0.7 10e9/L  0.1   Absolute Basophils Latest Ref Range: 0.0 - 0.2 10e9/L  0.0   Abs Immature Granulocytes Latest Ref Range: 0 - 0.4 10e9/L  0.0   Absolute Nucleated RBC Unknown  0.0         Assessment and Plan:  HCC in the setting of Hep C cirrhosis and initialy treated with TACE/ablation to S3 lesion in Oct 2019 and then had a new S3 lesion treated with MWA in March 2020. Unfortunately on repeat MRI in May 2020, she has now been found to have multifocal HCC- biopsy proven.  - She also has probably bone metastasis with right anterior 5th rib lesion and left clavicular head lesion.    - Started Lenvatinib on 6/30/2020 and held during admission for hepatic encephalopathy on 7/16/2020.Resumed after visit on 7/23/2020.      Hepatic Encephalopathy/cirrhosis. She recently stopped lactulose on her own as she did the diarrhea was unbearable. Resumed lactulose 1.5 days ago and daughter reports some, but not complete,  improvement in mentation. UA earlier this week was unremarkable for infection Cont lactulose 15 ml TID/Rifaximin 550 mg BID.  She follows with Gastroenterologist Dr Cast from [facility], discussed with their office today and requested a return call.  - Mild decrease in ammonia level with 1 day of lactulose with some mental improvement per daughter. Will have her return early next week for repeat evaluation. Aware to seek medical attention through ED if diarrhea becomes unbearable, or confusion worsens.     Portal vein thrombus.  - Because of significant thrombocytopenia,risk of bleeding would be very high if we start her on systemic anticoagulation.  At this time I am not certain whether she has any symptoms from this or not.  She does have right upper quadrant pain but this could be related to the underlying malignancy..  For now pain is under good control.       RUQ Pain/rib cage area pain-has  Oxycodone prn, but to use with caution given underlying liver dysfunction.  Plans to establish with palliative care on 9/4/2020.       Thrombocytopenia-platelets 36k- no bleeding symptoms.  This is from cirrhosis, portal hypertension and splenomegaly.  Continue to monitor.     Bone metastasis.  She has evidence of bone metastasis to the left clavicular head and right anterior fifth rib.  She mentions that she has got dental clearance to start Zometa.  Plan initiation pending improvement in mental status, consider at next vist.  Continue calcium and vitamin D.     Nausea- cont zofran, refilled today.     Smoking. Advised cessation- states she will try. May try nicotine patches again once mental status improve.     Left calf pain- doppler without DVT.     Dehydration- given 1 L IVF in office today. Will also ask her to stop her lasix and spironolactone for now, this was start IP per GI team. No signs of fluid over load today. Also asked her OP hepatologist on input on these meds.     Mild hypokalemia, diarrhea  contributing, also stopped lasix as above. Supplement per protocol in infusion.    Alesha Medina PA-C  [facility]  [address] [place] [address] [phone]

## 2020-08-13 PROBLEM — R19.7 DIARRHEA: Status: ACTIVE | Noted: 2020-01-01

## 2020-08-13 NOTE — LETTER
8/13/2020         RE: Dania Albert  [address] [facility] [address]        Dear Colleague,    Thank you for referring your patient, Dania Albert, to the [facility]. Please see a copy of my visit note below.    Oncology/Hematology Visit Note  Aug 13, 2020    Reason for Visit: Follow up of  hepatocellular carcinoma    History of Present Illness:    Initially evaluated in May 2020 for hepatocellular carcinoma .     She has Hep C cirrhosis. Hep C was previously treated with Sovaldi/ribavirin and achived SVR.  She was found to have HCC in June 2019 and had TACE/Ablation for S3 lesion on Oct 2019 and then found to have a new S3 lesion for which she had a MWA on 3/17/2020.  Bone Scan in Dec 2019 was negative for metastatic disease.   CTA Chest in October 2019 was negative for metastatic disease.     A follow up MRI on 5/8/2020 showed no resdual viable cancer in the treatment zones but it found dramatic increase in number and sizes of numerous arterial enhancing foci scattered throughout the liver parenchyma which was very suspicious for multifocal HCC. None of these were diagnostic of HC so she had liver biopsy on which confirmed moderately differentiated HCC.     As she was not deemed a candidate for any further liver directed therapy, she was referred to medical oncology.     She has constant pain in the RUQ region/beneath the lower right rib cage since the MWA procedure in March 2020.     Baseline CT chest abdomen and pelvis on 6/12/2020.  Bone scan showed anterior right fifth rib and left clavicular head meta stasis.     She started lenvatinib on 6/30/2020.     She was admitted to the hospital from 7/16/2020 till 7/20/2020 for hepatic encephalopathy and noninfectious colitis. MRI of the brain did not show any intracranial metastasis. She was started on lactulose and rifaximin and her confusion improved.  Lenvatinib was held during the admission.     Repeat CT chest abdomen and pelvis which  "did not show any pulmonary embolism.  Interval advancement of portal venous thrombus involving the main portal vein and the left intrahepatic portal venous system was seen.  There was also interval development of low-attenuation lesions which were smaller than the arterial enhancing lesions seen on CT scan from June 2020.      Lenvima resumed 7/23/2020       Interval History:  Dania Albert presents for follow up with her daughter. She started the lactulose as advised and at one point mentioned she is going 3-4 times a day- which daughter also agreed with, but at a later point mentioned she is \"always on the toilet.\" Denies any pain to rectal region at this time. Per her daughter, she has noted a slight improvement in mentation since her visit earlier this week. Feels she is able to care for her mother at home.   - Dania denies fevers, chills, new headache. States urinary issues have improved (was having some frequency). Admits to feeling dry and mild lightheadedness. Notes chronic, stable cough and dyspnea on exertion. Continues to smoke. No chest pain. Leg pain has improved. No nausea. Admits to eating less than usual because of her loose stools.       Current Outpatient Medications   Medication Sig Dispense Refill     acetaminophen (TYLENOL) 500 MG tablet Take 500 mg by mouth every 8 hours as needed        albuterol (PROAIR HFA) 108 (90 Base) MCG/ACT inhaler Inhale 2 puffs into the lungs every 6 hours as needed        benzonatate (TESSALON) 100 MG capsule Take 1 capsule (100 mg) by mouth 3 times daily as needed for cough 90 capsule 0     blood glucose monitoring (ONE TOUCH ULTRA MINI) meter device kit Use as directed to test glucose three times daily. Pharmacy dispense brand based on insurance.  Indications: Diabetes       calcium carbonate-vitamin D (OSCAL W/D) 500-200 MG-UNIT tablet Take 1 tablet by mouth 2 times daily (with meals) 60 tablet 3     cholecalciferol (VITAMIN D3) 1000 units (25 mcg) capsule Take " 1 capsule (1,000 Units) by mouth daily 30 capsule 3     diclofenac (VOLTAREN) 1 % topical gel Place 2 g onto the skin 4 times daily 100 g 0     furosemide (LASIX) 20 MG tablet Take 1 tablet (20 mg) by mouth daily 30 tablet 0     insulin glargine (LANTUS SOLOSTAR) 100 UNIT/ML pen Inject 38 Units Subcutaneous At Bedtime        lactulose (CHRONULAC) 10 GM/15ML solution Take 15 mLs (10 g) by mouth 3 times daily 500 mL 0     Lenvatinib 12 MG, 3 x 4 mg capsules, (LENVIMA) 3 x 4 MG capsule Take 3 capsules (12 mg) by mouth daily 90 capsule 0     melatonin 3 MG tablet Take 1 tablet (3 mg) by mouth At Bedtime 30 tablet 0     nicotine (NICODERM CQ) 14 MG/24HR 24 hr patch Place 1 patch onto the skin every 24 hours 30 patch 3     nicotine (NICORETTE) 2 MG gum Place 1 each (2 mg) inside cheek as needed for smoking cessation 60 each 3     ondansetron (ZOFRAN-ODT) 8 MG ODT tab Take 8 mg by mouth every 8 hours as needed for nausea And take 8mg prior to each dose of Levanitib.       OneTouch Delica Lancets 33G MISC Change lancet one time daily as directed.       order for DME Abdominal Binder - small (Patient not taking: Reported on 7/23/2020) 1 each 1     oxyCODONE (ROXICODONE) 5 MG tablet Take 0.5-1 tablets (2.5-5 mg) by mouth every 6 hours as needed for severe pain 30 tablet 0     pantoprazole (PROTONIX) 40 MG EC tablet TAKE ONE TABLET BY MOUTH EVERY DAY       QUEtiapine (SEROQUEL) 50 MG tablet Take 50 mg by mouth At Bedtime  0     rifaximin (XIFAXAN) 550 MG TABS tablet Take 1 tablet (550 mg) by mouth 2 times daily 60 tablet 0     sertraline (ZOLOFT) 100 MG tablet Take 200 mg by mouth daily        spironolactone (ALDACTONE) 50 MG tablet Take 1 tablet (50 mg) by mouth daily 30 tablet 0     TRULICITY 0.75 MG/0.5ML pen INJECT 0.5 ml's SUBCUTANEOUSLY ONCE WEEKLY  2     vitamin B1 (THIAMINE) 100 MG tablet Take 1 tablet (100 mg) by mouth daily 30 tablet 0       Physical Examination:  There were no vitals taken for this visit.  Wt  Readings from Last 10 Encounters:   08/11/20 75.8 kg (167 lb)   07/23/20 75.8 kg (167 lb)   07/20/20 79.5 kg (175 lb 3.2 oz)   06/29/20 79.2 kg (174 lb 11.2 oz)   05/28/20 78.5 kg (173 lb)   05/21/20 78.5 kg (173 lb)   03/17/20 78.7 kg (173 lb 8 oz)   03/02/20 78.7 kg (173 lb 8 oz)   02/06/20 80.2 kg (176 lb 12.8 oz)   01/31/20 78.9 kg (174 lb)     Constitutional:Female in no acute distress. Mild confusion with occasional repeats of questions. Frustrated with daughter during visit but redirectable   Eyes: EOMI, PERRL. No scleral icterus.  ENT: Oral mucosa is moist without lesions or thrush.   Lymphatic: Neck is supple without cervical or supraclavicular lymphadenopathy.  Cardiovascular: Regular rate and rhythm. No murmurs, gallops, or rubs. No peripheral edema.  Respiratory: Mild wheeze to bilateral lower lung fields.  Gastrointestinal: Bowel sounds present. Abdomen soft, non-tender. No palpable hepatosplenomegaly or masses.   Neurologic: Cranial nerves II through XII are grossly intact.  Skin: No rashes, petechiae, or bruising noted on exposed skin.    Laboratory Data:  Results for ROBBIE RAWLS (MRN [id number]) as of 8/13/2020 16:11   Ref. Range 8/13/2020 10:40 8/13/2020 10:41   Sodium Latest Ref Range: 133 - 144 mmol/L 139    Potassium Latest Ref Range: 3.4 - 5.3 mmol/L 3.2 (L)    Chloride Latest Ref Range: 94 - 109 mmol/L 105    Carbon Dioxide Latest Ref Range: 20 - 32 mmol/L 27    Urea Nitrogen Latest Ref Range: 7 - 30 mg/dL 11    Creatinine Latest Ref Range: 0.52 - 1.04 mg/dL 0.77    GFR Estimate Latest Ref Range: >60 mL/min/1.73_m2 86    GFR Estimate If Black Latest Ref Range: >60 mL/min/1.73_m2 >90    Calcium Latest Ref Range: 8.5 - 10.1 mg/dL 8.7    Anion Gap Latest Ref Range: 3 - 14 mmol/L 7    Albumin Latest Ref Range: 3.4 - 5.0 g/dL 3.2 (L)    Protein Total Latest Ref Range: 6.8 - 8.8 g/dL 7.5    Bilirubin Total Latest Ref Range: 0.2 - 1.3 mg/dL 1.5 (H)    Alkaline Phosphatase Latest Ref Range: 40 -  150 U/L 120    ALT Latest Ref Range: 0 - 50 U/L 35    AST Latest Ref Range: 0 - 45 U/L 43    Alpha Fetoprotein Latest Ref Range: 0 - 8 ug/L  8.8 (H)   Ammonia Latest Ref Range: 10 - 50 umol/L 71 (H)    Glucose Latest Ref Range: 70 - 99 mg/dL 155 (H)    WBC Latest Ref Range: 4.0 - 11.0 10e9/L  2.7 (L)   Hemoglobin Latest Ref Range: 11.7 - 15.7 g/dL  13.0   Hematocrit Latest Ref Range: 35.0 - 47.0 %  38.2   Platelet Count Latest Ref Range: 150 - 450 10e9/L  36 (LL)   RBC Count Latest Ref Range: 3.8 - 5.2 10e12/L  3.69 (L)   MCV Latest Ref Range: 78 - 100 fl  104 (H)   MCH Latest Ref Range: 26.5 - 33.0 pg  35.2 (H)   MCHC Latest Ref Range: 31.5 - 36.5 g/dL  34.0   RDW Latest Ref Range: 10.0 - 15.0 %  15.5 (H)   Diff Method Unknown  Automated Method   % Neutrophils Latest Units: %  63.4   % Lymphocytes Latest Units: %  28.8   % Monocytes Latest Units: %  4.8   % Eosinophils Latest Units: %  2.6   % Basophils Latest Units: %  0.4   % Immature Granulocytes Latest Units: %  0.0   Nucleated RBCs Latest Ref Range: 0 /100  0   Absolute Neutrophil Latest Ref Range: 1.6 - 8.3 10e9/L  1.7   Absolute Lymphocytes Latest Ref Range: 0.8 - 5.3 10e9/L  0.8   Absolute Monocytes Latest Ref Range: 0.0 - 1.3 10e9/L  0.1   Absolute Eosinophils Latest Ref Range: 0.0 - 0.7 10e9/L  0.1   Absolute Basophils Latest Ref Range: 0.0 - 0.2 10e9/L  0.0   Abs Immature Granulocytes Latest Ref Range: 0 - 0.4 10e9/L  0.0   Absolute Nucleated RBC Unknown  0.0         Assessment and Plan:  HCC in the setting of Hep C cirrhosis and initialy treated with TACE/ablation to S3 lesion in Oct 2019 and then had a new S3 lesion treated with MWA in March 2020. Unfortunately on repeat MRI in May 2020, she has now been found to have multifocal HCC- biopsy proven.  - She also has probably bone metastasis with right anterior 5th rib lesion and left clavicular head lesion.    - Started Lenvatinib on 6/30/2020 and held during admission for hepatic encephalopathy on  7/16/2020.Resumed after visit on 7/23/2020.      Hepatic Encephalopathy/cirrhosis. She recently stopped lactulose on her own as she did the diarrhea was unbearable. Resumed lactulose 1.5 days ago and daughter reports some, but not complete, improvement in mentation. UA earlier this week was unremarkable for infection Cont lactulose 15 ml TID/Rifaximin 550 mg BID.  She follows with Gastroenterologist Dr Cast from [facility], discussed with their office today and requested a return call.  - Mild decrease in ammonia level with 1 day of lactulose with some mental improvement per daughter. Will have her return early next week for repeat evaluation. Aware to seek medical attention through ED if diarrhea becomes unbearable, or confusion worsens.     Portal vein thrombus.  - Because of significant thrombocytopenia,risk of bleeding would be very high if we start her on systemic anticoagulation.  At this time I am not certain whether she has any symptoms from this or not.  She does have right upper quadrant pain but this could be related to the underlying malignancy..  For now pain is under good control.       RUQ Pain/rib cage area pain-has  Oxycodone prn, but to use with caution given underlying liver dysfunction.  Plans to establish with palliative care on 9/4/2020.       Thrombocytopenia-platelets 36k- no bleeding symptoms.  This is from cirrhosis, portal hypertension and splenomegaly.  Continue to monitor.     Bone metastasis.  She has evidence of bone metastasis to the left clavicular head and right anterior fifth rib.  She mentions that she has got dental clearance to start Zometa.  Plan initiation pending improvement in mental status, consider at next vist.  Continue calcium and vitamin D.     Nausea- cont zofran, refilled today.     Smoking. Advised cessation- states she will try. May try nicotine patches again once mental status improve.     Left calf pain- doppler without DVT.     Dehydration- given 1 L IVF  in office today. Will also ask her to stop her lasix and spironolactone for now, this was start IP per GI team. No signs of fluid over load today. Also asked her OP hepatologist on input on these meds.     Mild hypokalemia, diarrhea contributing, also stopped lasix as above. Supplement per protocol in infusion.    Alesha Medina PA-C  [facility] [address] [id number] [phone]    Again, thank you for allowing me to participate in the care of your patient.        Sincerely,        Alesha Medina PA-C

## 2020-08-13 NOTE — PROGRESS NOTES
Infusion Nursing Note:  Dania Albert presents today for 500ml NS bolus and Potassium replacement (Zometa held).    Patient seen by provider today: Yes: DEVON Olivares   present during visit today: Not Applicable.    Note:     Per TORB DEOVN Olivares/Heather Potter, RN @1151 20:  - Pt still very confused  - Having diarrhea, feeling woozy (VSS) -- will give 500ml NS bolus for now  - Replace potassium per protocol (will try to place orders in therapy plan)  - No Zometa today  - Consulting with hepatology - will give update with plan after    Patient arrives to infusion oriented to name, , and location. She is cooperative and settled in infusion chair without issue. Potassium replacement divided into 4, 10meQ PO tabs for easier swallowing.       Intravenous Access:  Peripheral IV placed via ultrasound.    Treatment Conditions:  Lab Results   Component Value Date    HGB 13.0 2020     Lab Results   Component Value Date    WBC 2.7 2020      Lab Results   Component Value Date    ANEU 1.7 2020     Lab Results   Component Value Date    PLT 36 2020      Lab Results   Component Value Date     2020                   Lab Results   Component Value Date    POTASSIUM 3.2 2020           Lab Results   Component Value Date    MAG 2.0 2020            Lab Results   Component Value Date    CR 0.77 2020                   Lab Results   Component Value Date    LETY 8.7 2020                Lab Results   Component Value Date    BILITOTAL 1.5 2020           Lab Results   Component Value Date    ALBUMIN 3.2 2020                    Lab Results   Component Value Date    ALT 35 2020           Lab Results   Component Value Date    AST 43 2020     Ammonia level: 71  Labs reviewed, okay to proceed with treatment.      Post Infusion Assessment:  Patient tolerated infusion without incident.  Blood return noted pre and post infusion.  Site patent  "and intact, free from redness, edema or discomfort.  No evidence of extravasations.  Access discontinued per protocol.      Per TORB DEVON Olivares/Heather Potter RN @1326 8/13/20:  - Okay to discontinue PIV and discharge patient home after fluids done  - Will call her with update on POC    Informed patient of plan. Patient kept asking \"Is that what Dr. Childress said to do?\" RN reinforced plan and patient agreeable. Pt escorted from infusion area to daughter in [facility]. RN updated daughter on plan with no further questions.      Discharge Plan:   Patient declined prescription refills.  Discharge instructions reviewed with: Patient.  Patient and/or family verbalized understanding of discharge instructions and all questions answered.  AVS to patient via [employer].  Patient will receive call from provider with plan. Patient aware.  Patient discharged in stable condition accompanied by: self and RN escort to [facility]. Left pt in care of daughter.  Departure Mode: Ambulatory.    Heather Potter RN                        "

## 2020-08-14 NOTE — TELEPHONE ENCOUNTER
[facility] Call Center    Phone Message    May a detailed message be left on voicemail: yes     Reason for Call: Other: Patient was seen in oncology on 08/13 and was dehydrated and they gave her iv fluids yesterday and they stopped her medications of    Spironolactone and also lasix and cut down her lactulose (CHRONULAC) 10 GM/15ML solution to 3 times a day and they wanted to know if she should re start these medications or not. Please call Shweta and discuss asap. Thank you.  Action Taken: Message routed to:  [facility] ([facility]): hep    Travel Screening: Not Applicable

## 2020-08-14 NOTE — TELEPHONE ENCOUNTER
Returned Shweta's call and discussed the following per Dr. Bergman:      She can stay off for now. I have a visit with her 8/24. Either I can assess then or if she sees her home GI Dr. Cast before then.    If she develops significant fluid retention in the meantime, she should call.      No further questions or concerns.      Maritza CAREY LPN  [facility]ology Clinic

## 2020-08-18 NOTE — PROGRESS NOTES
Oncology/Hematology Visit Note  Aug 18, 2020    Reason for Visit: Follow up of hepatocellular carcinoma    History of Present Illness:   Initially evaluated in May 2020 for hepatocellular carcinoma .     She has Hep C cirrhosis. Hep C was previously treated with Sovaldi/ribavirin and achived SVR.  She was found to have HCC in June 2019 and had TACE/Ablation for S3 lesion on Oct 2019 and then found to have a new S3 lesion for which she had a MWA on 3/17/2020.  Bone Scan in Dec 2019 was negative for metastatic disease.   CTA Chest in October 2019 was negative for metastatic disease.     A follow up MRI on 5/8/2020 showed no resdual viable cancer in the treatment zones but it found dramatic increase in number and sizes of numerous arterial enhancing foci scattered throughout the liver parenchyma which was very suspicious for multifocal HCC. None of these were diagnostic of HC so she had liver biopsy on which confirmed moderately differentiated HCC.     As she was not deemed a candidate for any further liver directed therapy, she was referred to medical oncology.     She has constant pain in the RUQ region/beneath the lower right rib cage since the MWA procedure in March 2020.     Baseline CT chest abdomen and pelvis on 6/12/2020.  Bone scan showed anterior right fifth rib and left clavicular head meta stasis.     She started lenvatinib on 6/30/2020.     She was admitted to the hospital from 7/16/2020 till 7/20/2020 for hepatic encephalopathy and noninfectious colitis. MRI of the brain did not show any intracranial metastasis. She was started on lactulose and rifaximin and her confusion improved.  Lenvatinib was held during the admission.     Repeat CT chest abdomen and pelvis which did not show any pulmonary embolism.  Interval advancement of portal venous thrombus involving the main portal vein and the left intrahepatic portal venous system was seen.  There was also interval development of low-attenuation lesions  "which were smaller than the arterial enhancing lesions seen on CT scan from June 2020.       Lenvima resumed 7/23/2020     Interval History:  Dania Albert was met with for follow up.  - She continues to have a lot of diarrhea- unable to quantify amount despite multiple attempts at questioning. States she goes every time she urinates, at least 5 times a day but also \"much more\" per her report. She remains on the lactulose TID. No fevers, chills, or blood in stool. She denies lightheadedness or new headaches.    - She feels tired, but also notes that she is napping less during the day. Overall energy level has been stable.   - Admits that her urine has been a dark color, but no dysuria or new urinary urgency or frequency. States she is getting about 8 glasses of water a day.   - She notes occasional abdominal discomfort. States she feels it throughout her stomach and it improves with a bowel movement. This happens 2x per week.   - Chronic cough is stable and she denies any shortness of breath or chest pain.   - Eating 1 meal a day and drinking 1 ensure a day.   - Remainder of pain seems controlled.       Review of Systems:  Patient denies fevers, chills,  headaches, dizziness, vision or hearing changes, new lumps or bumps, chest pain, shortness of breath, cough, abdominal pain, nausea, vomiting, changes to bowel or bladder, swelling of extremities, bleeding issues, or rash.    Current Outpatient Medications   Medication Sig Dispense Refill     acetaminophen (TYLENOL) 500 MG tablet Take 500 mg by mouth every 8 hours as needed        albuterol (PROAIR HFA) 108 (90 Base) MCG/ACT inhaler Inhale 2 puffs into the lungs every 6 hours as needed        benzonatate (TESSALON) 100 MG capsule Take 1 capsule (100 mg) by mouth 3 times daily as needed for cough 90 capsule 0     blood glucose monitoring (ONE TOUCH ULTRA MINI) meter device kit Use as directed to test glucose three times daily. Pharmacy dispense brand based on " insurance.  Indications: Diabetes       calcium carbonate-vitamin D (OSCAL W/D) 500-200 MG-UNIT tablet Take 1 tablet by mouth 2 times daily (with meals) 60 tablet 3     cholecalciferol (VITAMIN D3) 1000 units (25 mcg) capsule Take 1 capsule (1,000 Units) by mouth daily 30 capsule 3     diclofenac (VOLTAREN) 1 % topical gel Place 2 g onto the skin 4 times daily 100 g 0     insulin glargine (LANTUS SOLOSTAR) 100 UNIT/ML pen Inject 38 Units Subcutaneous At Bedtime        lactulose (CHRONULAC) 10 GM/15ML solution Take 15 mLs (10 g) by mouth 3 times daily 500 mL 0     Lenvatinib 12 MG, 3 x 4 mg capsules, (LENVIMA) 3 x 4 MG capsule Take 3 capsules (12 mg) by mouth daily 90 capsule 0     melatonin 5 MG tablet Take 1 tablet (5 mg) by mouth nightly as needed for sleep 30 tablet 1     nicotine (NICODERM CQ) 14 MG/24HR 24 hr patch Place 1 patch onto the skin every 24 hours 30 patch 3     nicotine (NICORETTE) 2 MG gum Place 1 each (2 mg) inside cheek as needed for smoking cessation 60 each 3     ondansetron (ZOFRAN-ODT) 8 MG ODT tab Take 1 tablet (8 mg) by mouth every 8 hours as needed for nausea And take 8mg prior to each dose of Levanitib. 30 tablet 3     OneTouch Delica Lancets 33G MISC Change lancet one time daily as directed.       order for DME Abdominal Binder - small (Patient not taking: Reported on 7/23/2020) 1 each 1     oxyCODONE (ROXICODONE) 5 MG tablet Take 0.5-1 tablets (2.5-5 mg) by mouth every 6 hours as needed for severe pain 30 tablet 0     pantoprazole (PROTONIX) 40 MG EC tablet TAKE ONE TABLET BY MOUTH EVERY DAY       QUEtiapine (SEROQUEL) 50 MG tablet Take 50 mg by mouth At Bedtime  0     rifaximin (XIFAXAN) 550 MG TABS tablet Take 1 tablet (550 mg) by mouth 2 times daily 60 tablet 0     sertraline (ZOLOFT) 100 MG tablet Take 200 mg by mouth daily        thiamine (B-1) 100 MG tablet Take 1 tablet (100 mg) by mouth daily 30 tablet 0     TRULICITY 0.75 MG/0.5ML pen INJECT 0.5 ml's SUBCUTANEOUSLY ONCE WEEKLY   2       Physical Examination:  /76   Pulse 73   Temp 98  F (36.7  C) (Oral)   Resp 16   Wt 73.9 kg (163 lb)   SpO2 92%   BMI 30.80 kg/m    Wt Readings from Last 10 Encounters:   08/18/20 73.9 kg (163 lb)   08/13/20 75.2 kg (165 lb 12.8 oz)   08/11/20 75.8 kg (167 lb)   07/23/20 75.8 kg (167 lb)   07/20/20 79.5 kg (175 lb 3.2 oz)   06/29/20 79.2 kg (174 lb 11.2 oz)   05/28/20 78.5 kg (173 lb)   05/21/20 78.5 kg (173 lb)   03/17/20 78.7 kg (173 lb 8 oz)   03/02/20 78.7 kg (173 lb 8 oz)     Constitutional: Well-appearing female in no acute distress.   Eyes: EOMI, PERRL. No scleral icterus.  ENT: Oral mucosa is moist without lesions or thrush.   Lymphatic: Neck is supple without cervical or supraclavicular lymphadenopathy.   Cardiovascular: Regular rate and rhythm. No murmurs, gallops, or rubs. No peripheral edema.  Respiratory: Clear to auscultation bilaterally. No wheezes or crackles.  Gastrointestinal: Bowel sounds present. Abdomen soft, non-tender. No palpable hepatosplenomegaly or masses.   Neurologic: Cranial nerves II through XII are grossly intact.  Skin: No rashes, petechiae. Bruising to left arm from prior IV site.     Laboratory Data:  Results for ROBBIE RAWLS (MRN [id number]) as of 8/18/2020 15:46   Ref. Range 8/18/2020 10:21 8/18/2020 12:00 8/18/2020 14:15   Sodium Latest Ref Range: 133 - 144 mmol/L 133     Potassium Latest Ref Range: 3.4 - 5.3 mmol/L 3.1 (L)     Chloride Latest Ref Range: 94 - 109 mmol/L 99     Carbon Dioxide Latest Ref Range: 20 - 32 mmol/L 28     Urea Nitrogen Latest Ref Range: 7 - 30 mg/dL 9     Creatinine Latest Ref Range: 0.52 - 1.04 mg/dL 2.02 (H)  1.16 (H)   GFR Estimate Latest Ref Range: >60 mL/min/1.73_m2 27 (L)  52 (L)   GFR Estimate If Black Latest Ref Range: >60 mL/min/1.73_m2 31 (L)  61   Calcium Latest Ref Range: 8.5 - 10.1 mg/dL 8.9     Anion Gap Latest Ref Range: 3 - 14 mmol/L 6     Albumin Latest Ref Range: 3.4 - 5.0 g/dL 3.4     Protein Total Latest Ref  Range: 6.8 - 8.8 g/dL 7.5     Bilirubin Total Latest Ref Range: 0.2 - 1.3 mg/dL 1.3     Alkaline Phosphatase Latest Ref Range: 40 - 150 U/L 111     ALT Latest Ref Range: 0 - 50 U/L 37     AST Latest Ref Range: 0 - 45 U/L 43     Ammonia Latest Ref Range: 10 - 50 umol/L 53 (H)     Glucose Latest Ref Range: 70 - 99 mg/dL 83     WBC Latest Ref Range: 4.0 - 11.0 10e9/L 1.9 (L)     Hemoglobin Latest Ref Range: 11.7 - 15.7 g/dL 13.2     Hematocrit Latest Ref Range: 35.0 - 47.0 % 39.0     Platelet Count Latest Ref Range: 150 - 450 10e9/L 28 (LL)     RBC Count Latest Ref Range: 3.8 - 5.2 10e12/L 3.77 (L)     MCV Latest Ref Range: 78 - 100 fl 103 (H)     MCH Latest Ref Range: 26.5 - 33.0 pg 35.0 (H)     MCHC Latest Ref Range: 31.5 - 36.5 g/dL 33.8     RDW Latest Ref Range: 10.0 - 15.0 % 15.5 (H)     Diff Method Unknown Automated Method     % Neutrophils Latest Units: % 47.1     % Lymphocytes Latest Units: % 40.0     % Monocytes Latest Units: % 8.6     % Eosinophils Latest Units: % 3.8     % Basophils Latest Units: % 0.5     % Immature Granulocytes Latest Units: % 0.0     Nucleated RBCs Latest Ref Range: 0 /100 0     Absolute Neutrophil Latest Ref Range: 1.6 - 8.3 10e9/L 0.9 (L)     Absolute Lymphocytes Latest Ref Range: 0.8 - 5.3 10e9/L 0.7 (L)     Absolute Monocytes Latest Ref Range: 0.0 - 1.3 10e9/L 0.2     Absolute Eosinophils Latest Ref Range: 0.0 - 0.7 10e9/L 0.1     Absolute Basophils Latest Ref Range: 0.0 - 0.2 10e9/L 0.0     Abs Immature Granulocytes Latest Ref Range: 0 - 0.4 10e9/L 0.0     Absolute Nucleated RBC Unknown 0.0     Color Urine Unknown  Yellow    Appearance Urine Unknown  Slightly Cloudy    Glucose Urine Latest Ref Range: NEG^Negative mg/dL  Negative    Bilirubin Urine Latest Ref Range: NEG^Negative   Negative    Ketones Urine Latest Ref Range: NEG^Negative mg/dL  Negative    Specific Gravity Urine Latest Ref Range: 1.003 - 1.035   1.021    pH Urine Latest Ref Range: 5.0 - 7.0 pH  6.0    Protein Albumin  Urine Latest Ref Range: NEG^Negative mg/dL  30 (A)    Urobilinogen mg/dL Latest Ref Range: 0.0 - 2.0 mg/dL  2.0    Nitrite Urine Latest Ref Range: NEG^Negative   Negative    Blood Urine Latest Ref Range: NEG^Negative   Negative    Leukocyte Esterase Urine Latest Ref Range: NEG^Negative   Negative    Source Unknown  Midstream Urine    WBC Urine Latest Ref Range: 0 - 5 /HPF  <1    RBC Urine Latest Ref Range: 0 - 2 /HPF  2    Squamous Epithelial /HPF Urine Latest Ref Range: 0 - 1 /HPF  8 (H)    Mucous Urine Latest Ref Range: NEG^Negative /LPF  Present (A)          Assessment and Plan:  HCC in the setting of Hep C cirrhosis and initialy treated with TACE/ablation to S3 lesion in Oct 2019 and then had a new S3 lesion treated with MWA in March 2020. Unfortunately on repeat MRI in May 2020, she has now been found to have multifocal HCC- biopsy proven.  - She also has probably bone metastasis with right anterior 5th rib lesion and left clavicular head lesion.    -Started Lenvatinib on 6/30/2020 and held during admission for hepatic encephalopathy on 7/16/2020.Resumed after visit on 7/23/2020.   - Now with KELSEY and proteinuria, see below but will continue Lenvatinib for now per discussion with Dr. Childress. See work up below.       Hepatic Encephalopathy/cirrhosis.  -  She previously stopped lactulose on her own as the diarrhea was unbearable. Resumed lactulose TID last week with improvement in mentation, but now progressive diarrhea and KELSEY.  Will have her reduce to daily dosing and keep a log of BMs at home. Stopped lasix and spironolactone at last visit.   - Spoke with RN from Gastroenterologist Dr Cast from [facility]- states they are recommending to have Dania follow up with Hepatology team here as scheduled on 8/24/2020. Will reach out to Dr. Bergman.      Portal vein thrombus.  - Because of significant thrombocytopenia,risk of bleeding would be very high if we start her on systemic anticoagulation.  At this time I  am not certain whether she has any symptoms from this or not.  She does have right upper quadrant pain but this could be related to the underlying malignancy.  For now pain is under good control.       RUQ Pain/rib cage area pain-has Oxycodone prn, but to use with caution given underlying liver/renal dysfunction.  Plans to establish with palliative care on 9/4/2020.    - Also notes occasional diffuse abdominal pain prior to a bowel movement. No blood in stools. Advised simethicone prn.   - There was expressed concern from patient that she did not have access to her pain medications as her daughter has them. Per daughter, she is worried that Dania takes too many at once. She reassured that Dania has access to her pain meds. Asked Dania to keep a pain med log when she needs them and to only take 1/2-1 tab at a time. If ineffective, she needs to talk with us prior to increasing her dose.      HEME  Thrombocytopenia-platelets 28k- no bleeding symptoms.  This is from cirrhosis, portal hypertension and splenomegaly.  Continue to monitor.   - Mild neutropenia. Infectious precautions discussed. Will monitor.      Bone metastasis.  She has evidence of bone metastasis to the left clavicular head and right anterior fifth rib.    - She mentions that she has got dental clearance to start Zometa.  Plan initiation pending continued improvement in mental status, and renal function.  Continue calcium and vitamin D.     Nausea- cont zofran as needed     Smoking. Advised cessation- states she will try. Previously did not have luck on nicotine patches.      Left calf pain- doppler without DVT. Advised supportive cares     KELSEY- Cr increased to 2.02 today. Suspect dehydration from diarrhea is largely contributing, though lenvima can also cause some renal insufficiency. Cr improved to 1.16 with 1 L IVF. Discussed admission, but she refused. Will have her return tomorrow for labs and IVF and Friday for labs/IVF/and visit with me for  symptom check. Decreasing lactulose, as above.   - Also noting protein in urine. Will repeat this at future appointment and order 24 hour urine if still present.       Hypokalemia, diarrhea contributing, also stopped lasix as above. Supplement per protocol in infusion and will repeat at follow up visits.     ADDENDUM 8/21/2020:  Per discussion with Dr. Childress, continue to routinely follow AFP. If stable, repeat CT CAP and Bone Scan after about 3 months of therapy    Alesha Medina PA-C  [facility]  [address] [place] [id number] [phone]

## 2020-08-18 NOTE — PROGRESS NOTES
Infusion Nursing Note:  Dania Albert presents today for IV fluids, potassium replacement.    Patient seen by provider today: Yes: Alesha OSORIO    Treatment Conditions:  Lab Results   Component Value Date    HGB 13.2 08/18/2020     Lab Results   Component Value Date    WBC 1.9 08/18/2020      Lab Results   Component Value Date    ANEU 0.9 08/18/2020     Lab Results   Component Value Date    PLT 28 08/18/2020      Lab Results   Component Value Date     08/18/2020                   Lab Results   Component Value Date    POTASSIUM 3.1 08/18/2020           Lab Results   Component Value Date    MAG 2.0 07/23/2020            Lab Results   Component Value Date    CR 2.02 08/18/2020                   Lab Results   Component Value Date    LETY 8.9 08/18/2020                Lab Results   Component Value Date    BILITOTAL 1.3 08/18/2020           Lab Results   Component Value Date    ALBUMIN 3.4 08/18/2020                    Lab Results   Component Value Date    ALT 37 08/18/2020           Lab Results   Component Value Date    AST 43 08/18/2020           Note:   ERYN OSORIO / Mare Cardoso RN:  Give 1000 ml NS IV today for creatinine 2.02  Replace potassium of 3.1 per electrolyte replacement protocol  (potassium 40 meq PO packet given)  Send UA  Draw Creatinine level after IV fluilds  No Zometa today    Ammonia level: 53 today    Creatinine redraw at 1415 = 1.16.    ERYN OSORIO / Mare Cardoso RN:  OK for pt to discharge home.  Return tomorrow for labs and IV fluids.    Intravenous Access:  Peripheral IV placed.    Post Infusion Assessment:  Patient tolerated infusion without incident.  Blood return noted pre and post infusion.  Access discontinued per protocol.    Discharge Plan:   Patient declined prescription refills.  Discharge instructions reviewed with: Patient.  Patient and/or family verbalized understanding of discharge instructions and all questions answered.  Copy of AVS reviewed with  patient and/or family.  Patient will return tomorrow for next appointment.  Patient discharged in stable condition accompanied by: sister.  Departure Mode: Ambulatory.  Face to Face time: 5 min.    Mare Cardoso RN

## 2020-08-18 NOTE — NURSING NOTE
"Oncology Rooming Note    August 18, 2020 10:56 AM   Dania Albert is a 54 year old female who presents for:    Chief Complaint   Patient presents with     Blood Draw     IV placed, blood drawn, vitals taken, checked into next appointment.     Oncology Clinic Visit     HCC FOLLOW UP      Initial Vitals: /76   Pulse 73   Temp 98  F (36.7  C) (Oral)   Resp 16   Wt 73.9 kg (163 lb)   SpO2 92%   BMI 30.80 kg/m   Estimated body mass index is 30.8 kg/m  as calculated from the following:    Height as of 7/16/20: 1.549 m (5' 1\").    Weight as of this encounter: 73.9 kg (163 lb). Body surface area is 1.78 meters squared.  Severe Pain (6) Comment: Data Unavailable   No LMP recorded. Patient is postmenopausal.  Allergies reviewed: Yes  Medications reviewed: Yes    Medications: Medication refills not needed today.  Pharmacy name entered into [employer]:    [id number] - [facility] [address] [facility]  [facility] [employer] - [place], [place] - [address]  [facility] - [place], [place] - [address]  [facility] [id number] [place] [facility] - [place], [place] - [facility] [employer] [facility]    Clinical concerns: Patient complains of feeling \"off\" and weak.   Her daughter Jessica took her pain medication and won't give them back to her. Her daughter doesn't think she needs them.  Alesha Bran  was notified.      Katie Cherry                  "

## 2020-08-18 NOTE — PATIENT INSTRUCTIONS
- Take Lactulose only 1 time a day, keep a log of your bowel movements at home  - Monitor your energy level and mental processing closely- seek medical attention if they worsen.   - Try simethicone as needed for abdominal pain  - Stay well hydrated, get at least 64 ounces of fluid a day.   - Please ensure you are NOT taking Furosemide (Lasix) and Spironolactone (Aldactone)      [facility] Triage and after hours / weekends / holidays:  [phone]    Please call the triage or after hours line if you experience a temperature greater than or equal to 100.5, shaking chills, have uncontrolled nausea, vomiting and/or diarrhea, dizziness, shortness of breath, chest pain, bleeding, unexplained bruising, or if you have any other new/concerning symptoms, questions or concerns.      If you are having any concerning symptoms or wish to speak to a provider before your next infusion visit, please call your care coordinator or triage to notify them so we can adequately serve you.     If you need a refill on a narcotic prescription or other medication, please call before your infusion appointment.         August 2020 Sunday Monday Tuesday Wednesday Thursday Friday Saturday                                 1       2     3     4     5     6     7     8       9     10     11    [facility] DRAW  12:30 PM   (15 min.)   [facility]   [facility] Lab Draw    [facility] ONC INFUSION 60   1:00 PM   (60 min.)    ONCOLOGY INFUSION   [facility] RETURN   2:30 PM   (50 min.)   Elo Jeffers PA-C   [facility] LWR EXT VENOUS DUPLEX LEFT   4:00 PM   (60 min.)   US3   [facility] 12     13    [facility] DRAW  10:00 AM   (15 min.)    [facility] DRAW   [facility] Lab Draw    [facility] RETURN  10:15 AM   (50 min.)   Alesha Medina PA-C   [facility] ONC INFUSION 60  11:30 AM   (60 min.)    ONCOLOGY INFUSION   [facility]  [facility] 14     15       16     17     18    [facility] DRAW  10:00 AM   (15 min.)    [facility] DRAW   [facility] Lab Draw    [facility] RETURN  10:25 AM   (50 min.)   Alesha Medina PA-C   [facility]    [facility] ONC INFUSION 180  11:30 AM   (180 min.)    ONCOLOGY INFUSION   [facility] 19    [facility] DRAW   6:45 AM   (15 min.)    [facility] DRAW   [facility] Lab Draw    [facility] ONC INFUSION 120   7:00 AM   (120 min.)    ONCOLOGY INFUSION   [facility] 20     21     22       23     24    [facility] RETURN GENERAL LIVER   9:15 AM   (30 min.)   Kayli Bergman MD   [facility] Hepatology 25     26     27     28     29       30     31 September 2020 Sunday Monday Tuesday Wednesday Thursday Friday Saturday             1     2     3     4    [facility] NEW   7:45 AM   (80 min.)   Naga Ortez MD   [facility] 5       6     7     8     9     10     11     12       13     14     15     16     17     18     19       20     21     22     23     24     25     26       27     28     29     30                                   Recent Results (from the past 24 hour(s))   Ammonia    Collection Time: 08/18/20 10:21 AM   Result Value Ref Range    Ammonia 53 (H) 10 - 50 umol/L   Comprehensive metabolic panel    Collection Time: 08/18/20 10:21 AM   Result Value Ref Range    Sodium 133 133 - 144 mmol/L    Potassium 3.1 (L) 3.4 - 5.3 mmol/L    Chloride 99 94 - 109 mmol/L    Carbon Dioxide 28 20 - 32 mmol/L    Anion Gap 6 3 - 14 mmol/L    Glucose 83 70 - 99 mg/dL    Urea Nitrogen 9 7 - 30 mg/dL    Creatinine 2.02 (H) 0.52 - 1.04 mg/dL    GFR Estimate 27 (L) >60 mL/min/[1.73_m2]    GFR Estimate If Black 31 (L) >60 mL/min/[1.73_m2]    Calcium 8.9 8.5 - 10.1 mg/dL    Bilirubin Total 1.3 0.2 - 1.3 mg/dL    Albumin 3.4 3.4 - 5.0 g/dL    Protein Total 7.5 6.8 - 8.8 g/dL    Alkaline Phosphatase  111 40 - 150 U/L    ALT 37 0 - 50 U/L    AST 43 0 - 45 U/L   CBC with platelets differential    Collection Time: 08/18/20 10:21 AM   Result Value Ref Range    WBC 1.9 (L) 4.0 - 11.0 10e9/L    RBC Count 3.77 (L) 3.8 - 5.2 10e12/L    Hemoglobin 13.2 11.7 - 15.7 g/dL    Hematocrit 39.0 35.0 - 47.0 %     (H) 78 - 100 fl    MCH 35.0 (H) 26.5 - 33.0 pg    MCHC 33.8 31.5 - 36.5 g/dL    RDW 15.5 (H) 10.0 - 15.0 %    Platelet Count 28 (LL) 150 - 450 10e9/L    Diff Method Automated Method     % Neutrophils 47.1 %    % Lymphocytes 40.0 %    % Monocytes 8.6 %    % Eosinophils 3.8 %    % Basophils 0.5 %    % Immature Granulocytes 0.0 %    Nucleated RBCs 0 0 /100    Absolute Neutrophil 0.9 (L) 1.6 - 8.3 10e9/L    Absolute Lymphocytes 0.7 (L) 0.8 - 5.3 10e9/L    Absolute Monocytes 0.2 0.0 - 1.3 10e9/L    Absolute Eosinophils 0.1 0.0 - 0.7 10e9/L    Absolute Basophils 0.0 0.0 - 0.2 10e9/L    Abs Immature Granulocytes 0.0 0 - 0.4 10e9/L    Absolute Nucleated RBC 0.0    UA reflex to Microscopic and Culture    Collection Time: 08/18/20 12:00 PM    Specimen: Midstream Urine   Result Value Ref Range    Color Urine Yellow     Appearance Urine Slightly Cloudy     Glucose Urine Negative NEG^Negative mg/dL    Bilirubin Urine Negative NEG^Negative    Ketones Urine Negative NEG^Negative mg/dL    Specific Gravity Urine 1.021 1.003 - 1.035    Blood Urine Negative NEG^Negative    pH Urine 6.0 5.0 - 7.0 pH    Protein Albumin Urine 30 (A) NEG^Negative mg/dL    Urobilinogen mg/dL 2.0 0.0 - 2.0 mg/dL    Nitrite Urine Negative NEG^Negative    Leukocyte Esterase Urine Negative NEG^Negative    Source Midstream Urine     RBC Urine 2 0 - 2 /HPF    WBC Urine <1 0 - 5 /HPF    Squamous Epithelial /HPF Urine 8 (H) 0 - 1 /HPF    Mucous Urine Present (A) NEG^Negative /LPF   Creatinine    Collection Time: 08/18/20  2:15 PM   Result Value Ref Range    Creatinine 1.16 (H) 0.52 - 1.04 mg/dL    GFR Estimate 52 (L) >60 mL/min/[1.73_m2]    GFR Estimate If  Black 61 >60 mL/min/[1.73_m2]

## 2020-08-18 NOTE — PROGRESS NOTES
Chief Complaint   Patient presents with     Blood Draw     IV placed, blood drawn, vitals taken, checked into next appointment.     Labs drawn via IV placed by Oxana Alegria MA   in right arm.

## 2020-08-19 NOTE — PROGRESS NOTES
Infusion Nursing Note:  Dania Albert presents today for IVF and Zometa  Patient seen by provider today: No   present during visit today: Not Applicable.    Note: Here today for IVF and possible electrolyte replacement.  Reports diarrhea x3 overnight.  Reports pain in RUQ area, low back and legs this morning.  She said these pains are not new and come and go.  She denies feeling any confusion this morning.  Here today with her sister.  I am needing to repeat things to Dania.      ERYN Hernandez RN / Alesha OSORIO  Give Zometa today  Does not need to come tomorrow, Alesha will switch to a virtual visit and arrange for follow up after that.  Urine today (was due tomorrow)    Receiving Zometa for the first time.  She denies any current jaw pain or dental issues.  I have provided printed information on Zometa.    Intravenous Access:  Peripheral IV placed in lab    Treatment Conditions:  Lab Results   Component Value Date    HGB 14.4 08/19/2020     Lab Results   Component Value Date    WBC 2.7 08/19/2020      Lab Results   Component Value Date    ANEU 1.5 08/19/2020     Lab Results   Component Value Date    PLT 32 08/19/2020      Lab Results   Component Value Date     08/19/2020                   Lab Results   Component Value Date    POTASSIUM 3.8 08/19/2020           Lab Results   Component Value Date    MAG 1.7 08/19/2020            Lab Results   Component Value Date    CR 0.71 08/19/2020                   Lab Results   Component Value Date    LETY 9.2 08/19/2020                Lab Results   Component Value Date    BILITOTAL 1.3 08/18/2020           Lab Results   Component Value Date    ALBUMIN 3.4 08/18/2020                    Lab Results   Component Value Date    ALT 37 08/18/2020           Lab Results   Component Value Date    AST 43 08/18/2020           Post Infusion Assessment:  Patient tolerated infusion without incident.  Blood return noted pre and post infusion.  Site patent and intact, free  from redness, edema or discomfort.  No evidence of extravasations.  Access discontinued per protocol.       Discharge Plan:   Patient declined prescription refills.  Copy of AVS reviewed with patient and/or family.  Patient will return 8/20 virtually, Alesha OSORIO will arrange for further follow-up for next appointment.  Patient discharged in stable condition accompanied by: sister.  Departure Mode: Ambulatory.  Face to Face time: 0.    Yadi Hernandez RN

## 2020-08-19 NOTE — PATIENT INSTRUCTIONS
Patient Education     Zometa or Reclast  Generic Name: Zoledronic Acid  Drug type  Zometa or Reclast works to slow the growth of cancer in the bones.  What this drug is used for  Bone metastasis (spread of cancer to the bones), or ________________________________________  How this drug is given  This drug is given through an IV line, a small tube inserted into a vein. Please let your nurse know right away if you feel pain, discomfort or heat during your infusion; or if you see redness on the skin where the IV is placed.  The amount of medicine that you receive depends on many things. Your doctor will decide on the best dose for you.   Make sure your health care team knows about all the medicines and supplements you take. This will help prevent drug interactions.   Side effects  Most people do not have all the side effects listed. Side effects usually go away after the treatment is complete. Some of the rare side effects are not listed here, so tell your doctor if you have any unusual symptoms.  Common side effects   (occur in more than 3 out of 10 of people):    Weakness    Fever    Chills    Feeling tired    Feeling dizzy    Feeling sleepy  Less common side effects   (occur in less than 3 out of 10 people):    Bone, joint or muscle pain    Headache    Confusion    Redness at IV site    Low blood calcium    Feel sick to your stomach (nausea)  Rare but serious side effects    Damage to the jaw bone. Tell your dentist that you are taking this drug before having any dental work. Be sure to have regular dental exams.    Kidney problems  Call your doctor right away if you have:    Signs of an allergic reaction: Breathing problems, feel like your throat is closing up, swelling of the mouth, face, lips or tongue, or hives (red, itchy blotches on the skin).    Fever of 100.5  F (38 C) or higher or chills    Feel faint or dizzy    Feel confused  Call your doctor within 24 hours if you feel:    So sick to your stomach, that  you cannot eat    So tired that you cannot care for yourself    You are vomiting more than 5 times in 24 hours    Unable to eat or drink for 24 hours  If you have any side effects, call us. We can help you cope with them.   Other information: __________________________  _________________________________________  _________________________________________  For more information, see:  www.chemocare.com   www.medlineplus.gov/druginformation.htm  www.cancer.gov/cancertopics/coping/chemotherapy-and-you  For informational purposes only. Not to replace the advice of your health care provider.   Copyright   2016 [employer]. All rights reserved. [employer] [id number] - 11/16.  For informational purposes only. Not to replace the advice of your health care provider.  Copyright   2018 [employer]. All rights reserved.    [facility] Main Line: [phone]    Call triage nurse with chills and/or temperature greater than or equal to 100.4, uncontrolled nausea/vomiting, diarrhea, constipation, dizziness, shortness of breath, chest pain, bleeding, unexplained bruising, or any new/concerning symptoms, questions/concerns.   If you are having any concerning symptoms or wish to speak to a provider before your next infusion visit, please call your care coordinator or triage to notify them so we can adequately serve you.   Triage Nurse Line: [phone]    If after hours, weekends, or holidays [phone]

## 2020-08-19 NOTE — NURSING NOTE
Chief Complaint   Patient presents with     Blood Draw     Labs drawn via PIV placed by RN in lab. VS taken. Patient checked in for next appt.     Labs drawn from PIV placed by RN. Line flushed with saline. Vitals taken. Pt checked in for appointment.    Ruthie Emerson RN

## 2020-08-20 NOTE — PROGRESS NOTES
Attempted to reach patient's daughter, no answer and message left with call back number. Reached patient to discuss lactulose. Patient states she is no longer having diarrhea and is having soft stools every day. She states she did not feel she was ever confused or groggy, but states her daughter felt she was. She is currently taking lactulose one time daily. Discussed lactulose titration to prevent or improve hepatic encephalopathy, and the rationale behind it. Patient appeared somewhat disinterested in the conversation. Will continue to attempt to reach daughter to review this information.

## 2020-08-24 NOTE — PROGRESS NOTES
"Dania Albert is a 54 year old female who is being evaluated via a billable video visit.      The patient has been notified of following:   \"This video visit will be conducted via a call between you and your physician/provider. We have found that certain health care needs can be provided without the need for an in-person physical exam.  This service lets us provide the care you need with a video conversation.  If a prescription is necessary we can send it directly to your pharmacy.  If lab work is needed we can place an order for that and you can then stop by our lab to have the test done at a later time. Video visits are billed at different rates depending on your insurance coverage.  Please reach out to your insurance provider with any questions.  If during the course of the call the physician/provider feels a video visit is not appropriate, you will not be charged for this service.\"   Patient has given verbal consent for Video visit? Yes      Type of service:  Video Visit  Video Start Time: 9:38  Video End Time 10:06  Patient location:clinic  Will anyone else be joining your video visit?   {If patient encounters technical issues they should call [phone] :3  Distant Location (provider location):  [facility] HEPATOLOGY   Mode of Communication:  Video Conference via [employer]  I have reviewed and updated the patient's Past Medical History, Social History, Family History and Medication List.      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 MELD 7-9 ABO O  Unfortunately has developed multifocal, metastatic HCC and is outside [place]. She had not completed eval and was not listed.    HCC with BONE METS. S/p TACE 10/2019 MWA 3/2020 with recent MRI showing multifocal active disease outside [place] and mets to L clavicle and R rib. Started on " lenvatinib by Dr. Childress in June. Next imaging planned for September. Nothing ordered. Will message Dr. Childress's team.    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 Excellent synthetic function   HCV treated and obtained SVR 2017    LE EDEMA This is mild and intermittent. She is not taking any diuretics now and not having any problems with swelling. Ok for her to not take.     HE new in July. On rifaximin and lactulose. No episodes since hospitalization. Discussed titration of lactulose.     THROMBOCYTOPENIA. 2/2 cirrhosis. Plt ~ 50    PAIN Will see palliative care 9/4/20, currently managed by oncology.  VARICEAL SCREENING. UTD EGD 10/29/19 grade 1 varices     DIARRHEA Chronic. Suggested Imodium. Prescribed Lomotil  PULMONARY ILD, smoking related. LV with pulm 5/22/20   DM On insulin     SOCIAL SUPPORT.  and daughter  FUNCTIONAL STATUS. Good  LIVER TRANSPLANT CANDIDACY. Not a candidate due to multifocal metastatic HCC.      Kayli Bergman MD  Hepatology/Liver Transplantation  Medical Director, Liver Transplantation  [facility]  ========================================================================     Subjective:  54 y M with HCV cirrhosis and HCC. First diagnosed with liver disease a couple years ago when she saw her doctor and had some testing done. She was diagnosed with Hepatitis C at that time and HCV was treated. HCC diagnosed June 2019, now outside [place] with mets. On lenvatinib starting 6/30/20.    Hospitalized 7/16-7/20/20 for HE, colitis. Started lactulose and rifaximin. Also started ortega and lasix for unknown reasons.     HCV  S/p SVR with Sovaldi 2017     HCC  Dx 6/2019 3.1x1.9x2.8 cm  CE and MWA at [facility] 10/2019  MRI 11/22/19 no active HCC  MRI 2/7/20 1.7 cm seg 3 with pseudocapsule  MRI 5/8/20  1. Cirrhotic liver morphology with evidence of portal hypertension including splenic vein and portosystemic collaterals.  2. The lesion previously treated via TACE in segment 3 and the lesion recently treated  via microwave ablation demonstrates posttreatment changes without any evidence of viable tumor (LR-TR nonviable).   3. Increasing and growing size of numerous arterial enhancing foci scattered throughout the liver parenchyma. There is dramatic increase in the number of these lesions since 2/7/2020. Although these arterial enhancing foci do not demonstrate any significant washout or pseudocapsule to definitely diagnose HCC, many of these demonstrate suspicious imaging findings on T2-weighted and DWI series. These are technically designated as  LIRADS 4, however findings overall worrisome for multifocal hepatocellular carcinomas.  4. New partially occlusive bland thrombus in the left main PV  5. Based on this exam only, patient is not within [place] criteria.  6. Subpleural reticular opacities concerning for interstitial lung disease.    Lab Test 08/18/20  1021   PROTTOTAL 7.5   ALBUMIN 3.4   BILITOTAL 1.3   ALKPHOS 111   AST 43   ALT 37     Lab Test 08/19/20  0701   WBC 2.7*   RBC 4.03   HGB 14.4   HCT 41.9   *   MCH 35.7*   MCHC 34.4   RDW 15.6*   PLT 32*     MELD-Na score: 9 at 7/20/2020  5:21 AM  MELD score: 9 at 7/20/2020  5:21 AM  Calculated from:  Serum Creatinine: 0.67 mg/dL (Rounded to 1 mg/dL) at 7/20/2020  5:21 AM  Serum Sodium: 138 mmol/L (Rounded to 137 mmol/L) at 7/20/2020  5:21 AM  Total Bilirubin: 0.8 mg/dL (Rounded to 1 mg/dL) at 7/20/2020  5:21 AM  INR(ratio): 1.29 at 7/18/2020  6:06 AM  Age: 54 years 7 months      EGD 10/28/19 grade 1 varices  COLONOSCOPY 12/8/16 due 2026  KIDNEY FUNCTION normal  BONE DENSITY DEXA pending     Portal vein assessment: patent on US   Diabetes: yes  Abdominal surgery: yes     HBV neg core ancelmo and s Ag    Lab Test 03/02/20  1043   PROTTOTAL 8.0   ALBUMIN 3.4   BILITOTAL 1.0   ALKPHOS 177*   AST 26   ALT 24     Lab Test 03/02/20  1115 03/02/20  1043   WBC  --  3.0*   RBC  --  3.81   HGB 13.0 12.9   HCT  --  38.8   MCV  --  102*   MCH  --  33.9*   MCHC  --  33.2   RDW   --  14.0   PLT  --  41*     MELD-Na score: 9 at 7/20/2020  5:21 AM  MELD score: 9 at 7/20/2020  5:21 AM  Calculated from:  Serum Creatinine: 0.67 mg/dL (Rounded to 1 mg/dL) at 7/20/2020  5:21 AM  Serum Sodium: 138 mmol/L (Rounded to 137 mmol/L) at 7/20/2020  5:21 AM  Total Bilirubin: 0.8 mg/dL (Rounded to 1 mg/dL) at 7/20/2020  5:21 AM  INR(ratio): 1.29 at 7/18/2020  6:06 AM  Age: 54 years 7 months      PAST MEDICAL HISTORY  Asuncion  R hernia repair  ORIF L and R wrists  Bowel obstruction no details known   Chronic diarrhea not on any medication  Botox shot in kidney for urinary frequency about 2018  SOCIAL HISTORY    Lives with   Currently is not working. 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 Worked in medical equipment assembly.  Smoking: Smokes 8-10 cig/d for 25 years  Alcohol  FAMILY HISTORY  M CAD, glaucoma, DM  F CAD  Bro kidney disease  DM in multiple sisters  ROS 10 point ROS neg other than the symptoms noted above in the HPI.

## 2020-08-24 NOTE — PROGRESS NOTES
"Dania Albert is a 54 year old female who is being evaluated via a billable video visit.        ** patient would like to do Doximity **      The patient has been notified of following:     \"This video visit will be conducted via a call between you and your physician/provider. We have found that certain health care needs can be provided without the need for an in-person physical exam.  This service lets us provide the care you need with a video conversation.  If a prescription is necessary we can send it directly to your pharmacy.  If lab work is needed we can place an order for that and you can then stop by our lab to have the test done at a later time.    Video visits are billed at different rates depending on your insurance coverage.  Please reach out to your insurance provider with any questions.    If during the course of the call the physician/provider feels a video visit is not appropriate, you will not be charged for this service.\"    Patient has given verbal consent for Video visit? Yes  How would you like to obtain your AVS? Mail a copy  If you are dropped from the video visit, the video invite should be resent to: Text to cell phone: [phone]  Will anyone else be joining your video visit? No  {If patient encounters technical issues they should call [phone] :[id number]}      Video-Visit Details    Type of service:  Video Visit    Video Start Time: {video visit start/end time:152948}  Video End Time: {video visit start/end time:152948}    Originating Location (pt. Location): {video visit patient location:746329::\"Home\"}    Distant Location (provider location):  [facility] HEPATOLOGY     Platform used for Video Visit: {Virtual Visit Platforms:404368::\"Sleep Number\"}    {signature options:891126}        "

## 2020-08-24 NOTE — PROGRESS NOTES
Was not able to reach patient and their mailbox is full. I will try again.  Violeta Kumar, CMA on 8/24/2020 at 9:13 AM

## 2020-08-27 NOTE — NURSING NOTE
Chief Complaint   Patient presents with     Blood Draw     labs drawn via venipuncture by RN in lab     BP (!) 148/89 (BP Location: Left arm, Patient Position: Chair, Cuff Size: Adult Regular)   Pulse 74   Temp 97.8  F (36.6  C) (Oral)   Resp 18   Wt 72.6 kg (160 lb 0.9 oz)   SpO2 (!) 89%   BMI 30.24 kg/m      Labs collected and sent from right antecubital venipuncture in lab by RN. Pt tolerated well.   Pt checked in for next appointment.    Marisa Stephens RN\

## 2020-08-27 NOTE — PROGRESS NOTES
Oncology/Hematology Visit Note  Aug 27, 2020    Reason for Visit: Follow up of hepatocellular carcinoma    History of Present Illness:   Initially evaluated in May 2020 for hepatocellular carcinoma .     She has Hep C cirrhosis. Hep C was previously treated with Sovaldi/ribavirin and achived SVR.  She was found to have HCC in June 2019 and had TACE/Ablation for S3 lesion on Oct 2019 and then found to have a new S3 lesion for which she had a MWA on 3/17/2020.  Bone Scan in Dec 2019 was negative for metastatic disease.   CTA Chest in October 2019 was negative for metastatic disease.     A follow up MRI on 5/8/2020 showed no resdual viable cancer in the treatment zones but it found dramatic increase in number and sizes of numerous arterial enhancing foci scattered throughout the liver parenchyma which was very suspicious for multifocal HCC. None of these were diagnostic of HC so she had liver biopsy on which confirmed moderately differentiated HCC.     As she was not deemed a candidate for any further liver directed therapy, she was referred to medical oncology.     She has constant pain in the RUQ region/beneath the lower right rib cage since the MWA procedure in March 2020.     Baseline CT chest abdomen and pelvis on 6/12/2020.  Bone scan showed anterior right fifth rib and left clavicular head meta stasis.     She started lenvatinib on 6/30/2020.     She was admitted to the hospital from 7/16/2020 till 7/20/2020 for hepatic encephalopathy and noninfectious colitis. MRI of the brain did not show any intracranial metastasis. She was started on lactulose and rifaximin and her confusion improved.  Lenvatinib was held during the admission.     Repeat CT chest abdomen and pelvis which did not show any pulmonary embolism.  Interval advancement of portal venous thrombus involving the main portal vein and the left intrahepatic portal venous system was seen.  There was also interval development of low-attenuation lesions  which were smaller than the arterial enhancing lesions seen on CT scan from June 2020.       Lenvima resumed 7/23/2020    Interval History:  Dania Albert was met with for follow up with her sister. She is overall doing better and is now living back at home.   - Diarrhea has improved, she is now having 3-4 stools a day, some are liquid and some are formed. She denies any confusion and she feels her energy is mildly improved.   - She denies any dizziness, notes some occasional headaches but this is chronic for her. No new changes in vision or new weakness. No falls. The headaches usually resolve on their own.   - She notes some mouth irritation with carbonated beverages and certain foods. Does not feel the irritation when she is not eating or drinking carbonated beverages. No pain in throat.  - Notes some shortness of breath on exertion, with no progression this past week. None at rest. Dyspnea resolved with rest. Denies cough, fevers, chills, chest pain.   - Abdominal Pain is much better controlled and she has only needed 2 oxycodone in the past week.   - she continues to lose weight. Feels she has an appetite and have been trying to increase oral intake. Notes occasional nausea, but this is controlled with Zofran. Willing to meet with dietician.     Review of Systems:  Patient denies fevers, chills, dizziness, vision or hearing changes, new lumps or bumps, swelling of extremities, bleeding issues, or rash.    Current Outpatient Medications   Medication Sig Dispense Refill     acetaminophen (TYLENOL) 500 MG tablet Take 500 mg by mouth every 8 hours as needed        albuterol (PROAIR HFA) 108 (90 Base) MCG/ACT inhaler Inhale 2 puffs into the lungs every 6 hours as needed        benzonatate (TESSALON) 100 MG capsule Take 1 capsule (100 mg) by mouth 3 times daily as needed for cough 90 capsule 0     blood glucose monitoring (ONE TOUCH ULTRA MINI) meter device kit Use as directed to test glucose three times daily.  Pharmacy dispense brand based on insurance.  Indications: Diabetes       calcium carbonate-vitamin D (OSCAL W/D) 500-200 MG-UNIT tablet Take 1 tablet by mouth 2 times daily (with meals) 60 tablet 3     cholecalciferol (VITAMIN D3) 1000 units (25 mcg) capsule Take 1 capsule (1,000 Units) by mouth daily 30 capsule 3     diclofenac (VOLTAREN) 1 % topical gel Place 2 g onto the skin 4 times daily 100 g 0     insulin glargine (LANTUS SOLOSTAR) 100 UNIT/ML pen Inject 38 Units Subcutaneous At Bedtime        lactulose (CHRONULAC) 10 GM/15ML solution Take 15 mLs (10 g) by mouth 3 times daily 500 mL 0     Lenvatinib 12 MG, 3 x 4 mg capsules, (LENVIMA) 3 x 4 MG capsule Take 3 capsules (12 mg) by mouth daily 90 capsule 0     melatonin 5 MG tablet Take 1 tablet (5 mg) by mouth nightly as needed for sleep 30 tablet 1     nicotine (NICORETTE) 2 MG gum Place 1 each (2 mg) inside cheek as needed for smoking cessation 60 each 3     ondansetron (ZOFRAN-ODT) 8 MG ODT tab Take 1 tablet (8 mg) by mouth every 8 hours as needed for nausea And take 8mg prior to each dose of Levanitib. 30 tablet 3     OneTouch Delica Lancets 33G MISC Change lancet one time daily as directed.       order for DME Abdominal Binder - small 1 each 1     oxyCODONE (ROXICODONE) 5 MG tablet Take 0.5-1 tablets (2.5-5 mg) by mouth every 6 hours as needed for severe pain 30 tablet 0     pantoprazole (PROTONIX) 40 MG EC tablet TAKE ONE TABLET BY MOUTH EVERY DAY       QUEtiapine (SEROQUEL) 50 MG tablet Take 50 mg by mouth At Bedtime  0     rifaximin (XIFAXAN) 550 MG TABS tablet Take 1 tablet (550 mg) by mouth 2 times daily 60 tablet 0     sertraline (ZOLOFT) 100 MG tablet Take 200 mg by mouth daily        simethicone (MYLICON) 80 MG chewable tablet Take 1 tablet (80 mg) by mouth every 6 hours as needed for flatulence or cramping 90 tablet 0     thiamine (B-1) 100 MG tablet Take 1 tablet (100 mg) by mouth daily 30 tablet 0     TRULICITY 0.75 MG/0.5ML pen INJECT 0.5 ml's  SUBCUTANEOUSLY ONCE WEEKLY  2       Physical Examination:  There were no vitals taken for this visit.  Wt Readings from Last 10 Encounters:   08/19/20 74.1 kg (163 lb 4.8 oz)   08/18/20 73.9 kg (163 lb)   08/13/20 75.2 kg (165 lb 12.8 oz)   08/11/20 75.8 kg (167 lb)   07/23/20 75.8 kg (167 lb)   07/20/20 79.5 kg (175 lb 3.2 oz)   06/29/20 79.2 kg (174 lb 11.2 oz)   05/28/20 78.5 kg (173 lb)   05/21/20 78.5 kg (173 lb)   03/17/20 78.7 kg (173 lb 8 oz)     Constitutional: Well-appearing female in no acute distress.   Eyes: EOMI, PERRL. No scleral icterus.  ENT: Oral mucosa is moist without lesions or thrush.   Lymphatic: Neck is supple without cervical or supraclavicular lymphadenopathy. No axillary lymphadenopathy.  Cardiovascular: Regular rate and rhythm. No murmurs, gallops, or rubs. No peripheral edema.  Respiratory: Clear to auscultation bilaterally. No wheezes or crackles.  Gastrointestinal: Bowel sounds present. Abdomen soft, non-tender. No palpable hepatosplenomegaly or masses.   Neurologic: Cranial nerves II through XII are grossly intact.  Skin: No rashes, petechiae, or bruising noted on exposed skin.    Laboratory Data:  Results for ROBBIE RAWLS (MRN [id number]) as of 8/27/2020 15:49   Ref. Range 8/27/2020 08:52   Sodium Latest Ref Range: 133 - 144 mmol/L 136   Potassium Latest Ref Range: 3.4 - 5.3 mmol/L 3.9   Chloride Latest Ref Range: 94 - 109 mmol/L 104   Carbon Dioxide Latest Ref Range: 20 - 32 mmol/L 26   Urea Nitrogen Latest Ref Range: 7 - 30 mg/dL 8   Creatinine Latest Ref Range: 0.52 - 1.04 mg/dL 0.62   GFR Estimate Latest Ref Range: >60 mL/min/1.73_m2 >90   GFR Estimate If Black Latest Ref Range: >60 mL/min/1.73_m2 >90   Calcium Latest Ref Range: 8.5 - 10.1 mg/dL 8.7   Anion Gap Latest Ref Range: 3 - 14 mmol/L 6   Magnesium Latest Ref Range: 1.6 - 2.3 mg/dL 1.7   Albumin Latest Ref Range: 3.4 - 5.0 g/dL 3.5   Protein Total Latest Ref Range: 6.8 - 8.8 g/dL 8.2   Bilirubin Total Latest Ref  Range: 0.2 - 1.3 mg/dL 1.6 (H)   Alkaline Phosphatase Latest Ref Range: 40 - 150 U/L 158 (H)   ALT Latest Ref Range: 0 - 50 U/L 39   AST Latest Ref Range: 0 - 45 U/L 48 (H)   Alpha Fetoprotein Latest Ref Range: 0 - 8 ug/L 10.6 (H)   Ammonia Latest Ref Range: 10 - 50 umol/L 30   T4 Free Latest Ref Range: 0.76 - 1.46 ng/dL 0.94   TSH Latest Ref Range: 0.40 - 4.00 mU/L 6.87 (H)   Glucose Latest Ref Range: 70 - 99 mg/dL 123 (H)   WBC Latest Ref Range: 4.0 - 11.0 10e9/L 2.8 (L)   Hemoglobin Latest Ref Range: 11.7 - 15.7 g/dL 14.4   Hematocrit Latest Ref Range: 35.0 - 47.0 % 43.1   Platelet Count Latest Ref Range: 150 - 450 10e9/L 43 (LL)   RBC Count Latest Ref Range: 3.8 - 5.2 10e12/L 4.09   MCV Latest Ref Range: 78 - 100 fl 105 (H)   MCH Latest Ref Range: 26.5 - 33.0 pg 35.2 (H)   MCHC Latest Ref Range: 31.5 - 36.5 g/dL 33.4   RDW Latest Ref Range: 10.0 - 15.0 % 15.9 (H)   Diff Method Unknown Manual Differential   % Neutrophils Latest Units: % 64.6   % Lymphocytes Latest Units: % 35.4   % Monocytes Latest Units: % 0.0   % Eosinophils Latest Units: % 0.0   % Basophils Latest Units: % 0.0   Absolute Neutrophil Latest Ref Range: 1.6 - 8.3 10e9/L 1.8   Absolute Lymphocytes Latest Ref Range: 0.8 - 5.3 10e9/L 1.0   Absolute Monocytes Latest Ref Range: 0.0 - 1.3 10e9/L 0.0   Absolute Eosinophils Latest Ref Range: 0.0 - 0.7 10e9/L 0.0   Absolute Basophils Latest Ref Range: 0.0 - 0.2 10e9/L 0.0   Anisocytosis Unknown Slight   Platelet Estimate Unknown Confirming automated cell count         Assessment and Plan:  1. HCC in the setting of Hep C cirrhosis and initialy treated with TACE/ablation to S3 lesion in Oct 2019 and then had a new S3 lesion treated with MWA in March 2020. Unfortunately on repeat MRI in May 2020, she has now been found to have multifocal HCC- biopsy proven.  - She also has probably bone metastasis with right anterior 5th rib lesion and left clavicular head lesion.    -Started Lenvatinib on 6/30/2020 and held  during admission for hepatic encephalopathy on 7/16/2020.Resumed after visit on 7/23/2020.   - Repeat CT CAP and Bone scan in 2-3 weeks with Dr. Childress follow up after.   - Mild bump in AFP to 10.6 today.     2. Hepatic Encephalopathy/cirrhosis.  -  On lactulose once daily and tolerating better. Ammonia level improved with mentation. She has established care with hepatology through , Dr. Bergman.      3. Portal vein thrombus.  - Because of significant thrombocytopenia,risk of bleeding would be very high if we start her on systemic anticoagulation.  At this time I am not certain whether she has any symptoms from this or not.  She does have right upper quadrant pain but this could be related to the underlying malignancy.  For now pain is under good control.       4. RUQ Pain/rib cage area pain-has Oxycodone prn, but to use with caution given underlying liver/renal dysfunction.  Plans to establish with palliative care on 9/4/2020.      5. HEME  Thrombocytopenia- no bleeding symptoms.  This is from cirrhosis, portal hypertension and splenomegaly.  Continue to monitor.      6. Bone metastasis.  She has evidence of bone metastasis to the left clavicular head and right anterior fifth rib.  Zometa given 8/19/2020.  Continue calcium and vitamin D.     7. Nausea- cont zofran as needed    8. Mouth irritation. Advised to trial salt/soda rinses. Aware to call if symptoms worsen and we can try MMW    9. Weight loss, will refer to nutritionist.      10. Recent KELSEY- resolved with IVF. Dehydration from diarrhea likely contributing.    - Noted protein in urine on day of KELSEY, since resolved. Will monitor intermittently on Lenvima and order 24 hour urine if still present.      11. Mild increase in AST, T bili, ALP- no intervention at this time. Will continues to monitor.     12. Subclinical hypothyroidism. Will follow with next blood draw. Initiate therapy if it remains abnormal.     Not addressed today:  - Smoking.      Alesha Medina  CARLOS  [facility]  [address], [place] [id number] [phone]

## 2020-08-27 NOTE — NURSING NOTE
"Oncology Rooming Note    August 27, 2020 9:42 AM   Dania Albert is a 54 year old female who presents for:    Chief Complaint   Patient presents with     Blood Draw     labs drawn via venipuncture by RN in lab     Oncology Clinic Visit     Pt is here for a rtn for Hepatocellular Cancer     Initial Vitals: Blood Pressure (Abnormal) 148/89 (BP Location: Left arm, Patient Position: Chair, Cuff Size: Adult Regular)   Pulse 74   Temperature 97.8  F (36.6  C) (Oral)   Respiration 18   Weight 72.6 kg (160 lb 0.9 oz)   Oxygen Saturation (Abnormal) 89%   Body Mass Index 30.24 kg/m   Estimated body mass index is 30.24 kg/m  as calculated from the following:    Height as of 7/16/20: 1.549 m (5' 1\").    Weight as of this encounter: 72.6 kg (160 lb 0.9 oz). Body surface area is 1.77 meters squared.  No Pain (0) Comment: Data Unavailable   No LMP recorded. Patient is postmenopausal.  Allergies reviewed: Yes  Medications reviewed: Yes    Medications: Medication refills not needed today.  Pharmacy name entered into POSLavu:    [id number] - [place], [place] - [address]  [facility] - [place], [place] - [address]  [facility] - [place], [place] - [address]  [facility] [id number] [place] [facility] - [place], [place] - [facility] [address]    Clinical concerns: none       Radha Franklin MA            "

## 2020-08-27 NOTE — PATIENT INSTRUCTIONS
Mix 1 tsp of baking soda and 1 tsp of salt in 1 cup of water. Swish around in your mouth 4 times a day.

## 2020-08-31 NOTE — PROGRESS NOTES
Attempted to reach patient's daughter for check in, no answer, detailed message left regarding lactulose titration, number provided if questions.

## 2020-09-11 NOTE — TELEPHONE ENCOUNTER
"Called and spoke with Dania Albert for post-hosp discharge follow-up for nicotine dependency counseling follow-up. Patient discharged from the hosp on 7/20/20. Had been smoking a 1/3 PPD at the time. Used 14 mg nicotine patch while in patient. Was given orders for patches and gums for home use. Today, patient stated she never filled those orders. She resumed smoking after discharge. Back to smoking 1/4 PPD now. Asked if she is still committed to quitting for good, she expressed interest. Encouraged patient fill the nicotine patch and gum orders as they're free per her medical insurance, [place]/[facility] [employer] [place]. Patient stated she'll do that \"as soon as she can.\" Also encouraged patient to call her primary care provider for refills. Will call patient again a month.     Kunal Robins, RRT, CTTS  Chronic Pulmonary Disease Specialist  Cardiopulmonary Services   Office: [phone]  Pager: [phone]    "

## 2020-09-16 NOTE — LETTER
"    9/16/2020         RE: Dania Albert  [address] [place] [id number]        Dear Colleague,    Thank you for referring your patient, Dania Albert, to the [facility]. Please see a copy of my visit note below.    Dania Albert is a 54 year old female who is being evaluated via a billable telephone visit.      The patient has been notified of following:     \"This telephone visit will be conducted via a call between you and your physician/provider. We have found that certain health care needs can be provided without the need for a physical exam.  This service lets us provide the care you need with a short phone conversation.  If a prescription is necessary we can send it directly to your pharmacy.  If lab work is needed we can place an order for that and you can then stop by our lab to have the test done at a later time.    Telephone visits are billed at different rates depending on your insurance coverage. During this emergency period, for some insurers they may be billed the same as an in-person visit.  Please reach out to your insurance provider with any questions.    If during the course of the call the physician/provider feels a telephone visit is not appropriate, you will not be charged for this service.\"    Patient has given verbal consent for Telephone visit?  Yes    CLINICAL NUTRITION SERVICES - ASSESSMENT NOTE    Dania Albert 54 year old referred for MNT related to liver cancer    Time Spent: 60 minutes  Visit Type: phone  Referring Physician: Monroe 8/27  Pt accompanied by: her daughter, Jessica via speaker phone    Nutrition Prescription   Recommendations Suggested by Registered Dietitian (RD):   1. 5-6 small, frequent, soft meals/snacks  2. 1600kcal, 65 grams protein/day  3. Start Healios BID for mouth sores   Malnutrition: severe in the context of chronic illness     NUTRITION HISTORY  Factors affecting nutrition intake include:decreased appetite and mouth sores  Current diet: " "soft foods  Current appetite/intake: poor  PEG Tube: No    Dania tells me that her intake has been very poor with her mouth sores.    She describes extreme pain upon eating anything.   Of note, she was prescribed magic mouth wash today.  RD advised her to pick this up as soon as possible.      Diet Recall  Breakfast Oatmeal made with 1% milk   Lunch skips   Dinner Hot dog   Snacks pudding   Beverages Juice and water     ANTHROPOMETRICS  Height: 61\"  Weight: 160 lbs/72kg  BMI: 30  Weight Status:  Obesity Grade I BMI 30-34.9  IBW: 105 lb (152%)  Weight History: down 15 lb x past 5 weeks (9%)  Wt Readings from Last 7 Encounters:   08/27/20 72.6 kg (160 lb 0.9 oz)   08/19/20 74.1 kg (163 lb 4.8 oz)   08/18/20 73.9 kg (163 lb)   08/13/20 75.2 kg (165 lb 12.8 oz)   08/11/20 75.8 kg (167 lb)   07/23/20 75.8 kg (167 lb)   07/20/20 79.5 kg (175 lb 3.2 oz)       Dosing Weight: 54kg    Medications/vitamins/minerals/herbals:   Reviewed    Labs:   Labs reviewed    NUTRITION FOCUSED PHYSICAL ASSESSMENT FOR DIAGNOSING MALNUTRITION:  Not observed due to Covid 19 - phone call     ASSESSED NUTRITION NEEDS:  Estimated Energy Needs: 7126-5692 kcals (30-35 Kcal/Kg)  Justification: repletion  Estimated Protein Needs: 65-80 grams protein (1.2-1.5 g pro/Kg)  Justification: Repletion  Estimated Fluid Needs: 2000  mL   Justification: maintenance    MALNUTRITION:  % Weight Loss:  > 7.5% in 3 months (severe malnutrition)  % Intake:  <75% for >/= 3 months (non-severe malnutrition)  Subcutaneous Fat Loss:  Not observed  Muscle Loss:  Not observed  Fluid Retention:  Not noted    Malnutrition Diagnosis: Severe malnutrition  In Context of:  Chronic illness or disease    NUTRITION DIAGNOSIS:  Inadequate protein-energy intake related to mucositis as evidenced by 9% wt loss x past 5 weeks    INTERVENTIONS  Provided written & verbal education:   - Discussed ways to maximize and fortify foods with calories and protein via small frequent meals via soft " foods  - Advised pt to aim for at least 1600-1900kcal and 65-80g protein daily.   - Discussed ways to cope with mucositis.  Recommend obtaining nutrition supplement - Healios - taking BID.  This may aid in healing mouth/esophagus sores.  Encouraged water, baking soda, salt rinses and using Rx magic mouth wash.  - Reviewed ONS options (Mass Pro weight kenneth, Ensure Enlive, Ensure Plus/Boost Plus, CIB, Benecalorie etc). Encouraged utilizing these ONS in home made shakes/smoothies to prevent flavor fatigue.  Encouraged pt to start consuming 2 ONS or home made shakes/smoothies daily.       Pt verbalize understanding of materials provided during consult.   Patient Understanding: good  Expected Compliance: good    Goals  1.  Aim for 5-6 small frequent meals, soft foods  2.  Aim for 1600kcal and 65g protein/day  3. Weight maintenance       Follow-Up Plans: Pt has RD contact information for questions.      Chiqui Keyes RDN, LD        Again, thank you for allowing me to participate in the care of your patient.        Sincerely,        Chiqui Keyes RD

## 2020-09-16 NOTE — PROGRESS NOTES
Potassium noted to be low today. Will send in 5 days of PO 20 meq KCl to pharmacy. Also noted TSH had increase to 12- will start on 25 mcg of synthroid.    Dania also mentioned that she is continuing to have mouth irritation. Will send in script for MMW and advised to talk with provider about this at appointment tomorrow.     Will get Zometa scheduled for 11/2020    Alesha Medina PA-C

## 2020-09-17 NOTE — PATIENT INSTRUCTIONS
Continue lenvatinib.  Continue calcium and vitamin D.  Refilled her Zofran.  Take lactulose so that you have 2 soft bowel movements daily.  Follow with palliative care.    Return to clinic to see Elo in a couple of weeks.

## 2020-09-17 NOTE — PROGRESS NOTES
Oncology follow up visit:  Date on this visit: 9/17/2020     Dania Albert  is referred by  ref. provider found for an oncology consultation. She requires evaluation for new diagnosis of HCC    Primary Physician: Micaela Gould       History Of Present Illness:  Please see my previous note for details.  I have copied and updated from prior note.    Ms. Albert is a 54 year old female who I initially evaluated in May 2020 for hepatocellular carcinoma .    She has Hep C cirrhosis. Hep C was previously treated with Sovaldi/ribavirin and achived SVR.  She was found to have HCC in June 2019 and had TACE/Ablation for S3 lesion on Oct 2019 and then found to have a new S3 lesion for which she had a MWA on 3/17/2020.  Bone Scan in Dec 2019 was negative for metastatic disease.   CTA Chest in October 2019 was negative for metastatic disease.    A follow up MRI on 5/8/2020 showed no resdual viable cancer in the treatment zones but it found dramatic increase in number and sizes of numerous arterial enhancing foci scattered throughout the liver parenchyma which was very suspicious for multifocal HCC. None of these were diagnostic of HC so she had liver biopsy on which confirmed moderately differentiated HCC.    As she was not deemed a candidate for any further liver directed therapy, she was referred to medical oncology.    She has constant pain in the RUQ region/beneath the lower right rib cage since the MWA procedure in March 2020.   She had a tick bite and she completed a 21 day course of Doxycycline in early June 2020.    We got a baseline CT chest abdomen and pelvis on 6/12/2020.  Bone scan showed anterior right fifth rib and left clavicular head meta stasis.    She started lenvatinib on 6/30/2020.    She was admitted to the hospital from 7/16/2020 till 7/20/2020 for hepatic encephalopathy and noninfectious colitis.   She was initially started on antibiotics but later on it was a stopped.   MRI of the brain did  not show any intracranial metastasis.  She was started on lactulose and rifaximin and her confusion improved.  Lenvatinib was held during the admission.    She had a repeat CT chest abdomen and pelvis which did not show any pulmonary embolism.  Interval advancement of portal venous thrombus involving the main portal vein and the left intrahepatic portal venous system was seen.  There was also interval development of low-attenuation lesions which were smaller than the arterial enhancing lesions seen on CT scan from June 2020.      Interval history.  This is video visit.  Her daughter, Mary is also available throughout the visit.  She complaints of mouth soreness. Has not tried mouth rinses. She does not think she has lost weight.  Has body aches. Takes on average 2 tylenol daily which helps. Has nausea but no vomiting. Has lose BM. She takes rifaximin and takes lactulose occasionally. No infections, new swellings. No abnormal bleeding. Hands and feet are fine. Has occasionally felt confused. Mary thinks her confusion is somewhat better although still confused.  Has fatigue and has dyspnea on exertion.     ECOG 2    ROS:  A comprehensive ROS was otherwise neg      I reviewed other history in epic as below.    Past Medical/Surgical History:  Past Medical History:   Diagnosis Date     Alcohol abuse      Bone metastasis (H) 7/15/2020     Chronic hepatitis C without hepatic coma (H) 10/23/2019    SVR     Cirrhosis of liver with ascites (H) 10/23/2019     COPD (chronic obstructive pulmonary disease) (H)      Depressive disorder      Diabetes (H)     Type 1 DM, Takes Insulin      Emphysema lung (H)      H/O esophageal varices      HCC (hepatocellular carcinoma) (H) 10/23/2019     History of blood transfusion     [facility]     ERICKA (obstructive sleep apnea)      Past Surgical History:   Procedure Laterality Date     ABDOMEN SURGERY      Gallbladder Removed      COLONOSCOPY      [place]     CYSTOSCOPY,  INJECT BOTOX      detrusor injection     GI SURGERY      Upper Endoscopy at [facility]      HERNIA REPAIR      Patient doesnt remember if mesh was used. [facility]      IR FLUORO 0-1 HOUR  3/17/2020     IR FLUORO 0-1 HOUR  3/17/2020     OPEN REDUCTION INTERNAL FIXATION WRIST Bilateral      Cancer History:   As above    Allergies:  Allergies as of 09/17/2020 - Reviewed 09/17/2020   Allergen Reaction Noted     Bee venom Other (See Comments) and Swelling 07/06/2012     Hydrocodone Other (See Comments) 02/18/2013     Nickel Rash 03/25/2016     Wool fiber Hives and Rash 03/25/2016     Current Medications:  Current Outpatient Medications   Medication Sig Dispense Refill     acetaminophen (TYLENOL) 500 MG tablet Take 500 mg by mouth every 8 hours as needed        albuterol (PROAIR HFA) 108 (90 Base) MCG/ACT inhaler Inhale 2 puffs into the lungs every 6 hours as needed        benzonatate (TESSALON) 100 MG capsule Take 1 capsule (100 mg) by mouth 3 times daily as needed for cough 90 capsule 0     blood glucose monitoring (ONE TOUCH ULTRA MINI) meter device kit Use as directed to test glucose three times daily. Pharmacy dispense brand based on insurance.  Indications: Diabetes       calcium carbonate-vitamin D (OSCAL W/D) 500-200 MG-UNIT tablet Take 1 tablet by mouth 2 times daily (with meals) 60 tablet 3     cholecalciferol (VITAMIN D3) 1000 units (25 mcg) capsule Take 1 capsule (1,000 Units) by mouth daily 30 capsule 3     diclofenac (VOLTAREN) 1 % topical gel Place 2 g onto the skin 4 times daily 100 g 0     insulin glargine (LANTUS SOLOSTAR) 100 UNIT/ML pen Inject 38 Units Subcutaneous At Bedtime        lactulose (CHRONULAC) 10 GM/15ML solution Take 15 mLs (10 g) by mouth 3 times daily 500 mL 0     Lenvatinib 12 MG, 3 x 4 mg capsules, (LENVIMA) 3 x 4 MG capsule Take 3 capsules (12 mg) by mouth daily 90 capsule 0     levothyroxine (SYNTHROID) 25 MCG tablet Take 1 tablet (25 mcg) by mouth daily 30 tablet 0     magic  mouthwash suspension (diphenhydrAMINE, lidocaine, aluminum-magnesium & simethicone) Swish and swallow 10 mLs in mouth every 6 hours as needed for mouth sores 120 mL 0     melatonin 5 MG tablet Take 1 tablet (5 mg) by mouth nightly as needed for sleep 30 tablet 1     nicotine (NICORETTE) 2 MG gum Place 1 each (2 mg) inside cheek as needed for smoking cessation (Patient not taking: Reported on 9/17/2020) 60 each 3     ondansetron (ZOFRAN-ODT) 8 MG ODT tab Take 1 tablet (8 mg) by mouth every 8 hours as needed for nausea And take 8mg prior to each dose of Levanitib. 30 tablet 3     OneTouch Delica Lancets 33G MISC Change lancet one time daily as directed.       order for DME Abdominal Binder - small (Patient not taking: Reported on 9/17/2020) 1 each 1     oxyCODONE (ROXICODONE) 5 MG tablet Take 0.5-1 tablets (2.5-5 mg) by mouth every 6 hours as needed for severe pain 30 tablet 0     pantoprazole (PROTONIX) 40 MG EC tablet TAKE ONE TABLET BY MOUTH EVERY DAY       potassium chloride ER (KLOR-CON M) 20 MEQ CR tablet Take 1 tablet (20 mEq) by mouth daily 5 tablet 0     QUEtiapine (SEROQUEL) 50 MG tablet Take 50 mg by mouth At Bedtime  0     rifaximin (XIFAXAN) 550 MG TABS tablet Take 1 tablet (550 mg) by mouth 2 times daily 60 tablet 0     sertraline (ZOLOFT) 100 MG tablet Take 200 mg by mouth daily        simethicone (MYLICON) 80 MG chewable tablet Take 1 tablet (80 mg) by mouth every 6 hours as needed for flatulence or cramping 90 tablet 0     thiamine (B-1) 100 MG tablet Take 1 tablet (100 mg) by mouth daily 30 tablet 0     TRULICITY 0.75 MG/0.5ML pen INJECT 0.5 ml's SUBCUTANEOUSLY ONCE WEEKLY  2      Family History:  Family History   Problem Relation Age of Onset     Coronary Artery Disease Mother      Coronary Artery Disease Father      No Known Problems Brother      Meniere's disease Sister      Diabetes Sister      No Known Problems Son      No Known Problems Daughter      Diabetes Sister      Liver Disease Sister       Chronic Obstructive Pulmonary Disease Sister    no hx of cancers- 2 kids- healthy    Social History:  Social History     Socioeconomic History     Marital status:      Spouse name: Not on file     Number of children: Not on file     Years of education: Not on file     Highest education level: Not on file   Occupational History     Not on file   Social Needs     Financial resource strain: Not on file     Food insecurity     Worry: Not on file     Inability: Not on file     Transportation needs     Medical: Not on file     Non-medical: Not on file   Tobacco Use     Smoking status: Current Every Day Smoker     Packs/day: 0.30     Years: 40.00     Pack years: 12.00     Types: Cigarettes     Smokeless tobacco: Never Used   Substance and Sexual Activity     Alcohol use: Not Currently     Comment: Rarely drank alcohol.      Drug use: Not Currently     Sexual activity: Not on file   Lifestyle     Physical activity     Days per week: Not on file     Minutes per session: Not on file     Stress: Not on file   Relationships     Social connections     Talks on phone: Not on file     Gets together: Not on file     Attends Jain service: Not on file     Active member of club or organization: Not on file     Attends meetings of clubs or organizations: Not on file     Relationship status: Not on file     Intimate partner violence     Fear of current or ex partner: Not on file     Emotionally abused: Not on file     Physically abused: Not on file     Forced sexual activity: Not on file   Other Topics Concern     Parent/sibling w/ CABG, MI or angioplasty before 65F 55M? Not Asked   Social History Narrative     Not on file     Has been a chronic smoker for >40 years and now smoking 1/3 ppd. She used to drink alcohol in the past. Denies heavy alcohol use. Last alcohol use was long time ago. Lives with .   She also wants her daughter Mary [phone] closely involved in her care.    Physical Exam:  There were  no vitals taken for this visit.    Wt Readings from Last 4 Encounters:   08/27/20 72.6 kg (160 lb 0.9 oz)   08/19/20 74.1 kg (163 lb 4.8 oz)   08/18/20 73.9 kg (163 lb)   08/13/20 75.2 kg (165 lb 12.8 oz)           Constitutional.  Does not seem to be in any acute distress.  Eyes.  No redness or discharge noted.  Respiratory.  Speaking in full sentences.  Breathing seems comfortable without any accessory use of muscles.    Skin.  Visualized skin does not show any obvious rashes.  Musculoskeletal.  Range of motion for visualized areas is intact.  Neurological.  She is alert.  Psychiatric.  She seems a little confused but makes sense and I believe that confusion is a stable.      The rest of a comprehensive physical examination is deferred due to Public Health Emergency video visit restrictions.    Laboratory/Imaging Studies    Reviewed        ASSESSMENT/PLAN:    HCC in the setting of Hep C cirrhosis and initialy treated with TACE/ablation to S3 lesion in Oct 2019 and then had a new S3 lesion treated with MWA in March 2020. Unfortunately on repeat MRI in May 2020, she has now been found to have multifocal HCC- biopsy proven.  She also has probably bone metastasis with right anterior 5th rib lesion and left clavicular head lesion.     Started Lenvatinib on 6/30/2020 and held during admission for hepatic encephalopathy on 7/16/2020.    It was restarted later in July 2020 and she has been taking that regularly.  Repeat scans including CT scan and bone scan on 9/16/2020 are stable to slightly improved.  Alpha-fetoprotein is also stable at 10.6.  Overall she is tolerating lenvatinib reasonably well and I would like to continue that.      Hepatic Encephalopathy/cirrhosis-she is still confused although a little better.  Continue to follow with hepatology.  Continue rifaximin.  Titrate her lactulose so that she has a couple of soft bowel movements daily.      Mouth soreness.  I recommend starting salt/baking soda mouth rinses  several times a day.  Use Magic mouthwash as needed.    Body aches.  She is taking couple of Tylenol a day which is helping.  She can continue using that.  I again recommended that she follows with palliative care because she canceled her previously scheduled appointment.  She has an appointment on 10/8/2020.    Cytopenias.  Related to cirrhosis, splenomegaly and portal hypertension as well as lenvatinib.  For now we will continue to monitor.    Bone metastasis.  She has evidence of bone metastasis to the left clavicular head and right anterior fifth rib.  She started Zometa on 8/19/2020 and will be getting it every 3 months.  Continue to take vitamin D and calcium.    Nausea-she has some nausea and I refilled her Zofran.      Smoking.  Started smoking again.  I again recommended that she uses the nicotine patch and quit smoking.     We did not address the following today.      We did not address the following today.    Portal vein thrombus.  She has thrombus in the main and left portal veins.  Because of significant thrombocytopenia, I believe that risk of bleeding would be very high if we start her on systemic anticoagulation so I am not planning to do that.  At this time I am not certain whether she has any symptoms from this or not.  She does have right upper quadrant pain but this could be related to the underlying malignancy.       Discussion regarding health care directive  We discussed that it is important that she completes health care directive which would help in making sure that her wishes are followed about her treatment care in case she is not able to make a decision for herself.  We gave her information regarding that.    I answered all of her and Mary's questions to their satisfaction.  They are agreeable and comfortable with the plan.      Dhruv Childress MD

## 2020-09-17 NOTE — PROGRESS NOTES
"Dania Albert is a 54 year old female who is being evaluated via a billable telephone visit.      The patient has been notified of following:     \"This telephone visit will be conducted via a call between you and your physician/provider. We have found that certain health care needs can be provided without the need for a physical exam.  This service lets us provide the care you need with a short phone conversation.  If a prescription is necessary we can send it directly to your pharmacy.  If lab work is needed we can place an order for that and you can then stop by our lab to have the test done at a later time.    Telephone visits are billed at different rates depending on your insurance coverage. During this emergency period, for some insurers they may be billed the same as an in-person visit.  Please reach out to your insurance provider with any questions.    If during the course of the call the physician/provider feels a telephone visit is not appropriate, you will not be charged for this service.\"    Patient has given verbal consent for Telephone visit?  Yes    CLINICAL NUTRITION SERVICES - ASSESSMENT NOTE    Dania Albert 54 year old referred for MNT related to liver cancer    Time Spent: 60 minutes  Visit Type: phone  Referring Physician: Monroe 8/27  Pt accompanied by: her daughter, Jessica via speaker phone    Nutrition Prescription   Recommendations Suggested by Registered Dietitian (RD):   1. 5-6 small, frequent, soft meals/snacks  2. 1600kcal, 65 grams protein/day  3. Start Healios BID for mouth sores   Malnutrition: severe in the context of chronic illness     NUTRITION HISTORY  Factors affecting nutrition intake include:decreased appetite and mouth sores  Current diet: soft foods  Current appetite/intake: poor  PEG Tube: Xochilt Najera tells me that her intake has been very poor with her mouth sores.    She describes extreme pain upon eating anything.   Of note, she was prescribed magic mouth wash today.  " "RD advised her to pick this up as soon as possible.      Diet Recall  Breakfast Oatmeal made with 1% milk   Lunch skips   Dinner Hot dog   Snacks pudding   Beverages Juice and water     ANTHROPOMETRICS  Height: 61\"  Weight: 160 lbs/72kg  BMI: 30  Weight Status:  Obesity Grade I BMI 30-34.9  IBW: 105 lb (152%)  Weight History: down 15 lb x past 5 weeks (9%)  Wt Readings from Last 7 Encounters:   08/27/20 72.6 kg (160 lb 0.9 oz)   08/19/20 74.1 kg (163 lb 4.8 oz)   08/18/20 73.9 kg (163 lb)   08/13/20 75.2 kg (165 lb 12.8 oz)   08/11/20 75.8 kg (167 lb)   07/23/20 75.8 kg (167 lb)   07/20/20 79.5 kg (175 lb 3.2 oz)       Dosing Weight: 54kg    Medications/vitamins/minerals/herbals:   Reviewed    Labs:   Labs reviewed    NUTRITION FOCUSED PHYSICAL ASSESSMENT FOR DIAGNOSING MALNUTRITION:  Not observed due to Covid 19 - phone call     ASSESSED NUTRITION NEEDS:  Estimated Energy Needs: 5527-6138 kcals (30-35 Kcal/Kg)  Justification: repletion  Estimated Protein Needs: 65-80 grams protein (1.2-1.5 g pro/Kg)  Justification: Repletion  Estimated Fluid Needs: 2000  mL   Justification: maintenance    MALNUTRITION:  % Weight Loss:  > 7.5% in 3 months (severe malnutrition)  % Intake:  <75% for >/= 3 months (non-severe malnutrition)  Subcutaneous Fat Loss:  Not observed  Muscle Loss:  Not observed  Fluid Retention:  Not noted    Malnutrition Diagnosis: Severe malnutrition  In Context of:  Chronic illness or disease    NUTRITION DIAGNOSIS:  Inadequate protein-energy intake related to mucositis as evidenced by 9% wt loss x past 5 weeks    INTERVENTIONS  Provided written & verbal education:   - Discussed ways to maximize and fortify foods with calories and protein via small frequent meals via soft foods  - Advised pt to aim for at least 1600-1900kcal and 65-80g protein daily.   - Discussed ways to cope with mucositis.  Recommend obtaining nutrition supplement - Healios - taking BID.  This may aid in healing mouth/esophagus sores.  " Encouraged water, baking soda, salt rinses and using Rx magic mouth wash.  - Reviewed ONS options (Mass Pro weight kenneth, Ensure Enlive, Ensure Plus/Boost Plus, CIB, Benecalorie etc). Encouraged utilizing these ONS in home made shakes/smoothies to prevent flavor fatigue.  Encouraged pt to start consuming 2 ONS or home made shakes/smoothies daily.       Pt verbalize understanding of materials provided during consult.   Patient Understanding: good  Expected Compliance: good    Goals  1.  Aim for 5-6 small frequent meals, soft foods  2.  Aim for 1600kcal and 65g protein/day  3. Weight maintenance       Follow-Up Plans: Pt has RD contact information for questions.      Chiqui Keyes RDN, LD

## 2020-09-17 NOTE — LETTER
"    9/17/2020         RE: Dania Albert  [address] [facility]  [place] [facility] [address]        Dear Colleague,    Thank you for referring your patient, Dania Albert, to the [facility]. Please see a copy of my visit note below.    Dania Albert is a 54 year old female who is being evaluated via a billable video visit.      The patient has been notified of following:     \"This video visit will be conducted via a call between you and your physician/provider. We have found that certain health care needs can be provided without the need for an in-person physical exam.  This service lets us provide the care you need with a video conversation.  If a prescription is necessary we can send it directly to your pharmacy.  If lab work is needed we can place an order for that and you can then stop by our lab to have the test done at a later time.    Video visits are billed at different rates depending on your insurance coverage.  Please reach out to your insurance provider with any questions.    If during the course of the call the physician/provider feels a video visit is not appropriate, you will not be charged for this service.\"    Patient has given verbal consent for Video visit? Yes    How would you like to obtain your AVS? MyChart     If you are dropped from the video visit, the video invite should be resent to: Text to cell phone: [phone]     Will anyone else be joining your video visit? No       Vitals - Patient Reported  Height (Patient Reported): 154.9 cm (5' 0.98\")  Pain Score: Mild Pain (3)  Pain Loc: Abdomen(left leg, shoulders, top back)    Jac Maguire LPN      Video-Visit Details    Type of service:  Video Visit    Video Start Time: 1:13 PM  Video End Time: 1:31 PM    Originating Location (pt. Location): Home    Distant Location (provider location):  [facility]     Platform used for Video Visit: Jaylen Childress MD        Oncology follow up visit:  Date on this " visit: 9/17/2020     Dania Albert  is referred by  ref. provider found for an oncology consultation. She requires evaluation for new diagnosis of HCC    Primary Physician: Mciaela Gould       History Of Present Illness:  Please see my previous note for details.  I have copied and updated from prior note.    Ms. Albert is a 54 year old female who I initially evaluated in May 2020 for hepatocellular carcinoma .    She has Hep C cirrhosis. Hep C was previously treated with Sovaldi/ribavirin and achived SVR.  She was found to have HCC in June 2019 and had TACE/Ablation for S3 lesion on Oct 2019 and then found to have a new S3 lesion for which she had a MWA on 3/17/2020.  Bone Scan in Dec 2019 was negative for metastatic disease.   CTA Chest in October 2019 was negative for metastatic disease.    A follow up MRI on 5/8/2020 showed no resdual viable cancer in the treatment zones but it found dramatic increase in number and sizes of numerous arterial enhancing foci scattered throughout the liver parenchyma which was very suspicious for multifocal HCC. None of these were diagnostic of HC so she had liver biopsy on which confirmed moderately differentiated HCC.    As she was not deemed a candidate for any further liver directed therapy, she was referred to medical oncology.    She has constant pain in the RUQ region/beneath the lower right rib cage since the MWA procedure in March 2020.   She had a tick bite and she completed a 21 day course of Doxycycline in early June 2020.    We got a baseline CT chest abdomen and pelvis on 6/12/2020.  Bone scan showed anterior right fifth rib and left clavicular head meta stasis.    She started lenvatinib on 6/30/2020.    She was admitted to the hospital from 7/16/2020 till 7/20/2020 for hepatic encephalopathy and noninfectious colitis.   She was initially started on antibiotics but later on it was a stopped.   MRI of the brain did not show any intracranial  metastasis.  She was started on lactulose and rifaximin and her confusion improved.  Lenvatinib was held during the admission.    She had a repeat CT chest abdomen and pelvis which did not show any pulmonary embolism.  Interval advancement of portal venous thrombus involving the main portal vein and the left intrahepatic portal venous system was seen.  There was also interval development of low-attenuation lesions which were smaller than the arterial enhancing lesions seen on CT scan from June 2020.      Interval history.  This is video visit.  Her daughter, Mary is also available throughout the visit.  She complaints of mouth soreness. Has not tried mouth rinses. She does not think she has lost weight.  Has body aches. Takes on average 2 tylenol daily which helps. Has nausea but no vomiting. Has lose BM. She takes rifaximin and takes lactulose occasionally. No infections, new swellings. No abnormal bleeding. Hands and feet are fine. Has occasionally felt confused. Mary thinks her confusion is somewhat better although still confused.  Has fatigue and has dyspnea on exertion.     ECOG 2    ROS:  A comprehensive ROS was otherwise neg      I reviewed other history in epic as below.    Past Medical/Surgical History:  Past Medical History:   Diagnosis Date     Alcohol abuse      Bone metastasis (H) 7/15/2020     Chronic hepatitis C without hepatic coma (H) 10/23/2019    SVR     Cirrhosis of liver with ascites (H) 10/23/2019     COPD (chronic obstructive pulmonary disease) (H)      Depressive disorder      Diabetes (H)     Type 1 DM, Takes Insulin      Emphysema lung (H)      H/O esophageal varices      HCC (hepatocellular carcinoma) (H) 10/23/2019     History of blood transfusion     [facility] in 1983     ERICKA (obstructive sleep apnea)      Past Surgical History:   Procedure Laterality Date     ABDOMEN SURGERY      Gallbladder Removed      COLONOSCOPY      [place]     CYSTOSCOPY, INJECT BOTOX       detrusor injection     GI SURGERY      Upper Endoscopy at [facility]      HERNIA REPAIR      Patient doesnt remember if mesh was used. [facility]      IR FLUORO 0-1 HOUR  3/17/2020     IR FLUORO 0-1 HOUR  3/17/2020     OPEN REDUCTION INTERNAL FIXATION WRIST Bilateral      Cancer History:   As above    Allergies:  Allergies as of 09/17/2020 - Reviewed 09/17/2020   Allergen Reaction Noted     Bee venom Other (See Comments) and Swelling 07/06/2012     Hydrocodone Other (See Comments) 02/18/2013     Nickel Rash 03/25/2016     Wool fiber Hives and Rash 03/25/2016     Current Medications:  Current Outpatient Medications   Medication Sig Dispense Refill     acetaminophen (TYLENOL) 500 MG tablet Take 500 mg by mouth every 8 hours as needed        albuterol (PROAIR HFA) 108 (90 Base) MCG/ACT inhaler Inhale 2 puffs into the lungs every 6 hours as needed        benzonatate (TESSALON) 100 MG capsule Take 1 capsule (100 mg) by mouth 3 times daily as needed for cough 90 capsule 0     blood glucose monitoring (ONE TOUCH ULTRA MINI) meter device kit Use as directed to test glucose three times daily. Pharmacy dispense brand based on insurance.  Indications: Diabetes       calcium carbonate-vitamin D (OSCAL W/D) 500-200 MG-UNIT tablet Take 1 tablet by mouth 2 times daily (with meals) 60 tablet 3     cholecalciferol (VITAMIN D3) 1000 units (25 mcg) capsule Take 1 capsule (1,000 Units) by mouth daily 30 capsule 3     diclofenac (VOLTAREN) 1 % topical gel Place 2 g onto the skin 4 times daily 100 g 0     insulin glargine (LANTUS SOLOSTAR) 100 UNIT/ML pen Inject 38 Units Subcutaneous At Bedtime        lactulose (CHRONULAC) 10 GM/15ML solution Take 15 mLs (10 g) by mouth 3 times daily 500 mL 0     Lenvatinib 12 MG, 3 x 4 mg capsules, (LENVIMA) 3 x 4 MG capsule Take 3 capsules (12 mg) by mouth daily 90 capsule 0     levothyroxine (SYNTHROID) 25 MCG tablet Take 1 tablet (25 mcg) by mouth daily 30 tablet 0     magic mouthwash  suspension (diphenhydrAMINE, lidocaine, aluminum-magnesium & simethicone) Swish and swallow 10 mLs in mouth every 6 hours as needed for mouth sores 120 mL 0     melatonin 5 MG tablet Take 1 tablet (5 mg) by mouth nightly as needed for sleep 30 tablet 1     nicotine (NICORETTE) 2 MG gum Place 1 each (2 mg) inside cheek as needed for smoking cessation (Patient not taking: Reported on 9/17/2020) 60 each 3     ondansetron (ZOFRAN-ODT) 8 MG ODT tab Take 1 tablet (8 mg) by mouth every 8 hours as needed for nausea And take 8mg prior to each dose of Levanitib. 30 tablet 3     OneTouch Delica Lancets 33G MISC Change lancet one time daily as directed.       order for DME Abdominal Binder - small (Patient not taking: Reported on 9/17/2020) 1 each 1     oxyCODONE (ROXICODONE) 5 MG tablet Take 0.5-1 tablets (2.5-5 mg) by mouth every 6 hours as needed for severe pain 30 tablet 0     pantoprazole (PROTONIX) 40 MG EC tablet TAKE ONE TABLET BY MOUTH EVERY DAY       potassium chloride ER (KLOR-CON M) 20 MEQ CR tablet Take 1 tablet (20 mEq) by mouth daily 5 tablet 0     QUEtiapine (SEROQUEL) 50 MG tablet Take 50 mg by mouth At Bedtime  0     rifaximin (XIFAXAN) 550 MG TABS tablet Take 1 tablet (550 mg) by mouth 2 times daily 60 tablet 0     sertraline (ZOLOFT) 100 MG tablet Take 200 mg by mouth daily        simethicone (MYLICON) 80 MG chewable tablet Take 1 tablet (80 mg) by mouth every 6 hours as needed for flatulence or cramping 90 tablet 0     thiamine (B-1) 100 MG tablet Take 1 tablet (100 mg) by mouth daily 30 tablet 0     TRULICITY 0.75 MG/0.5ML pen INJECT 0.5 ml's SUBCUTANEOUSLY ONCE WEEKLY  2      Family History:  Family History   Problem Relation Age of Onset     Coronary Artery Disease Mother      Coronary Artery Disease Father      No Known Problems Brother      Meniere's disease Sister      Diabetes Sister      No Known Problems Son      No Known Problems Daughter      Diabetes Sister      Liver Disease Sister      Chronic  Obstructive Pulmonary Disease Sister    no hx of cancers- 2 kids- healthy    Social History:  Social History     Socioeconomic History     Marital status:      Spouse name: Not on file     Number of children: Not on file     Years of education: Not on file     Highest education level: Not on file   Occupational History     Not on file   Social Needs     Financial resource strain: Not on file     Food insecurity     Worry: Not on file     Inability: Not on file     Transportation needs     Medical: Not on file     Non-medical: Not on file   Tobacco Use     Smoking status: Current Every Day Smoker     Packs/day: 0.30     Years: 40.00     Pack years: 12.00     Types: Cigarettes     Smokeless tobacco: Never Used   Substance and Sexual Activity     Alcohol use: Not Currently     Comment: Rarely drank alcohol.      Drug use: Not Currently     Sexual activity: Not on file   Lifestyle     Physical activity     Days per week: Not on file     Minutes per session: Not on file     Stress: Not on file   Relationships     Social connections     Talks on phone: Not on file     Gets together: Not on file     Attends Shinto service: Not on file     Active member of club or organization: Not on file     Attends meetings of clubs or organizations: Not on file     Relationship status: Not on file     Intimate partner violence     Fear of current or ex partner: Not on file     Emotionally abused: Not on file     Physically abused: Not on file     Forced sexual activity: Not on file   Other Topics Concern     Parent/sibling w/ CABG, MI or angioplasty before 65F 55M? Not Asked   Social History Narrative     Not on file     Has been a chronic smoker for >40 years and now smoking 1/3 ppd. She used to drink alcohol in the past. Denies heavy alcohol use. Last alcohol use was long time ago. Lives with .   She also wants her daughter Mary [phone] closely involved in her care.    Physical Exam:  There were no vitals  taken for this visit.    Wt Readings from Last 4 Encounters:   08/27/20 72.6 kg (160 lb 0.9 oz)   08/19/20 74.1 kg (163 lb 4.8 oz)   08/18/20 73.9 kg (163 lb)   08/13/20 75.2 kg (165 lb 12.8 oz)           Constitutional.  Does not seem to be in any acute distress.  Eyes.  No redness or discharge noted.  Respiratory.  Speaking in full sentences.  Breathing seems comfortable without any accessory use of muscles.    Skin.  Visualized skin does not show any obvious rashes.  Musculoskeletal.  Range of motion for visualized areas is intact.  Neurological.  She is alert.  Psychiatric.  She seems a little confused but makes sense and I believe that confusion is a stable.      The rest of a comprehensive physical examination is deferred due to Public Health Emergency video visit restrictions.    Laboratory/Imaging Studies    Reviewed        ASSESSMENT/PLAN:    HCC in the setting of Hep C cirrhosis and initialy treated with TACE/ablation to S3 lesion in Oct 2019 and then had a new S3 lesion treated with MWA in March 2020. Unfortunately on repeat MRI in May 2020, she has now been found to have multifocal HCC- biopsy proven.  She also has probably bone metastasis with right anterior 5th rib lesion and left clavicular head lesion.     Started Lenvatinib on 6/30/2020 and held during admission for hepatic encephalopathy on 7/16/2020.    It was restarted later in July 2020 and she has been taking that regularly.  Repeat scans including CT scan and bone scan on 9/16/2020 are stable to slightly improved.  Alpha-fetoprotein is also stable at 10.6.  Overall she is tolerating lenvatinib reasonably well and I would like to continue that.      Hepatic Encephalopathy/cirrhosis-she is still confused although a little better.  Continue to follow with hepatology.  Continue rifaximin.  Titrate her lactulose so that she has a couple of soft bowel movements daily.      Mouth soreness.  I recommend starting salt/baking soda mouth rinses several  times a day.  Use Magic mouthwash as needed.    Body aches.  She is taking couple of Tylenol a day which is helping.  She can continue using that.  I again recommended that she follows with palliative care because she canceled her previously scheduled appointment.  She has an appointment on 10/8/2020.    Cytopenias.  Related to cirrhosis, splenomegaly and portal hypertension as well as lenvatinib.  For now we will continue to monitor.    Bone metastasis.  She has evidence of bone metastasis to the left clavicular head and right anterior fifth rib.  She started Zometa on 8/19/2020 and will be getting it every 3 months.  Continue to take vitamin D and calcium.    Nausea-she has some nausea and I refilled her Zofran.      Smoking.  Started smoking again.  I again recommended that she uses the nicotine patch and quit smoking.     We did not address the following today.      We did not address the following today.    Portal vein thrombus.  She has thrombus in the main and left portal veins.  Because of significant thrombocytopenia, I believe that risk of bleeding would be very high if we start her on systemic anticoagulation so I am not planning to do that.  At this time I am not certain whether she has any symptoms from this or not.  She does have right upper quadrant pain but this could be related to the underlying malignancy.       Discussion regarding health care directive  We discussed that it is important that she completes health care directive which would help in making sure that her wishes are followed about her treatment care in case she is not able to make a decision for herself.  We gave her information regarding that.    I answered all of her and Mary's questions to their satisfaction.  They are agreeable and comfortable with the plan.      Dhruv Childress MD      Again, thank you for allowing me to participate in the care of your patient.        Sincerely,        Dhruv Childress MD

## 2020-09-17 NOTE — PROGRESS NOTES
"Dania Albert is a 54 year old female who is being evaluated via a billable video visit.      The patient has been notified of following:     \"This video visit will be conducted via a call between you and your physician/provider. We have found that certain health care needs can be provided without the need for an in-person physical exam.  This service lets us provide the care you need with a video conversation.  If a prescription is necessary we can send it directly to your pharmacy.  If lab work is needed we can place an order for that and you can then stop by our lab to have the test done at a later time.    Video visits are billed at different rates depending on your insurance coverage.  Please reach out to your insurance provider with any questions.    If during the course of the call the physician/provider feels a video visit is not appropriate, you will not be charged for this service.\"    Patient has given verbal consent for Video visit? Yes    How would you like to obtain your AVS? MyChart     If you are dropped from the video visit, the video invite should be resent to: Text to cell phone: [phone]     Will anyone else be joining your video visit? No       Vitals - Patient Reported  Height (Patient Reported): 154.9 cm (5' 0.98\")  Pain Score: Mild Pain (3)  Pain Loc: Abdomen(left leg, shoulders, top back)    Jac Maguire LPN      Video-Visit Details    Type of service:  Video Visit    Video Start Time: 1:13 PM  Video End Time: 1:31 PM    Originating Location (pt. Location): Home    Distant Location (provider location):  [facility]     Platform used for Video Visit: Jaylen Childress MD      "

## 2020-10-07 NOTE — LETTER
10/7/2020         RE: Dania Albert  [address]  [facility] [address]      Oncology/Hematology Visit Note  Oct 7, 2020    Reason for Visit: f/u for hepatocellular carcinoma    Oncology HPI:   Initially evaluated in May 2020 for hepatocellular carcinoma .     She has Hep C cirrhosis. Hep C was previously treated with Sovaldi/ribavirin and achived SVR.  She was found to have HCC in June 2019 and had TACE/Ablation for S3 lesion on Oct 2019 and then found to have a new S3 lesion for which she had a MWA on 3/17/2020.  Bone Scan in Dec 2019 was negative for metastatic disease.   CTA Chest in October 2019 was negative for metastatic disease.     A follow up MRI on 5/8/2020 showed no resdual viable cancer in the treatment zones but it found dramatic increase in number and sizes of numerous arterial enhancing foci scattered throughout the liver parenchyma which was very suspicious for multifocal HCC. None of these were diagnostic of HC so she had liver biopsy on which confirmed moderately differentiated HCC.     As she was not deemed a candidate for any further liver directed therapy, she was referred to medical oncology.     She has constant pain in the RUQ region/beneath the lower right rib cage since the MWA procedure in March 2020.     Baseline CT chest abdomen and pelvis on 6/12/2020.  Bone scan showed anterior right fifth rib and left clavicular head meta stasis.     She started lenvatinib on 6/30/2020.     She was admitted to the hospital from 7/16/2020 till 7/20/2020 for hepatic encephalopathy and noninfectious colitis. MRI of the brain did not show any intracranial metastasis. She was started on lactulose and rifaximin and her confusion improved.  Lenvatinib was held during the admission.     Repeat CT chest abdomen and pelvis which did not show any pulmonary embolism.Interval advancement of portal venous thrombus involving the main portal vein and the left intrahepatic portal venous system was seen.  There was  also interval development of low-attenuation lesions which were smaller than the arterial enhancing lesions seen on CT scan from June 2020.       Lenvima resumed 7/23/2020    Interval history:   -Feels weak and tired, which is ongoing. Feels a little worse with the chemotherapy.   -Naps much of the day, which is chronic.   -Chronically feels cold.  -Feels vision is more blurry lately. Last eye doctor visit was about a year ago.   -Has occasional pain in chest on both sides. Symptoms started in the last few weeks. Has pain about 3 times per week. Can come on with rest or with activity. Improves with inhaler.   -Has been coughing for a while, but a little worse in the last month.   -Has dyspnea on exertion with prolonged walking or with a flight of stairs. Has to stop 2-3 times on the flight of stairs.   -Smokes about 2-3 cigarettes, previously 1/3-1/2 ppd, started when 11 years old. Cut back over the last 6 months. Has tried nicotine patches without improvement.   -Has intermittent abdominal pain in the pelvic area and in the RUQ that travels to spine. Takes Tylenol most days 1000 mg once to two times per day. No help with lidocaine patches when in hospital.   -Vomited once last week. Unsure of cause. Woke up with nausea.   -Has loose stools several times per day. Not taking lactulose due to loose stools. Stopped rifaximin about 5 days ago as concerned that it is causing dry mouth.   -Has headaches intermittently in the back of her head. Has improvement sometimes with Tylenol.   -Does not sleep well due to need to urinate and due to cough.   -Mood varies, on antidepressants, with intermittent improvement in mood. Declines desire for therapy.   -Using salt/soda rinses for mouth soreness. Now, stopped them for time time. Uses Magic Mouthwash intermittent.   -Color in face and lips and fingertips are blue.   -Palms are red, but not sore.   -Has been continuing to lose weight. Has been eating less due to mouth pain.  Taking about 1 Premier Protein/day.   - is struggling with his wife's diagnosis. He is concerned about the number of her medications.     Review of Systems:  Patient denies any of the following except if noted above: fevers, chills, vision or hearing changes, chest pain, dyspnea at rest, diarrhea, constipation, urinary concerns, headaches, numbness, tingling, issues with sleep or mood.    Past Medical History:   Diagnosis Date     Alcohol abuse      Bone metastasis (H) 7/15/2020     Chronic hepatitis C without hepatic coma (H) 10/23/2019    SVR     Cirrhosis of liver with ascites (H) 10/23/2019     COPD (chronic obstructive pulmonary disease) (H)      Depressive disorder      Diabetes (H)     Type 1 DM, Takes Insulin      Emphysema lung (H)      H/O esophageal varices      HCC (hepatocellular carcinoma) (H) 10/23/2019     History of blood transfusion     [facility] 1983     ERICKA (obstructive sleep apnea)         Current Outpatient Medications   Medication Sig Dispense Refill     acetaminophen (TYLENOL) 500 MG tablet Take 500 mg by mouth every 8 hours as needed        albuterol (PROAIR HFA) 108 (90 Base) MCG/ACT inhaler Inhale 2 puffs into the lungs every 6 hours as needed        benzonatate (TESSALON) 100 MG capsule Take 1 capsule (100 mg) by mouth 3 times daily as needed for cough 90 capsule 0     blood glucose monitoring (ONE TOUCH ULTRA MINI) meter device kit Use as directed to test glucose three times daily. Pharmacy dispense brand based on insurance.  Indications: Diabetes       calcium carbonate-vitamin D (OSCAL W/D) 500-200 MG-UNIT tablet Take 1 tablet by mouth 2 times daily (with meals) 60 tablet 3     cholecalciferol (VITAMIN D3) 1000 units (25 mcg) capsule Take 1 capsule (1,000 Units) by mouth daily 30 capsule 3     diclofenac (VOLTAREN) 1 % topical gel Place 2 g onto the skin 4 times daily 100 g 0     insulin glargine (LANTUS SOLOSTAR) 100 UNIT/ML pen Inject 38 Units Subcutaneous At  Bedtime        lactulose (CHRONULAC) 10 GM/15ML solution Take 15 mLs (10 g) by mouth 3 times daily 500 mL 0     Lenvatinib 12 MG, 3 x 4 mg capsules, (LENVIMA) 3 x 4 MG capsule Take 3 capsules (12 mg) by mouth daily 90 capsule 0     Lenvatinib 12 MG, 3 x 4 mg capsules, (LENVIMA) 3 x 4 MG capsule Take 3 capsules (12 mg) by mouth daily 90 capsule 0     levothyroxine (SYNTHROID) 25 MCG tablet Take 1 tablet (25 mcg) by mouth daily 30 tablet 0     magic mouthwash suspension (diphenhydrAMINE, lidocaine, aluminum-magnesium & simethicone) Swish and swallow 10 mLs in mouth every 6 hours as needed for mouth sores 120 mL 0     melatonin 5 MG tablet Take 1 tablet (5 mg) by mouth nightly as needed for sleep 30 tablet 1     nicotine (NICORETTE) 2 MG gum Place 1 each (2 mg) inside cheek as needed for smoking cessation (Patient not taking: Reported on 9/17/2020) 60 each 3     ondansetron (ZOFRAN-ODT) 8 MG ODT tab Take 1 tablet (8 mg) by mouth every 8 hours as needed for nausea And take 8mg prior to each dose of Levanitib. 30 tablet 3     OneTouch Delica Lancets 33G MISC Change lancet one time daily as directed.       order for DME Abdominal Binder - small (Patient not taking: Reported on 9/17/2020) 1 each 1     oxyCODONE (ROXICODONE) 5 MG tablet Take 0.5-1 tablets (2.5-5 mg) by mouth every 6 hours as needed for severe pain 30 tablet 0     pantoprazole (PROTONIX) 40 MG EC tablet TAKE ONE TABLET BY MOUTH EVERY DAY       potassium chloride ER (KLOR-CON M) 20 MEQ CR tablet Take 1 tablet (20 mEq) by mouth daily 5 tablet 0     QUEtiapine (SEROQUEL) 50 MG tablet Take 50 mg by mouth At Bedtime  0     rifaximin (XIFAXAN) 550 MG TABS tablet Take 1 tablet (550 mg) by mouth 2 times daily 60 tablet 0     sertraline (ZOLOFT) 100 MG tablet Take 200 mg by mouth daily        simethicone (MYLICON) 80 MG chewable tablet Take 1 tablet (80 mg) by mouth every 6 hours as needed for flatulence or cramping 90 tablet 0     thiamine (B-1) 100 MG tablet Take 1  tablet (100 mg) by mouth daily 30 tablet 0     TRULICITY 0.75 MG/0.5ML pen INJECT 0.5 ml's SUBCUTANEOUSLY ONCE WEEKLY  2          Allergies   Allergen Reactions     Bee Venom Other (See Comments) and Swelling     Hand swelled up badly.       Hydrocodone Other (See Comments)     nausea     Nickel Rash     Wool Fiber Hives and Rash       Exam:  There were no vitals taken for this visit.  Wt Readings from Last 4 Encounters:   08/27/20 72.6 kg (160 lb 0.9 oz)   08/19/20 74.1 kg (163 lb 4.8 oz)   08/18/20 73.9 kg (163 lb)   08/13/20 75.2 kg (165 lb 12.8 oz)     -Constitutional: well appearing without acute distress, slender.   -Integumentary: color appropriate for ethnicity. visualized skin is intact without rashes, petechiae, or bruising  -Respiratory: Normal breathing effort. Equal chest rise and fall   -Neurological: Alert and Oriented *3. Follow commands.   -MSK: moves extremities without difficulty   - Psychiatric: Patient is cooperative, pleasant, and alert. Appropriate affect.     Labs:    10/8/2020 12:05   Sodium 135   Potassium 3.2 (L)   Chloride 100   Carbon Dioxide 27   Urea Nitrogen 11   Creatinine 0.69   GFR Estimate >90   GFR Estimate If Black >90   Calcium 8.5   Anion Gap 8   Magnesium 1.8   Albumin 3.3 (L)   Protein Total 7.5   Bilirubin Total 2.2 (H)   Alkaline Phosphatase 83   ALT 37   AST 42   Alpha Fetoprotein 13.7 (H)   Creatinine Urine 344   Protein Random Urine 1.37   Protein Total Urine g/gr Creatinine 0.40 (H)   T4 Free 0.98   TSH 8.31 (H)   Glucose 203 (H)   WBC 3.1 (L)   Hemoglobin 14.2   Hematocrit 42.0   Platelet Count 32 (LL)   RBC Count 3.89    (H)   MCH 36.5 (H)   MCHC 33.8   RDW 18.1 (H)   Diff Method Automated Method   % Neutrophils 72.9   % Lymphocytes 20.3   % Monocytes 4.9   % Eosinophils 1.3   % Basophils 0.3   % Immature Granulocytes 0.3   Nucleated RBCs 0   Absolute Neutrophil 2.2   Absolute Lymphocytes 0.6 (L)   Absolute Monocytes 0.2   Absolute Eosinophils 0.0   Absolute  Basophils 0.0   Abs Immature Granulocytes 0.0   Absolute Nucleated RBC 0.0   Platelet Estimate Confirming automated cell count     Assessment/plan:   HCC in the setting of Hep C cirrhosis and initialy treated with TACE/ablation to S3 lesion in Oct 2019 and then had a new S3 lesion treated with MWA in March 2020. Unfortunately on repeat MRI in May 2020, she has now been found to have multifocal HCC- biopsy proven.  She also has probably bone metastasis with right anterior 5th rib lesion and left clavicular head lesion.   Started Lenvatinib on 6/30/2020 and held during admission for hepatic encephalopathy on 7/16/2020.  It was restarted later in July 2020 and she has been taking that regularly.  Repeat scans including CT scan and bone scan on 9/16/2020 are stable to slightly improved.  Alpha-fetoprotein is also stable at 10.6.  Overall she is tolerating lenvatinib fairly well. I will have her follow-up with Dr. Childress or myself in a couple of weeks. Will repeat imaging about 2 months after her last imaging.      Hepatic Encephalopathy/cirrhosis.  Her confusion seems about at baseline. I encouraged her to resume the Rifaximin and continue on lactulose.     Tobacco abuse. Patient is open to meeting with [facility] for tobacco cessation counseling.     Blurred vision. Recommend seeing eye doctor in follow-up.     Emphysema. Encouraged her to follow-up with PCP to discuss potential for long acting inhaler. Refilled Tessalon Perles today for chronic cough.     Body aches. Managed with Tylenol. Also, discussed the potential use of Voltaren gel. Will be seeing palliative care soon.     Hand foot syndrome. Discussed trying to consistently use Eucerin cream or Udder Balm.    Anorexia and weight loss. Recommend meeting with our dietician and try drinking 3 nutritional supplements per day.      Cytopenias.  Related to cirrhosis, splenomegaly and portal hypertension as well as lenvatinib. Relatively stable. For now we will continue  "to monitor.     Bone metastasis.  She has evidence of bone metastasis to the left clavicular head and right anterior fifth rib.  She started Zometa on 8/19/2020 and will be getting it every 3 months.  Continue to take vitamin D and calcium.     Elo Jeffers PA-C  [facility]  [address] [place] [address]  [phone]      Dania Albert is a 54 year old female who is being evaluated via a billable video visit.      The patient has been notified of following:     \"This video visit will be conducted via a call between you and your physician/provider. We have found that certain health care needs can be provided without the need for an in-person physical exam.  This service lets us provide the care you need with a video conversation.  If a prescription is necessary we can send it directly to your pharmacy.  If lab work is needed we can place an order for that and you can then stop by our lab to have the test done at a later time.    Video visits are billed at different rates depending on your insurance coverage.  Please reach out to your insurance provider with any questions.    If during the course of the call the physician/provider feels a video visit is not appropriate, you will not be charged for this service.\"    Patient has given verbal consent for Video visit? Yes  How would you like to obtain your AVS? MyChart  If you are dropped from the video visit, the video invite should be resent to: Text to cell phone: [phone]  Will anyone else be joining your video visit? No    I have reviewed and updated the patient's allergies and medication list. Patient was asked if they had any patient reported vital signs to present, if yes, please see documented vitals.  Patient was also asked for their current weight and height, if presented, documented in vitals.    Concerns: Pt has been having issues with both of her hands.  Pt daughter states the tips of all her fingers are purple, has hang nails.  " Palm of her hands are dark red, and around lips are bluish. Patient has SOB is increasing, unintentional  weight loss     Refills: Needs refills of rufaximin, has not had this medication for 5 days, needs new prescription.  Refill of Tessalon capsule.    Clementine Rodriguez CMA    Video start: 1:29PM  Video stop: 2:04 PM        Elo Jeffers PA-C

## 2020-10-07 NOTE — PROGRESS NOTES
Oncology/Hematology Visit Note  Oct 7, 2020    Reason for Visit: f/u for hepatocellular carcinoma    Oncology HPI:   Initially evaluated in May 2020 for hepatocellular carcinoma .     She has Hep C cirrhosis. Hep C was previously treated with Sovaldi/ribavirin and achived SVR.  She was found to have HCC in June 2019 and had TACE/Ablation for S3 lesion on Oct 2019 and then found to have a new S3 lesion for which she had a MWA on 3/17/2020.  Bone Scan in Dec 2019 was negative for metastatic disease.   CTA Chest in October 2019 was negative for metastatic disease.     A follow up MRI on 5/8/2020 showed no resdual viable cancer in the treatment zones but it found dramatic increase in number and sizes of numerous arterial enhancing foci scattered throughout the liver parenchyma which was very suspicious for multifocal HCC. None of these were diagnostic of HC so she had liver biopsy on which confirmed moderately differentiated HCC.     As she was not deemed a candidate for any further liver directed therapy, she was referred to medical oncology.     She has constant pain in the RUQ region/beneath the lower right rib cage since the MWA procedure in March 2020.     Baseline CT chest abdomen and pelvis on 6/12/2020.  Bone scan showed anterior right fifth rib and left clavicular head meta stasis.     She started lenvatinib on 6/30/2020.     She was admitted to the hospital from 7/16/2020 till 7/20/2020 for hepatic encephalopathy and noninfectious colitis. MRI of the brain did not show any intracranial metastasis. She was started on lactulose and rifaximin and her confusion improved.  Lenvatinib was held during the admission.     Repeat CT chest abdomen and pelvis which did not show any pulmonary embolism.Interval advancement of portal venous thrombus involving the main portal vein and the left intrahepatic portal venous system was seen.  There was also interval development of low-attenuation lesions which were smaller than  the arterial enhancing lesions seen on CT scan from June 2020.       Lenvima resumed 7/23/2020    Interval history:   -Feels weak and tired, which is ongoing. Feels a little worse with the chemotherapy.   -Naps much of the day, which is chronic.   -Chronically feels cold.  -Feels vision is more blurry lately. Last eye doctor visit was about a year ago.   -Has occasional pain in chest on both sides. Symptoms started in the last few weeks. Has pain about 3 times per week. Can come on with rest or with activity. Improves with inhaler.   -Has been coughing for a while, but a little worse in the last month.   -Has dyspnea on exertion with prolonged walking or with a flight of stairs. Has to stop 2-3 times on the flight of stairs.   -Smokes about 2-3 cigarettes, previously 1/3-1/2 ppd, started when 11 years old. Cut back over the last 6 months. Has tried nicotine patches without improvement.   -Has intermittent abdominal pain in the pelvic area and in the RUQ that travels to spine. Takes Tylenol most days 1000 mg once to two times per day. No help with lidocaine patches when in hospital.   -Vomited once last week. Unsure of cause. Woke up with nausea.   -Has loose stools several times per day. Not taking lactulose due to loose stools. Stopped rifaximin about 5 days ago as concerned that it is causing dry mouth.   -Has headaches intermittently in the back of her head. Has improvement sometimes with Tylenol.   -Does not sleep well due to need to urinate and due to cough.   -Mood varies, on antidepressants, with intermittent improvement in mood. Declines desire for therapy.   -Using salt/soda rinses for mouth soreness. Now, stopped them for time time. Uses Magic Mouthwash intermittent.   -Color in face and lips and fingertips are blue.   -Palms are red, but not sore.   -Has been continuing to lose weight. Has been eating less due to mouth pain. Taking about 1 Premier Protein/day.   - is struggling with his wife's  diagnosis. He is concerned about the number of her medications.     Review of Systems:  Patient denies any of the following except if noted above: fevers, chills, vision or hearing changes, chest pain, dyspnea at rest, diarrhea, constipation, urinary concerns, headaches, numbness, tingling, issues with sleep or mood.    Past Medical History:   Diagnosis Date     Alcohol abuse      Bone metastasis (H) 7/15/2020     Chronic hepatitis C without hepatic coma (H) 10/23/2019    SVR     Cirrhosis of liver with ascites (H) 10/23/2019     COPD (chronic obstructive pulmonary disease) (H)      Depressive disorder      Diabetes (H)     Type 1 DM, Takes Insulin      Emphysema lung (H)      H/O esophageal varices      HCC (hepatocellular carcinoma) (H) 10/23/2019     History of blood transfusion     [facility] in 1983     ERICKA (obstructive sleep apnea)         Current Outpatient Medications   Medication Sig Dispense Refill     acetaminophen (TYLENOL) 500 MG tablet Take 500 mg by mouth every 8 hours as needed        albuterol (PROAIR HFA) 108 (90 Base) MCG/ACT inhaler Inhale 2 puffs into the lungs every 6 hours as needed        benzonatate (TESSALON) 100 MG capsule Take 1 capsule (100 mg) by mouth 3 times daily as needed for cough 90 capsule 0     blood glucose monitoring (ONE TOUCH ULTRA MINI) meter device kit Use as directed to test glucose three times daily. Pharmacy dispense brand based on insurance.  Indications: Diabetes       calcium carbonate-vitamin D (OSCAL W/D) 500-200 MG-UNIT tablet Take 1 tablet by mouth 2 times daily (with meals) 60 tablet 3     cholecalciferol (VITAMIN D3) 1000 units (25 mcg) capsule Take 1 capsule (1,000 Units) by mouth daily 30 capsule 3     diclofenac (VOLTAREN) 1 % topical gel Place 2 g onto the skin 4 times daily 100 g 0     insulin glargine (LANTUS SOLOSTAR) 100 UNIT/ML pen Inject 38 Units Subcutaneous At Bedtime        lactulose (CHRONULAC) 10 GM/15ML solution Take 15 mLs (10 g) by  mouth 3 times daily 500 mL 0     Lenvatinib 12 MG, 3 x 4 mg capsules, (LENVIMA) 3 x 4 MG capsule Take 3 capsules (12 mg) by mouth daily 90 capsule 0     Lenvatinib 12 MG, 3 x 4 mg capsules, (LENVIMA) 3 x 4 MG capsule Take 3 capsules (12 mg) by mouth daily 90 capsule 0     levothyroxine (SYNTHROID) 25 MCG tablet Take 1 tablet (25 mcg) by mouth daily 30 tablet 0     magic mouthwash suspension (diphenhydrAMINE, lidocaine, aluminum-magnesium & simethicone) Swish and swallow 10 mLs in mouth every 6 hours as needed for mouth sores 120 mL 0     melatonin 5 MG tablet Take 1 tablet (5 mg) by mouth nightly as needed for sleep 30 tablet 1     nicotine (NICORETTE) 2 MG gum Place 1 each (2 mg) inside cheek as needed for smoking cessation (Patient not taking: Reported on 9/17/2020) 60 each 3     ondansetron (ZOFRAN-ODT) 8 MG ODT tab Take 1 tablet (8 mg) by mouth every 8 hours as needed for nausea And take 8mg prior to each dose of Levanitib. 30 tablet 3     OneTouch Delica Lancets 33G MISC Change lancet one time daily as directed.       order for DME Abdominal Binder - small (Patient not taking: Reported on 9/17/2020) 1 each 1     oxyCODONE (ROXICODONE) 5 MG tablet Take 0.5-1 tablets (2.5-5 mg) by mouth every 6 hours as needed for severe pain 30 tablet 0     pantoprazole (PROTONIX) 40 MG EC tablet TAKE ONE TABLET BY MOUTH EVERY DAY       potassium chloride ER (KLOR-CON M) 20 MEQ CR tablet Take 1 tablet (20 mEq) by mouth daily 5 tablet 0     QUEtiapine (SEROQUEL) 50 MG tablet Take 50 mg by mouth At Bedtime  0     rifaximin (XIFAXAN) 550 MG TABS tablet Take 1 tablet (550 mg) by mouth 2 times daily 60 tablet 0     sertraline (ZOLOFT) 100 MG tablet Take 200 mg by mouth daily        simethicone (MYLICON) 80 MG chewable tablet Take 1 tablet (80 mg) by mouth every 6 hours as needed for flatulence or cramping 90 tablet 0     thiamine (B-1) 100 MG tablet Take 1 tablet (100 mg) by mouth daily 30 tablet 0     TRULICITY 0.75 MG/0.5ML pen  INJECT 0.5 ml's SUBCUTANEOUSLY ONCE WEEKLY  2          Allergies   Allergen Reactions     Bee Venom Other (See Comments) and Swelling     Hand swelled up badly.       Hydrocodone Other (See Comments)     nausea     Nickel Rash     Wool Fiber Hives and Rash       Exam:  There were no vitals taken for this visit.  Wt Readings from Last 4 Encounters:   08/27/20 72.6 kg (160 lb 0.9 oz)   08/19/20 74.1 kg (163 lb 4.8 oz)   08/18/20 73.9 kg (163 lb)   08/13/20 75.2 kg (165 lb 12.8 oz)     -Constitutional: well appearing without acute distress, slender.   -Integumentary: color appropriate for ethnicity. visualized skin is intact without rashes, petechiae, or bruising  -Respiratory: Normal breathing effort. Equal chest rise and fall   -Neurological: Alert and Oriented *3. Follow commands.   -MSK: moves extremities without difficulty   - Psychiatric: Patient is cooperative, pleasant, and alert. Appropriate affect.     Labs:    10/8/2020 12:05   Sodium 135   Potassium 3.2 (L)   Chloride 100   Carbon Dioxide 27   Urea Nitrogen 11   Creatinine 0.69   GFR Estimate >90   GFR Estimate If Black >90   Calcium 8.5   Anion Gap 8   Magnesium 1.8   Albumin 3.3 (L)   Protein Total 7.5   Bilirubin Total 2.2 (H)   Alkaline Phosphatase 83   ALT 37   AST 42   Alpha Fetoprotein 13.7 (H)   Creatinine Urine 344   Protein Random Urine 1.37   Protein Total Urine g/gr Creatinine 0.40 (H)   T4 Free 0.98   TSH 8.31 (H)   Glucose 203 (H)   WBC 3.1 (L)   Hemoglobin 14.2   Hematocrit 42.0   Platelet Count 32 (LL)   RBC Count 3.89    (H)   MCH 36.5 (H)   MCHC 33.8   RDW 18.1 (H)   Diff Method Automated Method   % Neutrophils 72.9   % Lymphocytes 20.3   % Monocytes 4.9   % Eosinophils 1.3   % Basophils 0.3   % Immature Granulocytes 0.3   Nucleated RBCs 0   Absolute Neutrophil 2.2   Absolute Lymphocytes 0.6 (L)   Absolute Monocytes 0.2   Absolute Eosinophils 0.0   Absolute Basophils 0.0   Abs Immature Granulocytes 0.0   Absolute Nucleated RBC 0.0    Platelet Estimate Confirming automated cell count     Assessment/plan:   HCC in the setting of Hep C cirrhosis and initialy treated with TACE/ablation to S3 lesion in Oct 2019 and then had a new S3 lesion treated with MWA in March 2020. Unfortunately on repeat MRI in May 2020, she has now been found to have multifocal HCC- biopsy proven.  She also has probably bone metastasis with right anterior 5th rib lesion and left clavicular head lesion.   Started Lenvatinib on 6/30/2020 and held during admission for hepatic encephalopathy on 7/16/2020.  It was restarted later in July 2020 and she has been taking that regularly.  Repeat scans including CT scan and bone scan on 9/16/2020 are stable to slightly improved.  Alpha-fetoprotein is also stable at 10.6.  Overall she is tolerating lenvatinib fairly well. I will have her follow-up with Dr. Childress or myself in a couple of weeks. Will repeat imaging about 2 months after her last imaging.      Hepatic Encephalopathy/cirrhosis.  Her confusion seems about at baseline. I encouraged her to resume the Rifaximin and continue on lactulose.     Tobacco abuse. Patient is open to meeting with [facility] for tobacco cessation counseling.     Blurred vision. Recommend seeing eye doctor in follow-up.     Emphysema. Encouraged her to follow-up with PCP to discuss potential for long acting inhaler. Refilled Tessalon Perles today for chronic cough.     Body aches. Managed with Tylenol. Also, discussed the potential use of Voltaren gel. Will be seeing palliative care soon.     Hand foot syndrome. Discussed trying to consistently use Eucerin cream or Udder Balm.    Anorexia and weight loss. Recommend meeting with our dietician and try drinking 3 nutritional supplements per day.      Cytopenias.  Related to cirrhosis, splenomegaly and portal hypertension as well as lenvatinib. Relatively stable. For now we will continue to monitor.     Bone metastasis.  She has evidence of bone metastasis to  the left clavicular head and right anterior fifth rib.  She started Zometa on 8/19/2020 and will be getting it every 3 months.  Continue to take vitamin D and calcium.     Elo Jeffers PA-C  [facility]  [address] [place] [id number] [phone]

## 2020-10-07 NOTE — PROGRESS NOTES
"Dania Albert is a 54 year old female who is being evaluated via a billable video visit.      The patient has been notified of following:     \"This video visit will be conducted via a call between you and your physician/provider. We have found that certain health care needs can be provided without the need for an in-person physical exam.  This service lets us provide the care you need with a video conversation.  If a prescription is necessary we can send it directly to your pharmacy.  If lab work is needed we can place an order for that and you can then stop by our lab to have the test done at a later time.    Video visits are billed at different rates depending on your insurance coverage.  Please reach out to your insurance provider with any questions.    If during the course of the call the physician/provider feels a video visit is not appropriate, you will not be charged for this service.\"    Patient has given verbal consent for Video visit? Yes  How would you like to obtain your AVS? MyChart  If you are dropped from the video visit, the video invite should be resent to: Text to cell phone: [phone]  Will anyone else be joining your video visit? No    I have reviewed and updated the patient's allergies and medication list. Patient was asked if they had any patient reported vital signs to present, if yes, please see documented vitals.  Patient was also asked for their current weight and height, if presented, documented in vitals.    Concerns: Pt has been having issues with both of her hands.  Pt daughter states the tips of all her fingers are purple, has hang nails.  Palm of her hands are dark red, and around lips are bluish. Patient has SOB is increasing, unintentional  weight loss     Refills: Needs refills of rufaximin, has not had this medication for 5 days, needs new prescription.  Refill of Tessalon capsule.    Clementine Rodriguez CMA    Video start: 1:29PM  Video stop: 2:04 PM  "

## 2020-10-07 NOTE — Clinical Note
10/7/2020         RE: Dania Albert  [address] [facility] [address]        Dear Colleague,    Thank you for referring your patient, Dania Albert, to the [facility]. Please see a copy of my visit note below.    Oncology/Hematology Visit Note  Oct 7, 2020    Reason for Visit: f/u for hepatocellular carcinoma    Oncology HPI:   Initially evaluated in May 2020 for hepatocellular carcinoma .     She has Hep C cirrhosis. Hep C was previously treated with Sovaldi/ribavirin and achived SVR.  She was found to have HCC in June 2019 and had TACE/Ablation for S3 lesion on Oct 2019 and then found to have a new S3 lesion for which she had a MWA on 3/17/2020.  Bone Scan in Dec 2019 was negative for metastatic disease.   CTA Chest in October 2019 was negative for metastatic disease.     A follow up MRI on 5/8/2020 showed no resdual viable cancer in the treatment zones but it found dramatic increase in number and sizes of numerous arterial enhancing foci scattered throughout the liver parenchyma which was very suspicious for multifocal HCC. None of these were diagnostic of HC so she had liver biopsy on which confirmed moderately differentiated HCC.     As she was not deemed a candidate for any further liver directed therapy, she was referred to medical oncology.     She has constant pain in the RUQ region/beneath the lower right rib cage since the MWA procedure in March 2020.     Baseline CT chest abdomen and pelvis on 6/12/2020.  Bone scan showed anterior right fifth rib and left clavicular head meta stasis.     She started lenvatinib on 6/30/2020.     She was admitted to the hospital from 7/16/2020 till 7/20/2020 for hepatic encephalopathy and noninfectious colitis. MRI of the brain did not show any intracranial metastasis. She was started on lactulose and rifaximin and her confusion improved.  Lenvatinib was held during the admission.     Repeat CT chest abdomen and pelvis which did not show  any pulmonary embolism.Interval advancement of portal venous thrombus involving the main portal vein and the left intrahepatic portal venous system was seen.  There was also interval development of low-attenuation lesions which were smaller than the arterial enhancing lesions seen on CT scan from June 2020.       Lenvima resumed 7/23/2020    Interval history:   -Feels weak and tired, which is ongoing. Feels a little worse with the chemotherapy.   -Naps much of the day, which is chronic.   -Chronically feels cold.  -Feels vision is more blurry lately. Last eye doctor visit was about a year ago.   -Has occasional pain in chest on both sides. Symptoms started in the last few weeks. Has pain about 3 times per week. Can come on with rest or with activity. Improves with inhaler.   -Has been coughing for a while, but a little worse in the last month.   -Has dyspnea on exertion with prolonged walking or with a flight of stairs. Has to stop 2-3 times on the flight of stairs.   -Smokes about 2-3 cigarettes, previously 1/3-1/2 ppd, started when 11 years old. Cut back over the last 6 months. Has tried nicotine patches without improvement.   -Has intermittent abdominal pain in the pelvic area and in the RUQ that travels to spine. Takes Tylenol most days 1000 mg once to two times per day. No help with lidocaine patches when in hospital.   -Vomited once last week. Unsure of cause. Woke up with nausea.   -Has loose stools several times per day. Not taking lactulose due to loose stools. Stopped rifaximin about 5 days ago as concerned that it is causing dry mouth.   -Has headaches intermittently in the back of her head. Has improvement sometimes with Tylenol.   -Does not sleep well due to need to urinate and due to cough.   -Mood varies, on antidepressants, with intermittent improvement in mood. Declines desire for therapy.   -Using salt/soda rinses for mouth soreness. Now, stopped them for time time. Uses Magic Mouthwash  intermittent.   -Color in face and lips and fingertips are blue.   -Palms are red, but not sore.   -Has been continuing to lose weight. Has been eating less due to mouth pain. Taking about 1 Premier Protein/day.   - is struggling with his wife's diagnosis. He is concerned about the number of her medications.     Review of Systems:  Patient denies any of the following except if noted above: fevers, chills, vision or hearing changes, chest pain, dyspnea at rest, diarrhea, constipation, urinary concerns, headaches, numbness, tingling, issues with sleep or mood.      Past Medical History:   Diagnosis Date     Alcohol abuse      Bone metastasis (H) 7/15/2020     Chronic hepatitis C without hepatic coma (H) 10/23/2019    SVR     Cirrhosis of liver with ascites (H) 10/23/2019     COPD (chronic obstructive pulmonary disease) (H)      Depressive disorder      Diabetes (H)     Type 1 DM, Takes Insulin      Emphysema lung (H)      H/O esophageal varices      HCC (hepatocellular carcinoma) (H) 10/23/2019     History of blood transfusion     [facility] 1983     ERICKA (obstructive sleep apnea)         Current Outpatient Medications   Medication Sig Dispense Refill     acetaminophen (TYLENOL) 500 MG tablet Take 500 mg by mouth every 8 hours as needed        albuterol (PROAIR HFA) 108 (90 Base) MCG/ACT inhaler Inhale 2 puffs into the lungs every 6 hours as needed        benzonatate (TESSALON) 100 MG capsule Take 1 capsule (100 mg) by mouth 3 times daily as needed for cough 90 capsule 0     blood glucose monitoring (ONE TOUCH ULTRA MINI) meter device kit Use as directed to test glucose three times daily. Pharmacy dispense brand based on insurance.  Indications: Diabetes       calcium carbonate-vitamin D (OSCAL W/D) 500-200 MG-UNIT tablet Take 1 tablet by mouth 2 times daily (with meals) 60 tablet 3     cholecalciferol (VITAMIN D3) 1000 units (25 mcg) capsule Take 1 capsule (1,000 Units) by mouth daily 30 capsule 3      diclofenac (VOLTAREN) 1 % topical gel Place 2 g onto the skin 4 times daily 100 g 0     insulin glargine (LANTUS SOLOSTAR) 100 UNIT/ML pen Inject 38 Units Subcutaneous At Bedtime        lactulose (CHRONULAC) 10 GM/15ML solution Take 15 mLs (10 g) by mouth 3 times daily 500 mL 0     Lenvatinib 12 MG, 3 x 4 mg capsules, (LENVIMA) 3 x 4 MG capsule Take 3 capsules (12 mg) by mouth daily 90 capsule 0     Lenvatinib 12 MG, 3 x 4 mg capsules, (LENVIMA) 3 x 4 MG capsule Take 3 capsules (12 mg) by mouth daily 90 capsule 0     levothyroxine (SYNTHROID) 25 MCG tablet Take 1 tablet (25 mcg) by mouth daily 30 tablet 0     magic mouthwash suspension (diphenhydrAMINE, lidocaine, aluminum-magnesium & simethicone) Swish and swallow 10 mLs in mouth every 6 hours as needed for mouth sores 120 mL 0     melatonin 5 MG tablet Take 1 tablet (5 mg) by mouth nightly as needed for sleep 30 tablet 1     nicotine (NICORETTE) 2 MG gum Place 1 each (2 mg) inside cheek as needed for smoking cessation (Patient not taking: Reported on 9/17/2020) 60 each 3     ondansetron (ZOFRAN-ODT) 8 MG ODT tab Take 1 tablet (8 mg) by mouth every 8 hours as needed for nausea And take 8mg prior to each dose of Levanitib. 30 tablet 3     OneTouch Delica Lancets 33G MISC Change lancet one time daily as directed.       order for DME Abdominal Binder - small (Patient not taking: Reported on 9/17/2020) 1 each 1     oxyCODONE (ROXICODONE) 5 MG tablet Take 0.5-1 tablets (2.5-5 mg) by mouth every 6 hours as needed for severe pain 30 tablet 0     pantoprazole (PROTONIX) 40 MG EC tablet TAKE ONE TABLET BY MOUTH EVERY DAY       potassium chloride ER (KLOR-CON M) 20 MEQ CR tablet Take 1 tablet (20 mEq) by mouth daily 5 tablet 0     QUEtiapine (SEROQUEL) 50 MG tablet Take 50 mg by mouth At Bedtime  0     rifaximin (XIFAXAN) 550 MG TABS tablet Take 1 tablet (550 mg) by mouth 2 times daily 60 tablet 0     sertraline (ZOLOFT) 100 MG tablet Take 200 mg by mouth daily         simethicone (MYLICON) 80 MG chewable tablet Take 1 tablet (80 mg) by mouth every 6 hours as needed for flatulence or cramping 90 tablet 0     thiamine (B-1) 100 MG tablet Take 1 tablet (100 mg) by mouth daily 30 tablet 0     TRULICITY 0.75 MG/0.5ML pen INJECT 0.5 ml's SUBCUTANEOUSLY ONCE WEEKLY  2          Allergies   Allergen Reactions     Bee Venom Other (See Comments) and Swelling     Hand swelled up badly.       Hydrocodone Other (See Comments)     nausea     Nickel Rash     Wool Fiber Hives and Rash       Exam:  There were no vitals taken for this visit.  Wt Readings from Last 4 Encounters:   08/27/20 72.6 kg (160 lb 0.9 oz)   08/19/20 74.1 kg (163 lb 4.8 oz)   08/18/20 73.9 kg (163 lb)   08/13/20 75.2 kg (165 lb 12.8 oz)     -Constitutional: well appearing without acute distress   -Integumentary: color appropriate for ethnicity. visualized skin is intact without rashes, petechiae, or bruising  -Respiratory: Normal breathing effort. Equal chest rise and fall   -Neurological: Alert and Oriented *3. Follow commands.   -MSK: moves extremities without difficulty   - Psychiatric: Patient is cooperative, pleasant, and alert. Appropriate affect. Demonstrates understanding of health choices and their consequences.     Labs: ***    Imaging: ***    Assessment/plan:   HCC in the setting of Hep C cirrhosis and initialy treated with TACE/ablation to S3 lesion in Oct 2019 and then had a new S3 lesion treated with MWA in March 2020. Unfortunately on repeat MRI in May 2020, she has now been found to have multifocal HCC- biopsy proven.  She also has probably bone metastasis with right anterior 5th rib lesion and left clavicular head lesion.      Started Lenvatinib on 6/30/2020 and held during admission for hepatic encephalopathy on 7/16/2020.     It was restarted later in July 2020 and she has been taking that regularly.  Repeat scans including CT scan and bone scan on 9/16/2020 are stable to slightly improved.   Alpha-fetoprotein is also stable at 10.6.  Overall she is tolerating lenvatinib reasonably well and I would like to continue that.        Hepatic Encephalopathy/cirrhosis-she is still confused although a little better.  Continue to follow with hepatology.  Continue rifaximin.  Titrate her lactulose so that she has a couple of soft bowel movements daily.       Mouth soreness.  I recommend starting salt/baking soda mouth rinses several times a day.  Use Magic mouthwash as needed.     Body aches.  She is taking couple of Tylenol a day which is helping.  She can continue using that.  I again recommended that she follows with palliative care because she canceled her previously scheduled appointment.  She has an appointment on 10/8/2020.     Cytopenias.  Related to cirrhosis, splenomegaly and portal hypertension as well as lenvatinib.  For now we will continue to monitor.     Bone metastasis.  She has evidence of bone metastasis to the left clavicular head and right anterior fifth rib.  She started Zometa on 8/19/2020 and will be getting it every 3 months.  Continue to take vitamin D and calcium.     Nausea-she has some nausea and I refilled her Zofran.       Smoking.  Started smoking again.  I again recommended that she uses the nicotine patch and quit smoking.      We did not address the following today.        We did not address the following today.     Portal vein thrombus.  She has thrombus in the main and left portal veins.  Because of significant thrombocytopenia, I believe that risk of bleeding would be very high if we start her on systemic anticoagulation so I am not planning to do that.  At this time I am not certain whether she has any symptoms from this or not.  She does have right upper quadrant pain but this could be related to the underlying malignancy.        Discussion regarding health care directive  We discussed that it is important that she completes health care directive which would help in making  "sure that her wishes are followed about her treatment care in case she is not able to make a decision for herself.  We gave her information regarding that.         Smoking-Tg Morales  Eye doctor   Emphysema- PCP for inhaler, long acting inhaler  Voltaren gel  Rifaximin refill  Tessalon Perles  Eucerin cream or Udder Balm   Nutrition consult and nutritional supplements 3/day    Labs tomorrow 11:30-12 and vitals    Elo Jeffers PA-C  [facility]  [address] [place] [address]  [phone]      Dania Albert is a 54 year old female who is being evaluated via a billable video visit.      The patient has been notified of following:     \"This video visit will be conducted via a call between you and your physician/provider. We have found that certain health care needs can be provided without the need for an in-person physical exam.  This service lets us provide the care you need with a video conversation.  If a prescription is necessary we can send it directly to your pharmacy.  If lab work is needed we can place an order for that and you can then stop by our lab to have the test done at a later time.    Video visits are billed at different rates depending on your insurance coverage.  Please reach out to your insurance provider with any questions.    If during the course of the call the physician/provider feels a video visit is not appropriate, you will not be charged for this service.\"    Patient has given verbal consent for Video visit? Yes  How would you like to obtain your AVS? MyChart  If you are dropped from the video visit, the video invite should be resent to: Text to cell phone: [phone]  Will anyone else be joining your video visit? No    I have reviewed and updated the patient's allergies and medication list. Patient was asked if they had any patient reported vital signs to present, if yes, please see documented vitals.  Patient was also asked for their current weight and height, if " presented, documented in vitals.    Concerns: Pt has been having issues with both of her hands.  Pt daughter states the tips of all her fingers are purple, has hang nails.  Palm of her hands are dark red, and around lips are bluish. Patient has SOB is increasing, unintentional  weight loss     Refills: Needs refills of rufaximin, has not had this medication for 5 days, needs new prescription.  Refill of Tessalon capsule.    Clementine Rodriguez CMA    Video start: 1:29PM  Video stop: 2:04 PM      Again, thank you for allowing me to participate in the care of your patient.        Sincerely,        Elo Jeffers PA-C

## 2020-10-08 NOTE — PROGRESS NOTES
"Dania Albert is a 54 year old female who is being evaluated via a billable video visit.      The patient has been notified of following:     \"This video visit will be conducted via a call between you and your physician/provider. We have found that certain health care needs can be provided without the need for an in-person physical exam.  This service lets us provide the care you need with a video conversation.  If a prescription is necessary we can send it directly to your pharmacy.  If lab work is needed we can place an order for that and you can then stop by our lab to have the test done at a later time.    Video visits are billed at different rates depending on your insurance coverage.  Please reach out to your insurance provider with any questions.    If during the course of the call the physician/provider feels a video visit is not appropriate, you will not be charged for this service.\"    Patient has given verbal consent for Video visit? Yes    How would you like to obtain your AVS? MyChart     If you are dropped from the video visit, the video invite should be resent to: Send to e-mail at: [email or web address]     Will anyone else be joining your video visit? No         I have reviewed and updated the patient's allergies and pt confirmed any changes to their medication list.  Patient was asked to provide any patient recorded vital signs, height and/or weight.  Please see \"Patient Reported Vital Signs\" tab for that information.  Patient instructed to be in the virtual waiting room 10-15 minutes prior to appointment time.    /80   Pulse 90   Temp 97.8  F (36.6  C) (Oral)   Resp 16   Wt 68.6 kg (151 lb 4.8 oz)   SpO2 94%   BMI 28.59 kg/m        Concerns: Patient has no new concerns.      Refills: None        Talib Manley, ANNI          Video-Visit Details    Type of service:  Video Visit    "

## 2020-10-08 NOTE — PROGRESS NOTES
Chief Complaint   Patient presents with     Blood Draw     vpt blood draw, vitals taken     Venipuncture labs drawn from right arm.    Oxana Alegria MA

## 2020-10-08 NOTE — PROGRESS NOTES
"Palliative Care Outpatient Clinic Consultation Note    Patient:  Dania Albert.  Daughter Jessica was also present throughout the video visit.  History is obtained from both the patient and her daughter    Chief Complaint:   Dania Albert 54 year old female who is presenting to the palliative medicine clinic today at the request of Tai Childress MD, medical oncology, for a palliative care consultation secondary to multifocal hepatocellular CA.   The patient's primary care provider is:  Micaela Gould     History of Present Illness:  54 y/ woman with history of Hepatitis C who was diagnosed in June 2019 with HCC.  Underwent TACE ablation in October 2019, found to have a new lesion treated locally on 3/17/20 (no metastatic disease on CT/bone scan in late 2019).  Follow up MRI in May 2020 showed multifocal HCC. Started lenvatinib on June 30, 2020.  Admitted 7/16-7/20 for HE.    Patient and daughter's main concern today is diarrhea.  Has been a low level chronic problem for the patient, but it sounds like in piecing together the story the diarrhea has gotten much worse in the past couple of months.  She was started on lactulose and rifaxamin during her HE hospital stay, but for the past few weeks she has not been taking the lactulose and persists with more than 8 loose stools per day.  No blood, no crampy pain.  She is having difficulty keeping up with hydration due to amount of diarrhea and due to mouth pain that prevents drinking anything carbonated.  Feels nauseated frequently.  Has had about 20 lb weight loss in the past couple of months.    Daughter is really concerned about her father's coping - he is \"in denial\" saying the doctors must have it wrong and she doesn't have cancer.  He would like her to get a second opinion.    Daughter also notes patient's mood is poor - down and with low energy.    Patient's Disease Understanding: Patient understands she has a terminal disease.  She was talking today about " "distributing her things after her death.    Coping:  Not coping well - she is depressed and down.  Spends a lot of time home alone ( working).  No strong Holiness or spiritual affiliation.  Taking sertraline 200mg and quetiapine prescribed by her primary care physician.  Interested in counseling.    Social History  Living Situation: lives with , who is working  Children: adult daughter (lives 1 hour away) and son  Actual/Potential Caregiver(s): , daughter  Support System: wants daughter to be HCA  Occupation: not currently working  Hobbies: none currently  Substance Use/History of misuse: not addressed today  Financial Concerns: not addressed today  Spiritual Background: a \"Rastafari\" - believes in God and prays, not prominent part of her identity  Spiritual Concerns/Needs: none currently  Social History     Tobacco Use     Smoking status: Current Every Day Smoker     Packs/day: 0.30     Years: 40.00     Pack years: 12.00     Types: Cigarettes     Smokeless tobacco: Never Used   Substance Use Topics     Alcohol use: Not Currently     Comment: Rarely drank alcohol.      Drug use: Not Currently       Family History  Family History   Problem Relation Age of Onset     Coronary Artery Disease Mother      Coronary Artery Disease Father      No Known Problems Brother      Meniere's disease Sister      Diabetes Sister      No Known Problems Son      No Known Problems Daughter      Diabetes Sister      Liver Disease Sister      Chronic Obstructive Pulmonary Disease Sister      Patient's Involvement with Prior History of Serious Illness in Family: not discussed    Advance Care Planning:  Advance Directive:    None currently, wants to have daughter as HCA  Where is written copy located: N/A  Health Care Agent Contact Information: jason Torre (Jenny), work [phone], mobile [phone]  POLST:   None yet    Allergies   Allergen Reactions     Bee Venom Other (See Comments) and Swelling    "  Hand swelled up badly.       Hydrocodone Other (See Comments)     nausea     Nickel Rash     Wool Fiber Hives and Rash     Current Outpatient Medications   Medication Sig Dispense Refill     acetaminophen (TYLENOL) 500 MG tablet Take 500 mg by mouth every 8 hours as needed        albuterol (PROAIR HFA) 108 (90 Base) MCG/ACT inhaler Inhale 2 puffs into the lungs every 6 hours as needed        benzonatate (TESSALON) 100 MG capsule Take 1-2 capsules (100-200 mg) by mouth 3 times daily as needed for cough 90 capsule 0     blood glucose monitoring (ONE TOUCH ULTRA MINI) meter device kit Use as directed to test glucose three times daily. Pharmacy dispense brand based on insurance.  Indications: Diabetes       calcium carbonate-vitamin D (OSCAL W/D) 500-200 MG-UNIT tablet Take 1 tablet by mouth 2 times daily (with meals) 60 tablet 3     cholecalciferol (VITAMIN D3) 1000 units (25 mcg) capsule Take 1 capsule (1,000 Units) by mouth daily 30 capsule 3     diclofenac (VOLTAREN) 1 % topical gel Place 2 g onto the skin 4 times daily 100 g 1     insulin glargine (LANTUS SOLOSTAR) 100 UNIT/ML pen Inject 38 Units Subcutaneous At Bedtime        lactulose (CHRONULAC) 10 GM/15ML solution Take 15 mLs (10 g) by mouth 3 times daily 500 mL 0     Lenvatinib 12 MG, 3 x 4 mg capsules, (LENVIMA) 3 x 4 MG capsule Take 3 capsules (12 mg) by mouth daily 90 capsule 0     Lenvatinib 12 MG, 3 x 4 mg capsules, (LENVIMA) 3 x 4 MG capsule Take 3 capsules (12 mg) by mouth daily 90 capsule 0     levothyroxine (SYNTHROID) 25 MCG tablet Take 1 tablet (25 mcg) by mouth daily 30 tablet 0     magic mouthwash suspension (diphenhydrAMINE, lidocaine, aluminum-magnesium & simethicone) Swish and swallow 10 mLs in mouth every 6 hours as needed for mouth sores 120 mL 0     melatonin 5 MG tablet Take 1 tablet (5 mg) by mouth nightly as needed for sleep 30 tablet 1     nicotine (NICORETTE) 2 MG gum Place 1 each (2 mg) inside cheek as needed for smoking cessation  (Patient not taking: Reported on 9/17/2020) 60 each 3     ondansetron (ZOFRAN-ODT) 8 MG ODT tab Take 1 tablet (8 mg) by mouth every 8 hours as needed for nausea And take 8mg prior to each dose of Levanitib. 30 tablet 3     OneTouch Delica Lancets 33G MISC Change lancet one time daily as directed.       order for DME Abdominal Binder - small (Patient not taking: Reported on 9/17/2020) 1 each 1     oxyCODONE (ROXICODONE) 5 MG tablet Take 0.5-1 tablets (2.5-5 mg) by mouth every 6 hours as needed for severe pain 30 tablet 0     pantoprazole (PROTONIX) 40 MG EC tablet TAKE ONE TABLET BY MOUTH EVERY DAY       potassium chloride ER (KLOR-CON M) 20 MEQ CR tablet Take 1 tablet (20 mEq) by mouth daily 5 tablet 0     QUEtiapine (SEROQUEL) 50 MG tablet Take 50 mg by mouth At Bedtime  0     rifaximin (XIFAXAN) 550 MG TABS tablet Take 1 tablet (550 mg) by mouth 2 times daily 60 tablet 3     sertraline (ZOLOFT) 100 MG tablet Take 200 mg by mouth daily        simethicone (MYLICON) 80 MG chewable tablet Take 1 tablet (80 mg) by mouth every 6 hours as needed for flatulence or cramping 90 tablet 0     thiamine (B-1) 100 MG tablet Take 1 tablet (100 mg) by mouth daily 30 tablet 0     TRULICITY 0.75 MG/0.5ML pen INJECT 0.5 ml's SUBCUTANEOUSLY ONCE WEEKLY  2     Past Medical History:   Diagnosis Date     Alcohol abuse      Bone metastasis (H) 7/15/2020     Chronic hepatitis C without hepatic coma (H) 10/23/2019    SVR     Cirrhosis of liver with ascites (H) 10/23/2019     COPD (chronic obstructive pulmonary disease) (H)      Depressive disorder      Diabetes (H)     Type 1 DM, Takes Insulin      Emphysema lung (H)      H/O esophageal varices      HCC (hepatocellular carcinoma) (H) 10/23/2019     History of blood transfusion     [facility] in 1983     ERICKA (obstructive sleep apnea)      Past Surgical History:   Procedure Laterality Date     ABDOMEN SURGERY      Gallbladder Removed      COLONOSCOPY      [place]     CYSTOSCOPY,  INJECT BOTOX      detrusor injection     GI SURGERY      Upper Endoscopy at [facility]      HERNIA REPAIR      Patient doesnt remember if mesh was used. [facility]      IR FLUORO 0-1 HOUR  3/17/2020     IR FLUORO 0-1 HOUR  3/17/2020     OPEN REDUCTION INTERNAL FIXATION WRIST Bilateral        REVIEW OF SYSTEMS:   ROS: 10 point ROS neg other than the symptoms noted above in the HPI and here:  Palliative Symptom Review (0=no symptom/no concern, 1=mild, 2=moderate, 3=severe):      Pain: 1 - some aching in legs, weakness      Fatigue: 1      Nausea: 1      Constipation: 0      Diarrhea: 3      Depressive Symptoms: 2      Anxiety: 1      Drowsiness: 1      Poor Appetite: 1      Shortness of Breath: 0      Insomnia: 1      Other: N/A      Overall (0 good/no concerns, 3 very poor):  2    PE  Patient seen via video visit due to COVID 19.  Daughter Jessica present throughout visit as well.  Patient appears chronically ill, pale, slightly disheveled, drowsy.  Patient frequently had to be redirected by daughter when she had confusion about topic of conversation, confused about previous events    Data Reviewed:  LABS:   Last Comprehensive Metabolic Panel:  Sodium   Date Value Ref Range Status   10/08/2020 135 133 - 144 mmol/L Final     Potassium   Date Value Ref Range Status   10/08/2020 3.2 (L) 3.4 - 5.3 mmol/L Final     Chloride   Date Value Ref Range Status   10/08/2020 100 94 - 109 mmol/L Final     Carbon Dioxide   Date Value Ref Range Status   10/08/2020 27 20 - 32 mmol/L Final     Anion Gap   Date Value Ref Range Status   10/08/2020 8 3 - 14 mmol/L Final     Glucose   Date Value Ref Range Status   10/08/2020 203 (H) 70 - 99 mg/dL Final     Urea Nitrogen   Date Value Ref Range Status   10/08/2020 11 7 - 30 mg/dL Final     Creatinine   Date Value Ref Range Status   10/08/2020 0.69 0.52 - 1.04 mg/dL Final     GFR Estimate   Date Value Ref Range Status   10/08/2020 >90 >60 mL/min/[1.73_m2] Final     Comment:     Non   GFR Calc  Starting 2018, serum creatinine based estimated GFR (eGFR) will be   calculated using the Chronic Kidney Disease Epidemiology Collaboration   (CKD-EPI) equation.       Calcium   Date Value Ref Range Status   10/08/2020 8.5 8.5 - 10.1 mg/dL Final       IMAGING: CT CAP 20:  1.  Continued decrease in size of hypodense lesions in the right lobe  suggestive of treatment response. No evidence of residual viable tumor  in the treated left lobe lesions.  2.  No evidence of metastasis in the chest.  3.  Cirrhosis with sequelae of portal hypertension including  splenomegaly, portosystemic collaterals and trace ascites. Unchanged  nonocclusive thrombus in the portal veins extending to the SMV.  4.  Stable chronic interstitial lung disease.     Images personally reviewed: CT CAP    Impressions:  Palliative Performance Score:  60%  Decision Making Capacity:  Compromised due to some confusion.  Able to track conversation with daughter's support.  Able to make decisions about values/preferences.    Recommendations & Counselin y/o woman with multifocal HCC receiving palliative chemotherapy with concerns today of diarrhea, coping, and advanced care planning.    Diarrhea:  Etiology unclear, although possibly related to lenvatinib.  Temporally related to onset of treatment, independent of lactulose use, no history of pancreatic insufficiency, no symptoms consistent with colitis, C diff negative in July.  Given HE, need to tread very carefully with trying to treat diarrhea symptomatically, but given her extreme frequency of BMs, will try very low dose immodium (1mg BID prn).  Explained at length to daughter concerns about exacerbating HE - she understands and will monitor her mom with daily video visits.  Will follow up for treatment effect    Coping:  Patient and daughter very stressed about diagnosis, complicated by 's denial.  Will refer to LICSW, patient interested in  counseling.    Advanced care planning:  Supported daughter in need for HCA documentation.  Directed to [place] Dept of Public Health website.  She plans to print documents and complete them at a family meeting this coming weekend.  Encouraged her to review [employer] website for resources to guide conversation.  She will mail documentation to us.    Ligia Liang MD  Palliative Care Fellow  Staffed with Naga Ortez MD    Total time:  65 minute video visit      Attending Note:  Patient seen and evaluated with Dr Liang and I agree with/confirm their findings/recs in this note.      Thank you for involving us in the patient's care.   Naga Ortez MD / Palliative Medicine / Text me via [facility].    Video visit,Kristinaniket, patient at dtr's home, [place] [facility] [place]

## 2020-10-08 NOTE — LETTER
"10/8/2020       RE: Dania Albert  [address]  [place] [address]     Dear Colleague,    Thank you for referring your patient, Dania Albert, to the [facility] at [facility]. Please see a copy of my visit note below.    Chief Complaint   Patient presents with     Blood Draw     vpt blood draw, vitals taken     Venipuncture labs drawn from right arm.    Oxana Alegria MA       Dania Albert is a 54 year old female who is being evaluated via a billable video visit.      The patient has been notified of following:     \"This video visit will be conducted via a call between you and your physician/provider. We have found that certain health care needs can be provided without the need for an in-person physical exam.  This service lets us provide the care you need with a video conversation.  If a prescription is necessary we can send it directly to your pharmacy.  If lab work is needed we can place an order for that and you can then stop by our lab to have the test done at a later time.    Video visits are billed at different rates depending on your insurance coverage.  Please reach out to your insurance provider with any questions.    If during the course of the call the physician/provider feels a video visit is not appropriate, you will not be charged for this service.\"    Patient has given verbal consent for Video visit? Yes    How would you like to obtain your AVS? MyChart     If you are dropped from the video visit, the video invite should be resent to: Send to e-mail at: [email or web address]     Will anyone else be joining your video visit? No       I have reviewed and updated the patient's allergies and pt confirmed any changes to their medication list.  Patient was asked to provide any patient recorded vital signs, height and/or weight.  Please see \"Patient Reported Vital Signs\" tab for that information.  Patient instructed to be in the " virtual waiting room 10-15 minutes prior to appointment time.      /80   Pulse 90   Temp 97.8  F (36.6  C) (Oral)   Resp 16   Wt 68.6 kg (151 lb 4.8 oz)   SpO2 94%   BMI 28.59 kg/m      Concerns: Patient has no new concerns.    Refills: None    Talib Manley, EMT      Video-Visit Details    Type of service:  Video Visit      Palliative Care Outpatient Clinic Consultation Note    Patient:  Dania Albert.  Daughter Jessica was also present throughout the video visit.  History is obtained from both the patient and her daughter    Chief Complaint:   Dania Albert 54 year old female who is presenting to the palliative medicine clinic today at the request of Tai Childress MD, medical oncology, for a palliative care consultation secondary to multifocal hepatocellular CA.   The patient's primary care provider is:  Micaela Gould     History of Present Illness:  54 y/ woman with history of Hepatitis C who was diagnosed in June 2019 with HCC.  Underwent TACE ablation in October 2019, found to have a new lesion treated locally on 3/17/20 (no metastatic disease on CT/bone scan in late 2019).  Follow up MRI in May 2020 showed multifocal HCC. Started lenvatinib on June 30, 2020.  Admitted 7/16-7/20 for HE.    Patient and daughter's main concern today is diarrhea.  Has been a low level chronic problem for the patient, but it sounds like in piecing together the story the diarrhea has gotten much worse in the past couple of months.  She was started on lactulose and rifaxamin during her HE hospital stay, but for the past few weeks she has not been taking the lactulose and persists with more than 8 loose stools per day.  No blood, no crampy pain.  She is having difficulty keeping up with hydration due to amount of diarrhea and due to mouth pain that prevents drinking anything carbonated.  Feels nauseated frequently.  Has had about 20 lb weight loss in the past couple of months.    Daughter is really concerned  "about her father's coping - he is \"in denial\" saying the doctors must have it wrong and she doesn't have cancer.  He would like her to get a second opinion.    Daughter also notes patient's mood is poor - down and with low energy.    Patient's Disease Understanding: Patient understands she has a terminal disease.  She was talking today about distributing her things after her death.    Coping:  Not coping well - she is depressed and down.  Spends a lot of time home alone ( working).  No strong Scientology or spiritual affiliation.  Taking sertraline 200mg and quetiapine prescribed by her primary care physician.  Interested in counseling.    Social History  Living Situation: lives with , who is working  Children: adult daughter (lives 1 hour away) and son  Actual/Potential Caregiver(s): , daughter  Support System: wants daughter to be HCA  Occupation: not currently working  Hobbies: none currently  Substance Use/History of misuse: not addressed today  Financial Concerns: not addressed today  Spiritual Background: a \"Restorationism\" - believes in God and prays, not prominent part of her identity  Spiritual Concerns/Needs: none currently  Social History     Tobacco Use     Smoking status: Current Every Day Smoker     Packs/day: 0.30     Years: 40.00     Pack years: 12.00     Types: Cigarettes     Smokeless tobacco: Never Used   Substance Use Topics     Alcohol use: Not Currently     Comment: Rarely drank alcohol.      Drug use: Not Currently       Family History  Family History   Problem Relation Age of Onset     Coronary Artery Disease Mother      Coronary Artery Disease Father      No Known Problems Brother      Meniere's disease Sister      Diabetes Sister      No Known Problems Son      No Known Problems Daughter      Diabetes Sister      Liver Disease Sister      Chronic Obstructive Pulmonary Disease Sister      Patient's Involvement with Prior History of Serious Illness in Family: not " discussed    Advance Care Planning:  Advance Directive:    None currently, wants to have daughter as HCA  Where is written copy located: N/A  Health Care Agent Contact Information: jason Torre (Jenny), work [phone], mobile [phone]  POLST:   None yet    Allergies   Allergen Reactions     Bee Venom Other (See Comments) and Swelling     Hand swelled up badly.       Hydrocodone Other (See Comments)     nausea     Nickel Rash     Wool Fiber Hives and Rash     Current Outpatient Medications   Medication Sig Dispense Refill     acetaminophen (TYLENOL) 500 MG tablet Take 500 mg by mouth every 8 hours as needed        albuterol (PROAIR HFA) 108 (90 Base) MCG/ACT inhaler Inhale 2 puffs into the lungs every 6 hours as needed        benzonatate (TESSALON) 100 MG capsule Take 1-2 capsules (100-200 mg) by mouth 3 times daily as needed for cough 90 capsule 0     blood glucose monitoring (ONE TOUCH ULTRA MINI) meter device kit Use as directed to test glucose three times daily. Pharmacy dispense brand based on insurance.  Indications: Diabetes       calcium carbonate-vitamin D (OSCAL W/D) 500-200 MG-UNIT tablet Take 1 tablet by mouth 2 times daily (with meals) 60 tablet 3     cholecalciferol (VITAMIN D3) 1000 units (25 mcg) capsule Take 1 capsule (1,000 Units) by mouth daily 30 capsule 3     diclofenac (VOLTAREN) 1 % topical gel Place 2 g onto the skin 4 times daily 100 g 1     insulin glargine (LANTUS SOLOSTAR) 100 UNIT/ML pen Inject 38 Units Subcutaneous At Bedtime        lactulose (CHRONULAC) 10 GM/15ML solution Take 15 mLs (10 g) by mouth 3 times daily 500 mL 0     Lenvatinib 12 MG, 3 x 4 mg capsules, (LENVIMA) 3 x 4 MG capsule Take 3 capsules (12 mg) by mouth daily 90 capsule 0     Lenvatinib 12 MG, 3 x 4 mg capsules, (LENVIMA) 3 x 4 MG capsule Take 3 capsules (12 mg) by mouth daily 90 capsule 0     levothyroxine (SYNTHROID) 25 MCG tablet Take 1 tablet (25 mcg) by mouth daily 30 tablet 0     magic  mouthwash suspension (diphenhydrAMINE, lidocaine, aluminum-magnesium & simethicone) Swish and swallow 10 mLs in mouth every 6 hours as needed for mouth sores 120 mL 0     melatonin 5 MG tablet Take 1 tablet (5 mg) by mouth nightly as needed for sleep 30 tablet 1     nicotine (NICORETTE) 2 MG gum Place 1 each (2 mg) inside cheek as needed for smoking cessation (Patient not taking: Reported on 9/17/2020) 60 each 3     ondansetron (ZOFRAN-ODT) 8 MG ODT tab Take 1 tablet (8 mg) by mouth every 8 hours as needed for nausea And take 8mg prior to each dose of Levanitib. 30 tablet 3     OneTouch Delica Lancets 33G MISC Change lancet one time daily as directed.       order for DME Abdominal Binder - small (Patient not taking: Reported on 9/17/2020) 1 each 1     oxyCODONE (ROXICODONE) 5 MG tablet Take 0.5-1 tablets (2.5-5 mg) by mouth every 6 hours as needed for severe pain 30 tablet 0     pantoprazole (PROTONIX) 40 MG EC tablet TAKE ONE TABLET BY MOUTH EVERY DAY       potassium chloride ER (KLOR-CON M) 20 MEQ CR tablet Take 1 tablet (20 mEq) by mouth daily 5 tablet 0     QUEtiapine (SEROQUEL) 50 MG tablet Take 50 mg by mouth At Bedtime  0     rifaximin (XIFAXAN) 550 MG TABS tablet Take 1 tablet (550 mg) by mouth 2 times daily 60 tablet 3     sertraline (ZOLOFT) 100 MG tablet Take 200 mg by mouth daily        simethicone (MYLICON) 80 MG chewable tablet Take 1 tablet (80 mg) by mouth every 6 hours as needed for flatulence or cramping 90 tablet 0     thiamine (B-1) 100 MG tablet Take 1 tablet (100 mg) by mouth daily 30 tablet 0     TRULICITY 0.75 MG/0.5ML pen INJECT 0.5 ml's SUBCUTANEOUSLY ONCE WEEKLY  2     Past Medical History:   Diagnosis Date     Alcohol abuse      Bone metastasis (H) 7/15/2020     Chronic hepatitis C without hepatic coma (H) 10/23/2019    SVR     Cirrhosis of liver with ascites (H) 10/23/2019     COPD (chronic obstructive pulmonary disease) (H)      Depressive disorder      Diabetes (H)     Type 1 DM, Takes  Insulin      Emphysema lung (H)      H/O esophageal varices      HCC (hepatocellular carcinoma) (H) 10/23/2019     History of blood transfusion     [facility] in 1983     ERICKA (obstructive sleep apnea)      Past Surgical History:   Procedure Laterality Date     ABDOMEN SURGERY      Gallbladder Removed      COLONOSCOPY      [place]     CYSTOSCOPY, INJECT BOTOX      detrusor injection     GI SURGERY      Upper Endoscopy at [facility]      HERNIA REPAIR      Patient doesnt remember if mesh was used. [facility]      IR FLUORO 0-1 HOUR  3/17/2020     IR FLUORO 0-1 HOUR  3/17/2020     OPEN REDUCTION INTERNAL FIXATION WRIST Bilateral        REVIEW OF SYSTEMS:   ROS: 10 point ROS neg other than the symptoms noted above in the HPI and here:  Palliative Symptom Review (0=no symptom/no concern, 1=mild, 2=moderate, 3=severe):      Pain: 1 - some aching in legs, weakness      Fatigue: 1      Nausea: 1      Constipation: 0      Diarrhea: 3      Depressive Symptoms: 2      Anxiety: 1      Drowsiness: 1      Poor Appetite: 1      Shortness of Breath: 0      Insomnia: 1      Other: N/A      Overall (0 good/no concerns, 3 very poor):  2    PE  Patient seen via video visit due to COVID 19.  Daughter Jessica present throughout visit as well.  Patient appears chronically ill, pale, slightly disheveled, drowsy.  Patient frequently had to be redirected by daughter when she had confusion about topic of conversation, confused about previous events    Data Reviewed:  LABS:   Last Comprehensive Metabolic Panel:  Sodium   Date Value Ref Range Status   10/08/2020 135 133 - 144 mmol/L Final     Potassium   Date Value Ref Range Status   10/08/2020 3.2 (L) 3.4 - 5.3 mmol/L Final     Chloride   Date Value Ref Range Status   10/08/2020 100 94 - 109 mmol/L Final     Carbon Dioxide   Date Value Ref Range Status   10/08/2020 27 20 - 32 mmol/L Final     Anion Gap   Date Value Ref Range Status   10/08/2020 8 3 - 14 mmol/L Final     Glucose    Date Value Ref Range Status   10/08/2020 203 (H) 70 - 99 mg/dL Final     Urea Nitrogen   Date Value Ref Range Status   10/08/2020 11 7 - 30 mg/dL Final     Creatinine   Date Value Ref Range Status   10/08/2020 0.69 0.52 - 1.04 mg/dL Final     GFR Estimate   Date Value Ref Range Status   10/08/2020 >90 >60 mL/min/[1.73_m2] Final     Comment:     Non  GFR Calc  Starting 2018, serum creatinine based estimated GFR (eGFR) will be   calculated using the Chronic Kidney Disease Epidemiology Collaboration   (CKD-EPI) equation.       Calcium   Date Value Ref Range Status   10/08/2020 8.5 8.5 - 10.1 mg/dL Final       IMAGING: CT CAP 20:  1.  Continued decrease in size of hypodense lesions in the right lobe  suggestive of treatment response. No evidence of residual viable tumor  in the treated left lobe lesions.  2.  No evidence of metastasis in the chest.  3.  Cirrhosis with sequelae of portal hypertension including  splenomegaly, portosystemic collaterals and trace ascites. Unchanged  nonocclusive thrombus in the portal veins extending to the SMV.  4.  Stable chronic interstitial lung disease.     Images personally reviewed: CT CAP    Impressions:  Palliative Performance Score:  60%  Decision Making Capacity:  Compromised due to some confusion.  Able to track conversation with daughter's support.  Able to make decisions about values/preferences.    Recommendations & Counselin y/o woman with multifocal HCC receiving palliative chemotherapy with concerns today of diarrhea, coping, and advanced care planning.    Diarrhea:  Etiology unclear, although possibly related to lenvatinib.  Temporally related to onset of treatment, independent of lactulose use, no history of pancreatic insufficiency, no symptoms consistent with colitis, C diff negative in July.  Given HE, need to tread very carefully with trying to treat diarrhea symptomatically, but given her extreme frequency of BMs, will try very low  dose immodium (1mg BID prn).  Explained at length to daughter concerns about exacerbating HE - she understands and will monitor her mom with daily video visits.  Will follow up for treatment effect    Coping:  Patient and daughter very stressed about diagnosis, complicated by 's denial.  Will refer to LICSW, patient interested in counseling.    Advanced care planning:  Supported daughter in need for HCA documentation.  Directed to [place] Dept of Public Health website.  She plans to print documents and complete them at a family meeting this coming weekend.  Encouraged her to review [employer] website for resources to guide conversation.  She will mail documentation to us.    Ligia Liang MD  Palliative Care Fellow  Staffed with Naga Ortez MD    Total time:  65 minute video visit    Attending Note:  Patient seen and evaluated with Dr Liang and I agree with/confirm their findings/recs in this note.      Thank you for involving us in the patient's care.   Naga Ortez MD / Palliative Medicine / Text me via [facility].    Video visit,Kristinaniket, patient at dtr's home, we are at [facility].

## 2020-10-22 NOTE — ORAL ONC MGMT
Oral Chemotherapy Monitoring Program    Subjective/Objective:  Dania Albert is a 54 year old female contacted by phone for a follow-up visit for oral chemotherapy.  I spoke with her daughter, who said that things were about the same, with significant diarrhea    ORAL CHEMOTHERAPY 7/6/2020 10/22/2020   Assessment Type - Monthly Follow up   Diagnosis Code - Hepatocellular Cancer   Providers - Dr. Childress   Clinic Name/Location - [facility]   Drug Name Lenvima (Lenvatinib) Lenvima (Lenvatinib)   Dose - 12 mg   Start Date of Last Cycle [id number]   Planned next cycle start date [id number]   Doses missed in last 2 weeks 1 0   Adherence Assessment Adherent Adherent   Adverse Effects No AE identified during assessment Diarrhea;Oral Mucositis   Diarrhea - Grade 2   Pharmacist Intervention(diarrhea) - No   Oral Mucositis - Grade 1   Pharmacist Intervention(oral mucositis) - No   Home BPs all BPs<140/90 Not applicable   Any new drug interactions? No No   Is the dose as ordered appropriate for the patient? Yes Yes   Is the patient currently in pain? No Yes   Does the patient feel the pain is currently being managed by a provider? - No   Has the patient been assessed within the past 6 months for depression? Yes No   Has the patient missed any days of school, work, or other routine activity? No No   Since the last time we talked, have you been hospitalized or used the emergency room? - No       Last PHQ-2 Score on record:   PHQ-2 ( 1999 [employer]) 1/31/2020 12/17/2019   Q1: Little interest or pleasure in doing things 0 0   Q2: Feeling down, depressed or hopeless 0 0   PHQ-2 Score 0 0   Q1: Little interest or pleasure in doing things - -   Q2: Feeling down, depressed or hopeless - -   PHQ-2 Score - -       Vitals:  BP:   BP Readings from Last 1 Encounters:   10/08/20 118/80     Wt Readings from Last 1 Encounters:   10/08/20 68.6 kg (151 lb 4.8 oz)     Estimated body surface area is 1.72 meters squared as calculated from the  "following:    Height as of 7/16/20: 1.549 m (5' 1\").    Weight as of 10/8/20: 68.6 kg (151 lb 4.8 oz).    Labs:  _  Result Component Current Result Ref Range   Sodium 135 (10/8/2020) 133 - 144 mmol/L     _  Result Component Current Result Ref Range   Potassium 3.2 (L) (10/8/2020) 3.4 - 5.3 mmol/L     _  Result Component Current Result Ref Range   Calcium 8.5 (10/8/2020) 8.5 - 10.1 mg/dL     _  Result Component Current Result Ref Range   Magnesium 1.8 (10/8/2020) 1.6 - 2.3 mg/dL     No results found for Phos within last 30 days.     _  Result Component Current Result Ref Range   Albumin 3.3 (L) (10/8/2020) 3.4 - 5.0 g/dL     _  Result Component Current Result Ref Range   Urea Nitrogen 11 (10/8/2020) 7 - 30 mg/dL     _  Result Component Current Result Ref Range   Creatinine 0.69 (10/8/2020) 0.52 - 1.04 mg/dL       _  Result Component Current Result Ref Range   AST 42 (10/8/2020) 0 - 45 U/L     _  Result Component Current Result Ref Range   ALT 37 (10/8/2020) 0 - 50 U/L     _  Result Component Current Result Ref Range   Bilirubin Total 2.2 (H) (10/8/2020) 0.2 - 1.3 mg/dL       _  Result Component Current Result Ref Range   WBC 3.1 (L) (10/8/2020) 4.0 - 11.0 10e9/L     _  Result Component Current Result Ref Range   Hemoglobin 14.2 (10/8/2020) 11.7 - 15.7 g/dL     _  Result Component Current Result Ref Range   Platelet Count 32 (LL) (10/8/2020) 150 - 450 10e9/L     _  Result Component Current Result Ref Range   Absolute Neutrophil 2.2 (10/8/2020) 1.6 - 8.3 10e9/L         Assessment/Plan:  Patient is tolerating therapy reasonably well with expected side effects. Continue therapy.    Follow-Up:  In one month    Refill Due:  12/23     Hilario Macario, Pharm D.  Clinical Oncology Pharmacist- Oral Chemotherapy Monitoring Program  [phone]    October 22, 2020        "

## 2020-10-23 NOTE — TELEPHONE ENCOUNTER
Spoke with daughter, Mary. Patient will scheduled zofran 8 mh BID. Message sent to scheduling with request for labs & follow-up visit with Dr. Childress next week.

## 2020-10-23 NOTE — TELEPHONE ENCOUNTER
Telephone call received from patient, she reports nausea/vomiting which began last week. The vomiting is intermittent (not daily) with the last episode occurring last night. She is taking Zofran 8 mg as needed, she is not taking it with Levanitib as stated on the script-triage encouraged her to start to do so.     She mentions intermittent diarrhea which is not a new symptom. She is taking Imodium, typically one tablet twice per day.     She is consuming fluids, voiding. She does report increased weakness. Denies dizziness, shortness of breath, chest pain, fever or chills.

## 2020-10-28 NOTE — NURSING NOTE
"Oncology Rooming Note    October 28, 2020 2:05 PM   Dania Albert is a 54 year old female who presents for:    Chief Complaint   Patient presents with     Oncology Clinic Visit     Follow up     Initial Vitals: BP 94/72 (BP Location: Left arm)   Pulse 92   Temp 98  F (36.7  C) (Oral)   Resp 20   Wt 67.5 kg (148 lb 12.8 oz)   SpO2 (!) 76%   BMI 28.12 kg/m   Estimated body mass index is 28.12 kg/m  as calculated from the following:    Height as of 7/16/20: 1.549 m (5' 1\").    Weight as of this encounter: 67.5 kg (148 lb 12.8 oz). Body surface area is 1.7 meters squared.  Worst Pain (10) Comment: Data Unavailable   No LMP recorded. Patient is postmenopausal.  Allergies reviewed: Yes  Medications reviewed: Yes    Medications: Medication refills not needed today.  Pharmacy name entered into [employer]:    [id number] - [place], [place] - [address] [facility] - [place], [place] - [address]  [facility] - [place], [place] - [address]  [facility] - [place], [place] - [facility] [address]    Bambi Camarillo LPN              "

## 2020-10-28 NOTE — PATIENT INSTRUCTIONS
Advised to go to ED  Daughter will take her to [facility] ED    We will determine follow up accordingly

## 2020-10-28 NOTE — PROGRESS NOTES
Contacted RN in ER at the [place] with report regarding patient status.  Patient was seen in clinic at [place] by Dr. Childress and advised to go to the ER (see note).  Patient was accompanied by her daughter.

## 2020-10-28 NOTE — LETTER
10/28/2020         RE: Dania Albert  [id number] [address]        Dear Colleague,    Thank you for referring your patient, Dania Albert, to the [facility]. Please see a copy of my visit note below.    Oncology follow up visit:  Date on this visit: 10/28/2020     Dania Albert  is referred by  ref. provider found for an oncology consultation. She requires evaluation for new diagnosis of HCC    Primary Physician: Micaela Gould A       History Of Present Illness:  Please see my previous note for details.  I have copied and updated from prior note.    Ms. Albret is a 54 year old female who I initially evaluated in May 2020 for hepatocellular carcinoma .    She has Hep C cirrhosis. Hep C was previously treated with Sovaldi/ribavirin and achieved SVR.  She was found to have HCC in June 2019 and had TACE/Ablation for S3 lesion on Oct 2019 and then found to have a new S3 lesion for which she had a MWA on 3/17/2020.  Bone Scan in Dec 2019 was negative for metastatic disease.   CTA Chest in October 2019 was negative for metastatic disease.    A follow up MRI on 5/8/2020 showed no resdual viable cancer in the treatment zones but it found dramatic increase in number and sizes of numerous arterial enhancing foci scattered throughout the liver parenchyma which was very suspicious for multifocal HCC. None of these were diagnostic of HCC so she had liver biopsy which confirmed moderately differentiated HCC.    As she was not deemed a candidate for any further liver directed therapy, she was referred to medical oncology.    She has constant pain in the RUQ region/beneath the lower right rib cage since the MWA procedure in March 2020.   She had a tick bite and she completed a 21 day course of Doxycycline in early June 2020.    We got a baseline CT chest abdomen and pelvis on 6/12/2020.  Bone scan showed anterior right fifth rib and left clavicular head  metastasis.    She started lenvatinib on 6/30/2020.    She was admitted to the hospital from 7/16/2020 till 7/20/2020 for hepatic encephalopathy and noninfectious colitis.   She was initially started on antibiotics but later on it was a stopped.   MRI of the brain did not show any intracranial metastasis.  She was started on lactulose and rifaximin and her confusion improved.  Lenvatinib was held during the admission.    She had a repeat CT chest abdomen and pelvis which did not show any pulmonary embolism.  Interval advancement of portal venous thrombus involving the main portal vein and the left intrahepatic portal venous system was seen.  There was also interval development of low-attenuation lesions which were smaller than the arterial enhancing lesions seen on CT scan from June 2020.      Repeat scans including CT scan and bone scan on 9/16/2020 were stable to slightly improved.  Alpha-fetoprotein was also stable at 10.6.      We continued Lenvatinib      Interval history.  She comes into the clinic accompanied by her daughter.  She is not doing well.  She is feeling generally weak.  She is also complaining of lower abdominal pain and worsening diarrhea.  She took Imodium but it did not help but she only took it once or twice.  She also off-and-on has had nausea and vomiting.  She is baseline confused so she cannot exactly tell me when was the last vomiting.  She also noticed little bit of blood in the vomit at some point and I cannot exactly pinpoint the timeframe for it.  She has been feeling more short of breath and with little exertion gets short of breath.  She has inhalers which help a little.  She quit smoking 1 week ago.  She has loss of appetite and has lost some weight.  Complains of mouth soreness.  No unusual swellings.  Denies fevers.  Cannot tell me exactly whether she noticed any problem with urination or not.    In addition to the abdominal pain she is also complaining of generalized body aches  for which she is taking Tylenol on average 4 a day.    ECOG 2-3    ROS:  Rest of the comprehensive review of the system was essentially unremarkable.        I reviewed other history in epic as below.    Past Medical/Surgical History:  Past Medical History:   Diagnosis Date     Alcohol abuse      Bone metastasis (H) 7/15/2020     Chronic hepatitis C without hepatic coma (H) 10/23/2019    SVR     Cirrhosis of liver with ascites (H) 10/23/2019     COPD (chronic obstructive pulmonary disease) (H)      Depressive disorder      Diabetes (H)     Type 1 DM, Takes Insulin      Emphysema lung (H)      H/O esophageal varices      HCC (hepatocellular carcinoma) (H) 10/23/2019     History of blood transfusion     [facility] in 1983     ERICKA (obstructive sleep apnea)      Past Surgical History:   Procedure Laterality Date     ABDOMEN SURGERY      Gallbladder Removed      COLONOSCOPY      [place]     CYSTOSCOPY, INJECT BOTOX      detrusor injection     GI SURGERY      Upper Endoscopy at [facility] [place]      HERNIA REPAIR      Patient doesnt remember if mesh was used. [facility]      IR FLUORO 0-1 HOUR  3/17/2020     IR FLUORO 0-1 HOUR  3/17/2020     OPEN REDUCTION INTERNAL FIXATION WRIST Bilateral      Cancer History:   As above    Allergies:  Allergies as of 10/28/2020 - Reviewed 10/28/2020   Allergen Reaction Noted     Bee venom Other (See Comments) and Swelling 07/06/2012     Hydrocodone Other (See Comments) 02/18/2013     Nickel Rash 03/25/2016     Wool fiber Hives and Rash 03/25/2016     Current Medications:  Current Outpatient Medications   Medication Sig Dispense Refill     acetaminophen (TYLENOL) 500 MG tablet Take 500 mg by mouth every 8 hours as needed        albuterol (PROAIR HFA) 108 (90 Base) MCG/ACT inhaler Inhale 2 puffs into the lungs every 6 hours as needed        benzonatate (TESSALON) 100 MG capsule Take 1-2 capsules (100-200 mg) by mouth 3 times daily as needed for cough 90 capsule 0     blood glucose  monitoring (ONE TOUCH ULTRA MINI) meter device kit Use as directed to test glucose three times daily. Pharmacy dispense brand based on insurance.  Indications: Diabetes       calcium carbonate-vitamin D (OSCAL W/D) 500-200 MG-UNIT tablet Take 1 tablet by mouth 2 times daily (with meals) 60 tablet 3     cholecalciferol (VITAMIN D3) 1000 units (25 mcg) capsule Take 1 capsule (1,000 Units) by mouth daily 30 capsule 3     diclofenac (VOLTAREN) 1 % topical gel Place 2 g onto the skin 4 times daily 100 g 1     insulin glargine (LANTUS SOLOSTAR) 100 UNIT/ML pen Inject 38 Units Subcutaneous At Bedtime        Lenvatinib 12 MG, 3 x 4 mg capsules, (LENVIMA) 3 x 4 MG capsule Take 3 capsules (12 mg) by mouth daily 90 capsule 0     levothyroxine (SYNTHROID) 25 MCG tablet Take 1 tablet (25 mcg) by mouth daily 30 tablet 0     magic mouthwash suspension (diphenhydrAMINE, lidocaine, aluminum-magnesium & simethicone) Swish and swallow 10 mLs in mouth every 6 hours as needed for mouth sores 120 mL 0     melatonin 5 MG tablet Take 1 tablet (5 mg) by mouth nightly as needed for sleep 30 tablet 1     ondansetron (ZOFRAN-ODT) 8 MG ODT tab Take 1 tablet (8 mg) by mouth every 8 hours as needed for nausea And take 8mg prior to each dose of Levanitib. 30 tablet 3     OneTouch Delica Lancets 33G MISC Change lancet one time daily as directed.       oxyCODONE (ROXICODONE) 5 MG tablet Take 0.5-1 tablets (2.5-5 mg) by mouth every 6 hours as needed for severe pain 30 tablet 0     pantoprazole (PROTONIX) 40 MG EC tablet TAKE ONE TABLET BY MOUTH EVERY DAY       potassium chloride ER (KLOR-CON M) 20 MEQ CR tablet Take 1 tablet (20 mEq) by mouth daily 5 tablet 0     QUEtiapine (SEROQUEL) 50 MG tablet Take 50 mg by mouth At Bedtime  0     rifaximin (XIFAXAN) 550 MG TABS tablet Take 1 tablet (550 mg) by mouth 2 times daily 60 tablet 3     sertraline (ZOLOFT) 100 MG tablet Take 200 mg by mouth daily        simethicone (MYLICON) 80 MG chewable tablet Take 1  tablet (80 mg) by mouth every 6 hours as needed for flatulence or cramping 90 tablet 0     thiamine (B-1) 100 MG tablet Take 1 tablet (100 mg) by mouth daily 30 tablet 0     TRULICITY 0.75 MG/0.5ML pen INJECT 0.5 ml's SUBCUTANEOUSLY ONCE WEEKLY  2     lactulose (CHRONULAC) 10 GM/15ML solution Take 15 mLs (10 g) by mouth 3 times daily 500 mL 0     nicotine (NICORETTE) 2 MG gum Place 1 each (2 mg) inside cheek as needed for smoking cessation (Patient not taking: Reported on 9/17/2020) 60 each 3     order for DME Abdominal Binder - small (Patient not taking: Reported on 9/17/2020) 1 each 1      Family History:  Family History   Problem Relation Age of Onset     Coronary Artery Disease Mother      Coronary Artery Disease Father      No Known Problems Brother      Meniere's disease Sister      Diabetes Sister      No Known Problems Son      No Known Problems Daughter      Diabetes Sister      Liver Disease Sister      Chronic Obstructive Pulmonary Disease Sister    no hx of cancers- 2 kids- healthy    Social History:  Social History     Socioeconomic History     Marital status:      Spouse name: Not on file     Number of children: Not on file     Years of education: Not on file     Highest education level: Not on file   Occupational History     Not on file   Social Needs     Financial resource strain: Not on file     Food insecurity     Worry: Not on file     Inability: Not on file     Transportation needs     Medical: Not on file     Non-medical: Not on file   Tobacco Use     Smoking status: Current Every Day Smoker     Packs/day: 0.30     Years: 40.00     Pack years: 12.00     Types: Cigarettes     Smokeless tobacco: Never Used   Substance and Sexual Activity     Alcohol use: Not Currently     Comment: Rarely drank alcohol.      Drug use: Not Currently     Sexual activity: Not on file   Lifestyle     Physical activity     Days per week: Not on file     Minutes per session: Not on file     Stress: Not on file    Relationships     Social connections     Talks on phone: Not on file     Gets together: Not on file     Attends Alevism service: Not on file     Active member of club or organization: Not on file     Attends meetings of clubs or organizations: Not on file     Relationship status: Not on file     Intimate partner violence     Fear of current or ex partner: Not on file     Emotionally abused: Not on file     Physically abused: Not on file     Forced sexual activity: Not on file   Other Topics Concern     Parent/sibling w/ CABG, MI or angioplasty before 65F 55M? Not Asked   Social History Narrative     Not on file     Has been a chronic smoker for >40 years and now smoking 1/3 ppd. She used to drink alcohol in the past. Denies heavy alcohol use. Last alcohol use was long time ago. Lives with .   She also wants her daughter Mary [phone] closely involved in her care.    Physical Exam:  BP 94/72 (BP Location: Left arm)   Pulse 92   Temp 98  F (36.7  C) (Oral)   Resp 20   Wt 67.5 kg (148 lb 12.8 oz)   SpO2 (!) 76%   BMI 28.12 kg/m      Wt Readings from Last 4 Encounters:   10/28/20 67.5 kg (148 lb 12.8 oz)   10/08/20 68.6 kg (151 lb 4.8 oz)   08/27/20 72.6 kg (160 lb 0.9 oz)   08/19/20 74.1 kg (163 lb 4.8 oz)       CONSTITUTIONAL: She looks unwell and is confused at baseline.  It was somewhat difficult for her to move from the chair to the examination table and she was short of breath doing so.  EYES: PERRLA, without pallor.  Mild jaundice.  ENT/MOUTH: Ears unremarkable. No oral lesions  CVS: s1s2 normal  RESPIRATORY: Chest is clear but distant breath sounds.  GI: Abdomen is soft.  There is tenderness across the lower abdomen without guarding rigidity or rebound.  NEURO: She is moving all extremities.  INTEGUMENT: no concerning skin rashes   LYMPHATIC: no palpable lymphadenopathy  MUSCULOSKELETAL: Unremarkable. No bony tenderness.   EXTREMITIES: no pedal edema. She has clubbing  PSYCH: She is  confused.      Laboratory/Imaging Studies    Reviewed    Results for ROBBIE RAWLS (MRN [id number]) as of 10/28/2020 14:49   Ref. Range 10/28/2020 13:38   Sodium Latest Ref Range: 133 - 144 mmol/L 137   Potassium Latest Ref Range: 3.4 - 5.3 mmol/L 3.0 (L)   Chloride Latest Ref Range: 94 - 109 mmol/L 102   Carbon Dioxide Latest Ref Range: 20 - 32 mmol/L 29   Urea Nitrogen Latest Ref Range: 7 - 30 mg/dL 6 (L)   Creatinine Latest Ref Range: 0.52 - 1.04 mg/dL 0.52   GFR Estimate Latest Ref Range: >60 mL/min/1.73_m2 >90   GFR Estimate If Black Latest Ref Range: >60 mL/min/1.73_m2 >90   Calcium Latest Ref Range: 8.5 - 10.1 mg/dL 9.0   Anion Gap Latest Ref Range: 3 - 14 mmol/L 6   Magnesium Latest Ref Range: 1.6 - 2.3 mg/dL 1.4 (L)   Albumin Latest Ref Range: 3.4 - 5.0 g/dL 3.2 (L)   Protein Total Latest Ref Range: 6.8 - 8.8 g/dL 7.4   Bilirubin Total Latest Ref Range: 0.2 - 1.3 mg/dL 2.4 (H)   Alkaline Phosphatase Latest Ref Range: 40 - 150 U/L 115   ALT Latest Ref Range: 0 - 50 U/L 39   AST Latest Ref Range: 0 - 45 U/L 49 (H)   T4 Free Latest Ref Range: 0.76 - 1.46 ng/dL 1.01   TSH Latest Ref Range: 0.40 - 4.00 mU/L 5.66 (H)   Glucose Latest Ref Range: 70 - 99 mg/dL 147 (H)   WBC Latest Ref Range: 4.0 - 11.0 10e9/L 3.1 (L)   Hemoglobin Latest Ref Range: 11.7 - 15.7 g/dL 14.5   Hematocrit Latest Ref Range: 35.0 - 47.0 % 41.9   Platelet Count Latest Ref Range: 150 - 450 10e9/L 35 (LL)   RBC Count Latest Ref Range: 3.8 - 5.2 10e12/L 3.88   MCV Latest Ref Range: 78 - 100 fl 108 (H)   MCH Latest Ref Range: 26.5 - 33.0 pg 37.4 (H)   MCHC Latest Ref Range: 31.5 - 36.5 g/dL 34.6   RDW Latest Ref Range: 10.0 - 15.0 % 17.9 (H)   Diff Method Unknown Automated Method   % Neutrophils Latest Units: % 64.5   % Lymphocytes Latest Units: % 28.2   % Monocytes Latest Units: % 5.1   % Eosinophils Latest Units: % 1.3   % Basophils Latest Units: % 0.6   % Immature Granulocytes Latest Units: % 0.3   Absolute Neutrophil Latest Ref Range: 1.6  - 8.3 10e9/L 2.0   Absolute Lymphocytes Latest Ref Range: 0.8 - 5.3 10e9/L 0.9   Absolute Monocytes Latest Ref Range: 0.0 - 1.3 10e9/L 0.2   Absolute Eosinophils Latest Ref Range: 0.0 - 0.7 10e9/L 0.0   Absolute Basophils Latest Ref Range: 0.0 - 0.2 10e9/L 0.0   Abs Immature Granulocytes Latest Ref Range: 0 - 0.4 10e9/L 0.0       ASSESSMENT/PLAN:    HCC in the setting of Hep C cirrhosis and initialy treated with TACE/ablation to S3 lesion in Oct 2019 and then had a new S3 lesion treated with MWA in March 2020. Unfortunately on repeat MRI in May 2020, she has now been found to have multifocal HCC- biopsy proven.  She also has probably bone metastasis with right anterior 5th rib lesion and left clavicular head lesion.     Started Lenvatinib on 6/30/2020 and held during admission for hepatic encephalopathy on 7/16/2020.    It was restarted later in July 2020 and she has been taking that regularly.  Repeat scans including CT scan and bone scan on 9/16/2020 were stable to slightly improved.  Alpha-fetoprotein was also stable at 10.6.      We continued lenvatinib.    She is not doing well and I am concerned about her safety at home currently in a current situation and I strongly recommend that she goes to the hospital.  Initially she was denying that but after I examined her I try to convince her again it is not safe for her to be at home and she needs to go to the hospital.  She has been feeling generally weak and she is having abdominal pain and diarrhea and decreased p.o. intake and has lost weight.  She has significant tenderness across the lower abdomen.  She also has confusion and she is feeling short of breath.  Her oxygen saturation which was recorded here was 76% but I do not believe that it is correct.    I would like her to hold lenvatinib for now and I believe she would need to repeat CT scan.  She could have colitis.  Her nausea and vomiting needs to be under better control and so is her diarrhea.  If she is  not found to have any infections then she can use Imodium as needed for diarrhea.    Initially I was planning to repeat CT scan/bone scan next month but we might have to repeat that earlier.    Shortness of breath and cough.  I believe it would be reasonable to check CT chest abdomen and pelvis which would be good for both her respiratory complaints as well as the abdominal pain.    Electrolyte imbalance.  She also has hypokalemia and hypomagnesemia which need to be replaced.    Weight loss.  Due to poor eating and nausea vomiting and diarrhea she has lost weight.  I again recommended to her that she would benefit from a nutrition consult but she does not want to that.    Hepatic encephalopathy/cirrhosis.  She is confused and I believe it is somewhat worse than her baseline.  She has not been on lactulose for a couple of months because of diarrhea.  I advised her to follow closely with Dr. Bergman upon discharge.    Leukopenia/thrombocytopenia.  This is from underlying cirrhosis and portal hypertension and splenomegaly.  Lenvatinib is probably not the main culprit.    Mouth soreness.  She should continue salt/baking soda mouth rinses and Magic mouthwash as needed.        We did not address the following today.  Bone metastasis.  She has evidence of bone metastasis to the left clavicular head and right anterior fifth rib.  She started Zometa on 8/19/2020 and will be getting it every 3 months.  Continue to take vitamin D and calcium.      Portal vein thrombus.  She has thrombus in the main and left portal veins.  Because of significant thrombocytopenia, I believe that risk of bleeding would be very high if we start her on systemic anticoagulation so I am not planning to do that.  At this time I am not certain whether she has any symptoms from this or not.  She does have right upper quadrant pain but this could be related to the underlying malignancy.       Discussion regarding health care directive  We discussed that  it is important that she completes health care directive which would help in making sure that her wishes are followed about her treatment care in case she is not able to make a decision for herself.  We gave her information regarding that.      We will determine the follow-up after she is discharged from the hospital.    I answered all of her and Mary's questions to their satisfaction.  They are agreeable and comfortable with the plan.      Dhruv Childress MD        Again, thank you for allowing me to participate in the care of your patient.        Sincerely,        Dhruv Childress MD

## 2020-10-28 NOTE — PROGRESS NOTES
Oncology follow up visit:  Date on this visit: 10/28/2020     Dania Albert  is referred by  ref. provider found for an oncology consultation. She requires evaluation for new diagnosis of HCC    Primary Physician: Micaela Gould       History Of Present Illness:  Please see my previous note for details.  I have copied and updated from prior note.    Ms. Albert is a 54 year old female who I initially evaluated in May 2020 for hepatocellular carcinoma .    She has Hep C cirrhosis. Hep C was previously treated with Sovaldi/ribavirin and achieved SVR.  She was found to have HCC in June 2019 and had TACE/Ablation for S3 lesion on Oct 2019 and then found to have a new S3 lesion for which she had a MWA on 3/17/2020.  Bone Scan in Dec 2019 was negative for metastatic disease.   CTA Chest in October 2019 was negative for metastatic disease.    A follow up MRI on 5/8/2020 showed no resdual viable cancer in the treatment zones but it found dramatic increase in number and sizes of numerous arterial enhancing foci scattered throughout the liver parenchyma which was very suspicious for multifocal HCC. None of these were diagnostic of HCC so she had liver biopsy which confirmed moderately differentiated HCC.    As she was not deemed a candidate for any further liver directed therapy, she was referred to medical oncology.    She has constant pain in the RUQ region/beneath the lower right rib cage since the MWA procedure in March 2020.   She had a tick bite and she completed a 21 day course of Doxycycline in early June 2020.    We got a baseline CT chest abdomen and pelvis on 6/12/2020.  Bone scan showed anterior right fifth rib and left clavicular head metastasis.    She started lenvatinib on 6/30/2020.    She was admitted to the hospital from 7/16/2020 till 7/20/2020 for hepatic encephalopathy and noninfectious colitis.   She was initially started on antibiotics but later on it was a stopped.   MRI of the brain did  not show any intracranial metastasis.  She was started on lactulose and rifaximin and her confusion improved.  Lenvatinib was held during the admission.    She had a repeat CT chest abdomen and pelvis which did not show any pulmonary embolism.  Interval advancement of portal venous thrombus involving the main portal vein and the left intrahepatic portal venous system was seen.  There was also interval development of low-attenuation lesions which were smaller than the arterial enhancing lesions seen on CT scan from June 2020.      Repeat scans including CT scan and bone scan on 9/16/2020 were stable to slightly improved.  Alpha-fetoprotein was also stable at 10.6.      We continued Lenvatinib      Interval history.  She comes into the clinic accompanied by her daughter.  She is not doing well.  She is feeling generally weak.  She is also complaining of lower abdominal pain and worsening diarrhea.  She took Imodium but it did not help but she only took it once or twice.  She also off-and-on has had nausea and vomiting.  She is baseline confused so she cannot exactly tell me when was the last vomiting.  She also noticed little bit of blood in the vomit at some point and I cannot exactly pinpoint the timeframe for it.  She has been feeling more short of breath and with little exertion gets short of breath.  She has inhalers which help a little.  She quit smoking 1 week ago.  She has loss of appetite and has lost some weight.  Complains of mouth soreness.  No unusual swellings.  Denies fevers.  Cannot tell me exactly whether she noticed any problem with urination or not.    In addition to the abdominal pain she is also complaining of generalized body aches for which she is taking Tylenol on average 4 a day.    ECOG 2-3    ROS:  Rest of the comprehensive review of the system was essentially unremarkable.        I reviewed other history in epic as below.    Past Medical/Surgical History:  Past Medical History:   Diagnosis  Date     Alcohol abuse      Bone metastasis (H) 7/15/2020     Chronic hepatitis C without hepatic coma (H) 10/23/2019    SVR     Cirrhosis of liver with ascites (H) 10/23/2019     COPD (chronic obstructive pulmonary disease) (H)      Depressive disorder      Diabetes (H)     Type 1 DM, Takes Insulin      Emphysema lung (H)      H/O esophageal varices      HCC (hepatocellular carcinoma) (H) 10/23/2019     History of blood transfusion     [facility] in 1983     ERICKA (obstructive sleep apnea)      Past Surgical History:   Procedure Laterality Date     ABDOMEN SURGERY      Gallbladder Removed      COLONOSCOPY      [place]     CYSTOSCOPY, INJECT BOTOX      detrusor injection     GI SURGERY      Upper Endoscopy at [facility]      HERNIA REPAIR      Patient doesnt remember if mesh was used. [facility]      IR FLUORO 0-1 HOUR  3/17/2020     IR FLUORO 0-1 HOUR  3/17/2020     OPEN REDUCTION INTERNAL FIXATION WRIST Bilateral      Cancer History:   As above    Allergies:  Allergies as of 10/28/2020 - Reviewed 10/28/2020   Allergen Reaction Noted     Bee venom Other (See Comments) and Swelling 07/06/2012     Hydrocodone Other (See Comments) 02/18/2013     Nickel Rash 03/25/2016     Wool fiber Hives and Rash 03/25/2016     Current Medications:  Current Outpatient Medications   Medication Sig Dispense Refill     acetaminophen (TYLENOL) 500 MG tablet Take 500 mg by mouth every 8 hours as needed        albuterol (PROAIR HFA) 108 (90 Base) MCG/ACT inhaler Inhale 2 puffs into the lungs every 6 hours as needed        benzonatate (TESSALON) 100 MG capsule Take 1-2 capsules (100-200 mg) by mouth 3 times daily as needed for cough 90 capsule 0     blood glucose monitoring (ONE TOUCH ULTRA MINI) meter device kit Use as directed to test glucose three times daily. Pharmacy dispense brand based on insurance.  Indications: Diabetes       calcium carbonate-vitamin D (OSCAL W/D) 500-200 MG-UNIT tablet Take 1 tablet by mouth 2 times  daily (with meals) 60 tablet 3     cholecalciferol (VITAMIN D3) 1000 units (25 mcg) capsule Take 1 capsule (1,000 Units) by mouth daily 30 capsule 3     diclofenac (VOLTAREN) 1 % topical gel Place 2 g onto the skin 4 times daily 100 g 1     insulin glargine (LANTUS SOLOSTAR) 100 UNIT/ML pen Inject 38 Units Subcutaneous At Bedtime        Lenvatinib 12 MG, 3 x 4 mg capsules, (LENVIMA) 3 x 4 MG capsule Take 3 capsules (12 mg) by mouth daily 90 capsule 0     levothyroxine (SYNTHROID) 25 MCG tablet Take 1 tablet (25 mcg) by mouth daily 30 tablet 0     magic mouthwash suspension (diphenhydrAMINE, lidocaine, aluminum-magnesium & simethicone) Swish and swallow 10 mLs in mouth every 6 hours as needed for mouth sores 120 mL 0     melatonin 5 MG tablet Take 1 tablet (5 mg) by mouth nightly as needed for sleep 30 tablet 1     ondansetron (ZOFRAN-ODT) 8 MG ODT tab Take 1 tablet (8 mg) by mouth every 8 hours as needed for nausea And take 8mg prior to each dose of Levanitib. 30 tablet 3     OneTouch Delica Lancets 33G MISC Change lancet one time daily as directed.       oxyCODONE (ROXICODONE) 5 MG tablet Take 0.5-1 tablets (2.5-5 mg) by mouth every 6 hours as needed for severe pain 30 tablet 0     pantoprazole (PROTONIX) 40 MG EC tablet TAKE ONE TABLET BY MOUTH EVERY DAY       potassium chloride ER (KLOR-CON M) 20 MEQ CR tablet Take 1 tablet (20 mEq) by mouth daily 5 tablet 0     QUEtiapine (SEROQUEL) 50 MG tablet Take 50 mg by mouth At Bedtime  0     rifaximin (XIFAXAN) 550 MG TABS tablet Take 1 tablet (550 mg) by mouth 2 times daily 60 tablet 3     sertraline (ZOLOFT) 100 MG tablet Take 200 mg by mouth daily        simethicone (MYLICON) 80 MG chewable tablet Take 1 tablet (80 mg) by mouth every 6 hours as needed for flatulence or cramping 90 tablet 0     thiamine (B-1) 100 MG tablet Take 1 tablet (100 mg) by mouth daily 30 tablet 0     TRULICITY 0.75 MG/0.5ML pen INJECT 0.5 ml's SUBCUTANEOUSLY ONCE WEEKLY  2     lactulose  (CHRONULAC) 10 GM/15ML solution Take 15 mLs (10 g) by mouth 3 times daily 500 mL 0     nicotine (NICORETTE) 2 MG gum Place 1 each (2 mg) inside cheek as needed for smoking cessation (Patient not taking: Reported on 9/17/2020) 60 each 3     order for DME Abdominal Binder - small (Patient not taking: Reported on 9/17/2020) 1 each 1      Family History:  Family History   Problem Relation Age of Onset     Coronary Artery Disease Mother      Coronary Artery Disease Father      No Known Problems Brother      Meniere's disease Sister      Diabetes Sister      No Known Problems Son      No Known Problems Daughter      Diabetes Sister      Liver Disease Sister      Chronic Obstructive Pulmonary Disease Sister    no hx of cancers- 2 kids- healthy    Social History:  Social History     Socioeconomic History     Marital status:      Spouse name: Not on file     Number of children: Not on file     Years of education: Not on file     Highest education level: Not on file   Occupational History     Not on file   Social Needs     Financial resource strain: Not on file     Food insecurity     Worry: Not on file     Inability: Not on file     Transportation needs     Medical: Not on file     Non-medical: Not on file   Tobacco Use     Smoking status: Current Every Day Smoker     Packs/day: 0.30     Years: 40.00     Pack years: 12.00     Types: Cigarettes     Smokeless tobacco: Never Used   Substance and Sexual Activity     Alcohol use: Not Currently     Comment: Rarely drank alcohol.      Drug use: Not Currently     Sexual activity: Not on file   Lifestyle     Physical activity     Days per week: Not on file     Minutes per session: Not on file     Stress: Not on file   Relationships     Social connections     Talks on phone: Not on file     Gets together: Not on file     Attends Jewish service: Not on file     Active member of club or organization: Not on file     Attends meetings of clubs or organizations: Not on file      Relationship status: Not on file     Intimate partner violence     Fear of current or ex partner: Not on file     Emotionally abused: Not on file     Physically abused: Not on file     Forced sexual activity: Not on file   Other Topics Concern     Parent/sibling w/ CABG, MI or angioplasty before 65F 55M? Not Asked   Social History Narrative     Not on file     Has been a chronic smoker for >40 years and now smoking 1/3 ppd. She used to drink alcohol in the past. Denies heavy alcohol use. Last alcohol use was long time ago. Lives with .   She also wants her daughter Mary [phone] closely involved in her care.    Physical Exam:  BP 94/72 (BP Location: Left arm)   Pulse 92   Temp 98  F (36.7  C) (Oral)   Resp 20   Wt 67.5 kg (148 lb 12.8 oz)   SpO2 (!) 76%   BMI 28.12 kg/m      Wt Readings from Last 4 Encounters:   10/28/20 67.5 kg (148 lb 12.8 oz)   10/08/20 68.6 kg (151 lb 4.8 oz)   08/27/20 72.6 kg (160 lb 0.9 oz)   08/19/20 74.1 kg (163 lb 4.8 oz)       CONSTITUTIONAL: She looks unwell and is confused at baseline.  It was somewhat difficult for her to move from the chair to the examination table and she was short of breath doing so.  EYES: PERRLA, without pallor.  Mild jaundice.  ENT/MOUTH: Ears unremarkable. No oral lesions  CVS: s1s2 normal  RESPIRATORY: Chest is clear but distant breath sounds.  GI: Abdomen is soft.  There is tenderness across the lower abdomen without guarding rigidity or rebound.  NEURO: She is moving all extremities.  INTEGUMENT: no concerning skin rashes   LYMPHATIC: no palpable lymphadenopathy  MUSCULOSKELETAL: Unremarkable. No bony tenderness.   EXTREMITIES: no pedal edema. She has clubbing  PSYCH: She is confused.      Laboratory/Imaging Studies    Reviewed    Results for RAWLSROBBIE FERREIRA (MRN [id number]) as of 10/28/2020 14:49   Ref. Range 10/28/2020 13:38   Sodium Latest Ref Range: 133 - 144 mmol/L 137   Potassium Latest Ref Range: 3.4 - 5.3 mmol/L 3.0 (L)    Chloride Latest Ref Range: 94 - 109 mmol/L 102   Carbon Dioxide Latest Ref Range: 20 - 32 mmol/L 29   Urea Nitrogen Latest Ref Range: 7 - 30 mg/dL 6 (L)   Creatinine Latest Ref Range: 0.52 - 1.04 mg/dL 0.52   GFR Estimate Latest Ref Range: >60 mL/min/1.73_m2 >90   GFR Estimate If Black Latest Ref Range: >60 mL/min/1.73_m2 >90   Calcium Latest Ref Range: 8.5 - 10.1 mg/dL 9.0   Anion Gap Latest Ref Range: 3 - 14 mmol/L 6   Magnesium Latest Ref Range: 1.6 - 2.3 mg/dL 1.4 (L)   Albumin Latest Ref Range: 3.4 - 5.0 g/dL 3.2 (L)   Protein Total Latest Ref Range: 6.8 - 8.8 g/dL 7.4   Bilirubin Total Latest Ref Range: 0.2 - 1.3 mg/dL 2.4 (H)   Alkaline Phosphatase Latest Ref Range: 40 - 150 U/L 115   ALT Latest Ref Range: 0 - 50 U/L 39   AST Latest Ref Range: 0 - 45 U/L 49 (H)   T4 Free Latest Ref Range: 0.76 - 1.46 ng/dL 1.01   TSH Latest Ref Range: 0.40 - 4.00 mU/L 5.66 (H)   Glucose Latest Ref Range: 70 - 99 mg/dL 147 (H)   WBC Latest Ref Range: 4.0 - 11.0 10e9/L 3.1 (L)   Hemoglobin Latest Ref Range: 11.7 - 15.7 g/dL 14.5   Hematocrit Latest Ref Range: 35.0 - 47.0 % 41.9   Platelet Count Latest Ref Range: 150 - 450 10e9/L 35 (LL)   RBC Count Latest Ref Range: 3.8 - 5.2 10e12/L 3.88   MCV Latest Ref Range: 78 - 100 fl 108 (H)   MCH Latest Ref Range: 26.5 - 33.0 pg 37.4 (H)   MCHC Latest Ref Range: 31.5 - 36.5 g/dL 34.6   RDW Latest Ref Range: 10.0 - 15.0 % 17.9 (H)   Diff Method Unknown Automated Method   % Neutrophils Latest Units: % 64.5   % Lymphocytes Latest Units: % 28.2   % Monocytes Latest Units: % 5.1   % Eosinophils Latest Units: % 1.3   % Basophils Latest Units: % 0.6   % Immature Granulocytes Latest Units: % 0.3   Absolute Neutrophil Latest Ref Range: 1.6 - 8.3 10e9/L 2.0   Absolute Lymphocytes Latest Ref Range: 0.8 - 5.3 10e9/L 0.9   Absolute Monocytes Latest Ref Range: 0.0 - 1.3 10e9/L 0.2   Absolute Eosinophils Latest Ref Range: 0.0 - 0.7 10e9/L 0.0   Absolute Basophils Latest Ref Range: 0.0 - 0.2 10e9/L  0.0   Abs Immature Granulocytes Latest Ref Range: 0 - 0.4 10e9/L 0.0       ASSESSMENT/PLAN:    HCC in the setting of Hep C cirrhosis and initialy treated with TACE/ablation to S3 lesion in Oct 2019 and then had a new S3 lesion treated with MWA in March 2020. Unfortunately on repeat MRI in May 2020, she has now been found to have multifocal HCC- biopsy proven.  She also has probably bone metastasis with right anterior 5th rib lesion and left clavicular head lesion.     Started Lenvatinib on 6/30/2020 and held during admission for hepatic encephalopathy on 7/16/2020.    It was restarted later in July 2020 and she has been taking that regularly.  Repeat scans including CT scan and bone scan on 9/16/2020 were stable to slightly improved.  Alpha-fetoprotein was also stable at 10.6.      We continued lenvatinib.    She is not doing well and I am concerned about her safety at home currently in a current situation and I strongly recommend that she goes to the hospital.  Initially she was denying that but after I examined her I try to convince her again it is not safe for her to be at home and she needs to go to the hospital.  She has been feeling generally weak and she is having abdominal pain and diarrhea and decreased p.o. intake and has lost weight.  She has significant tenderness across the lower abdomen.  She also has confusion and she is feeling short of breath.  Her oxygen saturation which was recorded here was 76% but I do not believe that it is correct.    I would like her to hold lenvatinib for now and I believe she would need to repeat CT scan.  She could have colitis.  Her nausea and vomiting needs to be under better control and so is her diarrhea.  If she is not found to have any infections then she can use Imodium as needed for diarrhea.    Initially I was planning to repeat CT scan/bone scan next month but we might have to repeat that earlier.    Shortness of breath and cough.  I believe it would be  reasonable to check CT chest abdomen and pelvis which would be good for both her respiratory complaints as well as the abdominal pain.    Electrolyte imbalance.  She also has hypokalemia and hypomagnesemia which need to be replaced.    Weight loss.  Due to poor eating and nausea vomiting and diarrhea she has lost weight.  I again recommended to her that she would benefit from a nutrition consult but she does not want to that.    Hepatic encephalopathy/cirrhosis.  She is confused and I believe it is somewhat worse than her baseline.  She has not been on lactulose for a couple of months because of diarrhea.  I advised her to follow closely with Dr. Bergman upon discharge.    Leukopenia/thrombocytopenia.  This is from underlying cirrhosis and portal hypertension and splenomegaly.  Lenvatinib is probably not the main culprit.    Mouth soreness.  She should continue salt/baking soda mouth rinses and Magic mouthwash as needed.        We did not address the following today.  Bone metastasis.  She has evidence of bone metastasis to the left clavicular head and right anterior fifth rib.  She started Zometa on 8/19/2020 and will be getting it every 3 months.  Continue to take vitamin D and calcium.      Portal vein thrombus.  She has thrombus in the main and left portal veins.  Because of significant thrombocytopenia, I believe that risk of bleeding would be very high if we start her on systemic anticoagulation so I am not planning to do that.  At this time I am not certain whether she has any symptoms from this or not.  She does have right upper quadrant pain but this could be related to the underlying malignancy.       Discussion regarding health care directive  We discussed that it is important that she completes health care directive which would help in making sure that her wishes are followed about her treatment care in case she is not able to make a decision for herself.  We gave her information regarding that.      We  will determine the follow-up after she is discharged from the hospital.    I answered all of her and Mary's questions to their satisfaction.  They are agreeable and comfortable with the plan.      Dhruv Childress MD

## 2020-10-29 PROBLEM — R09.02 HYPOXEMIA: Status: ACTIVE | Noted: 2020-01-01

## 2020-10-29 PROBLEM — K76.82 HEPATIC ENCEPHALOPATHY (H): Status: ACTIVE | Noted: 2020-01-01

## 2020-10-29 PROBLEM — J18.9 PNEUMONIA DUE TO INFECTIOUS ORGANISM, UNSPECIFIED LATERALITY, UNSPECIFIED PART OF LUNG: Status: ACTIVE | Noted: 2020-01-01

## 2020-10-29 NOTE — ED TRIAGE NOTES
"Dania comes to the ED this morning stating that her doctor sent her to be admitted.  She was asked to come to the hospital yesterday however was unable.  She is reporting a 2 month history of intermittent nausea and vomiting and diarrhea episodes \"every time I eat or drink\".  She in unsure of whether she has had a fever but has experienced a \"dry cough\"  "

## 2020-10-29 NOTE — H&P
[facility]     History and Physical - Hospitalist Service, Gold 6       Date of Admission:  10/29/2020    Assessment & Plan   Dania Albert is a 54 year old female with past medical history significant for multifocal HCC s/p TACE and ablation (10/2019) now w/ bone metastasis, HCV cirrhosis c/b EVs and ascites, COPD, type 2 DM, depression, and anxiety admitted for nausea, vomiting, and diarrhea, and found to be hypoxic, concerning for COVID 19 infection.     # Acute hypoxic respiratory failure   # High suspicion for COVID 19 vs CAP vs COPD exacerbation  Admitted w/ nausea, vomiting, and diarrhea.  Requiring 8L suppl O2 on admission.  CXR with bilateral peripheral and bibasilar reticular interstitial fibrotic opacities, c/f superimposed edema vs infection vs COPD exacerbation.  Denies supp O2 use at home.  Procal neg.  Trop neg.  EKG unremarkable.  Leukopenic as below.    - COVID/iso precautions  - Not low suspicion, would recheck in 72 hr if neg  - Continue Ceftriaxone and Azithromycin IV for now   - Continue supp O2, though suspect hypercapnic drive given hx COPD   - Albuterol inhaler PRN     # Possible UTI - UA with large LE, few bacteria, 46 WBC.  Reports urinary frequency.    - Continue Ceftriaxone IV   - UC pending     # Multifocal HCC s/p TACE and ablation (10/2019)  # Recently identified bony metastasis   # Cancer related pain, neuropathy   Follows w/ Dr. Childress, last seen in clinic yesterday 10/28.  Initially dx with HCC in 6/2019 and underwent TACE/ablation in 10/2019 and MWA in 3/2020.  Last MRI in 5/2020 without residual viable cancer in tx zones but c/f multifocal disease d/t dramatic increase in number and sizes of numerous arterial enhancing foci scattered throughout the liver parenchyma.  CT C/A/P 6/2020 with mets to anterior right fifth rib and left clavicular head.  Started on Lenvatinib on 6/30/2020.   - Oncology consult placed   - Continue magic  mouthwash for stomatitis   - CMP in AM    - Pain management: continue Oxycodone 2.5-5mg Q4H PRN; will start low dose Gabapentin for lower ext neuropathy and uptitrate as tolerated       # Hx HCV cirrhosis c/b EVs, ascites, HE, PVT - S/p tx with Sovaldi/Ribavirin.  Known to have non-occlusive thrombus in main and left portal veins, ultimately not a candidate for AC d/t chronic thrombocytopenia and risk for bleeding.  On Rifaximin PTA.  Previously on Lactulose but this appears to have been stopped in 8/2020 due to issues with persistent diarrhea.  Ammonia is elevated to 87 on admission.  Tbili 2.2. INR 1.19. Other LFTs unremarkable.    - Will consider Hepatology consult     - Continue Rifaximin   - Hold Lactulose pending w/u diarrhea vs colitis     # R hip pain - Ongoing for 1-2 months.  Denies recent falls.   - Check hip XR  - Pain management as above     # Type 2 DM - Last Hgb A1c in 2019.  Poor PO recently, unable to describe recent meal intake.  Reports taking Lantus 40 units at HS.    - Resume Lantus at 20 units (50% reduction)  - Check BGs Q4H  - Hypoglycemia protocol   - Regular diet   - Check Hgb A1c     # Leukopenia  # Pancytopenia   Likely 2/2 underlying liver disease and HCC.  Per last Oncology note, not felt to be d/t chemo.  WBC 2.9, plt 40 on admission.  No acute s/s active bleed but noted to have ecchymoses on upper extremities.   - Trend CBC daily   - Transfuse Hgb < 7, plt < 10     # Risk for malnutrition   # Nausea, vomiting   Ongoing last 2 months.  Likely 2/2 chemotherapy.  Cannot recall last meal.    - Nutrition consult    - Calorie counts   - Anti-emetics PRN   - Monitor weights     # Diarrhea, abdominal pain - Possible colitis 2/2 chemotherapy with Lenvatinib.      - Check C diff, MARC  - If neg, will resume antidiarrheals     # GERD - Continue PPI     # Depression, anxiety - Continue PTA Seroquel            Diet:   Regular   DVT Prophylaxis: Mechanical d/t thrombocytopenia  Valdes Catheter: not  "present  Code Status:   FULL   Rule Out COVID-19 Handoff:  Dania is NOT A LOW SUSPICION PUI (needs further investigation).    Follow these instructions:    If COVID test is positive -> continue isolation precautions    If 1st COVID test is negative -> continue isolation precautions    -  Order a repeat COVID test to be done 72 hours after the 1st test  -  Place \"PUI Isolation\" nurse communication order  -  Consider ID consult    If 2nd COVID test performed after 72 hours is negative -> consider discontinuing COVID-specific isolation precautions if clinical course atypical for COVID and/or an alternative diagnosis emerges       Disposition Plan   Expected discharge: 4 - 7 days, recommended to transfer to [facility] pending  COVID results, hypoxia resolved, and Oncology consultation complete.  Entered: DEUCE Chakraborty CNP 10/29/2020, 3:12 PM     The patient's care was discussed with the Attending Physician, Dr. Sekou Molina.    DEUCE Chakraborty CNP  [facility]   Contact information available via [facility] Paging/Directory  Please see sign in/sign out for up to date coverage information    ______________________________________________________________________    Chief Complaint   \"My clinic doctor - Dr Childress - told me to come\"     History is obtained from the patient and chart review.      History of Present Illness   Dania Albert is a 54 year old female with past medical history significant for multifocal HCC s/p TACE and ablation (10/2019) now w/ bone metastasis, HCV cirrhosis c/b EVs and ascites, COPD, type 2 DM, depression, and anxiety admitted for nausea, vomiting, and diarrhea, and found to be hypoxic, concerning for COVID 19 infection.     Dania is resting in bed.  She reports having nausea, vomiting, and diarrhea \"for several months\".  Does not recall bloody stools.  She doesn't recall when she last ate a meal.  She reports some chest pain, but " denies feeling short of breath.  Reports some R hip pain that has been present for a month, but denies falls.  She reports watery stools and frequent urination.      Review of Systems    The 10 point Review of Systems is negative other than noted in the HPI or here.     Past Medical History    I have reviewed this patient's medical history and updated it with pertinent information if needed.   Past Medical History:   Diagnosis Date     Alcohol abuse      Bone metastasis (H) 7/15/2020     Chronic hepatitis C without hepatic coma (H) 10/23/2019    SVR     Cirrhosis of liver with ascites (H) 10/23/2019     COPD (chronic obstructive pulmonary disease) (H)      Depressive disorder      Diabetes (H)     Type 1 DM, Takes Insulin      Emphysema lung (H)      H/O esophageal varices      HCC (hepatocellular carcinoma) (H) 10/23/2019     History of blood transfusion     [facility] in 1983     ERICKA (obstructive sleep apnea)        Past Surgical History   I have reviewed this patient's surgical history and updated it with pertinent information if needed.  Past Surgical History:   Procedure Laterality Date     ABDOMEN SURGERY      Gallbladder Removed      COLONOSCOPY      [place]     CYSTOSCOPY, INJECT BOTOX      detrusor injection     GI SURGERY      Upper Endoscopy at [facility]      HERNIA REPAIR      Patient doesnt remember if mesh was used. [facility]      IR FLUORO 0-1 HOUR  3/17/2020     IR FLUORO 0-1 HOUR  3/17/2020     OPEN REDUCTION INTERNAL FIXATION WRIST Bilateral        Social History   I have reviewed this patient's social history and updated it with pertinent information if needed.  Social History     Tobacco Use     Smoking status: Current Every Day Smoker     Packs/day: 0.30     Years: 40.00     Pack years: 12.00     Types: Cigarettes     Smokeless tobacco: Never Used   Substance Use Topics     Alcohol use: Not Currently     Comment: Rarely drank alcohol.      Drug use: Not Currently       Family  History   I have reviewed this patient's family history and updated it with pertinent information if needed.  Family History   Problem Relation Age of Onset     Coronary Artery Disease Mother      Coronary Artery Disease Father      No Known Problems Brother      Meniere's disease Sister      Diabetes Sister      No Known Problems Son      No Known Problems Daughter      Diabetes Sister      Liver Disease Sister      Chronic Obstructive Pulmonary Disease Sister        Prior to Admission Medications   Prior to Admission Medications   Prescriptions Last Dose Informant Patient Reported? Taking?   Lenvatinib 12 MG, 3 x 4 mg capsules, (LENVIMA) 3 x 4 MG capsule   No No   Sig: Take 3 capsules (12 mg) by mouth daily   OneTouch Delica Lancets 33G MISC   Yes No   Sig: Change lancet one time daily as directed.   QUEtiapine (SEROQUEL) 50 MG tablet   Yes No   Sig: Take 50 mg by mouth At Bedtime   TRULICITY 0.75 MG/0.5ML pen   Yes No   Sig: INJECT 0.5 ml's SUBCUTANEOUSLY ONCE WEEKLY   acetaminophen (TYLENOL) 500 MG tablet   Yes No   Sig: Take 500 mg by mouth every 8 hours as needed    albuterol (PROAIR HFA) 108 (90 Base) MCG/ACT inhaler   Yes No   Sig: Inhale 2 puffs into the lungs every 6 hours as needed    benzonatate (TESSALON) 100 MG capsule   No No   Sig: Take 1-2 capsules (100-200 mg) by mouth 3 times daily as needed for cough   blood glucose monitoring (ONE TOUCH ULTRA MINI) meter device kit   Yes No   Sig: Use as directed to test glucose three times daily. Pharmacy dispense brand based on insurance.  Indications: Diabetes   calcium carbonate-vitamin D (OSCAL W/D) 500-200 MG-UNIT tablet   No No   Sig: Take 1 tablet by mouth 2 times daily (with meals)   cholecalciferol (VITAMIN D3) 1000 units (25 mcg) capsule   No No   Sig: Take 1 capsule (1,000 Units) by mouth daily   diclofenac (VOLTAREN) 1 % topical gel   No No   Sig: Place 2 g onto the skin 4 times daily   insulin glargine (LANTUS SOLOSTAR) 100 UNIT/ML pen   Yes No    Sig: Inject 38 Units Subcutaneous At Bedtime    lactulose (CHRONULAC) 10 GM/15ML solution   No No   Sig: Take 15 mLs (10 g) by mouth 3 times daily   levothyroxine (SYNTHROID) 25 MCG tablet   No No   Sig: Take 1 tablet (25 mcg) by mouth daily   magic mouthwash suspension (diphenhydrAMINE, lidocaine, aluminum-magnesium & simethicone)   No No   Sig: Swish and swallow 10 mLs in mouth every 6 hours as needed for mouth sores   magnesium oxide (MAG-OX) 400 MG tablet   No No   Sig: Take 1 tablet (400 mg) by mouth 2 times daily   melatonin 5 MG tablet   No No   Sig: Take 1 tablet (5 mg) by mouth nightly as needed for sleep   nicotine (NICORETTE) 2 MG gum   No No   Sig: Place 1 each (2 mg) inside cheek as needed for smoking cessation   Patient not taking: Reported on 9/17/2020   ondansetron (ZOFRAN-ODT) 8 MG ODT tab   No No   Sig: Take 1 tablet (8 mg) by mouth every 8 hours as needed for nausea And take 8mg prior to each dose of Levanitib.   order for DME   No No   Sig: Abdominal Binder - small   Patient not taking: Reported on 9/17/2020   oxyCODONE (ROXICODONE) 5 MG tablet   No No   Sig: Take 0.5-1 tablets (2.5-5 mg) by mouth every 6 hours as needed for severe pain   pantoprazole (PROTONIX) 40 MG EC tablet   Yes No   Sig: TAKE ONE TABLET BY MOUTH EVERY DAY   potassium chloride (KLOR-CON) 20 MEQ packet   No No   Sig: Take 20 mEq by mouth 2 times daily   potassium chloride ER (KLOR-CON M) 20 MEQ CR tablet   No No   Sig: Take 1 tablet (20 mEq) by mouth daily   rifaximin (XIFAXAN) 550 MG TABS tablet   No No   Sig: Take 1 tablet (550 mg) by mouth 2 times daily   sertraline (ZOLOFT) 100 MG tablet   Yes No   Sig: Take 200 mg by mouth daily    simethicone (MYLICON) 80 MG chewable tablet   No No   Sig: Take 1 tablet (80 mg) by mouth every 6 hours as needed for flatulence or cramping   thiamine (B-1) 100 MG tablet   No No   Sig: Take 1 tablet (100 mg) by mouth daily      Facility-Administered Medications: None     Allergies    Allergies   Allergen Reactions     Bee Venom Other (See Comments) and Swelling     Hand swelled up badly.       Hydrocodone Other (See Comments)     nausea     Nickel Rash     Wool Fiber Hives and Rash       Physical Exam   Vital Signs: Temp: 97.8  F (36.6  C) Temp src: Oral BP: 130/73 Pulse: 67   Resp: 18 SpO2: 95 % O2 Device: Oxymask Oxygen Delivery: 8 LPM  Weight: 148 lbs 12.8 oz    GENERAL: Alert and oriented x 3. Well nourished, well developed.  No acute distress.    HEENT: Normocephalic, atraumatic. Anicteric sclera. Mucous membranes moist.   CV: RRR. S1, S2. No murmurs appreciated.   RESPIRATORY: Effort normal on 8L. Lungs CTAB with no wheezing, rales, or rhonchi.   GI: Abdomen soft and non distended, bowel sounds present x all 4 quadrants. TTP over lower quadrant.    NEUROLOGICAL: No focal deficits. Follows commands.  Strength equal in upper and lower extremities.   MUSCULOSKELETAL: No joint swelling or tenderness. Moves all extremities.   EXTREMITIES: No gross deformities. No peripheral edema. Significant clubbing of fingers.    SKIN: Grossly warm, dry, and intact. No jaundice. No rashes.       Data   Data reviewed today: I reviewed all medications, new labs and imaging results over the last 24 hours.     Recent Labs   Lab 10/29/20  1158 10/29/20  1100 10/28/20  1338   WBC  --  2.9* 3.1*   HGB  --  13.0 14.5   MCV  --  111* 108*   PLT  --  40* 35*   INR  --  1.19*  --    NA  --  137 137   POTASSIUM 3.2* 3.7 3.0*   CHLORIDE  --  102 102   CO2  --  30 29   BUN  --  7 6*   CR  --  0.53 0.52   ANIONGAP  --  5 6   LETY  --  8.9 9.0   GLC  --  156* 147*   ALBUMIN  --  2.9* 3.2*   PROTTOTAL  --  7.0 7.4   BILITOTAL  --  2.2* 2.4*   ALKPHOS  --  90 115   ALT  --  38 39   AST  --  Canceled, Test credited 49*   TROPI  --  <0.015  --      Most Recent 3 CBC's:  Recent Labs   Lab Test 10/29/20  1100 10/28/20  1338 10/08/20  1205   WBC 2.9* 3.1* 3.1*   HGB 13.0 14.5 14.2   * 108* 108*   PLT 40* 35* 32*     Most  Recent 3 BMP's:  Recent Labs   Lab Test 10/29/20  1158 10/29/20  1100 10/28/20  1338 10/08/20  1205   NA  --  137 137 135   POTASSIUM 3.2* 3.7 3.0* 3.2*   CHLORIDE  --  102 102 100   CO2  --  30 29 27   BUN  --  7 6* 11   CR  --  0.53 0.52 0.69   ANIONGAP  --  5 6 8   LETY  --  8.9 9.0 8.5   GLC  --  156* 147* 203*     Most Recent 2 LFT's:  Recent Labs   Lab Test 10/29/20  1100 10/28/20  1338   AST Canceled, Test credited 49*   ALT 38 39   ALKPHOS 90 115   BILITOTAL 2.2* 2.4*     Most Recent 3 INR's:  Recent Labs   Lab Test 10/29/20  1100 07/18/20  0606 07/16/20  1349   INR 1.19* 1.29* 1.26*     Recent Results (from the past 24 hour(s))   XR Chest Port 1 View    Narrative    EXAM: XR CHEST PORT 1 VW  10/29/2020 10:37 AM     HISTORY:  SOB       COMPARISON:  CT chest dated 9/16/2020 and chest x-ray dated 12/9/2019    FINDINGS: Portable semiupright AP radiograph of the chest. Cardiac  silhouette is within normal limits. Trachea midline. Diffuse reticular  opacities, peripheral and lower lobe predominant, slightly increased  compared to prior study dated 12/9/2019.  Bilateral blunting of  costophrenic angles. No pneumothorax visualized. No pneumoperitoneum.  No acute osseous abnormality.          Impression    IMPRESSION: Bilateral peripheral and bibasilar reticular interstitial  fibrotic opacities, slightly increased compared to prior radiograph,   seen on prior chest CT.  Superimposed edema, infection or acute  exacerbation are considerations.    I have personally reviewed the examination and initial interpretation  and I agree with the findings.    JOSE RAUL REBOLLEDO MD

## 2020-10-29 NOTE — ED NOTES
"[facility]    ED Nurse to Floor Handoff     Dania Albert is a 54 year old female who speaks English and lives with a spouse,  in a home  They arrived in the ED by unknown from home    ED Chief Complaint: Nausea, Vomiting, & Diarrhea (\"2 months\")    ED Dx;   Final diagnoses:   Pneumonia due to infectious organism, unspecified laterality, unspecified part of lung   Hepatic encephalopathy (H)   Hypoxemia   HCC (hepatocellular carcinoma) (H)   Urinary tract infection without hematuria, site unspecified         Needed?: No    Allergies:   Allergies   Allergen Reactions     Bee Venom Other (See Comments) and Swelling     Hand swelled up badly.       Hydrocodone Other (See Comments)     nausea     Nickel Rash     Wool Fiber Hives and Rash   .  Past Medical Hx:   Past Medical History:   Diagnosis Date     Alcohol abuse      Bone metastasis (H) 7/15/2020     Chronic hepatitis C without hepatic coma (H) 10/23/2019    SVR     Cirrhosis of liver with ascites (H) 10/23/2019     COPD (chronic obstructive pulmonary disease) (H)      Depressive disorder      Diabetes (H)     Type 1 DM, Takes Insulin      Emphysema lung (H)      H/O esophageal varices      HCC (hepatocellular carcinoma) (H) 10/23/2019     History of blood transfusion     [facility] in [facility]     ERICKA (obstructive sleep apnea)       Baseline Mental status: WDL  Current Mental Status changes: at basesline    Infection present or suspected this encounter: Yes, cultures pending.   Sepsis suspected: No  Isolation type: Special Precautions-COVID-19, Enteric  Patient tested for COVID 19 prior to admission: YES     Activity level - Baseline/Home:  Independent  Activity Level - Current:   Stand with Assist    Bariatric equipment needed?: No    In the ED these meds were given:   Medications   0.9% sodium chloride BOLUS (1,000 mLs Intravenous New Bag 10/29/20 1102)     Followed by   sodium chloride 0.9% infusion " (has no administration in time range)   cefTRIAXone (ROCEPHIN) 1 g vial to attach to  mL bag for ADULTS or NS 50 mL bag for PEDS (has no administration in time range)   azithromycin (ZITHROMAX) 500 mg in sodium chloride 0.9 % 250 mL intermittent infusion (500 mg Intravenous New Bag 10/29/20 1158)   potassium chloride (KLOR-CON) Packet 40 mEq (has no administration in time range)       Drips running?  Yes See MAR    Home pump  No    Current LDAs  Peripheral IV 10/29/20 Right Upper arm (Active)   Site Assessment WDL 10/29/20 1102   Line Status Saline locked 10/29/20 1102   Number of days: 0       Peripheral IV 09/16/20 Right Upper forearm (Active)   Number of days: 43       Labs results:   Labs Ordered and Resulted from Time of ED Arrival Up to the Time of Departure from the ED   CBC WITH PLATELETS DIFFERENTIAL - Abnormal; Notable for the following components:       Result Value    WBC 2.9 (*)     RBC Count 3.51 (*)      (*)     MCH 37.0 (*)     RDW 18.2 (*)     Platelet Count 40 (*)     All other components within normal limits   COMPREHENSIVE METABOLIC PANEL - Abnormal; Notable for the following components:    Glucose 156 (*)     Bilirubin Total 2.2 (*)     Albumin 2.9 (*)     All other components within normal limits   INR - Abnormal; Notable for the following components:    INR 1.19 (*)     All other components within normal limits   AMMONIA - Abnormal; Notable for the following components:    Ammonia 87 (*)     All other components within normal limits   ROUTINE UA WITH MICROSCOPIC - Abnormal; Notable for the following components:    Bilirubin Urine Small (*)     Protein Albumin Urine 10 (*)     Urobilinogen mg/dL 8.0 (*)     Leukocyte Esterase Urine Large (*)     WBC Urine 46 (*)     Bacteria Urine Few (*)     Squamous Epithelial /HPF Urine 6 (*)     Mucous Urine Present (*)     All other components within normal limits   POTASSIUM - Abnormal; Notable for the following components:    Potassium 3.2  "(*)     All other components within normal limits   TROPONIN I   COVID-19 VIRUS (CORONAVIRUS) BY PCR   PERIPHERAL IV CATHETER   CARDIAC CONTINUOUS MONITORING   PULSE OXIMETRY NURSING   URINE CULTURE AEROBIC BACTERIAL       Imaging Studies:   Recent Results (from the past 24 hour(s))   XR Chest Port 1 View    Narrative    EXAM: XR CHEST PORT 1 VW  10/29/2020 10:37 AM     HISTORY:  SOB       COMPARISON:  CT chest dated 9/16/2020 and chest x-ray dated 12/9/2019    FINDINGS: Portable semiupright AP radiograph of the chest. Cardiac  silhouette is within normal limits. Trachea midline. Diffuse reticular  opacities, peripheral and lower lobe predominant, slightly increased  compared to prior study dated 12/9/2019.  Bilateral blunting of  costophrenic angles. No pneumothorax visualized. No pneumoperitoneum.  No acute osseous abnormality.          Impression    IMPRESSION: Bilateral peripheral and bibasilar reticular interstitial  fibrotic opacities, slightly increased compared to prior radiograph,   seen on prior chest CT.  Superimposed edema, infection or acute  exacerbation are considerations.    I have personally reviewed the examination and initial interpretation  and I agree with the findings.    JOSE RAUL REBOLLEDO MD       Recent vital signs:   /77   Pulse 73   Temp 97.8  F (36.6  C) (Oral)   Resp 18   Ht 1.549 m (5' 1\")   Wt 67.5 kg (148 lb 12.8 oz)   SpO2 97%   Breastfeeding No   BMI 28.12 kg/m      [place] Coma Scale Score: 14 (10/29/20 1104)       Cardiac Rhythm: Normal Sinus  Pt needs tele? No  Skin/wound Issues: None    Code Status: Full Code    Pain control: good    Nausea control: good    Abnormal labs/tests/findings requiring intervention: See results    Family present during ED course? No   Family Comments/Social Situation comments:      Tasks needing completion: None    Virginia Wright, RN  [phone] [facility]      "

## 2020-10-29 NOTE — ED PROVIDER NOTES
"    [place] EMERGENCY DEPARTMENT ([facility])  10/29/20    History     Chief Complaint   Patient presents with     Nausea, Vomiting, & Diarrhea     \"2 months\"     History was provided by the patient, the patient's daughter and medical records.        Dania Albert is a 54 year old female with a past medical history of DM II, hepatocellular carcinoma with bone metastases s/p TACE & ablation (10/2019), Hepatitis C cirrhosis with ascities, and esophageal varicies who presents for evaluation of nausea, vomiting, and diarrhea. Patient presented to her oncologist yesterday complaining of general weakness, lower abdominal pain and diarrhea. Additionally, the patient noted intermittent nausea and vomiting, with a small amount of blood in the emesis at un unspecified time, and diarrhea over the past 2 months. She notes worsening shortness of breath with minimal exertion.  Her oncologist, Dr. Dhruv Childress, recommended she visit the ED following her clinic visit yesterday but states she wasn't able to. Today, she repeats the symptoms listed above as well as an occassional dry cough but no fevers. She denies a recent COVID-19 positive test or recent COVID-19 exposure. She denies having abdominal pain currently but notes having it occasionally. She reports dysuria, frequency and occasional hematuria. She denies difficulty urinating. The patient reports her chronic one-sided back pain has recent become more intense and has is now present bilaterally.    WHOLE BODY BONE SCAN - [facility] (9/16/2020)  IMPRESSION:   1. No scintigraphic evidence of osseous metastatic disease.  2. Focal uptake to the right anterior 5th rib, corresponding with fracture seen on same day CT.     CT CHEST/ABDOMEN/PELVIS - [facility] (9/16/2020)  IMPRESSION:  1.  Continued decrease in size of hypodense lesions in the right lobe suggestive of treatment response. No evidence of residual viable tumor in the treated left lobe lesions.  2.  No evidence of " metastasis in the chest.  3.  Cirrhosis with sequelae of portal hypertension including splenomegaly, portosystemic collaterals and trace ascites. Unchanged nonocclusive thrombus in the portal veins extending to the SMV.  4.  Stable chronic interstitial lung disease.     Past Medical History:   Diagnosis Date     Alcohol abuse      Bone metastasis (H) 7/15/2020     Chronic hepatitis C without hepatic coma (H) 10/23/2019    SVR     Cirrhosis of liver with ascites (H) 10/23/2019     COPD (chronic obstructive pulmonary disease) (H)      Depressive disorder      Diabetes (H)     Type 1 DM, Takes Insulin      Emphysema lung (H)      H/O esophageal varices      HCC (hepatocellular carcinoma) (H) 10/23/2019     History of blood transfusion     [facility] in 1983     ERICKA (obstructive sleep apnea)        Past Surgical History:   Procedure Laterality Date     ABDOMEN SURGERY      Gallbladder Removed      COLONOSCOPY      [place]     CYSTOSCOPY, INJECT BOTOX      detrusor injection     GI SURGERY      Upper Endoscopy at [facility]      HERNIA REPAIR      Patient doesnt remember if mesh was used. [facility]      IR FLUORO 0-1 HOUR  3/17/2020     IR FLUORO 0-1 HOUR  3/17/2020     OPEN REDUCTION INTERNAL FIXATION WRIST Bilateral        Family History   Problem Relation Age of Onset     Coronary Artery Disease Mother      Coronary Artery Disease Father      No Known Problems Brother      Meniere's disease Sister      Diabetes Sister      No Known Problems Son      No Known Problems Daughter      Diabetes Sister      Liver Disease Sister      Chronic Obstructive Pulmonary Disease Sister        Social History     Tobacco Use     Smoking status: Current Every Day Smoker     Packs/day: 0.30     Years: 40.00     Pack years: 12.00     Types: Cigarettes     Smokeless tobacco: Never Used   Substance Use Topics     Alcohol use: Not Currently     Comment: Rarely drank alcohol.      Current Facility-Administered Medications    Medication     0.9% sodium chloride BOLUS    Followed by     sodium chloride 0.9% infusion     Current Outpatient Medications   Medication     acetaminophen (TYLENOL) 500 MG tablet     albuterol (PROAIR HFA) 108 (90 Base) MCG/ACT inhaler     benzonatate (TESSALON) 100 MG capsule     blood glucose monitoring (ONE TOUCH ULTRA MINI) meter device kit     calcium carbonate-vitamin D (OSCAL W/D) 500-200 MG-UNIT tablet     cholecalciferol (VITAMIN D3) 1000 units (25 mcg) capsule     diclofenac (VOLTAREN) 1 % topical gel     insulin glargine (LANTUS SOLOSTAR) 100 UNIT/ML pen     lactulose (CHRONULAC) 10 GM/15ML solution     Lenvatinib 12 MG, 3 x 4 mg capsules, (LENVIMA) 3 x 4 MG capsule     levothyroxine (SYNTHROID) 25 MCG tablet     magic mouthwash suspension (diphenhydrAMINE, lidocaine, aluminum-magnesium & simethicone)     magnesium oxide (MAG-OX) 400 MG tablet     melatonin 5 MG tablet     nicotine (NICORETTE) 2 MG gum     ondansetron (ZOFRAN-ODT) 8 MG ODT tab     OneTouch Delica Lancets 33G MISC     order for DME     oxyCODONE (ROXICODONE) 5 MG tablet     pantoprazole (PROTONIX) 40 MG EC tablet     potassium chloride (KLOR-CON) 20 MEQ packet     potassium chloride ER (KLOR-CON M) 20 MEQ CR tablet     QUEtiapine (SEROQUEL) 50 MG tablet     rifaximin (XIFAXAN) 550 MG TABS tablet     sertraline (ZOLOFT) 100 MG tablet     simethicone (MYLICON) 80 MG chewable tablet     thiamine (B-1) 100 MG tablet     TRULICITY 0.75 MG/0.5ML pen        Allergies   Allergen Reactions     Bee Venom Other (See Comments) and Swelling     Hand swelled up badly.       Hydrocodone Other (See Comments)     nausea     Nickel Rash     Wool Fiber Hives and Rash         Review of Systems   Constitutional: Positive for fatigue. Negative for chills and fever.   HENT: Negative for congestion.    Eyes: Negative for redness.   Respiratory: Positive for cough ( dry) and shortness of breath.    Cardiovascular: Negative for chest pain.   Gastrointestinal:  "Positive for abdominal pain ( occasional), diarrhea, nausea and vomiting.   Endocrine: Negative for polydipsia and polyuria.   Genitourinary: Positive for dysuria, frequency and hematuria ( occasional). Negative for difficulty urinating and pelvic pain.   Musculoskeletal: Negative for arthralgias, gait problem and neck stiffness.   Skin: Negative for color change.   Neurological: Negative for headaches.   Hematological: Negative for adenopathy.   Psychiatric/Behavioral: Negative for confusion.   All other systems reviewed and are negative.    A complete review of systems was performed with pertinent positives and negatives noted in the HPI, and all other systems negative.    Physical Exam   BP: 129/81  Pulse: 87  Temp: 97.8  F (36.6  C)  Resp: 18  Height: 154.9 cm (5' 1\")  Weight: 67.5 kg (148 lb 12.8 oz)  SpO2: 91 %  Physical Exam  Vitals signs and nursing note reviewed.   Constitutional:       General: She is not in acute distress.     Appearance: Normal appearance. She is normal weight. She is not toxic-appearing or diaphoretic.   HENT:      Head: Normocephalic and atraumatic.      Mouth/Throat:      Pharynx: No oropharyngeal exudate.   Eyes:      General: No scleral icterus.     Extraocular Movements: Extraocular movements intact.      Conjunctiva/sclera: Conjunctivae normal.      Pupils: Pupils are equal, round, and reactive to light.   Neck:      Musculoskeletal: Normal range of motion and neck supple.   Cardiovascular:      Rate and Rhythm: Normal rate.      Pulses: Normal pulses.      Heart sounds: Normal heart sounds.   Pulmonary:      Effort: Pulmonary effort is normal. No respiratory distress.      Breath sounds: Normal breath sounds. No wheezing, rhonchi or rales.   Abdominal:      General: Abdomen is flat. Bowel sounds are normal. There is no distension.      Palpations: Abdomen is soft.      Tenderness: There is abdominal tenderness. There is no right CVA tenderness, left CVA tenderness or guarding. "   Musculoskeletal: Normal range of motion.         General: No tenderness.   Lymphadenopathy:      Cervical: No cervical adenopathy.   Skin:     General: Skin is warm.      Findings: No rash.   Neurological:      General: No focal deficit present.      Mental Status: She is alert and oriented to person, place, and time.      Cranial Nerves: No cranial nerve deficit.      Coordination: Coordination normal.   Psychiatric:         Mood and Affect: Mood normal.         Behavior: Behavior normal.         Thought Content: Thought content normal.         Judgment: Judgment normal.         ED Course     ED Course as of Oct 29 1024   Thu Oct 29, 2020   1022 SpO2: 91 %    10:12 AM  The patient was seen and examined by Dr. Doreen Boone in Room ED 09.   Procedures             EKG Interpretation:      Interpreted by Doreen Boone MD  Time reviewed: 10:06 AM  Symptoms at time of EKG: Shortness of breath  Rhythm: normal sinus   Rate: normal  Axis: normal  Ectopy: none  Conduction: normal  ST Segments/ T Waves: No ST-T wave changes  Q Waves: none  Comparison to prior: Unchanged from old EKG done on July 16, 2020    Clinical Impression: NSR with no acute ischemic changes                      No results found for any visits on 10/29/20.  Medications - No data to display     Assessments & Plan (with Medical Decision Making)   Dania Albert is a 54-year-old woman with hepatocellular carcinoma presenting with fatigue, nausea, vomiting, diarrhea, shortness of breath and cough.  Differential diagnosis: Pneumonia, COVID-19 infection, viral illness, dehydration, electrolyte abnormalities, metastatic cancer, ACS, pulmonary embolis.    After the recent physical exam, patient appears to be in no acute distress.  She has been hypoxemic in the emergency department with saturations dropping down to 83%.  We are able to get her up to 91% on 8 L oxygen mask.  I will obtain chest x-ray, EKG, and laboratory studies for further diagnostic  evaluation.  She agrees with the plan.  I spoke with her daughter as well she agrees with the plan as well.    Patient's laboratory studies returned with leukopenia of 2,900 and thrombocytopenia 40,000 which appeared to be chronic in nature.  There is no anemia, hemoglobin is normal at 13.0.  Potassium is somewhat low at 3.2 and this will be repleted with oral potassium chloride solution.  Creatinine is normal 0.53.  Total bilirubin is elevated at 2.2 which is consistent with previous values.  Troponin is undetectable and EKG showed no acute ischemic changes.  Urinalysis shows elevated leukocyte esterase and WBCs and given that she is symptomatic I do suspect that she has acute cystitis which will be treated with IV ceftriaxone.  I reviewed her chest x-ray and I read the radiology report; she has bilateral peripheral and bibasilar interstitial opacities worse than on the previous radiograph or her previous chest CT.  Given her new cough and hypoxemia, I do suspect that she likely has pneumonia which could be bacterial or viral in origin.  I will add IV ceftriaxone to her antibiotic regimen to cover for potential bacterial pneumonia.  COVID-19 test is pending.  I discussed these findings with her and her daughter and at this time she will be admitted to the hospital for further evaluation and management.  Her oxygenation has dropped to 83% throughout her emergency department stay, however, this was corrected with oxygen mask at 8 L/min.    This part of the document was transcribed by Lorenzo iSngh Scribe.    I have reviewed the nursing notes. I have reviewed the findings, diagnosis, plan and need for follow up with the patient.    New Prescriptions    No medications on file       Final diagnoses:   None     I, Tyrell Patel, am serving as a trained medical scribe to document services personally performed by Doreen Boone MD, based on the provider's statements to me.      IDoreen MD, was physically  present and have reviewed and verified the accuracy of this note documented by Tyrell Patel.     --  Doreen Boone MD  [facility] EMERGENCY DEPARTMENT  10/29/2020     Dorene Boone MD  10/29/20 [id number]

## 2020-10-30 NOTE — PROGRESS NOTES
Transfer  Transferred from: ED  Via:bed  Reason for transfer: Pt appropriate for 6B-   Family: Aware of transfer  Belongings: Received with pt  Chart: Received with pt  Medications: Meds received from old unit with pt  Code Status verified on armband: yes/no  2 RN Skin Assessment Completed By: Karina  Med rec completed: no  Bed surface reassessed with algorithm and charted: yes  New bed surface ordered: no    Report received from: Fariba YEUNG  Pt status: Sat >92 on 8L oxymask. VSS. Oriented x3. Denies pain.

## 2020-10-30 NOTE — PLAN OF CARE
Neuro: Lethargy this a.m, improved around noon.  Now A&Ox4 but is very forgetful.  Generalized weakness.  Afebrile, reports chills.  Cardiac: SR 90's. VSS. Denies CP, palpitations.    Respiratory: Sating >88% on 4-5L via NC.  80% on RA. Significant clubbing.  Denies SOB, tolerated activity within room without any signs of dyspnea.  Has intermittent cough, good - nonproductive.  On azithromycin and ceftriaxone for PNA coverage.  Covid negative, plan to reswab her on Sunday the 1st.  RVP pending.   GI/: Adequate urine output. BM X1, loose brown. C-diff negative. Enteric panel pending. Being treated for UTI, asymptomatic.  Diet/appetite: Tolerating regular diet. Ate one large meal; ordering items to snack on for this evening.  Dietician consult; supplements ordered.  Activity:  Assist of SB, up to chair and in room.  Pain: At acceptable level on current regimen.   Skin: No new deficits noted.  LDA's:  PIV to R AC, saline locked.    Plan: Will continue to try and wean 02.  Continue with POC. Notify primary team with changes.

## 2020-10-30 NOTE — PLAN OF CARE
[id number]:  Neuro: A&Ox4, forgetful.   Cardiac: NSR, HR 70s. BPs 110-140s/70s  Resp: Weaned from 8L Oxymask to 4L NC. Dry cough. COVID test pending.  GI/: Voiding adequate amounts, LBM 10/29. Need stool sample. Lactulose on hold d/t diarrhea.  Diet/Appetite: Tolerating regular diet. BG q 4 hours. Not needing insulin.  Skin: Bruising, no deficits.  Access: PIV x2, NS at 125cc/hr.  Drains: None  Activity: Ax1-SBA, bed alarm for safety.  Pain: PRN oxy x1 for cancer pain.  High suspicion for covid-will retest in 72 hours if negative.     Will continue with plan of care and notify MD of any changes.

## 2020-10-30 NOTE — PROGRESS NOTES
CLINICAL NUTRITION SERVICES - ASSESSMENT NOTE     Nutrition Prescription    RECOMMENDATIONS FOR MDs/PROVIDERS TO ORDER:  1. Recommend scheduled antiemetics rather than PRN to better control nausea  2. Minimize NPO days and restricted diet orders during hospitalization     Malnutrition Status:    Severe malnutrition in the context of chronic illness     Recommendations already ordered by Registered Dietitian (RD):  Boost plus 3x times per day (2:00)  Magic cup: Chocolate or vanilla daily with lunch     Future/Additional Recommendations:  1. If patient is unable to consume oral intake- discuss trial of additional supplements, consider use of appetite stimulant (remeron, megace, marinol) as medically appropriate  2. If patient is still unable to consume adequate PO intake with supplements-discuss if enteral nutrition is within patient's goals of care. Would recommend placement of feeding tube and start of enteral nutrition   --Nutren 1.5 @ 50 mL/hr to provide 1800 kcals (25 kcal/kg/day), 82 g PRO (1.2 g/kg wt/day), 912 mL H2O, 211 g CHO and no fiber daily. (per dosing weight 67.5 kg)         REASON FOR ASSESSMENT  Dania Albert is a/an 54 year old female assessed by the dietitian for Provider Order - Possible malnutrition in setting of HCC and nausea and vomiting d/t chemo    CLINICAL HISTORY  multifocal HCC s/p TACE and ablation (10/2019) now w/ bone metastasis, HCV cirrhosis c/b EVs and ascites, COPD, type 2 DM, depression, and anxiety admitted for nausea, vomiting, and diarrhea, and found to be hypoxic, concerning for COVID 19 infection.     NUTRITION HISTORY  Patient last saw RD via telephone visit 9/16/2020. At that time, was trying to eat 5-6 small meals per day. Goal of 1600 calories and 65 g protein. Patient reported poor appetite due to mouth sores, had just been prescribed magic mouth wash.     Today patient reported that she continues to have mouth sores and has not used the magic wash because she did not  "feel that it was working. Reported avoiding citrus and spicy foods as well as choosing softer easy to chew items. However, patient could not remember when her last meal was nor usual things consumed at home. She did reported taking a Premiere protein drink at home, usually 3 per day.   She stated she is nauseous at baseline, eating does not seem to make it worse. She also has intermittent vomiting, did not think she was vomiting daily.   Reported usual weight was 173 lbs but she did not think she had lost weight. Writer reviewed weight history with patient. She reported wearing the same clothing due to mostly staying at home.     CURRENT NUTRITION ORDERS  Diet: Regular  Intake/Tolerance: No intake documented, pt reported she has not ate but will order     LABS  Na 145 (H)    MEDICATIONS  Calcium Carbonate- vitamin D   Novolog/Lantus   Levothyroxine  Magnesium oxide  Protonix   Thiamine   Cholecalciferol     ANTHROPOMETRICS  Height: 154.9 cm (5' 1\")  Most Recent Weight: 67.5 kg (148 lb 12.8 oz)    BMI: Overweight BMI 25-29.9  Weight History:   Wt Readings from Last 15 Encounters:   10/29/20 67.5 kg (148 lb 12.8 oz)   10/28/20 67.5 kg (148 lb 12.8 oz)   10/08/20 68.6 kg (151 lb 4.8 oz)   08/27/20 72.6 kg (160 lb 0.9 oz)   08/19/20 74.1 kg (163 lb 4.8 oz)   08/18/20 73.9 kg (163 lb)   08/13/20 75.2 kg (165 lb 12.8 oz)   08/11/20 75.8 kg (167 lb)   07/23/20 75.8 kg (167 lb)   07/20/20 79.5 kg (175 lb 3.2 oz)   06/29/20 79.2 kg (174 lb 11.2 oz)   05/28/20 78.5 kg (173 lb)   05/21/20 78.5 kg (173 lb)   03/17/20 78.7 kg (173 lb 8 oz)   03/02/20 78.7 kg (173 lb 8 oz)   10% weight change in 3 months     Dosing Weight: 67.5 kg (actual)      ASSESSED NUTRITION NEEDS  RMR (MSJ) 1200   Estimated Energy Needs: 7927-6044 kcals/day (RMR x 1.2-1.5)  Justification: Increased needs  Estimated Protein Needs: 65-84 grams protein/day (18% of energy needs)  Justification: Increased needs  Estimated Fluid Needs: 2317-4209 mL/day (1 " mL/kcal)   Justification: Maintenance    PHYSICAL FINDINGS  Reviewed per physician/nursing notes  --clubbing of fingers       MALNUTRITION  % Intake: </=75% for >/= 1 month (severe)  % Weight Loss: > 7.5% in 3 months (severe)  Subcutaneous Fat Loss: Unable to assess  Muscle Loss: Unable to assess  Fluid Accumulation/Edema: None noted  Malnutrition Diagnosis: Severe malnutrition in the context of Chronic illness     NUTRITION DIAGNOSIS  Unintended weight loss related to inability to consume adequate caloric intake as evidenced by Nausea, vomiting, hx of mouth source affecting PO and weight loss of 10% in 3 months- nutritionally severe.     INTERVENTIONS  Implementation  Nutrition Education: RD role in care    Collaboration with other providers  Enteral Nutrition - recommendation   Medical food supplement therapy     Goals  Patient to consume % of nutritionally adequate meal trays TID, or the equivalent with supplements/snacks.     Monitoring/Evaluation  Progress toward goals will be monitored and evaluated per protocol.      Lala Ashford RD, YURI  6B pager: [phone]

## 2020-10-30 NOTE — PROGRESS NOTES
SPIRITUAL HEALTH SERVICES  [facility] 6A  ON-CALL VISIT     REFERRAL SOURCE: Request     Attempted tele-visit with pt several times. Phone was busy, unable to reach pt.     PLAN: Will inform on-call  of request.     Rev. Vannessa Bonilla MDiv, [facility]  Staff    Pager [phone]  * [facility] remains available 24/7 for emergent requests/referrals, either by having the switchboard page the on-call  or by entering an ASAP/STAT consult in Epic (this will also page the on-call ).*

## 2020-10-30 NOTE — UTILIZATION REVIEW
Admission Status; Secondary Review Determination    Under the authority of the Utilization Management Committee, the utilization review process indicated a secondary review on the above patient. The review outcome is based on review of the medical records, discussions with staff, and applying clinical experience noted on the date of the review.    (x) Inpatient Status Appropriate - This patient's medical care is consistent with medical management for inpatient care and reasonable inpatient medical practice.    RATIONALE FOR DETERMINATION: Complex 54-year-old female with history of HCV cirrhosis complicated by esophageal varices and ascites, multifocal hepatic cellular carcinoma status post TACE and ablation now with bone metastasis, type 2 diabetes, mental health disease now admitted for recurrent nausea, vomiting and diarrhea as well as significant hypoxemic respiratory failure requiring 6 L of nasal cannula to maintain sats greater than 90% appropriate for acute inpatient evaluation and management.    At the time of admission with the information available to the attending physician more than 2 nights Hospital complex care was anticipated, based on patient risk of adverse outcome if treated as outpatient and complex care required. Inpatient admission is appropriate based on the Medicare guidelines.    This document was produced using voice recognition software    The information on this document is developed by the utilization review team in order for the business office to ensure compliance. This only denotes the appropriateness of proper admission status and does not reflect the quality of care rendered.    The definitions of Inpatient Status and Observation Status used in making the determination above are those provided in the CMS Coverage Manual, Chapter 1 and Chapter 6, section 70.4.    Sincerely,    Maurice Domínguez MD  Utilization Review  Physician Advisor  [facility].

## 2020-10-30 NOTE — CONSULTS
Oncology  Consult Note   Date of Service: 10/30/2020    Patient: Dania Albert  MRN: [id number]  Admission Date: 10/29/2020  Hospital Day # 1  Cancer Diagnosis: HCC with metastases to the bone   Primary Outpatient Oncologist: Dr. Childress   Current Treatment Plan: currently on Lenvatinib     Reason for Consult: Hx HCC rently found to have bone mets, currently on chemo, admitted with n/v/d    Assessment & Plan:   Dania Albert is a 54 year old female with past medical history significant for multifocal HCC s/p TACE and ablation (10/2019) now w/ bone metastasis, HCV cirrhosis c/b EVs and ascites, COPD, type 2 DM, depression, and anxiety. Admitted for nausea, vomiting, and diarrhea    Recommendations:   - hold lenvantinib while inpatient   - continue symptomatic management with antiemetics and antidiarrheals (once infectious labs resulted)     # Multifocal HCC with Metastasis to the Bone   Oncologic history obtained from outpatient notes. She was found to have HCC in June 2019 and had TACE/Ablation for S3 lesion on Oct. 2019. She had a follow up MRI in May 2020 showed no residual viable cancer in the treatment zones but found increase in number and sizes of numerous arterial enhancing foci scattered throughout the liver parenchyma which is suspicious for multifocal HCC. She underwent a liver biopsy which confirmed moderately differentiated HCC. Bone scan showed involvement of anterior right fifth rib and left clavicular head. She was started on Lenvatinib 6/30/20. Repeat CT scan and bone scan 9/16/20 show stable to slightly improved disease.   - Hold Lenvantinib while in the hospital.     # Pancytopenia   Secondary to underlying malignancy and chemotherapy.     # Nausea  # Vomiting   # Diarrhea   She was seen in clinic by Dr. Childress on 10/28 and he was concerned about her weakness, and intermittent nausea/vomiting. He recommended that she go to the hospital. Daughter states that the patient has been increasing  "nausea/vomiting and resulting in poor nutrition.      Patient was seen and plan of care was discussed with attending physician Dr. Leslie Cintron.      Thank you for the opportunity to partake in this patients plan of care. Please do not hesitate to page with questions. We will continue to follow.      Marlin Peterson PA-C   Hematology/Oncology  Pager #[phone]    ___________________________________________________________________     Subjective & Interval History:    No acute events overnight. Nursing notes reviewed. Nursing staff states patient is too confused/ lethargic to talk over the phone.     Called as spoke with the patient's daughter who states her mom has been having good day and bad days. She is very concerned about her moms nutrition as she has not been eating a whole lot d/t to nausea. They recently scheduled Zofran 8 mg BID which has been helping with vomiting but her mom continues to feel nauseous. She also has been having some diarrhea at home. Her daughter states she has been taking Imodium frequently to help. She is concerned the hepatic encephalopathy may be worse now since she isn't having as much stool and appears more confused as of recent. She has not been taking her lactulose at home but was still having stools x4 daily.      Physical Exam:    Blood pressure 112/73, pulse 75, temperature 97.4  F (36.3  C), temperature source Oral, resp. rate 16, height 1.549 m (5' 1\"), weight 67.5 kg (148 lb 12.8 oz), SpO2 96 %, not currently breastfeeding.    Vitals reviewed. Patient was not seen as she is being ruled out for COVID.     Past Medical History:  Past Medical History:   Diagnosis Date     Alcohol abuse      Bone metastasis (H) 7/15/2020     Chronic hepatitis C without hepatic coma (H) 10/23/2019    SVR     Cirrhosis of liver with ascites (H) 10/23/2019     COPD (chronic obstructive pulmonary disease) (H)      Depressive disorder      Diabetes (H)     Type 1 DM, Takes Insulin      Emphysema " lung (H)      H/O esophageal varices      HCC (hepatocellular carcinoma) (H) 10/23/2019     History of blood transfusion     [facility] in [facility]     ERICKA (obstructive sleep apnea)        Past Surgical History:  Past Surgical History:   Procedure Laterality Date     ABDOMEN SURGERY      Gallbladder Removed      COLONOSCOPY      [place]     CYSTOSCOPY, INJECT BOTOX      detrusor injection     GI SURGERY      Upper Endoscopy at [facility]      HERNIA REPAIR      Patient doesnt remember if mesh was used. [facility]      IR FLUORO 0-1 HOUR  3/17/2020     IR FLUORO 0-1 HOUR  3/17/2020     OPEN REDUCTION INTERNAL FIXATION WRIST Bilateral        Social History:  Social History     Socioeconomic History     Marital status:      Spouse name: Not on file     Number of children: Not on file     Years of education: Not on file     Highest education level: Not on file   Occupational History     Not on file   Social Needs     Financial resource strain: Not on file     Food insecurity     Worry: Not on file     Inability: Not on file     Transportation needs     Medical: Not on file     Non-medical: Not on file   Tobacco Use     Smoking status: Current Every Day Smoker     Packs/day: 0.30     Years: 40.00     Pack years: 12.00     Types: Cigarettes     Smokeless tobacco: Never Used   Substance and Sexual Activity     Alcohol use: Not Currently     Comment: Rarely drank alcohol.      Drug use: Not Currently     Sexual activity: Not on file   Lifestyle     Physical activity     Days per week: Not on file     Minutes per session: Not on file     Stress: Not on file   Relationships     Social connections     Talks on phone: Not on file     Gets together: Not on file     Attends [employer] service: Not on file     Active member of club or organization: Not on file     Attends meetings of clubs or organizations: Not on file     Relationship status: Not on file     Intimate partner violence     Fear of current or ex  partner: Not on file     Emotionally abused: Not on file     Physically abused: Not on file     Forced sexual activity: Not on file   Other Topics Concern     Parent/sibling w/ CABG, MI or angioplasty before 65F 55M? Not Asked   Social History Narrative     Not on file        Family History  Family History   Problem Relation Age of Onset     Coronary Artery Disease Mother      Coronary Artery Disease Father      No Known Problems Brother      Meniere's disease Sister      Diabetes Sister      No Known Problems Son      No Known Problems Daughter      Diabetes Sister      Liver Disease Sister      Chronic Obstructive Pulmonary Disease Sister        Outpatient Medications:  No current facility-administered medications on file prior to encounter.        acetaminophen (TYLENOL) 500 MG tablet, Take 500 mg by mouth every 8 hours as needed        albuterol (PROAIR HFA) 108 (90 Base) MCG/ACT inhaler, Inhale 2 puffs into the lungs every 6 hours as needed        benzonatate (TESSALON) 100 MG capsule, Take 1-2 capsules (100-200 mg) by mouth 3 times daily as needed for cough       blood glucose monitoring (ONE TOUCH ULTRA MINI) meter device kit, Use as directed to test glucose three times daily. Pharmacy dispense brand based on insurance.  Indications: Diabetes       calcium carbonate-vitamin D (OSCAL W/D) 500-200 MG-UNIT tablet, Take 1 tablet by mouth 2 times daily (with meals)       cholecalciferol (VITAMIN D3) 1000 units (25 mcg) capsule, Take 1 capsule (1,000 Units) by mouth daily       diclofenac (VOLTAREN) 1 % topical gel, Place 2 g onto the skin 4 times daily       insulin glargine (LANTUS SOLOSTAR) 100 UNIT/ML pen, Inject 38 Units Subcutaneous At Bedtime        lactulose (CHRONULAC) 10 GM/15ML solution, Take 15 mLs (10 g) by mouth 3 times daily       Lenvatinib 12 MG, 3 x 4 mg capsules, (LENVIMA) 3 x 4 MG capsule, Take 3 capsules (12 mg) by mouth daily       levothyroxine (SYNTHROID) 25 MCG tablet, Take 1 tablet (25  mcg) by mouth daily       magic mouthwash suspension (diphenhydrAMINE, lidocaine, aluminum-magnesium & simethicone), Swish and swallow 10 mLs in mouth every 6 hours as needed for mouth sores       magnesium oxide (MAG-OX) 400 MG tablet, Take 1 tablet (400 mg) by mouth 2 times daily       melatonin 5 MG tablet, Take 1 tablet (5 mg) by mouth nightly as needed for sleep       nicotine (NICORETTE) 2 MG gum, Place 1 each (2 mg) inside cheek as needed for smoking cessation (Patient not taking: Reported on 9/17/2020)       ondansetron (ZOFRAN-ODT) 8 MG ODT tab, Take 1 tablet (8 mg) by mouth every 8 hours as needed for nausea And take 8mg prior to each dose of Levanitib.       OneTouch Delica Lancets 33G MISC, Change lancet one time daily as directed.       order for DME, Abdominal Binder - small (Patient not taking: Reported on 9/17/2020)       oxyCODONE (ROXICODONE) 5 MG tablet, Take 0.5-1 tablets (2.5-5 mg) by mouth every 6 hours as needed for severe pain       pantoprazole (PROTONIX) 40 MG EC tablet, TAKE ONE TABLET BY MOUTH EVERY DAY       potassium chloride (KLOR-CON) 20 MEQ packet, Take 20 mEq by mouth 2 times daily       potassium chloride ER (KLOR-CON M) 20 MEQ CR tablet, Take 1 tablet (20 mEq) by mouth daily       QUEtiapine (SEROQUEL) 50 MG tablet, Take 50 mg by mouth At Bedtime       rifaximin (XIFAXAN) 550 MG TABS tablet, Take 1 tablet (550 mg) by mouth 2 times daily       sertraline (ZOLOFT) 100 MG tablet, Take 200 mg by mouth daily        simethicone (MYLICON) 80 MG chewable tablet, Take 1 tablet (80 mg) by mouth every 6 hours as needed for flatulence or cramping       thiamine (B-1) 100 MG tablet, Take 1 tablet (100 mg) by mouth daily       TRULICITY 0.75 MG/0.5ML pen, INJECT 0.5 ml's SUBCUTANEOUSLY ONCE WEEKLY           Labs & Studies: I personally reviewed the following studies:  ROUTINE LABS (Last four results):  [facility]  Recent Labs   Lab 10/30/20  0602 10/29/20  1158 10/29/20  1100 10/28/20  1338   *   --  137 137   POTASSIUM 3.9 3.2* 3.7 3.0*   CHLORIDE 114*  --  102 102   CO2 27  --  30 29   ANIONGAP 5  --  5 6   *  --  156* 147*   BUN 5*  --  7 6*   CR 0.60  --  0.53 0.52   GFRESTIMATED >90  --  >90 >90   GFRESTBLACK >90  --  >90 >90   LETY 7.5*  --  8.9 9.0   MAG  --   --   --  1.4*   PROTTOTAL 5.6*  --  7.0 7.4   ALBUMIN 2.4*  --  2.9* 3.2*   BILITOTAL 1.1  --  2.2* 2.4*   ALKPHOS 81  --  90 115   AST 37  --  Canceled, Test credited 49*   ALT 28  --  38 39     CBC  Recent Labs   Lab 10/30/20  0602 10/29/20  1100 10/28/20  1338   WBC 1.5* 2.9* 3.1*   RBC 2.91* 3.51* 3.88   HGB 10.9* 13.0 14.5   HCT 33.3* 38.9 41.9   * 111* 108*   MCH 37.5* 37.0* 37.4*   MCHC 32.7 33.4 34.6   RDW 18.1* 18.2* 17.9*   PLT 31* 40* 35*     INR  Recent Labs   Lab 10/29/20  1100   INR 1.19*       IMAGING:

## 2020-10-30 NOTE — PROGRESS NOTES
[facility]     Medicine Progress Note - Hospitalist Service, Gold 6       Date of Admission:  10/29/2020  Assessment & Plan       Dania Albert is 54 year old female with past medical history significant for multifocal HCC s/p TACE and ablation (10/2019) now w/ bone metastasis, HCV cirrhosis c/b EVs and ascites, COPD, type 2 DM, depression, and anxiety admitted for nausea, vomiting, and diarrhea, and found to be hypoxic, concerning for COVID 19 infection.     # Acute hypoxic respiratory failure   # Possible COVID 19 vs CAP vs COPD exacerbation   # Hx COPD, presumed ILD 2/2 chronic tobacco smoking   # ERICKA   Stable.  Required 8L suppl O2 on admission, improved to 4-6L today.  First COVID swab negative.  CXR 10/29 with bilateral peripheral and bibasilar reticular interstitial fibrotic opacities, c/f superimposed edema vs infection vs COPD exacerbation.  Seen by Pulmonology at [facility] (Dr. Fowler) in 4/2018, at that time felt to have end stage lung disease.  Had Pulmonology consult here in 1/2020 as part of pre-transplant evaluation, felt to have smoking related ILD and emphysema, with risk for progressive ILD.  Appears lost to Pulmonary follow up.  Not currently using CPAP.  Denies supp O2 use at home.  Procal neg.  Trop neg.  EKG unremarkable.  Leukopenic as below.  - Continue IV Ceftriaxone and Azithromycin for now   - Suspect that pt has component of chronic hypoxia/hypercapnea though has required supp O2 to keep sats > 88%; given new suppl O2 requirements, will plan to recheck COVID in 72 hrs (~11/1 afternoon)   - RVP added   - Continue PRN albuterol   - Continue PRN tessalon perles     # E. Coli UTI - UA w/ large LE, UC w/ >100K E coli, sensitivities pending.   - Continue IV Ceftriaxone for now   - Await final UC results   - Encourage PO fluids     # Multifocal HCC s/p TACE and ablation (10/2019)  # Recently identified bony metastasis   # Cancer related pain,  neuropathy   Follows w/ Dr. Childress, last seen in clinic yesterday 10/28.  Initially dx with HCC in 6/2019 and underwent TACE/ablation in 10/2019 and MWA in 3/2020.  Last MRI in 5/2020 without residual viable cancer in tx zones but c/f multifocal disease d/t dramatic increase in number and sizes of numerous arterial enhancing foci scattered throughout the liver parenchyma.  CT C/A/P 6/2020 with mets to anterior right fifth rib and left clavicular head.  Started on Lenvatinib on 6/30/2020.   - Oncology following, appreciate recs   - Continue to hold Lenvatinib per Oncology  - Continue magic mouthwash for stomatitis   - CMP in AM    - Follow CBC w/ diff   - Pain management: Oxycodone 2.5-5mg Q8H PRN; will start low dose Gabapentin for lower ext neuropathy and uptitrate as tolerated       # Hx HCV cirrhosis c/b EVs s/p banding, ascites, HE, PVT - S/p tx with Sovaldi/Ribavirin in 2017 w/ SVR.  Known to have non-occlusive thrombus in main and left portal veins, ultimately not a candidate for AC d/t chronic thrombocytopenia and risk for bleeding.  On Rifaximin PTA.  Previously on Lactulose but this appears to have been stopped in 8/2020 due to issues with persistent diarrhea.  Ammonia is elevated to 87 on admission.  Tbili 2.2. INR 1.19. Other LFTs unremarkable.  Not currently a transplant candidate.    - Continue Rifaximin   - Hold lactulose for now d/t diarrhea     # Type 2 DM - Well controlled.  Hgb A1c 5.9% this admission.  On Lantus 40 units at HS PTA, resumed at 50% dose given poor PO intake recently.  Started medium sliding scale insulin.  BGs 101-137 last 24 hr.   - Continue Lantus 20 units HS  - Continue MSSI   - BG checks ac/hs   - Hypoglycemia protocol in place     # Pancytopenia   Likely 2/2 underlying liver disease and HCC vs acute/underlying infection.  WBC 1.9, Hgb 10.9, hematocrit 33.3, Plt 31, ANC 0.8.  Per last Oncology note, not felt to be d/t chemo.  No acute s/s active bleed but noted to have ecchymoses  "on upper extremities.   - Trend CBC w/ diff daily   - Transfuse for hgb < 7 plt <10  - Avoid pharm DVT ppx for now     # Severe malnutrition in setting of chronic illness   # Nausea, vomiting   # Stomatitis 2/2 chemo   No complaints of n/v today.  Per nutrition note, last seen in 9/2020, has had 10% weight loss in last 3 months.  Albumin 2.4, protein 5.6. Mag 1.6, Phos 2.8.   - Appreciate Nutrition recs   - Continue regular diet with boost TID and magic cups with lunch   - Will consider addition of appetite stimulant pending improvement of PO intake   - Added mag replacement protocol     # R hip pain - No complaints today.  XR on admission neg for fracture.     # Diarrhea, abdominal pain - Ongoing for several months, seems better today.  Possibly 2/2 colitis 2/2 chemo.   - Continue enteric precautions, stool studies pending     # GERD - Continue daily PPI     # Depression, anxiety - Continue PTA Seroquel    # Hx tobacco abuse - Nicotine replacement PRN        Diet: Regular Diet Adult    DVT Prophylaxis: Mechanical (d/t thrombocytopenia)   Valdes Catheter: not present  Code Status: Full Code    Rule Out COVID-19 Handoff:  Dania is NOT A LOW SUSPICION PUI (needs further investigation).    Follow these instructions:    If COVID test is positive -> continue isolation precautions    If 1st COVID test is negative -> continue isolation precautions    -  Order a repeat COVID test to be done 72 hours after the 1st test  -  Place \"PUI Isolation\" nurse communication order  -  Consider ID consult    If 2nd COVID test performed after 72 hours is negative -> consider discontinuing COVID-specific isolation precautions if clinical course atypical for COVID and/or an alternative diagnosis emerges       Disposition Plan   Expected discharge: 2 - 3 days, recommended to prior living arrangement vs TCU once O2 requirements stable and infectious w/u complete.  Entered: DEUCE Chakraborty CNP 10/30/2020, 7:48 AM       The patient's care " was discussed with the Attending Physician, Dr. Sekou Molina.    DEUCE Chakraborty [facility]  Hospitalist Service, [address]   Contact information available via [facility] Paging/Directory  Please see sign in/sign out for up to date coverage information  ______________________________________________________________________    Interval History   Dania is resting in bed.  She is very sleepy, but follows commands and overall feels better.  Denies feeling short of breath.  Denies abdominal pain and hip pain.      Data reviewed today: I reviewed all medications, new labs and imaging results over the last 24 hours.    Physical Exam   Vital Signs: Temp: 97.4  F (36.3  C) Temp src: Oral BP: 112/73 Pulse: 75   Resp: 16 SpO2: 96 % O2 Device: Nasal cannula Oxygen Delivery: 3 LPM  Weight: 148 lbs 12.8 oz    GENERAL: Sleepy. Well nourished, well developed.  No acute distress.    HEENT: Normocephalic, atraumatic. Anicteric sclera. Mucous membranes moist.   CV: RRR. S1, S2. No murmurs appreciated.   RESPIRATORY: Effort normal on 5L. Lungs CTAB with no wheezing, rales, or rhonchi.   GI: Abdomen soft and non distended, bowel sounds present x all 4 quadrants. No tenderness, rebound, or guarding.   NEUROLOGICAL: No focal deficits. Follows commands.    MUSCULOSKELETAL: No joint swelling or tenderness. Moves all extremities.   EXTREMITIES: No gross deformities. No peripheral edema. Significant clubbing of fingers.   SKIN: Grossly warm, dry, and intact. No jaundice. No rashes.       Data   Recent Labs   Lab 10/30/20  0602 10/29/20  1158 10/29/20  1100 10/28/20  1338   WBC 1.5*  --  2.9* 3.1*   HGB 10.9*  --  13.0 14.5   *  --  111* 108*   PLT 31*  --  40* 35*   INR  --   --  1.19*  --    *  --  137 137   POTASSIUM 3.9 3.2* 3.7 3.0*   CHLORIDE 114*  --  102 102   CO2 27  --  30 29   BUN 5*  --  7 6*   CR 0.60  --  0.53 0.52   ANIONGAP 5  --  5 6   LETY 7.5*  --  8.9 9.0   GLC  137*  --  156* 147*   ALBUMIN 2.4*  --  2.9* 3.2*   PROTTOTAL 5.6*  --  7.0 7.4   BILITOTAL 1.1  --  2.2* 2.4*   ALKPHOS 81  --  90 115   ALT 28  --  38 39   AST 37  --  Canceled, Test credited 49*   TROPI  --   --  <0.015  --      Recent Results (from the past 24 hour(s))   XR Chest Port 1 View    Narrative    EXAM: XR CHEST PORT 1 VW  10/29/2020 10:37 AM     HISTORY:  SOB       COMPARISON:  CT chest dated 9/16/2020 and chest x-ray dated 12/9/2019    FINDINGS: Portable semiupright AP radiograph of the chest. Cardiac  silhouette is within normal limits. Trachea midline. Diffuse reticular  opacities, peripheral and lower lobe predominant, slightly increased  compared to prior study dated 12/9/2019.  Bilateral blunting of  costophrenic angles. No pneumothorax visualized. No pneumoperitoneum.  No acute osseous abnormality.          Impression    IMPRESSION: Bilateral peripheral and bibasilar reticular interstitial  fibrotic opacities, slightly increased compared to prior radiograph,   seen on prior chest CT.  Superimposed edema, infection or acute  exacerbation are considerations.    I have personally reviewed the examination and initial interpretation  and I agree with the findings.    JOSE RAUL REBOLLEDO MD   XR Hip Port Right G/E 2 Views    Narrative    EXAM: XR HIP PORTABLE RIGHT 2-3 VIEWS  LOCATION: [facility]  DATE/TIME: 10/29/2020 9:07 PM    INDICATION: Acute on chronic hip pain.    COMPARISON: None.      Impression    IMPRESSION: No fracture or dislocation. No focal bone lesion. Orthopedic screw in the right ilium.      Medications     sodium chloride 125 mL/hr at 10/29/20 1732       azithromycin  500 mg Intravenous Q24H     calcium carbonate-vitamin D  1 tablet Oral BID w/meals     cefTRIAXone  2 g Intravenous Q24H     gabapentin  100 mg Oral TID     insulin aspart  1-6 Units Subcutaneous Q4H     insulin glargine  20 Units Subcutaneous At Bedtime     levothyroxine  25 mcg Oral Daily     magnesium  oxide  400 mg Oral BID     pantoprazole  40 mg Oral Daily     polyethylene glycol  17 g Oral Daily     QUEtiapine  50 mg Oral At Bedtime     rifaximin  550 mg Oral BID     senna-docusate  1 tablet Oral BID    Or     senna-docusate  2 tablet Oral BID     sodium chloride (PF)  3 mL Intracatheter Q8H     thiamine  100 mg Oral Daily     Vitamin D3  25 mcg Oral Daily

## 2020-10-30 NOTE — PLAN OF CARE
Care Provided 20:00 - 23:00    Neuro: Disoriented to time, frequently forgetful. Intermittently confused. Bed alarm in use.  Cardiac: SR. VSS.   Respiratory: Sating >92 on 8L Oxymask.   GI/: Adequate urine output. No BM, need to collect sample.  Diet/appetite: Regular diet.   Activity:  SBA to bathroom.  Pain: Pt c/o back pain, provider paged to request PRN Oxy.   Skin: No new deficits noted.  LDA's: RPIV: NS @ 125 ml/hr.     Plan: Continue with POC. Notify primary team with changes.

## 2020-10-31 NOTE — CONSULTS
[facility]  Pulmonary Consult Initial Note  October 31, 2020      Dania Albert MRN# [id number]   Age: 54 year old YOB: 1965     Date of Admission: 10/29/2020  Reason for Consultation: Hypoxic respiratory failure  Requesting Physician: Dr. Mloina    Primary care provider: Micaela Gould     Assessment and Plan:  Dania Albert is a 54 year old female with a past medical history of hepatitis C cirrhosis and tobacco use with a likely smoking-related interstitial lung disease (vs CPFE) admitted on 10/29/2020 for acute hypoxic respiratory failure.  She was initially referred for transplant evaluation for a potential liver transplant, and after discussion at multidisciplinary chest conference was felt to have a smoking-related interstitial lung disease.  Smoking cessation was encouraged and she was seen in follow-up in May 2020.  At that time she had minimal respiratory symptoms but continued to smoke.  She had recently had a biopsy of a liver lesion which was concerning for hepatocellular carcinoma and her preference was not to follow-up further with pulmonary if that was indeed the case and she was not a transplant candidate.  She has since been diagnosed with multifocal hepatocellular carcinoma with metastases to the bone and was started on lenvatinib in June 2020.  Most recent CT chest in 9/2020 was stable from CT chest in 10/2019.    This hospitalization she presented with nausea, vomiting, and diarrhea and was found to be hypoxic upon ED presentation.  Initial Covid was negative, she will have a repeat tomorrow.  Work-up thus far is notable for pancytopenia with white blood cell count of 2 and absolute neutrophil count of 1100.  Respiratory viral panel was negative.  She was found to have a urinary tract infection and is on treatment with ceftriaxone and azithromycin.  Pulmonology was consulted for concern for an interstitial lung disease exacerbation.    Differential  diagnosis for her acute hypoxic respiratory failure includes a flare of her underlying smoking related interstitial lung disease, viral infection, community-acquired pneumonia, aspiration related to her nausea and vomiting, pulmonary edema, hepatopulmonary syndrome.  Lenvatinib pneumonitis has been reported but is quite rare.    Also of note, though she does have at least moderate emphysema on her chest CT, she does not have obstruction and does not have a history consistent with COPD.    CT obtained today does not show any obvious progression of her underlying interstitial lung disease and does not show any evidence of an ILD exacerbation or pneumonia.     Recommendations:  1.  Recommend echocardiogram with bubble study to evaluate for cardiac contribution to hypoxia, and also for evidence of a intrapulmonary shunt.  Would also check a NT BNP.  2. Given lack of findings of pulmonary inflammation would also obtain a CT PE study given unexplained hypoxia.     We will continue to follow with you. Please do not hesitate to contact us with questions.     Staffed with Dr. Welsh.     Mack Breen MD  Pulmonary & Critical Care Fellow            Chief Complaint:     History obtained from patient and chart review.    History of Present Illness:   54-year-old woman with past medical history of hepatitis C cirrhosis, tobacco use, and a smoking-related interstitial lung disease admitted to the hospital with nausea, vomiting, diarrhea, and acute hypoxic respiratory failure.  She is a difficult historian, but says that prior to presentation she had more trouble breathing and a cough for more than a few days.  She was initially coughing up some sputum but that has resolved since her admission on 10/29.  She also reports some vomiting prior to presentation.  She denies any fever, chills, or hemoptysis.  She denies any sick contacts.  She denies any tobacco use within the last 2 weeks.  Her breathing is feeling better than  it did on admission.             Past Medical History:     Past Medical History:   Diagnosis Date     Alcohol abuse      Bone metastasis (H) 7/15/2020     Chronic hepatitis C without hepatic coma (H) 10/23/2019    SVR     Cirrhosis of liver with ascites (H) 10/23/2019     COPD (chronic obstructive pulmonary disease) (H)      Depressive disorder      Diabetes (H)     Type 1 DM, Takes Insulin      Emphysema lung (H)      H/O esophageal varices      HCC (hepatocellular carcinoma) (H) 10/23/2019     History of blood transfusion     [facility]     ERICKA (obstructive sleep apnea)                 Past Surgical History:     Past Surgical History:   Procedure Laterality Date     ABDOMEN SURGERY      Gallbladder Removed      COLONOSCOPY      [place]     CYSTOSCOPY, INJECT BOTOX      detrusor injection     GI SURGERY      [facility] at [facility]      HERNIA REPAIR      Patient doesnt remember if mesh was used. [facility]      IR FLUORO 0-1 HOUR  3/17/2020     IR FLUORO 0-1 HOUR  3/17/2020     OPEN REDUCTION INTERNAL FIXATION WRIST Bilateral             Social History:     Social History     Socioeconomic History     Marital status:      Spouse name: Not on file     Number of children: Not on file     Years of education: Not on file     Highest education level: Not on file   Occupational History     Not on file   Social Needs     Financial resource strain: Not on file     Food insecurity     Worry: Not on file     Inability: Not on file     Transportation needs     Medical: Not on file     Non-medical: Not on file   Tobacco Use     Smoking status: Current Every Day Smoker     Packs/day: 0.30     Years: 40.00     Pack years: 12.00     Types: Cigarettes     Smokeless tobacco: Never Used   Substance and Sexual Activity     Alcohol use: Not Currently     Comment: Rarely drank alcohol.      Drug use: Not Currently     Sexual activity: Not on file   Lifestyle     Physical activity     Days per week:  Not on file     Minutes per session: Not on file     Stress: Not on file   Relationships     Social connections     Talks on phone: Not on file     Gets together: Not on file     Attends Taoist service: Not on file     Active member of club or organization: Not on file     Attends meetings of clubs or organizations: Not on file     Relationship status: Not on file     Intimate partner violence     Fear of current or ex partner: Not on file     Emotionally abused: Not on file     Physically abused: Not on file     Forced sexual activity: Not on file   Other Topics Concern     Parent/sibling w/ CABG, MI or angioplasty before 65F 55M? Not Asked   Social History Narrative     Not on file            Family History:     Family History   Problem Relation Age of Onset     Coronary Artery Disease Mother      Coronary Artery Disease Father      No Known Problems Brother      Meniere's disease Sister      Diabetes Sister      No Known Problems Son      No Known Problems Daughter      Diabetes Sister      Liver Disease Sister      Chronic Obstructive Pulmonary Disease Sister             Allergies:      Allergies   Allergen Reactions     Bee Venom Other (See Comments) and Swelling     Hand swelled up badly.       Hydrocodone Other (See Comments)     nausea     Nickel Rash     Wool Fiber Hives and Rash            Medications:       azithromycin  500 mg Intravenous Q24H     calcium carbonate-vitamin D  1 tablet Oral BID w/meals     cefTRIAXone  2 g Intravenous Q24H     gabapentin  100 mg Oral TID     insulin aspart  1-6 Units Subcutaneous Q4H     insulin glargine  20 Units Subcutaneous At Bedtime     levothyroxine  25 mcg Oral Daily     magnesium oxide  400 mg Oral BID     pantoprazole  40 mg Oral Daily     polyethylene glycol  17 g Oral Daily     QUEtiapine  50 mg Oral At Bedtime     rifaximin  550 mg Oral BID     senna-docusate  1 tablet Oral BID    Or     senna-docusate  2 tablet Oral BID     sodium chloride (PF)  3 mL  Intracatheter Q8H     thiamine  100 mg Oral Daily     Vitamin D3  25 mcg Oral Daily     acetaminophen, albuterol, benzonatate, glucose **OR** dextrose **OR** glucagon, HYDROmorphone, lidocaine 4%, lidocaine (buffered or not buffered), magic mouthwash suspension (diphenhydrAMINE, lidocaine, aluminum-magnesium & simethicone), melatonin, naloxone, ondansetron **OR** ondansetron, oxyCODONE, simethicone, sodium chloride (PF)         Review of Systems:   10-systems reviewed with pertinent positives and negatives mentioned in the HPI.         Physical Exam:   Temp:  [97.3  F (36.3  C)-98.8  F (37.1  C)] 98.8  F (37.1  C)  Pulse:  [81-96] 81  Resp:  [16-18] 18  BP: ()/(57-75) 99/57  SpO2:  [83 %-96 %] 92 %    Intake/Output Summary (Last 24 hours) at 10/31/2020 1149  Last data filed at 10/31/2020 0000  Gross per 24 hour   Intake 2134.17 ml   Output 875 ml   Net 1259.17 ml       Gen:   No acute distress. Alert, awake, she is slow to answer questions and occasionally needs to be reminded.     HEENT:   Anicteric sclerae. No oral lesions     Lungs:   Bibasilar crackles.  No wheezes or rhonchi.     Cardiovascular:    Normal S1 and S2.  RRR.  No murmur, gallop or rub.     Abdomen:   Soft, ND, with active BS.  There is some mild to moderate right-sided tenderness.     Extremities:    1+ bilateral lower extremity edema.     Neurologic:    Alert, but easily distracted and occasionally needs to be reminded to answer questions.     Skin:    Warm, dry.  No rash on exposed skin.           Data:   All laboratory and imaging data reviewed.       Sputum Cultures in the last 3 months:  Specimen Description   Date Value Ref Range Status   10/30/2020 Feces  Final   10/29/2020 Midstream Urine  Final   07/19/2020 Midstream Urine  Final    Culture Micro   Date Value Ref Range Status   10/29/2020 >100,000 colonies/mL  Escherichia coli   (A)  Final   07/19/2020   Final    <10,000 colonies/mL  urogenital ashley  Susceptibility testing not  routinely done     07/16/2020 No growth  Final           Chest x-ray: She has diffuse bilateral interstitial and alveolar opacities.

## 2020-10-31 NOTE — PROGRESS NOTES
"   10/31/20 0867   Visit Information   Visit Made By Staff    Type of Visit Follow-up;On-call   Visited Patient   Interventions   Basic Spiritual Interventions   Assessment of spiritual needs/resources;Reflective conversation;Prayer;Facilitating connection with joana/spiritual community   Advanced Assessments/Interventions   Presenting Concerns/Issues Spiritual/Yazdanism/emotional support   SPIRITUAL HEALTH SERVICES: Tele-Encounter  Patient Location ([facility], [facility], Unit): [facility], [place],   Spoke with (patient, family relationship): patient      Referral Source: follow up after attempts yesterday.  See call details below.    DATA:  10 minute phone conversation with Dania, who was sleepy and faded in and out during our call.  Dania began by asking \"Is there a heaven or hell?\"  and \"why?\" about her illness and suffering. She affirmed her Bahai joana and welcomed prayer at the close of our visit.  I engaged Dania in some reflection, shared some of Juan words and scripture, and suggested following up to allow her time to rest and reflect.       PLAN:  I will inform the unit  of care provided. Dania would like further spiritual support; I have entered the request for on call  or  to follow up tomorrow.     Montserrat Govea    ______________________________    Type of service:  Telephone Visit     has received verbal consent for a TelephoneVisit from the patient? Yes    Distance Provider Location: designated [place] office or home office (secure setting)    Mode of Communication: telephone (via [employer] phone or [employer] tele-call-number ([phone]))      "

## 2020-10-31 NOTE — PROGRESS NOTES
Neuro: A&Ox4. Very forgetful. Often seems confused about whats going on, repeats questions. But she is able to answer orientation questions.  Cardiac: SR 80-90s. VSS. Afebrile   Respiratory: Sating >92% on 5-6L. Requiring up to 13L with activity. COVID recheck for tomorrow afternoon.  GI/: Adequate urine output, using commode. BM X1, loose/watery.  Diet/appetite: Tolerating regular diet. Eating well.  Activity:  SBA in room  Pain: At acceptable level on current regimen.   Skin: No new deficits noted.  LDA's: R PIV    Patient went down for chest CT w/ contrast around 1830. Bolus given prior to going.    Plan: Continue with POC. Notify primary team with changes.

## 2020-10-31 NOTE — PROGRESS NOTES
[facility]     Medicine Progress Note - Hospitalist Service, Gold 6       Date of Admission:  10/29/2020  Assessment & Plan       Dania Albert is 54 year old female with past medical history significant for multifocal HCC s/p TACE and ablation (10/2019) now w/ bone metastasis, HCV cirrhosis c/b EVs and ascites, COPD, type 2 DM, depression, and anxiety admitted for nausea, vomiting, and diarrhea, and found to be hypoxic, concerning for COVID 19 infection.     # Acute hypoxic respiratory failure   # Possible COVID 19 vs CAP vs COPD exacerbation   # Presumed ILD 2/2 chronic tobacco smoking   # ERICKA not currently on CPAP   Stable.  Required 8L suppl O2 on admission, continues to be at 4-6L today.  First COVID swab negative.  CXR 10/29 with bilateral peripheral and bibasilar reticular interstitial fibrotic opacities, c/f superimposed edema vs infection vs COPD exacerbation.  Seen by Pulmonology at [facility] (Dr. Fowler) in 4/2018, at that time felt to have end stage lung disease.  Had Pulmonology consult here in 1/2020 as part of pre-transplant evaluation, felt to have smoking related ILD and emphysema.  Denies supp O2 use at home.  Procal neg.  RVP negative.  Trop neg.  EKG unremarkable.  Leukopenic as below.  CT chest 10/31 with UIP type interstitial lung disease and aortic and coronary artery atherosclerosis.  NT BNP unremarkable.    - Pulmonology consult placed, will check TTE w/ bubble study and CT PE protocol    - Continue IV Ceftriaxone and Azithromycin for now    - Suspect that pt has component of chronic hypoxia/hypercapnea though has required supp O2 to keep sats > 88%  - Given new suppl O2 requirements and hx presumed ILD, will plan to recheck COVID in 72 hrs (~11/1 afternoon)   - Continue PRN albuterol   - Continue PRN tessalon perles     # E. Coli UTI - UA w/ large LE, UC w/ >100K E coli, with resistance to Ampicillin, Unasyn, Cefazolin, and Bactrim.    -  Continue IV Ceftriaxone for now   - Encourage PO fluids     # Multifocal HCC s/p TACE and ablation (10/2019)  # Recently identified bony metastasis   # Cancer related pain, neuropathy   Follows w/ Dr. Childress, last seen in clinic yesterday 10/28.  Initially dx with HCC in 6/2019 and underwent TACE/ablation in 10/2019 and MWA in 3/2020.  Last MRI in 5/2020 without residual viable cancer in tx zones but c/f multifocal disease d/t dramatic increase in number and sizes of numerous arterial enhancing foci scattered throughout the liver parenchyma.  CT C/A/P 6/2020 with mets to anterior right fifth rib and left clavicular head.  Started on Lenvatinib on 6/30/2020.   - Oncology following, appreciate recs   - Continue to hold Lenvatinib per Oncology  - Continue magic mouthwash for stomatitis   - CMP in AM    - Follow CBC w/ diff   - Pain management: on Oxycodone 2.5-5mg Q8H PRN PTA, please hold for sedation  - Started Gabapentin however pt somnolent today, hold for now   - Can offer APAP PRN, Lidoderm patches PRN if c/o pain     # Hx HCV cirrhosis c/b EVs s/p banding, ascites, HE, PVT - S/p tx with Sovaldi/Ribavirin in 2017 w/ SVR.  Known to have non-occlusive thrombus in main and left portal veins, ultimately not a candidate for AC d/t chronic thrombocytopenia and risk for bleeding.  On Rifaximin PTA.  Previously on Lactulose but this appears to have been stopped in 8/2020 due to issues with persistent diarrhea.  Ammonia is elevated to 87 on admission.  Tbili 2.2. INR 1.19. Other LFTs unremarkable.  Not currently a transplant candidate.    - Continue Rifaximin   - Resume Lactulose 20g daily     # Type 2 DM - Well controlled.  Hgb A1c 5.9% this admission.  On Lantus 40 units at HS PTA, resumed at 50% dose given poor PO intake recently.  BGs as below:   Recent Labs   Lab 10/31/20  1217 10/31/20  0934 10/31/20  0511 10/31/20  0411 10/31/20  0054 10/30/20  2103 10/30/20  1217 10/30/20  0602 10/30/20  0602 10/29/20  1100  10/29/20  1100 10/28/20  1338   GLC  --   --  128*  --   --   --   --   --  137*  --  156* 147*   * 120*  --  132* 117* 146* 101*   < >  --    < >  --   --     < > = values in this interval not displayed.   - Continue Lantus 20 units HS  - Continue MSSI   - BG checks ac/hs   - Hypoglycemia protocol in place     # Pancytopenia   Likely 2/2 underlying liver disease and HCC vs acute/underlying infection.  WBC 1.9, Hgb 10.9, hematocrit 33.3, Plt 31, ANC 0.8.  Per last Oncology note, not felt to be d/t chemo.  No acute s/s active bleed but noted to have ecchymoses on upper extremities.   - Trend CBC w/ diff daily   - Transfuse for hgb < 7 plt <10  - Avoid pharm DVT ppx for now     # Severe malnutrition in setting of chronic illness   # Nausea, vomiting   # Stomatitis 2/2 chemo   No complaints of n/v today.  Per nutrition note, last seen in 9/2020, has had 10% weight loss in last 3 months.  Albumin 2.4, protein 5.6. Mag 1.6, Phos 2.8.   - Appreciate Nutrition recs   - Continue regular diet with boost TID and magic cups with lunch   - Will consider addition of appetite stimulant pending improvement of PO intake   - Added mag replacement protocol     # R hip pain - No complaints today.  XR on admission neg for fracture.     # Diarrhea, abdominal pain - Possibly 2/2 colitis 2/2 chemo.  Improved. C diff negative.      # GERD - Continue daily PPI     # Depression, anxiety - Continue PTA Seroquel    # Hx tobacco abuse - Nicotine replacement PRN        Diet: Regular Diet Adult  Snacks/Supplements Adult: Boost Plus; Between Meals  Snacks/Supplements Adult: Magic Cup; With Meals    DVT Prophylaxis: Mechanical (d/t thrombocytopenia)   Valdes Catheter: not present  Code Status: Full Code    Rule Out COVID-19 Handoff:  Dania is NOT A LOW SUSPICION PUI (needs further investigation).    Follow these instructions:    If COVID test is positive -> continue isolation precautions    If 1st COVID test is negative -> continue isolation  "precautions    -  Order a repeat COVID test to be done 72 hours after the 1st test  -  Place \"PUI Isolation\" nurse communication order  -  Consider ID consult    If 2nd COVID test performed after 72 hours is negative -> consider discontinuing COVID-specific isolation precautions if clinical course atypical for COVID and/or an alternative diagnosis emerges       Disposition Plan   Expected discharge: 2 - 3 days, recommended to prior living arrangement vs TCU once O2 requirements stable and Pulmonology consult complete.   Entered: DEUCE Chakraborty CNP 10/31/2020, 8:14 AM       The patient's care was discussed with the Attending Physician, Dr. Sekou Molina.    DEUCE Chakraborty CNP  Hospitalist Service, [address]   Contact information available via [facility] Paging/Directory  Please see sign in/sign out for up to date coverage information  ______________________________________________________________________    Interval History   Dania is resting in bed.  She is very sleepy, but follows commands and overall feels better.  Feels that diarrhea has resolved.  C/o flank pain.  Denies abdominal pain and hip pain.      Data reviewed today: I reviewed all medications, new labs and imaging results over the last 24 hours.    Physical Exam   Vital Signs: Temp: 97.5  F (36.4  C) Temp src: Oral BP: 109/75 Pulse: 86   Resp: 18 SpO2: 93 % O2 Device: Nasal cannula Oxygen Delivery: 5 LPM  Weight: 155 lbs 6.4 oz    GENERAL: Sleepy. Well nourished, well developed.  No acute distress.    HEENT: Normocephalic, atraumatic. Anicteric sclera. Mucous membranes moist.   CV: RRR. S1, S2. No murmurs appreciated.   RESPIRATORY: Effort normal on 5L. Lungs CTAB with no wheezing, rales, or rhonchi.   GI: Abdomen soft and non distended, bowel sounds present x all 4 quadrants. No tenderness, rebound, or guarding.   NEUROLOGICAL: No focal deficits. Follows commands.    MUSCULOSKELETAL: No " joint swelling or tenderness. Moves all extremities.   EXTREMITIES: No gross deformities. No peripheral edema. Significant clubbing of fingers.   SKIN: Grossly warm, dry, and intact. No jaundice. No rashes.       Data   Recent Labs   Lab 10/31/20  0511 10/30/20  0602 10/29/20  1158 10/29/20  1100   WBC 2.0* 1.5*  --  2.9*   HGB 10.5* 10.9*  --  13.0   * 114*  --  111*   PLT 24* 31*  --  40*   INR  --   --   --  1.19*    145*  --  137   POTASSIUM 3.3* 3.9 3.2* 3.7   CHLORIDE 109 114*  --  102   CO2 26 27  --  30   BUN 5* 5*  --  7   CR 0.52 0.60  --  0.53   ANIONGAP 5 5  --  5   LETY 7.8* 7.5*  --  8.9   * 137*  --  156*   ALBUMIN 2.4* 2.4*  --  2.9*   PROTTOTAL 5.4* 5.6*  --  7.0   BILITOTAL 0.9 1.1  --  2.2*   ALKPHOS 112 81  --  90   ALT 26 28  --  38   AST 37 37  --  Canceled, Test credited   TROPI  --   --   --  <0.015     No results found for this or any previous visit (from the past 24 hour(s)).  Medications       azithromycin  500 mg Intravenous Q24H     calcium carbonate-vitamin D  1 tablet Oral BID w/meals     cefTRIAXone  2 g Intravenous Q24H     gabapentin  100 mg Oral TID     insulin aspart  1-6 Units Subcutaneous Q4H     insulin glargine  20 Units Subcutaneous At Bedtime     levothyroxine  25 mcg Oral Daily     magnesium oxide  400 mg Oral BID     pantoprazole  40 mg Oral Daily     polyethylene glycol  17 g Oral Daily     potassium chloride  40 mEq Oral or Feeding Tube Once     QUEtiapine  50 mg Oral At Bedtime     rifaximin  550 mg Oral BID     senna-docusate  1 tablet Oral BID    Or     senna-docusate  2 tablet Oral BID     sodium chloride (PF)  3 mL Intracatheter Q8H     thiamine  100 mg Oral Daily     Vitamin D3  25 mcg Oral Daily

## 2020-10-31 NOTE — PLAN OF CARE
"Neuro: Disoriented to time.  Cardiac: /75 (BP Location: Left arm)   Pulse 86   Temp 97.5  F (36.4  C) (Oral)   Resp 18   Ht 1.549 m (5' 1\")   Wt 70.5 kg (155 lb 6.4 oz)   SpO2 93%   Breastfeeding No   BMI 29.36 kg/m     Respiratory: Sating 93% on 5 LPM via NC.  GI/: Adequate urine output. No BM during shift.  Diet/appetite: Tolerating Regular diet. Eating well.  Activity:  SBA up to chair and in halls.  Pain: At acceptable level on current regimen.   Skin: No new deficits noted.  LDA's: PIV, NC    Plan: Continue with POC. Notify primary team with changes.    "

## 2020-10-31 NOTE — PROGRESS NOTES
Brief Heme/Onc Note  Chart and labs reviewed. Remains on 5-6LPM by nasal cannula with concern for COVID vs CAP vs ILD/COPD flare. Also has E coli UTI, and being treated for that.      No new recommendations today. Continue to treat infection(s) and hypoxia per primary team. Hold lenvatinib for now. We can consider restarting pending her clinical course.     Patient was discussed with Dr. Sommer who agrees with above. We will continue to follow peripherally    Please don't hesitate to contact the Heme/Onc fellow on-call with further questions about this patient.    Guy Rubio MD PhD  Heme/Onc/Transplant Fellow  Pgr [phone]

## 2020-10-31 NOTE — PLAN OF CARE
Neuro: A&Ox 4, forgetful, lethargic, karina in AM. Bed alarms in place.  Cardiac: SR 80s-90s. VSS. Denies CP, palpitations.       Respiratory: Sating 92% on 5-6L NC, desats easily. Infrequent NP cough. Recheck Covid tomrrow.    GI/: Adequate urine output, although missed hat. No BM today, BS+ Flatus +  Diet/appetite: Ate 100% of one meal. Regular diet.  Activity:  SBA in room  Pain: Denies, no PRNs given   Skin: No new deficits noted.  LDA's:  R PIV TKO, abx      Plan: Chest CT w no contrast completed, chest CT w contrast now ordered, to bolus beforehand. K+ replaced per protocol

## 2020-11-01 NOTE — PROGRESS NOTES
Shift: 0700 -1930  VS: Temp: 97.6  F (36.4  C) Temp src: Oral BP: 115/72 Pulse: 91   Resp: 18 SpO2: 95 % O2 Device: Nasal cannula Oxygen Delivery: 5 LPM  Pain: Back pain, gave PRN oxy x1. Decreased pain reported.   Neuro: A&Ox4, forgetful.  Cardiac:   SR. Tele discontinued.   Respiratory: Lung sounds clear w/coarse crackles in the bases. Desaturation with activity, sinks to upper 60's low 70's on oxiplus.Clubbing is present in fingers.   GI/Diet/Appetite: Regular diet with good appetite, patient requires assistance with ordering meals, otherwise will not order meals. Diarrhea. Held miralax and senna, patient is also receiving lactulose.   :  Void with mix of stool, see output.   LDA's: PIV to LUE infusing TKO/ABX intermittently.   Skin: some bruising, otherwise intact.   Activity: up ad berkley in room to the commode.   Tests/Procedures: second covid swab negative!  Pertinent Labs/Lab Collection:      Plan: Continue w/POC.

## 2020-11-01 NOTE — PROGRESS NOTES
[facility]     Medicine Progress Note - Hospitalist Service, Gold 6       Date of Admission:  10/29/2020  Assessment & Plan      Dania Albert is 54 year old female with past medical history significant for multifocal HCC s/p TACE and ablation (10/2019) now w/ bone metastasis, HCV cirrhosis c/b EVs and ascites, COPD, type 2 DM, depression, and anxiety admitted for nausea, vomiting, and diarrhea, and found to be hypoxic, concerning for COVID 19 infection.      # Acute hypoxic respiratory failure   # Possible COVID 19 vs CAP vs COPD exacerbation   # Presumed ILD 2/2 chronic tobacco smoking   # ERICKA not currently on CPAP   Stable.  Required 8L suppl O2 on admission, continues to be at 4L today.  First COVID swab negative, repeat COVID swab 11/1 neg.  CXR 10/29 with bilateral peripheral and bibasilar reticular interstitial fibrotic opacities, c/f superimposed edema vs infection vs COPD exacerbation.  Seen by Pulmonology at [facility] 4/2018, dx with end stage lung disease.  Pre-transplant pulm consult here 1/2020 felt to have smoking related ILD and emphysema. No supp O2 use pta.  Procal neg.  RVP negative.  Trop neg.  EKG unremarkable.  Leukopenic as below.  CT chest 10/31 with UIP type interstitial lung disease and aortic and coronary artery atherosclerosis.  CT PE neg for acute PE.  BNP unremarkable.  ECHO with bubble study pending. O2 needs decreasing, now using 4L with sats 96% - however pt continues to remove oxygen and sits around 85-86% on RA.   - Pulmonology following, appreciate recommendations   - Continue IV Ceftriaxone and Azithromycin for now  - Suspect that pt has component of chronic hypoxia/hypercapnea though has required supp O2 to keep sats > 88%  - Continue PRN albuterol   - Continue PRN tessalon      # E. Coli UTI. UA w/ large LE, UC w/ >100K E coli, with resistance to Ampicillin, Unasyn, Cefazolin, and Bactrim.    - Continue IV Ceftriaxone for now  (day 5 11/2)  - Encourage PO fluids      # Multifocal HCC s/p TACE and ablation (10/2019)  # Recently identified bony metastasis   # Cancer related pain, neuropathy   Follows w/ Dr. Childress. Initially dx with HCC in 6/2019 and underwent TACE/ablation in 10/2019 and MWA in 3/2020.  Last MRI in 5/2020 without residual viable cancer in tx zones but c/f multifocal disease d/t dramatic increase in number and sizes of numerous arterial enhancing foci scattered throughout the liver parenchyma.  CT C/A/P 6/2020 with mets to anterior right fifth rib and left clavicular head.  Started on Lenvatinib on 6/30/2020.   - Oncology following, appreciate recs   - Continue to hold Lenvatinib per Oncology  - CBC, CMP daily  - Pain management: on Oxycodone 2.5-5mg Q8H PRN PTA, please hold for sedation. Cont to hold gabapentin (had increased somnolence with trialing)  - Can offer APAP PRN, Lidoderm patches PRN if c/o pain      # Hx HCV cirrhosis c/b EVs s/p banding, ascites, HE, PVT. S/p tx with Sovaldi/Ribavirin in 2017 w/ SVR.  Known to have non-occlusive thrombus in main and left portal veins, ultimately not a candidate for AC d/t chronic thrombocytopenia and risk for bleeding.  On Rifaximin PTA.  Previously on Lactulose but this appears to have been stopped in 8/2020 due to issues with persistent diarrhea.  Ammonia elevated to 87 on admission. Tbili wnl. Not currently a transplant candidate.    - Continue Rifaximin   - Continue lactulose 20g daily - monitor output closely. If chemo restarted, would stop as she develops diarrhea following.      # Type 2 DM - Well controlled.  Hgb A1c 5.9% this admission.  On Lantus 40 units at HS PTA, resumed at 50% dose given poor PO intake recently.  Bgs low to 67 this morning. PO intake overall improving.  - Decrease lantus to 10U QHS  - Continue MSSI   - BG checks ac/hs   - Hypoglycemia protocol in place      # Pancytopenia, lymphopenia. Likely 2/2 underlying liver disease and HCC vs acute/underlying  infection.  Per last Oncology note, not felt to be d/t chemo.  No acute s/s active bleed but noted to have scattered multiple ecchymoses on upper extremities.   - Trend CBC w/ diff daily   - Transfuse for hgb < 7 plt <10  - Avoid pharm DVT ppx for now      # Severe malnutrition in setting of chronic illness   # Nausea, vomiting   # Stomatitis 2/2 chemo   No complaints of n/v today.  Per nutrition note, last seen in 9/2020, has had 10% weight loss in last 3 months.  Albumin 2.4, protein 5.6. Mag 1.6, Phos 2.8.   - Appreciate Nutrition recs   - Continue regular diet with boost TID and magic cups with lunch   - Monitor calorie counts  - Will consider addition of appetite stimulant   - Mag replacement protocol   - Continue magic mouthwash for stomatitis      # R hip pain. XR neg for fx on admission. No complaints today.  Pain control as above.      # Diarrhea, abdominal pain. Possibly 2/2 colitis 2/2 chemo.  Improved. C diff negative 10/30.       # GERD. Cont PPI daily.      # Depression, anxiety. Mood stable. Continue PTA Seroquel     # Hx tobacco abuse . Nicotine replacement PRN     #Hypokalemia. K 3.0 today. Likely 2/2 poor po intake and did receive lasix x 1 yesterday. Replaced with 40meq KCl x 1.        Diet: Regular Diet Adult  Snacks/Supplements Adult: Boost Plus; Between Meals  Snacks/Supplements Adult: Magic Cup; With Meals  Calorie Counts    DVT Prophylaxis: mechanical d/t thrombocytopenia  Valdes Catheter: not present  Code Status: Full Code    Rule Out COVID-19 Handoff:  Dania is a LOW SUSPICION PUI.  Follow these instructions:    If COVID test positive -> continue isolation precautions    If COVID test negative -> discontinue COVID-specific isolation precautions       Disposition Plan   Expected discharge: 2 - 3 days, recommended to prior living arrangement vs TCU once pulm work up complete, stable on O2, therapies completed.  Entered: Aura Romeo PA-C 11/01/2020, 2:39 PM       The patient's  care was discussed with the Dr Molina.     Aura Romeo PA-C  Hospitalist Service, [address]   Contact information available via [facility] Paging/Directory  Please see sign in/sign out for up to date coverage information  ______________________________________________________________________    Interval History   Dania is doing okay today. Feels her cough is getting better and her breathing is overall better. PO intake improving - ate almost all of breakfast today. Denies any fevers or chills. No chest pain. No dyspnea. No abdominal pain. Takes oxygen off here and there, does not feel short of breath without it despite sats being around 85%. No diarrhea yet today.     Physical Exam   Vital Signs: Temp: 98.3  F (36.8  C) Temp src: Oral BP: 102/74 Pulse: 89   Resp: 18 SpO2: 95 % O2 Device: Nasal cannula Oxygen Delivery: 5 LPM  Weight: 155 lbs 6.4 oz  GENERAL: Alert and oriented x 3. NAD. Sitting up in bed. No acute distress.    HEENT: Anicteric sclera. Mucous membranes moist. Mild redness on posterior tongue with stomatitis.   CV: RRR.   RESPIRATORY: Effort normal on 4L per NC. Lungs CTAB with no wheezing, rales, rhonchi.   GI: Abdomen soft and non distended with normoactive bowel sounds present in all quadrants. No tenderness, rebound, guarding.   NEUROLOGICAL: No focal deficits. Moves all extremities.    EXTREMITIES: No peripheral edema. Intact bilateral pedal pulses.   SKIN: clubbing of fingers

## 2020-11-01 NOTE — PLAN OF CARE
"/55 (BP Location: Left arm)   Pulse 85   Temp 98.3  F (36.8  C) (Oral)   Resp 20   Ht 1.549 m (5' 1\")   Wt 70.5 kg (155 lb 6.4 oz)   SpO2 95%   Breastfeeding No   BMI 29.36 kg/m       Neuro: A&O x 4, forgetful  Cardiac: SR  Respiratory: Coarse lung sounds. Productive loose cough. O2 94% on 5L O2 per n/c  GI/: Adequate urine output. Frequency and urgency. Incontinence. Frewquent watery stools.   Diet/Appetite: Appetite good. Ate 100% of meals and snacks  Skin: Intact.   LDA: PIV SL.   Activity: Up ad berkley. Steady gait.   Pain: C/O generalized pain. Oxycodone given which was effective.   Plan: Will continue with POC.     "

## 2020-11-01 NOTE — PROGRESS NOTES
[facility]     Medicine Progress Note - Hospitalist Service, Gold 6       Date of Admission:  10/29/2020  Assessment & Plan         Dania Albert is 54 year old female with past medical history significant for multifocal HCC s/p TACE and ablation (10/2019) now w/ bone metastasis, HCV cirrhosis c/b EVs and ascites, COPD, type 2 DM, depression, and anxiety admitted for nausea, vomiting, and diarrhea, and found to be hypoxic, concerning for COVID 19 infection.     # Acute hypoxic respiratory failure   # Possible COVID 19 vs CAP vs COPD exacerbation   # Presumed ILD 2/2 chronic tobacco smoking   # ERICKA not currently on CPAP   Stable.  Required 8L suppl O2 on admission, continues to be at 4-6L today, but requiring up to 13L with activity.  First COVID swab negative, repeat pending today 11/1.  CXR 10/29 with bilateral peripheral and bibasilar reticular interstitial fibrotic opacities, c/f superimposed edema vs infection vs COPD exacerbation.  [facility], at that time felt to have end stage lung disease.  Had Pulmonology consult here in 1/2020 as part of pre-transplant evaluation, felt to have smoking related ILD and emphysema.  Denies supp O2 use at home.  Procal neg.  RVP negative.  Trop neg.  EKG unremarkable.  Leukopenic as below.  CT chest 10/31 with UIP type interstitial lung disease and aortic and coronary artery atherosclerosis.  CT PE neg for acute PE.  NT BNP unremarkable.    - Pulmonology following, appreciate recommendations   - TTE ordered, to be completed   - Repeat COVID test pending, can discontinue precautions if neg   - Continue IV Ceftriaxone and Azithromycin for now    - Suspect that pt has component of chronic hypoxia/hypercapnea though has required supp O2 to keep sats > 88%  - Continue PRN albuterol   - Continue PRN tessalon perles     # E. Coli UTI - UA w/ large LE, UC w/ >100K E coli, with resistance  to Ampicillin, Unasyn, Cefazolin, and Bactrim.    - Continue IV Ceftriaxone for now   - Encourage PO fluids     # Multifocal HCC s/p TACE and ablation (10/2019)  # Recently identified bony metastasis   # Cancer related pain, neuropathy   Follows w/ Dr. Childress, last seen in clinic yesterday 10/28.  Initially dx with HCC in 6/2019 and underwent TACE/ablation in 10/2019 and MWA in 3/2020.  Last MRI in 5/2020 without residual viable cancer in tx zones but c/f multifocal disease d/t dramatic increase in number and sizes of numerous arterial enhancing foci scattered throughout the liver parenchyma.  CT C/A/P 6/2020 with mets to anterior right fifth rib and left clavicular head.  Started on Lenvatinib on 6/30/2020.   - Oncology following, appreciate recs   - Continue to hold Lenvatinib per Oncology  - CMP in AM    - Follow CBC w/ diff   - Pain management: on Oxycodone 2.5-5mg Q8H PRN PTA, please hold for sedation  - Started Gabapentin however pt somnolent today, hold for now   - Can offer APAP PRN, Lidoderm patches PRN if c/o pain     # Hx HCV cirrhosis c/b EVs s/p banding, ascites, HE, PVT - S/p tx with Sovaldi/Ribavirin in 2017 w/ SVR.  Known to have non-occlusive thrombus in main and left portal veins, ultimately not a candidate for AC d/t chronic thrombocytopenia and risk for bleeding.  On Rifaximin PTA.  Previously on Lactulose but this appears to have been stopped in 8/2020 due to issues with persistent diarrhea.  Ammonia is elevated to 87 on admission.  Tbili 2.2. INR 1.19. Other LFTs unremarkable.  Not currently a transplant candidate.    - Continue Rifaximin   - Resume Lactulose 20g daily pending plan for chemo    # Type 2 DM - Well controlled.  Hgb A1c 5.9% this admission.  On Lantus 40 units at HS PTA, resumed at 50% dose given poor PO intake recently.  BGs as below:   Recent Labs   Lab 11/01/20  1311 11/01/20  1126 11/01/20  0608 11/01/20  0424 11/01/20  0043 10/31/20  1950 10/31/20  1633 10/31/20  0511  10/31/20  0511 10/30/20  0602 10/30/20  0602 10/29/20  1100 10/29/20  1100 10/28/20  1338   GLC  --   --  106*  --   --   --   --   --  128*  --  137*  --  156* 147*   * 170*  --  115* 134* 107* 120*   < >  --    < >  --    < >  --   --     < > = values in this interval not displayed.   - Continue Lantus 20 units HS  - Continue MSSI   - BG checks ac/hs   - Hypoglycemia protocol in place     # Pancytopenia, lymphopenia    Likely 2/2 underlying liver disease and HCC vs acute/underlying infection.  Per last Oncology note, not felt to be d/t chemo.  No acute s/s active bleed but noted to have ecchymoses on upper extremities.   - Trend CBC w/ diff daily   - Transfuse for hgb < 7 plt <10  - Avoid pharm DVT ppx for now     # Severe malnutrition in setting of chronic illness   # Nausea, vomiting   # Stomatitis 2/2 chemo   No complaints of n/v today.  Per nutrition note, last seen in 9/2020, has had 10% weight loss in last 3 months.  Albumin 2.4, protein 5.6. Mag 1.6, Phos 2.8.   - Appreciate Nutrition recs   - Continue regular diet with boost TID and magic cups with lunch   - Calorie counts x 3 days   - Will consider addition of appetite stimulant   - Mag replacement protocol   - Continue magic mouthwash for stomatitis     # R hip pain - No complaints today.  XR on admission neg for fracture.     # Diarrhea, abdominal pain - Possibly 2/2 colitis 2/2 chemo.  Improved. C diff negative.      # GERD - Continue daily PPI     # Depression, anxiety - Continue PTA Seroquel    # Hx tobacco abuse - Nicotine replacement PRN        Diet: Regular Diet Adult  Snacks/Supplements Adult: Boost Plus; Between Meals  Snacks/Supplements Adult: Magic Cup; With Meals    DVT Prophylaxis: Mechanical (d/t thrombocytopenia)   Valdes Catheter: not present  Code Status: Full Code    Rule Out COVID-19 Handoff:  Dania is NOT A LOW SUSPICION PUI (needs further investigation).    Follow these instructions:    If COVID test is positive -> continue  "isolation precautions    If 1st COVID test is negative -> continue isolation precautions    -  Order a repeat COVID test to be done 72 hours after the 1st test  -  Place \"PUI Isolation\" nurse communication order  -  Consider ID consult    If 2nd COVID test performed after 72 hours is negative -> consider discontinuing COVID-specific isolation precautions if clinical course atypical for COVID and/or an alternative diagnosis emerges       Disposition Plan   Expected discharge: 2 - 3 days, recommended to prior living arrangement vs TCU once O2 requirements stable, PT/OT evaluations complete, and Pulmonology consult complete.   Entered: DEUCE Chakraborty CNP 11/01/2020, 7:32 AM       The patient's care was discussed with the Attending Physician, Dr. Sekou Molina.    DEUCE Chakraborty CNP  Hospitalist Service, [address]   Contact information available via [facility] Paging/Directory  Please see sign in/sign out for up to date coverage information  ______________________________________________________________________    Interval History   Dania is resting in bed.  More awake today.  Feels that breathing more comfortably on oxygen.  Does not feel short of breath and denies chest pain. No complaints of pain today.     Data reviewed today: I reviewed all medications, new labs and imaging results over the last 24 hours.    Physical Exam   Vital Signs: Temp: 98.3  F (36.8  C) Temp src: Oral BP: 100/55 Pulse: 85   Resp: 20 SpO2: 95 % O2 Device: Nasal cannula Oxygen Delivery: 5 LPM  Weight: 155 lbs 6.4 oz    GENERAL: AAOx 3. Well nourished, well developed.  No acute distress.    HEENT: Normocephalic, atraumatic. Anicteric sclera. Mucous membranes moist.   CV: RRR. S1, S2. No murmurs appreciated.   RESPIRATORY: Effort normal on 5L. Lungs CTAB with no wheezing, rales, or rhonchi.   GI: Abdomen soft and non distended, bowel sounds present x all 4 quadrants. No tenderness, " rebound, or guarding.   NEUROLOGICAL: No focal deficits. Follows commands.    MUSCULOSKELETAL: No joint swelling or tenderness. Moves all extremities.   EXTREMITIES: No gross deformities. No peripheral edema. Significant clubbing of fingers.   SKIN: Grossly warm, dry, and intact. No jaundice. No rashes.       Data   Recent Labs   Lab 11/01/20  0608 10/31/20  1356 10/31/20  0511 10/30/20  0602 10/29/20  1100 10/29/20  1100   WBC 1.7*  --  2.0* 1.5*  --  2.9*   HGB 10.0*  --  10.5* 10.9*  --  13.0   *  --  116* 114*  --  111*   PLT 24*  --  24* 31*  --  40*   INR  --   --   --   --   --  1.19*     --  141 145*  --  137   POTASSIUM 3.6 4.3 3.3* 3.9   < > 3.7   CHLORIDE 107  --  109 114*  --  102   CO2 29  --  26 27  --  30   BUN 7  --  5* 5*  --  7   CR 0.50*  --  0.52 0.60  --  0.53   ANIONGAP 4  --  5 5  --  5   LETY 7.9*  --  7.8* 7.5*  --  8.9   *  --  128* 137*  --  156*   ALBUMIN 2.3*  --  2.4* 2.4*  --  2.9*   PROTTOTAL 5.6*  --  5.4* 5.6*  --  7.0   BILITOTAL 0.9  --  0.9 1.1  --  2.2*   ALKPHOS 125  --  112 81  --  90   ALT 29  --  26 28  --  38   AST 41  --  37 37  --  Canceled, Test credited   TROPI  --   --   --   --   --  <0.015    < > = values in this interval not displayed.     Recent Results (from the past 24 hour(s))   CT Chest w/o Contrast    Narrative    Chest CT without contrast    INDICATION: Interstitial lung disease    COMPARISON: 9/18/2020    FINDINGS: Mostly subpleural fibrotic changes there are again noted  with honeycombing bilaterally. Findings appear typical of UIP-type  pneumonia. No new pulmonary nodules of suspicion. No pleural or  pericardial effusion. Comparison left thyroid lobectomy noted no  nodules in the isthmus or right lobe. Nonenlarged aortopulmonary  window lymph nodes are present. No enlarged axillary or hilar lymph  nodes. There is thoracic aortic and coronary artery atherosclerotic  calcification. Heart size appears normal. Thoracic aorta and  main  pulmonary artery sizes are also normal.  Upper abdomen shows cholecystectomy clips. Bowel loops noted between  the liver and right kidney again. Spleen is bulky as before. Bones  show degenerative changes in the thoracic spine. Old right fifth rib  fracture.      Impression    IMPRESSION: Continued UIP-type interstitial lung disease. Possible  prior left thyroid lobectomy again noted. Upper limits of normal sized  aortopulmonary window lymph nodes, grossly unchanged. Aortic and  coronary artery atherosclerosis. Bulky spleen. Cholecystectomy.    IFTIKHAR MEDRANO MD   CT Chest Pulmonary Embolism w Contrast    Narrative    EXAMINATION: CT CHEST PULMONARY EMBOLISM W CONTRAST, 10/31/2020 6:56  PM     COMPARISON: Same day CT chest without contrast    HISTORY: PE suspected, low pretest probability    TECHNIQUE: Volumetric helical acquisition of CT images of the chest  from the lung apices to the kidneys were acquired after the  administration of 56 mL of Isovue-370 IV contrast.     Findings:   Lungs: Slightly suboptimal contrast opacification of the pulmonary  arterial system. There is no large central pulmonary embolism. No  obvious lobar level or proximal segmental level emboli present. The  distal subsegmental branches cannot be evaluated. No evidence for  right heart strain.    Bilateral subpleural predominant reticular fibrotic changes with  suggestion of honeycombing similar to same day CT. No new focal  airspace opacities. No new or enlarging solid pulmonary nodules.  Central airways are clear. No pneumothorax. No pleural effusion.    The heart is not enlarged. No large pericardial effusion. The  ascending aorta and main pulmonary artery are normal caliber. Moderate  atherosclerotic calcification of the coronary arteries. Apparent  changes of left thyroid lobectomy again present. Mildly prominent  mediastinal lymph nodes are unchanged in the short interval from  prior.    Upper Abdomen: Limited view of the  upper abdomen shows partially  visualized splenomegaly similar to prior likely with a small posterior  splenule. Cholecystectomy clips. Partially visualized portosystemic  collaterals. Revisualization of treated left hepatic lobe lesion  similar to prior and better evaluated on prior multiphasic studies.  Small amount of perihepatic ascites. Soft tissue density noted  posterior to the spleen which appears to be an accessory spleen on  review of an abdomen CT from 9/18/2020.    Bones: Degenerative changes of the spine. No suspicious osseous  lesions. Chronic anterolateral right fifth rib fracture similar to  prior.      Impression    Impression:  1. Exam is not optimal for detection of pulmonary embolism, however  there is no large central, lobar or proximal segmental level pulmonary  embolism. No evidence for right heart strain.  2. Interstitial lung disease in a UIP pattern similar to prior.  3. Chronic liver disease with small amount of perihepatic ascites,  splenomegaly,  and other typical vascular changes.    I have personally reviewed the examination and initial interpretation  and I agree with the findings.    IFTIKHAR MEDRANO MD     Medications       azithromycin  500 mg Intravenous Q24H     calcium carbonate-vitamin D  1 tablet Oral BID w/meals     cefTRIAXone  2 g Intravenous Q24H     [Held by provider] gabapentin  100 mg Oral TID     insulin aspart  1-6 Units Subcutaneous Q4H     insulin glargine  20 Units Subcutaneous At Bedtime     lactulose  20 g Oral Daily     levothyroxine  25 mcg Oral Daily     lidocaine   Transdermal Q8H     magnesium oxide  400 mg Oral BID     pantoprazole  40 mg Oral Daily     polyethylene glycol  17 g Oral Daily     QUEtiapine  50 mg Oral At Bedtime     rifaximin  550 mg Oral BID     senna-docusate  1 tablet Oral BID    Or     senna-docusate  2 tablet Oral BID     sodium chloride (PF)  3 mL Intracatheter Q8H     thiamine  100 mg Oral Daily     Vitamin D3  25 mcg Oral Daily

## 2020-11-02 NOTE — PLAN OF CARE
PT-5B- PT Consult Order received, per chart review and communication with interdisciplinary care team, pt is mobilizing IND, no LOB. Pt does not demonstrate skilled need for PT at this time. PT will defer evaluation at this time, OT will continue to follow.

## 2020-11-02 NOTE — PROVIDER NOTIFICATION
"Paged MD #[phone]: \" K+ 3.0 and outside of replacement protocol range. Please add 1x replacement or adjust order\"    [id number]: MD ordered 1x K+ replacement order. Will recheck value in 4 hr.   "

## 2020-11-02 NOTE — PLAN OF CARE
"/74 (BP Location: Left arm)   Pulse 84   Temp 97.6  F (36.4  C) (Oral)   Resp 18   Ht 1.549 m (5' 1\")   Wt 70.5 kg (155 lb 6.4 oz)   SpO2 94%   Breastfeeding No   BMI 29.36 kg/m       Neuro: A&O x 4 forgetful  Cardiac: SR,   Respiratory: Crackles to bases, Desats with activity. 5L O2 per n/c. Sats > 94% at rest  GI/:  Intermittently incontinent of bowel and bladder. Adequate urine output. Frequent loose to watery stools.   Diet/Appetite: Good appetite. Needs assist to order meals.   Skin: Intact skin. Bruising  LDA: piv SL  Activity: up ad berkley.   Pain: C/O generalized aching pain. Oxycodone and Tylenol given x 1 which was effective. Pt sleeping majority of night.   Plan: Transfer to  Report given.     "

## 2020-11-02 NOTE — CONSULTS
Care Management Initial Consult     General Information  Assessment completed with: Patient,    Type of CM/SW Visit: Initial Assessment  Primary Care Provider verified and updated as needed: Yes   Readmission within the last 30 days: no previous admission in last 30 days     Reason for Consult: discharge planning  Advance Care Planning:          Communication Assessment  Patient's communication style: spoken language (English or Bilingual)    Hearing Difficulty or Deaf: no   Wear Glasses or Blind: yes     Cognitive  Cognitive/Neuro/Behavioral: .WDL except    Level of Consciousness: alert    Arousal Level: opens eyes spontaneously    Orientation: oriented x 4    Mood/Behavior: calm;cooperative  Best Language: 0 - No aphasia  Speech: clear     Living Environment:   People in home: child(anupama), adult;spouse  Spouse, Ideleo  Current living Arrangements: house      Able to return to prior arrangements: Unclear, patient notes that multiple family member have COVID after recent travel together (Spouse travelled w/these family members). Patient notes that she has asked her spouse to get tested for COVID before she returns home, if unable, she may discharge to her son's home.     Family/Social Support:  Care provided by: self  Provides care for: no one  Marital Status:   ;Sibling(s)    Description of Support System: Supportive;Involved       Current Resources:   Skilled Home Care Services:  None  Community Resources: None  Equipment currently used at home: grab bar, tub/shower  Supplies currently used at home: None     Employment/Financial:  Employment Status: unemployed     Lifestyle & Psychosocial Needs:          Socioeconomic History     Marital status:        Spouse name: Not on file     Number of children: Not on file     Years of education: Not on file     Highest education level: Not on file                  Tobacco Use     Smoking status: Current Every Day Smoker       Packs/day: 0.30        Years: 40.00       Pack years: 12.00       Types: Cigarettes     Smokeless tobacco: Never Used   Substance and Sexual Activity     Alcohol use: Not Currently       Comment: Rarely drank alcohol.      Drug use: Not Currently                Additional Information:  Due to increased unplanned readmission risk, patient triggered case management assessment. Patient lives in [place], [place] w/her spouse, independently. PT currently deferring formal eval, as a patient is up independently, currently recommending discharge to home w/family. Patient is currently requiring 5L O2, will need to complete a home O2 walk assessment prior to discharge to further assess supplemental oxygen needs. Patient voices concern over where she will discharge to when medically ready as her spouse recently travelled to [place] w/family whom now all have COVID, but her spouse has no been tested yet. Patient states that she asked her  to get tested, he as an appointment scheduled for tomorrow, 11/3. Patient states that she will further talk about discharge location with her son if needed. RNCC will continue to follow for discharge planning.      Jeannette Garcia, ANJANACC, BSN     [facility]    Medicine Group  [address] [place] [id number]    [email or web address]    Office: [phone] Pager: [phone]  To contact the weekend RNCC, page [phone].

## 2020-11-02 NOTE — PROGRESS NOTES
Care Management Initial Consult    General Information  Assessment completed with: Patient,    Type of CM/SW Visit: Initial Assessment  Primary Care Provider verified and updated as needed: Yes   Readmission within the last 30 days: no previous admission in last 30 days     Reason for Consult: discharge planning  Advance Care Planning:          Communication Assessment  Patient's communication style: spoken language (English or Bilingual)    Hearing Difficulty or Deaf: no   Wear Glasses or Blind: yes    Cognitive  Cognitive/Neuro/Behavioral: .WDL except  Level of Consciousness: alert  Arousal Level: opens eyes spontaneously  Orientation: oriented x 4  Mood/Behavior: calm;cooperative  Best Language: 0 - No aphasia  Speech: clear    Living Environment:   People in home: child(anupama), adult;spouse  Spouse, Ideleo  Current living Arrangements: house      Able to return to prior arrangements: Unclear, patient notes that multiple family member have COVID after recent travel together (Spouse travelled w/these family members). Patient notes that she has asked her spouse to get tested for COVID before she returns home, if unable, she may discharge to her son's home.    Family/Social Support:  Care provided by: self  Provides care for: no one  Marital Status:   ;Sibling(s)    Description of Support System: Supportive;Involved      Current Resources:   Skilled Home Care Services:  None  Community Resources: None  Equipment currently used at home: grab bar, tub/shower  Supplies currently used at home: None    Employment/Financial:  Employment Status: unemployed        Lifestyle & Psychosocial Needs:        Socioeconomic History     Marital status:      Spouse name: Not on file     Number of children: Not on file     Years of education: Not on file     Highest education level: Not on file     Tobacco Use     Smoking status: Current Every Day Smoker     Packs/day: 0.30     Years: 40.00     Pack years: 12.00      Types: Cigarettes     Smokeless tobacco: Never Used   Substance and Sexual Activity     Alcohol use: Not Currently     Comment: Rarely drank alcohol.      Drug use: Not Currently          Additional Information:  Due to increased unplanned readmission risk, patient triggered case management assessment. Patient lives in [place], [place] w/her spouse, independently. PT currently deferring formal eval, as a patient is up independently, currently recommending discharge to home w/family. Patient is currently requiring 5L O2, will need to complete a home O2 walk assessment prior to discharge to further assess supplemental oxygen needs. Patient voices concern over where she will discharge to when medically ready as her spouse recently travelled to [place] w/family whom now all have COVID, but her spouse has no been tested yet. Patient states that she asked her  to get tested, he as an appointment scheduled for tomorrow, 11/3. Patient states that she will further talk about discharge location with her son if needed. RNCC will continue to follow for discharge planning.     Jeannette Garcia, RNCC, BSN    [facility]    Medicine Group  [address] [place] [id number]    [email or web address]    Office: [phone] Pager: [phone]  To contact the weekend RNCC, page [phone].

## 2020-11-02 NOTE — PLAN OF CARE
Transferred from  to , room 5226 at 0515. Arrived in hospital bed. VS obtained and WNL. Oriented to room. All questions answered. Patient was incontinent of urine and pull up was changed, she also voided 200 ml clear yellow urine. Gait is steady and patient is oriented x4. Independent with ambulation. Stated that she would like to take a shower this morning. The nurse from [facility] stated that the patient will not remember to order her own meals, and that nursing must order for her. She is being treated for pneumonia. She said that she has sores in her mouth from chemotherapy. Continue with current plan of nursing care, and update MD with concerns as neeeded.

## 2020-11-02 NOTE — PROGRESS NOTES
[facility]  Pulmonary Consult Progress Note  11/2/2020         Assessment and Plan:   Dania Albert is a 54 year old female with a past medical history of hepatitis C cirrhosis and tobacco use with a likely smoking-related interstitial lung disease (vs CPFE) admitted on 10/29/2020 for acute hypoxic respiratory failure.  She was initially referred for transplant evaluation for a potential liver transplant, and after discussion at multidisciplinary chest conference was felt to have a smoking-related interstitial lung disease.  Smoking cessation was encouraged and she was seen in follow-up in May 2020.  At that time she had minimal respiratory symptoms but continued to smoke.  She had recently had a biopsy of a liver lesion which was concerning for hepatocellular carcinoma and her preference was not to follow-up further with pulmonary if that was indeed the case and she was not a transplant candidate.  She has since been diagnosed with multifocal hepatocellular carcinoma with metastases to the bone and was started on lenvatinib in June 2020.  Most recent CT chest in 9/2020 was stable from CT chest in 10/2019.     This hospitalization she presented with nausea, vomiting, and diarrhea and was found to be hypoxic upon ED presentation.  Initial Covid was negative.  Work-up thus far is notable for pancytopenia with white blood cell count of 2 and absolute neutrophil count of 1100.  Respiratory viral panel was negative.  She was found to have a urinary tract infection and is on treatment with ceftriaxone and azithromycin.  Pulmonology was consulted for concern for an interstitial lung disease exacerbation.     Differential diagnosis for her acute hypoxic respiratory failure includes a flare of her underlying smoking related interstitial lung disease, viral infection, community-acquired pneumonia, aspiration related to her nausea and vomiting, pulmonary edema, hepatopulmonary syndrome.  Lenvatinib  pneumonitis has been reported but is quite rare.     Also of note, though she does have at least moderate emphysema on her chest CT, she does not have obstruction and does not have a history consistent with COPD.     CT obtained on 10/31 does not show any obvious progression of her underlying interstitial lung disease and does not show any evidence of an ILD exacerbation or pneumonia. CT not per PE protocol but no evidence for right heart strain. Repeat COVID was negative. Lenvatinib has been held. Will need TTE with bubble study to assess for hepatopulmonary syndrome. She will need follow up in pulmonology clinic for smoking related ILD.     Recommendations  1. Awaiting TTE to evaluate for hepatopulmonary syndrome.  2. At discharge, will need follow up in outpatient pulmonology clinic.  3.Encourage smoking cessation    We will follow peripherally. Please do not hesitate to contact us with questions.     Staffed with Dr. Wes Robert, ZAHIDA  Internal Medicine Resident PGY2         Interval History:     No acute events overnight. She is requiring up to 4L NC. When taken off supplemental oxygen, she maintains sats comfortably at 85-88%. Denies any shortness of breath, change in cough or sputum.           Medications:       azithromycin  500 mg Intravenous Q24H     calcium carbonate-vitamin D  1 tablet Oral BID w/meals     cefTRIAXone  2 g Intravenous Q24H     docosanol   Topical 5x Daily     [Held by provider] gabapentin  100 mg Oral TID     insulin aspart  1-6 Units Subcutaneous Q4H     insulin glargine  10 Units Subcutaneous At Bedtime     lactulose  20 g Oral Daily     levothyroxine  25 mcg Oral Daily     lidocaine   Transdermal Q8H     magnesium oxide  400 mg Oral BID     pantoprazole  40 mg Oral Daily     polyethylene glycol  17 g Oral Daily     QUEtiapine  50 mg Oral At Bedtime     rifaximin  550 mg Oral BID     senna-docusate  1 tablet Oral BID    Or     senna-docusate  2 tablet Oral BID      sodium chloride (PF)  3 mL Intracatheter Q8H     thiamine  100 mg Oral Daily     Vitamin D3  25 mcg Oral Daily     acetaminophen, albuterol, benzonatate, glucose **OR** dextrose **OR** glucagon, diclofenac, lidocaine, lidocaine 4%, lidocaine (buffered or not buffered), magic mouthwash suspension (diphenhydrAMINE, lidocaine, aluminum-magnesium & simethicone), melatonin, naloxone, ondansetron **OR** ondansetron, oxyCODONE, simethicone, sodium chloride (PF)         Physical Exam:   Temp:  [96.7  F (35.9  C)-98.2  F (36.8  C)] 98.2  F (36.8  C)  Pulse:  [79-89] 89  Resp:  [16-18] 16  BP: ()/(55-74) 100/55  SpO2:  [91 %-98 %] 98 %    Intake/Output Summary (Last 24 hours) at 11/2/2020 1708  Last data filed at 11/2/2020 0900  Gross per 24 hour   Intake 270 ml   Output 500 ml   Net -230 ml       Gen:   No acute distress. Alert, awake, and oriented.     HEENT:   Anicteric sclerae. No oral lesions     Lungs:   Bilateral coarse crackles. No wheezes     Cardiovascular:   Normal S1 and S2.  RRR.  No murmur, gallop or rub.     Abdomen:   Soft, ND, NT with active BS.      Extremities:    Mild non-pitting edema in lower extremities. Significant digital clubbing.      Neurologic:   Alert, easily distracted on telephone, but able to answer questions     Skin:   Warm, dry.  No rash on exposed skin.               Data:   All laboratory and imaging data reviewed.      Respiratory Virus Testing    No results found for: RS, FLUAG    Sputum Culture last 3 months:  Specimen Description   Date Value Ref Range Status   10/30/2020 Feces  Final   10/29/2020 Midstream Urine  Final   07/19/2020 Midstream Urine  Final    Culture Micro   Date Value Ref Range Status   10/29/2020 >100,000 colonies/mL  Escherichia coli   (A)  Final   07/19/2020   Final    <10,000 colonies/mL  urogenital ashley  Susceptibility testing not routinely done     07/16/2020 No growth  Final        10/31 CT Chest  Impression:  1. Exam is not optimal for detection of  pulmonary embolism, however  there is no large central, lobar or proximal segmental level pulmonary  embolism. No evidence for right heart strain.  2. Interstitial lung disease in a UIP pattern similar to prior.  3. Chronic liver disease with small amount of perihepatic ascites,  splenomegaly,  and other typical vascular changes.

## 2020-11-03 NOTE — PROGRESS NOTES
[facility]     Medicine Progress Note - Hospitalist Service, Gold 6       Date of Admission:  10/29/2020  Assessment & Plan        aDnia Albert is 54 year old female with past medical history significant for multifocal HCC s/p TACE and ablation (10/2019) now w/ bone metastasis, HCV cirrhosis c/b EVs and ascites, COPD, type 2 DM, depression, and anxiety admitted for nausea, vomiting, and diarrhea, and found to be hypoxic, concerning for COVID 19 infection.      # Acute hypoxic respiratory failure   # Possible CAP  # Presumed ILD 2/2 chronic tobacco smoking   # ERICKA not currently on CPAP   Stable.  Required 8L supp O2 on admission, currently using 3-5L to keep O2 sats >92%. No supplemental O2 use pta. COVID x 2 negative. CXR 10/29 with bilateral peripheral and bibasilar reticular interstitial fibrotic opacities, c/f superimposed edema vs infection vs COPD exacerbation.  Seen by Pulmonology at [facility] 4/2018, dx with end stage lung disease.  Pre-transplant pulm consult here 1/2020 felt to have smoking related ILD and emphysema. Procal neg.  RVP negative.  Trop neg.  EKG unremarkable.  Leukopenic as below.  CT chest 10/31 with UIP type interstitial lung disease and aortic and coronary artery atherosclerosis.  CT PE neg for acute PE.  BNP unremarkable.  ECHO 11/3 with LVEF 60-65%, patent foramen ovale. S/p azithromycin 10/29-11/2, continues on ceftriaxone (started 10/29) for possible CAP. O2 sats have been stable on 3-5L per NC as long as patient remains compliant - frequently removes O2. Suspect patient has underlying ILD requiring oxygen for sometime but has been compensating on her own pta.   - Cont IV ceftriaxone thru 11/4 to complete 7d  - Will discuss ECHO result with pulm  - Continue PRN albuterol   - Continue PRN tessalon   - Walk test to be performed today to eval need for home O2 (although pt reports shes not sure if she will use oxygen at home)  - Pt  refusing CPAP     # E. Coli UTI. UA w/ large LE, UC w/ >100K E coli, with resistance to Ampicillin, Unasyn, Cefazolin, and Bactrim.    - Continue IV Ceftriaxone through 11/4 to complete 7d course  - Encourage PO fluids      # Multifocal HCC s/p TACE and ablation (10/2019)  # Recently identified bony metastasis   # Cancer related pain, neuropathy   Follows w/ Dr. Childress. Initially dx with HCC in 6/2019 and underwent TACE/ablation in 10/2019 and MWA in 3/2020.  Last MRI in 5/2020 without residual viable cancer in tx zones but c/f multifocal disease d/t dramatic increase in number and sizes of numerous arterial enhancing foci scattered throughout the liver parenchyma.  CT C/A/P 6/2020 with mets to anterior right fifth rib and left clavicular head.  Started on Lenvatinib on 6/30/2020.   - Oncology following, appreciate recs   - Continue to hold Lenvatinib per Oncology while inpatient - plan to resume on discharge  - CBC, CMP daily  - Pain management: cont prn oxycodone 2.5mg TID prn (using 2x per day). Holding home gabapentin d/t sedation.      # Hx HCV cirrhosis c/b EVs s/p banding, ascites, HE, PVT. S/p tx with Sovaldi/Ribavirin in 2017 w/ SVR.  Known to have non-occlusive thrombus in main and left portal veins, ultimately not a candidate for AC d/t chronic thrombocytopenia and risk for bleeding.  On Rifaximin PTA.  Previously on Lactulose but this appears to have been stopped in 8/2020 due to issues with persistent diarrhea r/t chemo.  Tbili wnl. Not currently a transplant candidate.    - Continue Rifaximin   - Continue lactulose 20g daily while off chemo (pt intermittently refusing)     # Type 2 DM - Well controlled.  Hgb A1c 5.9% this admission.  On Lantus 40 units at HS PTA, resumed at decreased dose of lantus 10U at bedtime d/t poor po intake with stable BGs.  - Cont lantus to 10U QHS  - Continue MSSI   - BG checks ac/hs   - Hypoglycemia protocol in place      # Pancytopenia, lymphopenia. Likely 2/2 underlying  liver disease and HCC vs acute/underlying infection.  Per last Oncology note, not felt to be d/t chemo.  No acute s/s active bleed but noted to have scattered multiple ecchymoses on upper extremities.   - Trend CBC w/ diff daily   - Transfuse for hgb < 7 plt <10  - Avoid pharm DVT ppx for now      # Severe malnutrition in setting of chronic illness   # Nausea, vomiting   # Stomatitis 2/2 chemo   No complaints of n/v over past 2 days, significantly improved since chemo has been held.    - Continue regular diet with boost TID and magic cups with lunch   - Will consider addition of appetite stimulant  - Continue magic mouthwash for stomatitis      # R hip pain. XR neg for fx on admission. No complaints today.  Pain control as above.      # Diarrhea, abdominal pain. Possibly 2/2 colitis 2/2 chemo. C diff negative 10/30.  Improving while chemo held.      # GERD. Cont PPI daily.      # Depression, anxiety. Mood stable. Continue PTA Seroquel     # Hx tobacco abuse . Nicotine replacement PRN        Diet: Regular Diet Adult  Snacks/Supplements Adult: Boost Plus; Between Meals  Snacks/Supplements Adult: Magic Cup; With Meals  Calorie Counts    DVT Prophylaxis: mechanical, pharm c/i d/t thrombocytopenia  Valdes Catheter: not present  Code Status: Full Code           Disposition Plan   Expected discharge: Tomorrow, recommended to prior living arrangement once ECHO completed, O2 sats remain stable, home O2 arranged.  Entered: Aura Romeo PA-C 11/03/2020, 8:58 AM       The patient's care was discussed with the Attending Physician, Dr. Kellogg.    Aura Romeo PA-C  Hospitalist Service, [address]   Contact information available via [facility] Paging/Directory  Please see sign in/sign out for up to date coverage information  ______________________________________________________________________    Interval History   Dania is doing okay today. She     Physical  Exam   Vital Signs: Temp: 96.6  F (35.9  C) Temp src: Oral BP: 101/58 Pulse: 71   Resp: 16 SpO2: 95 % O2 Device: Nasal cannula Oxygen Delivery: 5 LPM  Weight: 149 lbs 14.6 oz  GENERAL: Alert and oriented x 3. NAD. Sitting up in bed. Pleasant.   HEENT: MMM   CV: RRR.   RESPIRATORY: Effort normal on 4L per NC. Diminished sounds at bases.   NEUROLOGICAL: No focal deficits.   EXTREMITIES: significant clubbing of fingers  SKIN: No jaundice or rashes on exposed areas of skin

## 2020-11-03 NOTE — PROGRESS NOTES
"Care Management Follow Up    Length of Stay (days): 5    Expected Discharge Date: 11/04/20     Concerns to be Addressed:    Home oxygen set up.   Patient plan of care discussed at interdisciplinary rounds: Yes    Anticipated Discharge Disposition:  Daughter's home.      Anticipated Discharge Services:  None.  Anticipated Discharge DME:  Home oxygen.     Education Provided on the Discharge Plan: Yes  Patient/Family in Agreement with the Plan: Yes    Referrals Placed by CM/SW:  Home oxygen set up through [facility].     Additional Information:  Met with patient to discuss discharge planning and set up of home oxygen. Patient became quite frustrated and tearful w/unknown prognosis and frustration w/the unknown future. Patient's daughter at the bedside, very involved and supportive, attempted to redirect patient and calm down. Patient is agreeable to home oxygen set up as long as she is not \"lugging around some huge tank.\" Writer discussed palliative care consult, patient's daughter noted that she is followed by Dr. Liang, declines IP consult at this time. Page sent to attending provider at this time to cosign home O2 clinical documentation. RNCC will continue to follow for discharge planning.    [facility] (4L w/activity)  Phone: [phone]    Jeannette Garcia RNCC, BSN    [facility]    Medicine Group  [address] [place] [id number]    [email or web address]    Office: [phone] Pager: [phone]  To contact the weekend RNCC, page [phone].           "

## 2020-11-03 NOTE — PLAN OF CARE
Assumed cares [id number]. Pt A&Ox4, VSS on 4L O2 via NC. Blood sugars WDL, no insulin given. Pt endorses mild mid-back pain that is mostly relieved by lidocaine patch. Also endorses mild dyspnea upon exertion with sats dropping to high 80's with activity. O2 increased to 6L when up to commode. Up to commode x1 overnight, one loose BM. Pt appeared to be resting comfortably in between cares. Continue to monitor and update MD with changes.

## 2020-11-03 NOTE — PLAN OF CARE
Pt A&O x4 with forgetfulness, VSS on 3L NC. BS covered. Q4H BS checks. Independent in room. Had multiple loose BM this evening. Very urgent and almost not able to make to the bathroom. Abreva ordered for cold sore on mouth. Fair appetite. On calorie counts. Given oxycodone x1 for pain in ankles and tops of feet. Will continue to monitor.

## 2020-11-03 NOTE — PROGRESS NOTES
Calorie Count  Intake recorded for: 11/2  Total Kcals: 1966 Total Protein: 78g  Kcals from Hospital Food: 1966  Protein: 78g  Kcals from Outside Food (average):0 Protein: 0g  # Meals Ordered from Kitchen: 3 meals   # Meals Recorded: 3 meals (First - 100% corn, grilled cheese w/ sahu & tomato, 75% fruit cup)      (Second - 100% ice cream, 50% flatbread w/ margarita, onion, peppers & parmesan)      (Third - 100% orange juice, 50% scrambled eggs w/ cheese, 25% hash browns, less than 25% sausage alexandru)  # Supplements Recorded: 100% 2 Boost Plus

## 2020-11-03 NOTE — PLAN OF CARE
Patient has been assessed for Home Oxygen needs. Oxygen readings:    *Pulse oximetry (SpO2) = 93% on room air at rest while awake.    *SpO2 improved to 94 % on 4 liters/minute at rest.    *SpO2 = 82 % on room air during activity/with exercise.    *SpO2 improved to 92 % on 4 liters/minute during activity/with exercise.

## 2020-11-03 NOTE — PROGRESS NOTES
Brief Medicine Note  November 3, 2020    Oxygen Documentation:   I certify that this patient, Daina Albert has been under my care (or a nurse practitioner or physican's assistant working with me). This is the face-to-face encounter for oxygen medical necessity.      Dania Albert is now in a chronic stable state and continues to require supplemental oxygen. Patient has continued oxygen desaturation due to Emphysema J43.9  ILD J84.9.    Alternative treatment(s) tried or considered and deemed clinically infective for treatment of Emphysema J43.9  ILD J84.9 include nebulizers, inhalers and steroids.  If portability is ordered, is the patient mobile within the home? yes      Aura Romeo PA-C

## 2020-11-03 NOTE — PROGRESS NOTES
Hematology / Oncology  Daily Progress Note   Date of Service: 11/03/2020  Patient: Dania Albert  MRN: [id number]  Admission Date: 10/29/2020  Hospital Day # 5  Cancer Diagnosis: HCC with metastases to the bone   Primary Outpatient Oncologist: Dr. Childress   Current Treatment Plan: currently on Lenvatinib     Recommendations:   - Per discussion on 11/3 with primary oncologist (Dr. Childress), patient should resume Lenvatinib upon discharge and follow-up per below.   - Agree with management of infections/hypoxia per primary team/pulmonology. Pulm will be following with patient in outpatient setting.   - Discharge planning per primary team. Anticipated discharge 11/4.   - Oncology follow-up scheduled: labs/SENG visit on 11/9, appt w/ Dr. Childress on 11/17.      Assessment & Plan:   Dania Albert is a 54 year old female with past medical history significant for multifocal HCC s/p TACE and ablation (10/2019) now w/ bone metastasis, HCV cirrhosis c/b EVs and ascites, COPD, type 2 DM, depression, and anxiety. Admitted for nausea, vomiting, diarrhea and acute hypoxic respiratory failure.     # Multifocal HCC with Metastasis to the Bone   Oncologic history obtained from outpatient notes. She was found to have HCC in June 2019 and had TACE/Ablation for S3 lesion on Oct. 2019. She had a follow up MRI in May 2020 showed no residual viable cancer in the treatment zones but found increase in number and sizes of numerous arterial enhancing foci scattered throughout the liver parenchyma which is suspicious for multifocal HCC. She underwent a liver biopsy which confirmed moderately differentiated HCC. Bone scan showed involvement of anterior right fifth rib and left clavicular head. She was started on Lenvatinib 6/30/20. Repeat CT scan and bone scan 9/16/20 show stable to slightly improved disease.   - Hold Lenvantinib while in the hospital. Messaged primary oncologist on 11/3 (Dr. Childress) and per discussion, plan to resume Lenvatinib  upon discharge and follow-up in clinic in 7-10 days.   - Oncology follow-up scheduled: labs/SENG visit on 11/9, appt w/ Dr. Childress on 11/17.    # Acute hypoxic respiratory failure   # Possible COVID 19 vs CAP vs COPD exacerbation   # Presumed ILD 2/2 chronic tobacco smoking   Requiring 8L of O2 on admission, continues to require 4-5L throughout hospital stay. COVID testing negative. RVP negative. CT PE negative. BNP unremarkable. CXR on 10/29 concerning for superimposed edema vs infection vs COPD exacerbation. She was dx with end stage lung disease at [facility] 4/2018. Had pre-transplant pulm consult here in 1/2020 and felt to have smoking-related ILD and emphysema.   - Agree with w/u and management per primary team. Planning to complete 7d course Ceftriaxone/Azithromycin.   - Pulmonology following, planning to obtain echo. Will follow in clinic.     # E. Coli UTI  - Management per primary team. Planning to complete 7d course Ceftriaxone.     # Pancytopenia   Secondary to underlying malignancy and chemotherapy.   - Transfuse for hgb < 7 plt <10    # Nausea, improved  # Vomiting, improved  # Diarrhea   # Mucositis  She was seen in clinic by Dr. Childress on 10/28 and he was concerned about her weakness, and intermittent nausea/vomiting. He recommended that she go to the hospital. Daughter states that the patient has been increasing nausea/vomiting and resulting in poor nutrition.  - Symptomatic management per primary team.   - Lenvatinib does have 11-41% risk of stomatitis. Continue s/s rinses and MMW prn. Patient reports mouth sores are currently tolerable and not interfering with eating.   - Nutrition following, starting calorie counts 11/2-11/4. Boost TID and magic cups with lunch ordered per primary team.     Oncologic History:  - She has Hep C cirrhosis. Hep C was previously treated with Sovaldi/ribavirin and achieved SVR.  - She was found to have HCC in June 2019 and had TACE/Ablation for S3 lesion on Oct 2019  and then found to have a new S3 lesion for which she had a MWA on 3/17/2020.  - Bone Scan in Dec 2019 was negative for metastatic disease.   - CTA Chest in October 2019 was negative for metastatic disease.   - A follow up MRI on 5/8/2020 showed no resdual viable cancer in the treatment zones but it found dramatic increase in number and sizes of numerous arterial enhancing foci scattered throughout the liver parenchyma which was very suspicious for multifocal HCC. None of these were diagnostic of HCC so she had liver biopsy which confirmed moderately differentiated HCC.   - As she was not deemed a candidate for any further liver directed therapy, she was referred to medical oncology.  - She has constant pain in the RUQ region/beneath the lower right rib cage since the MWA procedure in March 2020.  - She had a tick bite and she completed a 21 day course of Doxycycline in early June 2020.  - Baseline CT chest abdomen and pelvis on 6/12/2020.  Bone scan showed anterior right fifth rib and left clavicular head metastasis.  - She started lenvatinib on 6/30/2020.  - She was admitted to the hospital from 7/16/2020 till 7/20/2020 for hepatic encephalopathy and noninfectious colitis. She was initially started on antibiotics but later on it was a stopped.   MRI of the brain did not show any intracranial metastasis.She was started on lactulose and rifaximin and her confusion improved.  Lenvatinib was held during the admission.  - She had a repeat CT chest abdomen and pelvis which did not show any pulmonary embolism.  Interval advancement of portal venous thrombus involving the main portal vein and the left intrahepatic portal venous system was seen.  There was also interval development of low-attenuation lesions which were smaller than the arterial enhancing lesions seen on CT scan from June 2020.    - Repeat scans including CT scan and bone scan on 9/16/2020 were stable to slightly improved.  Alpha-fetoprotein was also stable  "at 10.6. Lenvatinib continued.     Patient was seen and plan of care was discussed with attending physician Dr. Cintron.    Thank you for the opportunity to partake in this patients plan of care. Please do not hesitate to page with questions. We will continue to follow.     Fiona Kaur PA-C   Hematology/Oncology   Pager: [phone]   ___________________________________________________________________    Subjective & Interval History:    No acute events overnight. Ongoing loose stools overnight. Otherwise feeling okay this morning. Denies nausea, abdominal pain, or pain this morning. Looking forward to leaving the hospital when able. Feels breathing is well. No SOB at rest. Per nursing note, did require up to 6L when up to using the commode yesterday. Planning for walk test today. Waiting on echo with pulm.     Physical Exam:    Blood pressure 101/58, pulse 71, temperature 96.6  F (35.9  C), temperature source Oral, resp. rate 16, height 1.549 m (5' 1\"), weight 68 kg (149 lb 14.6 oz), SpO2 95 %, not currently breastfeeding.    General: lying in bed, no acute distress  HEENT: sclera anicteric, EOMI, MMM  Neck: supple, normal ROM  CV: RRR, normal S1/S2, no m/r/g  Resp: CTAB, no wheezing/crackles, normal respiratory effort on 5L/NC.   GI: Patient declined abdominal exam.   MSK: warm and well-perfused, normal tone  Skin: no rashes on limited exam, no jaundice  Neuro: Alert and interactive, moves all extremities equally, no focal deficits    Labs & Studies: I personally reviewed the following studies:  ROUTINE LABS (Last four results):  CMP  Recent Labs   Lab 11/03/20  0610 11/02/20  1308 11/02/20  0620 11/01/20  0608 10/31/20  0511 10/31/20  0511 10/30/20  0602 10/28/20  1338 10/28/20  1338     --  142 140  --  141 145*   < > 137   POTASSIUM 4.3 4.0 3.0* 3.6   < > 3.3* 3.9   < > 3.0*   CHLORIDE 108  --  107 107  --  109 114*   < > 102   CO2 29  --  29 29  --  26 27   < > 29   ANIONGAP 5  --  5 4  --  5 5   < > 6   GLC " 94  --  67* 106*  --  128* 137*   < > 147*   BUN 9  --  8 7  --  5* 5*   < > 6*   CR 0.50*  --  0.53 0.50*  --  0.52 0.60   < > 0.52   GFRESTIMATED >90  --  >90 >90  --  >90 >90   < > >90   GFRESTBLACK >90  --  >90 >90  --  >90 >90   < > >90   LETY 8.4*  --  7.9* 7.9*  --  7.8* 7.5*   < > 9.0   MAG 2.0  --   --   --   --   --  1.6  --  1.4*   PHOS 3.7  --   --   --   --   --  2.8  --   --    PROTTOTAL 5.9*  --  5.7* 5.6*  --  5.4* 5.6*   < > 7.4   ALBUMIN 2.4*  --  2.4* 2.3*  --  2.4* 2.4*   < > 3.2*   BILITOTAL 0.9  --  1.0 0.9  --  0.9 1.1   < > 2.4*   ALKPHOS 157*  --  111 125  --  112 81   < > 115   AST 75*  --  52* 41  --  37 37   < > 49*   ALT 45  --  30 29  --  26 28   < > 39    < > = values in this interval not displayed.     CBC  Recent Labs   Lab 11/03/20  0610 11/02/20  0620 11/01/20  0608 10/31/20  0511   WBC 1.3* 1.9* 1.7* 2.0*   RBC 2.77* 2.73* 2.70* 2.83*   HGB 10.2* 10.0* 10.0* 10.5*   HCT 32.5* 31.6* 31.2* 32.9*   * 116* 116* 116*   MCH 36.8* 36.6* 37.0* 37.1*   MCHC 31.4* 31.6 32.1 31.9   RDW 18.1* 17.9* 17.9* 18.2*   PLT 28* 27* 24* 24*     INR  Recent Labs   Lab 10/29/20  1100   INR 1.19*       Medications list for reference:  Current Facility-Administered Medications   Medication     acetaminophen (TYLENOL) tablet 650 mg     albuterol (PROAIR HFA/PROVENTIL HFA/VENTOLIN HFA) 108 (90 Base) MCG/ACT inhaler 2 puff     benzonatate (TESSALON) capsule 100-200 mg     calcium carbonate-vitamin D (OSCAL w/D) per tablet 1 tablet     cefTRIAXone (ROCEPHIN) 2 g vial to attach to  ml bag for ADULTS or NS 50 ml bag for PEDS     glucose gel 15-30 g    Or     dextrose 50 % injection 25-50 mL    Or     glucagon injection 1 mg     diclofenac (VOLTAREN) 1 % topical gel 4 g     docosanol (ABREVA) 10 % cream     [Held by provider] gabapentin (NEURONTIN) capsule 100 mg     insulin aspart (NovoLOG) injection (RAPID ACTING)     insulin glargine (LANTUS PEN) injection 10 Units     lactulose (CHRONULAC)  solution 20 g     levothyroxine (SYNTHROID/LEVOTHROID) tablet 25 mcg     Lidocaine (LIDOCARE) 4 % Patch 1-3 patch     lidocaine (LMX4) cream     lidocaine 1 % 0.1-1 mL     lidocaine patch in PLACE     magic mouthwash suspension (diphenhydrAMINE, lidocaine, aluminum-magnesium & simethicone)     magnesium oxide (MAG-OX) tablet 400 mg     melatonin tablet 3 mg     naloxone (NARCAN) injection 0.1-0.4 mg     ondansetron (ZOFRAN-ODT) ODT tab 4 mg    Or     ondansetron (ZOFRAN) injection 4 mg     oxyCODONE IR (ROXICODONE) half-tab 2.5 mg     pantoprazole (PROTONIX) EC tablet 40 mg     polyethylene glycol (MIRALAX) Packet 17 g     QUEtiapine (SEROquel) tablet 50 mg     rifaximin (XIFAXAN) tablet 550 mg     senna-docusate (SENOKOT-S/PERICOLACE) 8.6-50 MG per tablet 1 tablet    Or     senna-docusate (SENOKOT-S/PERICOLACE) 8.6-50 MG per tablet 2 tablet     simethicone (MYLICON) chewable tablet 80 mg     sodium chloride (PF) 0.9% PF flush 3 mL     sodium chloride (PF) 0.9% PF flush 3 mL     thiamine (B-1) tablet 100 mg     Vitamin D3 (CHOLECALCIFEROL) tablet 25 mcg

## 2020-11-03 NOTE — PROGRESS NOTES
"SPIRITUAL HEALTH SERVICES: Tele-Encounter  Patient Location (Hospital, [facility], Unit): [facility], [facility]  Spoke with (patient, family relationship): Pt's daughter      Referral Source: Pt request    If applicable: patient was appropriately screened for telechaplaincy support with bedside nurse prior to visit (e.g. Mental Health and Addiction contexts). See call details below.    DATA:  Pt's daughter answered the phone and replied that \"Mom doesn't want a visit right now\". I oriented her to spiritual health services and let her know we could offer support to both her and her mom.        PLAN:  Spiritual health services remains available for any follow-up or requests      Chaplain Jannie Watson    ______________________________    Type of service:  Telephone Visit     has received verbal consent for a TelephoneVisit from the patient? Yes    Distance Provider Location: designated [place] office or home office (secure setting)    Mode of Communication: telephone (via [employer] phone or [employer] tele-call-number ([phone]))          "

## 2020-11-03 NOTE — PLAN OF CARE
PT-5B- 6MWT order received, however per chart review Home O2 test indicated as a home oxygen qualifier,  home O2 test can be completed by bedside RN or Occupational Therapist. 6MWT order completed.

## 2020-11-04 NOTE — PROGRESS NOTES
Calorie Count  Intake recorded for: 11/3  Total Kcals: 0 Total Protein: 0g  Kcals from Hospital Food: 0   Protein: 0g  Kcals from Outside Food (average):0 Protein: 0g  # Meals Recorded: 1 meal ordered from kitchen, no intake recorded.   # Supplements Recorded: no intake recorded.

## 2020-11-04 NOTE — PLAN OF CARE
Alert and oriented x 4. Denies pain. Blood glucose was 181. Oxygen saturation 94% on 4 liters. Up to the BSC independently. Call light in reach.

## 2020-11-04 NOTE — DISCHARGE SUMMARY
[facility]   Hospitalist Discharge Summary      Date of Admission:  10/29/2020  Date of Discharge: 11/4/2020  Discharging Provider: Aura Romeo PA-C/ Dr Murdock  Discharge Team: Hospitalist Service, Gold 6    Discharge Diagnoses   Acute on likely chronic respiratory failure  Suspected CAP - treated  Presumed ILD 2/2 chronic tobacco use  ERICKA  EColi UTI - treated  Multifocal HCC s/p TACE and ablation now with bony metastasis  Cancer related pain, neuropathy  Hx of HCV cirrhosis c/b EVs sp banding, ascites, HE, PV thrombosis  DM II  Pancytopenia  Lymphopenia  Severe malnutrition in setting of chronic illness  Stomatitis, N/V 2/2 chemo  R Hip pain  Diarrhea  Abdominal pain  GERD  Depresion  Anxiety    Follow-ups Needed After Discharge   Follow-up Appointments     Adult [facility] Follow-up and recommended labs and tests      Follow up with oncology as scheduled 11/9  Follow up with pulmonology scheduled 12/16  Follow up with palliative care 12/10  Follow up with primary care provider in 1-2 weeks    Appointments on [place] and/or [facility] (with [facility] or [facility]   provider or service). Call [phone] if you haven't heard regarding   these appointments within 7 days of discharge.           Discharge Disposition   Discharged to home with new home oxygen  Condition at discharge: Stable      Hospital Course   Dania Albert is 54 year old female with past medical history significant for multifocal HCC s/p TACE and ablation (10/2019) now w/ bone metastasis, HCV cirrhosis c/b EVs and ascites, COPD, type 2 DM, depression, and anxiety admitted for nausea, vomiting, and diarrhea, and found to be hypoxic. Her chemo was held with resolution of GI symptoms. She was treated for possible CAP and a UTI. Pulm evaluated patient and felt like patients hypoxia is likely chronic in nature d/t underlying ILD vs emphysema vs hepatopulmonary syndrome in setting of signifiicant  tobacco use hx and ESLD.       # Acute or likely chronic hypoxic respiratory failure 2/2 suspected CAP  # Presumed ILD 2/2 chronic tobacco use  # ERICKA not currently on CPAP   Found to be hypoxic to 85% on admission requiring 3-5L to keep O2 sats >92%. No supplemental O2 use pta. COVID x 2 negative. CXR 10/29 with bilateral peripheral and bibasilar reticular interstitial fibrotic opacities, c/f superimposed edema vs infection vs COPD exacerbation.  Seen by Pulmonology at [facility] 4/2018, dx with end stage lung disease.  Pre-transplant pulm consult here 1/2020 felt to have smoking related ILD and emphysema. Procal neg.  RVP negative.  Trop neg.  EKG unremarkable.  Leukopenic as below.  CT chest 10/31 with UIP type interstitial lung disease and aortic and coronary artery atherosclerosis.  CT PE neg for acute PE.  BNP unremarkable.  ECHO 11/3 with LVEF 60-65%, patent foramen ovale. S/p azithromycin 10/29-11/2 and ceftriaxone 10/29-11/4 for possible CAP. O2 sats have been stable on 3-5L per NC as long as patient remains compliant - frequently removes O2. Suspect patient has underlying ILD requiring oxygen for sometime but has been compensating on her own pta, she has significant clubbing of her digits. Pulmonary followed while inpatient, concern for possible hepatopulmonary syndrome but cant exclusively diagnose with known PFO.   - Discharged with home oxygen, extensively counseled patient on importance of using oxygen  - Continue PRN albuterol and prn tessalon  - Continue PRN tessalon   - Pt refusing CPAP  - Follow up with pulmonology 12/16     # E. Coli UTI. UA w/ large LE, UC w/ >100K E coli, with resistance to Ampicillin, Unasyn, Cefazolin, and Bactrim.  Completed 7d course of rocephin 11/4.      # Multifocal HCC s/p TACE and ablation (10/2019)  # Recently identified bony metastasis   # Cancer related pain, neuropathy   Follows w/ Dr. Reyna Initially dx with HCC in 6/2019 and underwent TACE/ablation in 10/2019  and MWA in 3/2020.  Last MRI in 5/2020 without residual viable cancer in tx zones but c/f multifocal disease d/t dramatic increase in number and sizes of numerous arterial enhancing foci scattered throughout the liver parenchyma.  CT C/A/P 6/2020 with mets to anterior right fifth rib and left clavicular head.  Started on Lenvatinib on 6/30/2020. Pt reports significant side effects from lenvatinib including n/v and persistent diarrhea. Lenvatinib held while inpatient in setting of infection (CAP and UTI) with recommendations to resume per oncology team on discharge.   - Resume lenvatinib on discharge  - Follow up with oncology as scheduled 11/9 and palliative care 12/10  - Pain management: cont prn oxycodone 2.5mg TID prn, stopped gabapentin d/t sedation     # Hx HCV cirrhosis c/b EVs s/p banding, ascites, HE, PVT. S/p tx with Sovaldi/Ribavirin in 2017 w/ SVR.  Known to have non-occlusive thrombus in main and left portal veins, ultimately not a candidate for AC d/t chronic thrombocytopenia and risk for bleeding.  On Rifaximin PTA.  Previously on Lactulose but this appears to have been stopped in 8/2020 due to issues with persistent diarrhea r/t chemo.  Tbili wnl. Not currently a transplant candidate.    - Continue Rifaximin   - Continue lactulose 20g daily prn if less than 3 bowel movements per day     # Type 2 DM. Well controlled.  Hgb A1c 5.9% this admission.  On Lantus 40 units at HS PTA, resumed at decreased dose of lantus 10U d/t poor po intake with stable BGs.  - Cont lantus to 10U at bedtime on discharge, recommend continuing to monitor BG QID and uptitrate lantus as BGs elevate and po intake improves     # Pancytopenia, lymphopenia. Presumed 2/2 cirrhosis. Likely 2/2 underlying liver disease and HCC vs acute/underlying infection.  Per last Oncology note, not felt to be d/t chemo.  No acute s/s active bleed but noted to have scattered multiple ecchymoses on upper extremities.      # Severe malnutrition in  setting of chronic illness   # Nausea, vomiting presumed 2/2 chemo  N/V significantly improved since chemo has been held. Pt tolerating regular diet + boost supplements. Consider adding appetite stimulant as OP.     #Stomatitis 2/2 chemo. Cont supportive cares.      # Diarrhea, abdominal pain. Presumed 2/2 chemo. C diff negative 10/30. Near resolved when chemo held.      # GERD. Cont PPI daily.      # Depression, anxiety. Mood stable. Continue PTA Seroquel     # Hx tobacco abuse . Nicotine replacement PRN        Consultations This Hospital Stay   ONCOLOGY ADULT IP CONSULT  NUTRITION SERVICES ADULT IP CONSULT  PULMONARY GENERAL ADULT IP CONSULT  PHYSICAL THERAPY ADULT IP CONSULT  OCCUPATIONAL THERAPY ADULT IP CONSULT  CARE MANAGEMENT / SOCIAL WORK IP CONSULT    Code Status   Full Code    Time Spent on this Encounter   I, Aura Romeo PA-C, personally saw the patient today and spent greater than 30 minutes discharging this patient.       Aura Romeo PA-C  [facility] [address] [address]  Phone: [phone]  ______________________________________________________________________    Physical Exam   Vital Signs: Temp: 99  F (37.2  C) Temp src: Oral BP: 93/48 Pulse: 86   Resp: 16 SpO2: 95 % O2 Device: Nasal cannula Oxygen Delivery: 4 LPM  Weight: 149 lbs 4.8 oz  GENERAL: Alert and oriented x 3. NAD. Resting in bed, no apparent distress.   HEENT: Anicteric sclera. MMM.   CV: RRR.   RESPIRATORY: Effort normal on 3L per NC.   GI: Abd, soft, nd/nt.   NEUROLOGICAL: No focal deficits. Forgetful but responds appropriately to questions.  Moves all extremities.  No asterixis.   EXTREMITIES: trace peripheral edema  SKIN: No generalized jaundice or rashes on exposed areas of skin         Primary Care Physician   ARMANDO ARANGO    Discharge Orders      CBC with platelets differential    Last Lab Result: Hemoglobin (g/dL)       Date                     Value                  11/03/2020               10.2 (L)         ----------     Comprehensive metabolic panel     Medication Therapy Management Referral      Reason for your hospital stay    Dear Dania Albert    Your were hospitalized at [facility] with nausea vomiting and diarrhea felt to be related to your chemotherapy, a UTI treated with antibiotics, and possible pneumonia treated with antibiotics. Over your hospitalization your symptoms improved and today you are ready to be discharged home.  If you continue home oxygen therapy you should continue to improve but if you develop fever, shortness of breath, light headedness, chest pain or other acute concerns please seek medical attention.    We are suggesting the following medication changes:  - Decrease lantus to 10U every night, slowly increase to your prior dose as your blood sugars rise and your oral intake improves  - Holding gabapentin as you became drowsy with this medication  - Your chemotherapy was resumed on discharge  - Restart taking lactulose as needed if you are not having at least 3 bowel movements per day    It was a pleasure meeting with you today. Thank you for allowing me and my team the privilege of caring for you today. You are the reason we are here, and I truly hope we provided you with the excellent service you deserve. Please let us know if there is anything else we can do for you so that we can be sure you are leaving completely satisfied with your care experience.    Your hospital unit at the time of discharge is 5B so if you have any questions regarding your discharge paperwork please call the hospital at [phone] and ask to talk to a nurse on 5B.    Take care!  Aura Romeo PA-C  Department of Medicine  [facility]     Activity    Your activity upon discharge: activity as tolerated. No driving while taking narcotics.     Monitor and record    blood glucose 4 times a day, before meals and at bedtime      When to contact your care team    [facility] triage line at [phone] for temp > or = 100.4, uncontrolled nausea/vomiting/diarrhea/constipation, unrelieved pain, bleeding not relieved with pressure, dizziness, chest pain, shortness of breath, loss of consciousness, and any new or concerning symptoms.    Please contact your primary care provider or seek medical care if you develop chest pain, shortness of breath, fever >101F, chills, abdominal pain, or if any other concerns arise.     Adult [facility] Follow-up and recommended labs and tests    Follow up with oncology as scheduled 11/9  Follow up with pulmonology scheduled 12/16  Follow up with palliative care 12/10  Follow up with primary care provider in 1-2 weeks    Appointments on [place] and/or [facility] (with [facility] or [facility] provider or service). Call [phone] if you haven't heard regarding these appointments within 7 days of discharge.     Full Code     Oxygen Adult/Peds    Oxygen Documentation:   I certify that this patient, Dania Albert has been under my care (or a nurse practitioner or physican's assistant working with me). This is the face-to-face encounter for oxygen medical necessity.      Dania Albert is now in a chronic stable state and continues to require supplemental oxygen. Patient has continued oxygen desaturation due to Emphysema J43.9  ILD J84.9.    Alternative treatment(s) tried or considered and deemed clinically infective for treatment of Emphysema J43.9  ILD J84.9 include nebulizers, inhalers and steroids.  If portability is ordered, is the patient mobile within the home? yes    **Patients who qualify for home O2 coverage under the CMS guidelines require ABG tests or O2 sat readings obtained closest to, but no earlier than 2 days prior to the discharge, as evidence of the need for home oxygen therapy. Testing must be performed while patient is in the chronic stable state. See notes for O2 sats.**     Diet     Follow this diet upon discharge: regular diet with supplements as needed       Significant Results and Procedures   Results for orders placed or performed during the hospital encounter of 10/29/20   XR Chest Port 1 View    Narrative    EXAM: XR CHEST PORT 1 VW  10/29/2020 10:37 AM     HISTORY:  SOB       COMPARISON:  CT chest dated 9/16/2020 and chest x-ray dated 12/9/2019    FINDINGS: Portable semiupright AP radiograph of the chest. Cardiac  silhouette is within normal limits. Trachea midline. Diffuse reticular  opacities, peripheral and lower lobe predominant, slightly increased  compared to prior study dated 12/9/2019.  Bilateral blunting of  costophrenic angles. No pneumothorax visualized. No pneumoperitoneum.  No acute osseous abnormality.          Impression    IMPRESSION: Bilateral peripheral and bibasilar reticular interstitial  fibrotic opacities, slightly increased compared to prior radiograph,   seen on prior chest CT.  Superimposed edema, infection or acute  exacerbation are considerations.    I have personally reviewed the examination and initial interpretation  and I agree with the findings.    JOSE RAUL REBOLLEDO MD   XR Hip Port Right G/E 2 Views    Narrative    EXAM: XR HIP PORTABLE RIGHT 2-3 VIEWS  LOCATION: [facility]  DATE/TIME: 10/29/2020 9:07 PM    INDICATION: Acute on chronic hip pain.    COMPARISON: None.      Impression    IMPRESSION: No fracture or dislocation. No focal bone lesion. Orthopedic screw in the right ilium.    CT Chest w/o Contrast    Narrative    Chest CT without contrast    INDICATION: Interstitial lung disease    COMPARISON: 9/18/2020    FINDINGS: Mostly subpleural fibrotic changes there are again noted  with honeycombing bilaterally. Findings appear typical of UIP-type  pneumonia. No new pulmonary nodules of suspicion. No pleural or  pericardial effusion. Comparison left thyroid lobectomy noted no  nodules in the isthmus or right lobe. Nonenlarged  aortopulmonary  window lymph nodes are present. No enlarged axillary or hilar lymph  nodes. There is thoracic aortic and coronary artery atherosclerotic  calcification. Heart size appears normal. Thoracic aorta and main  pulmonary artery sizes are also normal.  Upper abdomen shows cholecystectomy clips. Bowel loops noted between  the liver and right kidney again. Spleen is bulky as before. Bones  show degenerative changes in the thoracic spine. Old right fifth rib  fracture.      Impression    IMPRESSION: Continued UIP-type interstitial lung disease. Possible  prior left thyroid lobectomy again noted. Upper limits of normal sized  aortopulmonary window lymph nodes, grossly unchanged. Aortic and  coronary artery atherosclerosis. Bulky spleen. Cholecystectomy.    IFTIKHAR MEDRANO MD   CT Chest Pulmonary Embolism w Contrast    Narrative    EXAMINATION: CT CHEST PULMONARY EMBOLISM W CONTRAST, 10/31/2020 6:56  PM     COMPARISON: Same day CT chest without contrast    HISTORY: PE suspected, low pretest probability    TECHNIQUE: Volumetric helical acquisition of CT images of the chest  from the lung apices to the kidneys were acquired after the  administration of 56 mL of Isovue-370 IV contrast.     Findings:   Lungs: Slightly suboptimal contrast opacification of the pulmonary  arterial system. There is no large central pulmonary embolism. No  obvious lobar level or proximal segmental level emboli present. The  distal subsegmental branches cannot be evaluated. No evidence for  right heart strain.    Bilateral subpleural predominant reticular fibrotic changes with  suggestion of honeycombing similar to same day CT. No new focal  airspace opacities. No new or enlarging solid pulmonary nodules.  Central airways are clear. No pneumothorax. No pleural effusion.    The heart is not enlarged. No large pericardial effusion. The  ascending aorta and main pulmonary artery are normal caliber. Moderate  atherosclerotic calcification  of the coronary arteries. Apparent  changes of left thyroid lobectomy again present. Mildly prominent  mediastinal lymph nodes are unchanged in the short interval from  prior.    Upper Abdomen: Limited view of the upper abdomen shows partially  visualized splenomegaly similar to prior likely with a small posterior  splenule. Cholecystectomy clips. Partially visualized portosystemic  collaterals. Revisualization of treated left hepatic lobe lesion  similar to prior and better evaluated on prior multiphasic studies.  Small amount of perihepatic ascites. Soft tissue density noted  posterior to the spleen which appears to be an accessory spleen on  review of an abdomen CT from 2020.    Bones: Degenerative changes of the spine. No suspicious osseous  lesions. Chronic anterolateral right fifth rib fracture similar to  prior.      Impression    Impression:  1. Exam is not optimal for detection of pulmonary embolism, however  there is no large central, lobar or proximal segmental level pulmonary  embolism. No evidence for right heart strain.  2. Interstitial lung disease in a UIP pattern similar to prior.  3. Chronic liver disease with small amount of perihepatic ascites,  splenomegaly,  and other typical vascular changes.    I have personally reviewed the examination and initial interpretation  and I agree with the findings.    IFTIKHAR MEDRANO MD   Echo Complete    Narrative    [id number]  [id number]  [id number]  [id number]^EMILY^ADRIANNA^YOBANI [facility]  Echocardiography Laboratory  [address] [place] [address]     Name: ROBBIE RAWLS  MRN: [id number]  : 1965  Study Date: 2020 07:44 AM  Age: 54 yrs  Gender: Female  Patient Location: [facility]  Reason For Study: Hypoxia, Respiratory failure  Ordering Physician: ADRIANNA DIAZ  Performed By: Helen Bella RDCS     BSA: 1.7 m2  Height: 61 in  Weight: 155 lb  HR: 79  BP: 107/66  mmHg  _____________________________________________________________________________  __        Procedure  Bubble Echocardiogram with two-dimensional, color and spectral Doppler  performed. Echocardiogram with two-dimensional, color and spectral Doppler  performed.  _____________________________________________________________________________  __        Interpretation Summary  Global and regional left ventricular function is normal with an EF of 60-65%.  Global right ventricular function is normal.  Pulmonary artery systolic pressure is normal.  A patent foramen ovale is present based on the brisk appearance of bubbles  with Valsalva on the bubble study.  No significant valvular abnormalities present.  The inferior vena cava was normal in size with preserved respiratory  variability.  No pericardial effusion is present.  _____________________________________________________________________________  __        Left Ventricle  Left ventricular size is normal. Left ventricular wall thickness is normal.  Global and regional left ventricular function is normal with an EF of 60-65%.  Left ventricular diastolic function is indeterminate. No regional wall motion  abnormalities are seen.     Right Ventricle  The right ventricle is normal size. Global right ventricular function is  normal.     Atria  Both atria appear normal. A patent foramen ovale is present based on the brisk  appearance of bubbles with Valsalva on the bubble study.     Mitral Valve  The mitral valve is normal. Mild mitral annular calcification is present.        Aortic Valve  Aortic valve is normal in structure and function.     Tricuspid Valve  The tricuspid valve is normal. Trace to mild tricuspid insufficiency is  present. The right ventricular systolic pressure is approximated at 20.6 mmHg  plus the right atrial pressure. Pulmonary artery systolic pressure is normal.     Pulmonic Valve  The pulmonic valve is normal.     Vessels  The aorta root is normal.  The inferior vena cava was normal in size with  preserved respiratory variability.     Pericardium  No pericardial effusion is present.        Miscellaneous  No significant valvular abnormalities present.     Compared to Previous Study  This study was compared with the study from 19 . Biventricular function  is unchanged compared to prior study.  _____________________________________________________________________________  __     MMode/2D Measurements & Calculations  IVSd: 0.84 cm  LVIDd: 4.5 cm  LVIDs: 2.8 cm  LVPWd: 0.99 cm  FS: 36.2 %  LV mass(C)d: 134.3 grams  LV mass(C)dI: 79.2 grams/m2  asc Aorta Diam: 3.0 cm  LVOT diam: 2.0 cm  LVOT area: 3.1 cm2  LA Volume (BP): 50.6 ml     LA Volume Index (BP): 29.8 ml/m2  RWT: 0.44        Doppler Measurements & Calculations  MV E max everette: 102.0 cm/sec  MV A max everette: 104.0 cm/sec  MV E/A: 0.98  MV dec slope: 465.0 cm/sec2  MV dec time: 0.22 sec  TR max everette: 227.0 cm/sec  TR max P.6 mmHg  E/E' av.9  Lateral E/e': 13.4  Medial E/e': 16.5        _____________________________________________________________________________  __        Report approved by: Nahun Mazariegos 2020 09:35 AM            Discharge Medications   Current Discharge Medication List      CONTINUE these medications which have CHANGED    Details   insulin glargine (LANTUS SOLOSTAR) 100 UNIT/ML pen Inject 10 Units Subcutaneous At Bedtime  Refills: 0    Comments: If Lantus is not covered by insurance, may substitute Basaglar at same dose and frequency.        lactulose (CHRONULAC) 10 GM/15ML solution Take 30 mLs (20 g) by mouth daily as needed for constipation (if less than 3 bowel movements in a day)         CONTINUE these medications which have NOT CHANGED    Details   acetaminophen (TYLENOL) 500 MG tablet Take 500 mg by mouth every 8 hours as needed       albuterol (PROAIR HFA) 108 (90 Base) MCG/ACT inhaler Inhale 2 puffs into the lungs every 6 hours as needed     Comments: Pharmacy may  dispense brand covered by insurance (Proair, or proventil or ventolin or generic albuterol inhaler)      benzonatate (TESSALON) 100 MG capsule Take 1-2 capsules (100-200 mg) by mouth 3 times daily as needed for cough  Qty: 90 capsule, Refills: 0    Associated Diagnoses: Confusion      blood glucose monitoring (ONE TOUCH ULTRA MINI) meter device kit Use as directed to test glucose three times daily. Pharmacy dispense brand based on insurance.  Indications: Diabetes      calcium carbonate-vitamin D (OSCAL W/D) 500-200 MG-UNIT tablet Take 1 tablet by mouth 2 times daily (with meals)  Qty: 60 tablet, Refills: 3    Associated Diagnoses: Low bone density      cholecalciferol (VITAMIN D3) 1000 units (25 mcg) capsule Take 1 capsule (1,000 Units) by mouth daily  Qty: 30 capsule, Refills: 3    Associated Diagnoses: Low bone density      diclofenac (VOLTAREN) 1 % topical gel Place 2 g onto the skin 4 times daily  Qty: 100 g, Refills: 1    Associated Diagnoses: Confusion      Lenvatinib 12 MG, 3 x 4 mg capsules, (LENVIMA) 3 x 4 MG capsule Take 3 capsules (12 mg) by mouth daily  Qty: 90 capsule, Refills: 0    Comments: May administer with or without food.  Associated Diagnoses: Bone metastasis (H); HCC (hepatocellular carcinoma) (H)      levothyroxine (SYNTHROID) 25 MCG tablet Take 1 tablet (25 mcg) by mouth daily  Qty: 30 tablet, Refills: 0    Associated Diagnoses: HCC (hepatocellular carcinoma) (H)      magic mouthwash suspension (diphenhydrAMINE, lidocaine, aluminum-magnesium & simethicone) Swish and swallow 10 mLs in mouth every 6 hours as needed for mouth sores  Qty: 120 mL, Refills: 0    Associated Diagnoses: HCC (hepatocellular carcinoma) (H)      magnesium oxide (MAG-OX) 400 MG tablet Take 1 tablet (400 mg) by mouth 2 times daily  Qty: 60 tablet, Refills: 1    Associated Diagnoses: Hypomagnesemia      melatonin 5 MG tablet Take 1 tablet (5 mg) by mouth nightly as needed for sleep  Qty: 30 tablet, Refills: 1    Associated  Diagnoses: Insomnia, unspecified type      nicotine (NICORETTE) 2 MG gum Place 1 each (2 mg) inside cheek as needed for smoking cessation  Qty: 60 each, Refills: 3    Associated Diagnoses: Nicotine dependence, uncomplicated, unspecified nicotine product type      ondansetron (ZOFRAN-ODT) 8 MG ODT tab Take 1 tablet (8 mg) by mouth every 8 hours as needed for nausea And take 8mg prior to each dose of Levanitib.  Qty: 30 tablet, Refills: 3    Associated Diagnoses: Nausea      OneTouch Delica Lancets 33G MISC Change lancet one time daily as directed.      order for DME Abdominal Binder - small  Qty: 1 each, Refills: 1    Associated Diagnoses: Abdominal pain, generalized      oxyCODONE (ROXICODONE) 5 MG tablet Take 0.5-1 tablets (2.5-5 mg) by mouth every 6 hours as needed for severe pain  Qty: 30 tablet, Refills: 0    Associated Diagnoses: Hepatocellular carcinoma (H)      pantoprazole (PROTONIX) 40 MG EC tablet TAKE ONE TABLET BY MOUTH EVERY DAY      potassium chloride (KLOR-CON) 20 MEQ packet Take 20 mEq by mouth 2 times daily  Qty: 60 packet, Refills: 1    Associated Diagnoses: Hypokalemia      potassium chloride ER (KLOR-CON M) 20 MEQ CR tablet Take 1 tablet (20 mEq) by mouth daily  Qty: 5 tablet, Refills: 0    Associated Diagnoses: HCC (hepatocellular carcinoma) (H)      QUEtiapine (SEROQUEL) 50 MG tablet Take 50 mg by mouth At Bedtime  Refills: 0      rifaximin (XIFAXAN) 550 MG TABS tablet Take 1 tablet (550 mg) by mouth 2 times daily  Qty: 60 tablet, Refills: 3    Associated Diagnoses: Confusion      sertraline (ZOLOFT) 100 MG tablet Take 200 mg by mouth daily       simethicone (MYLICON) 80 MG chewable tablet Take 1 tablet (80 mg) by mouth every 6 hours as needed for flatulence or cramping  Qty: 90 tablet, Refills: 0    Associated Diagnoses: Abdominal pain, generalized      thiamine (B-1) 100 MG tablet Take 1 tablet (100 mg) by mouth daily  Qty: 30 tablet, Refills: 0    Associated Diagnoses: Confusion       TRULICITY 0.75 MG/0.5ML pen INJECT 0.5 ml's SUBCUTANEOUSLY ONCE WEEKLY  Refills: 2           Allergies   Allergies   Allergen Reactions     Bee Venom Other (See Comments) and Swelling     Hand swelled up badly.       Hydrocodone Other (See Comments)     nausea     Nickel Rash     Wool Fiber Hives and Rash

## 2020-11-04 NOTE — PLAN OF CARE
Discharged to: Home with Home O2  Transportation: Daughter  Time: 1630  Prescriptions: Sent with patient  Belongings: Sent with patient  PIV/Access: PIV removed prior to discharge  Paperwork: Paperwork reviewed and questions answered.  Signature obtained.

## 2020-11-04 NOTE — DISCHARGE INSTRUCTIONS
Oxygen Provider:  Arranged through [facility], contact number [phone]. If you have any questions for concerns please call the oxygen company directly.

## 2020-11-04 NOTE — PROGRESS NOTES
12:05pm- called johanna and spoke with Hilario she confirmed they received documents and would work on getting portable tank to hospital within next hour or so. She confirmed she would send referral to [facility] location and they will get the patient setup with home oxygen once discharged home. Jammie contact # for [facility]- delivering port tank is [phone] and the [place] location # is [phone]  12:07pm- called and left vm for Jeannette MCINTOSH with update.

## 2020-11-04 NOTE — PROGRESS NOTES
Care Management Follow Up    Length of Stay (days): 6    Expected Discharge Date: 11/04/20     Concerns to be Addressed:    Set up of home oxygen.   Patient plan of care discussed at interdisciplinary rounds: Yes    Anticipated Discharge Disposition: Home, daughter's home temporarily.      Anticipated Discharge Services: Home Oxygen.     Education Provided on the Discharge Plan: Yes  Patient/Family in Agreement with the Plan: Yes    Referrals Placed by CM/SW:  Home Oxygen    Additional Information:  Per updates from the primary team, patient is likely medically ready for discharge after completion of IV antibiotics today. Patient requiring 4L of O2 w/activity, call out to [facility] at this time to arrange. RNCC will continue to follow for discharge planning.     [facility] (4L w/activity)  Phone: [phone] Addendum:  Call back received from [facility], patient lives in [place] and [place], referral will be sent to [facility], as they likely be able to provide service to the patient. Awaiting call back from [facility] at this time.     1147 Addendum:  [facility] will have O2 delivered the the hospital shortly.     1241 Addendum:  Writer spoke with patient x2 regarding home O2, patient adamantly declines home oxygen at this time. Writer contacted [facility] to cancel home O2 at this time. Provider updated. Pt confirms that her daughter will provide transportation at discharge.     1132 Addendum:  Updated received from the primary provider, patient is now agreeable to home oxygen. Call out to [facility] at this time, awaiting call back from [facility] regarding set up.     [facility]  Phone  [phone]  Fax [phone] Addendum:   Call out to [facility] to confirm delivery of portable O2, will be delivered within the next 45 minutes, bedside nurse updated.     Jeannette Garcia, RNCC,  AVTAR    [facility]    Medicine Group  [address] [place] [id number]    [email or web address]    Office: [phone] Pager: [phone]  To contact the weekend RNCC, page [phone].

## 2020-11-04 NOTE — PLAN OF CARE
[facility] cares [id number]. AxOx4. VSS on 4L O2 via NC; pt was 88% on RA and writer encouraged pt to wear oxygen overnight to help improve sleep and oxygenation status, pt compliant for now. Reported pain; prn oxycodone given, pt very frustrated with 2.5mg dose, MD paged for 5mg dose, ordered and given to pt at bedtime. ; insulin given per orders. Voiding spontaneously. On regular diet and tolerating well. Up ad berkley in room. Plan for discharge to daughter's home tomorrow with home O2. Continue to monitor and follow plan of care.

## 2020-11-04 NOTE — PROGRESS NOTES
Received intake call for home oxygen at 8:24AM. Reviewed patient's chart; Patient qualifies under insurance guidelines and all documentation is in the chart including a good order. However patient lives in [place] which is out of our service [place] which we are not in contract with. I will send oxygen referral to [facility].  8:38am- Spoke with Jeannette MCINTOSH informing her I will start referral to [facility].  9:03am- All [facility]ed to [facility]  9:18am- [facility] and provided oxygen intake with Heather. She confirmed it will go to insurance for verification and then they will work on getting port tank to hospital once its ok'd through insurance.   9:30am- Spoke with Jeannette MCINTOSH informing her that referral has been started with [facility] and all [facility] have [facility] to [facility].   9:34am- Spoke with patient to offer choice and she is ok with using [facility] for oxygen setup.

## 2020-11-04 NOTE — CONSULTS
Smoking Cessation Consult   2020    Patient: Dania Albert      :  1965                    MRN:[id number]      Hepatic encephalopathy (H) [K72.90]  Hypoxemia [R09.02]  HCC (hepatocellular carcinoma) (H) [C22.0]  Urinary tract infection without hematuria, site unspecified [N39.0]  Pneumonia due to infectious organism, unspecified laterality, unspecified part of lung [J18.9] @HX    History of Present Illness: Dania Albert is 54 year old female with past medical history significant for multifocal HCC s/p TACE and ablation (10/2019) now w/ bone metastasis, HCV cirrhosis c/b EVs and ascites, COPD, type 2 DM, depression, and anxiety admitted for nausea, vomiting, and diarrhea, and found to be hypoxic, concerning for COVID 19 infection.     Reason for Consult: Patient identified as current everyday smoker per patient chart.     Stopped by Ms. Albert's room for smoking cessation counseling. Initially seen 20 for counseling, subsequently followed-up with calls for support. She was given nicotine patches and gums as well as a comprehensive relapse prevention plan. Most recently spoke with her on 20, at which time stating she was still smoking about 1/4PPD. Today, Ms. Albert stated she hasn't smoked in 3 weeks due to: 1) her father-in-law recently passing from lung CA; and 2) not feeling well lately and thus loosing desire.     Symptoms of craving or withdrawal: Patient currently not experiencing symptoms of withdrawal such as sleeplessness, headache, anxiety, irritability, and intense cravings to smoke. Declines NRT at this time.      Congratulated patient for being smoke-free for this long. Patient stated she's really proud of herself. Provided motivation and encouragement. Shared that slipping up here and there doesn't necessarily mean she failed; rather, it's an opportunity to reflect and modify her behaviors and habits and to employ alternate strategies to deal with strong triggers.    Doesn't  "think she needs NRT going forward. However, she accepted our continued follow-calls for support.     Patients last cigarette/vape/e-cig/smokeless tobacco: 3 weeks ago.     Patients main reason for smoking include: Social, physical, emotional, stress reliever; nicotine addiction    Top Reasons to quit smoking:  Her health deteriorating    Types of Tobacco and Amount   Cigarettes X   E-Cigs    Smokeless Tobacco    Cigars    Pipes    Waterpipes      Fagerstrom Test for Nicotine Dependence   How soon after waking do you smoke your first cigarette Within 5 minutes=3  5-30 minutes=2  31-60 minute=1  >61 minutes=0           1   Do you find it difficult to refrain from smoking in places where it is forbidden? e.g. Zoroastrianism, restaurants, etc? Yes=1  No=0        0   Which cigarette would you hate to give up? The first in the morning=1  Any other=0 1        How many cigarettes a day do you smoke? 10 or less=0  11-20=1  21-30=2  31 or more=3 0   Do you smoke more frequently in the morning?   Yes=1  No=0 1   Do you smoke even if you are sick in bed most of the day? Yes=1  No=0 0                                                                                                                                           Total Score 3   SCORE 1-2 = Low Dependence          3-4 = Low to Mod Dependence     5-7 = Moderate Dependence       8+ = High Dependence     Stage of Behavior Change:   Pre-contemplation - No intention    Contemplation - Change on the horizon    Preparation - Getting Ready    Action - Consistently changed (within 6 months) X   Maintenance - Staying quit (more than 6 months)    Relapse - Recycling      Patients Motivation to Quit Scale    Importance (1-10): 10   Confidence  (1-10): 10   Can Patient Imagine a Future without Smoking: YES   Quit Attempt Date:  11/4/20   Final Quit Date:       Education/Recommendations    Education:    -Encouraged her to use the educational workbook, \"Quitting for Good with Treatment and " "Support\". Discussed health risks of continued smoking.     Recommendations:   -Encouraged patient to use workbook to help her understand why she smoked, come up with 5 reasons to quit, and to imagine what her future looks like without smoking. Encouraged him/her to develop strategies to distract herself to get past cravings.     -Developed Smoking Cessation Relapse Prevention Plan that includes recommendation of:     **Use 7/14/21 mg patch daily for 4-6 weeks of smoking abstinence based on assessment of patients dependence level. Taper every 2-4 weeks in 7 mg steps as tolerated.      **Use 2/4 mg gum, take first thing in the morning and/as needed for cravings and urges to smoke. May be used every 1-2 hours.     **Participate in our post-hosp discharge follow-up calls, starting at 48 hrs post discharge, then at 14 days, 1 month, and at 6 months.      Time Spent: I spent 20 minutes with patient. Will notify provider of recommendations.    Kunal Robins, RRT, CTTS  Chronic Pulmonary Disease Specialist  Cardiopulmonary Services   Office: [phone]  Pager: [phone]        "

## 2020-11-04 NOTE — PROGRESS NOTES
Hematology / Oncology  Daily Progress Note   Date of Service: 11/04/2020  Patient: Dania Albert  MRN: [id number]  Admission Date: 10/29/2020  Hospital Day # 6  Cancer Diagnosis: HCC with metastases to the bone   Primary Outpatient Oncologist: Dr. Childress   Current Treatment Plan: currently on Lenvatinib     Recommendations:   - From oncology standpoint, agree with discharge today per primary team.   - Per discussion on 11/3 with primary oncologist (Dr. Childress), patient should resume Lenvatinib upon discharge and follow-up per below.   - Daughter (Ann-Marie) updated this morning by phone regarding plan to resume Lenvatinib along with follow-up information.   - Oncology follow-up scheduled: labs/SENG visit on 11/9, appt w/ Dr. Childress on 11/17.      Assessment & Plan:   Dania Albert is a 54 year old female with past medical history significant for multifocal HCC s/p TACE and ablation (10/2019) now w/ bone metastasis, HCV cirrhosis c/b EVs and ascites, COPD, type 2 DM, depression, and anxiety. Admitted for nausea, vomiting, diarrhea and acute hypoxic respiratory failure.     # Multifocal HCC with Metastasis to the Bone   Oncologic history obtained from outpatient notes. She was found to have HCC in June 2019 and had TACE/Ablation for S3 lesion on Oct. 2019. She had a follow up MRI in May 2020 showed no residual viable cancer in the treatment zones but found increase in number and sizes of numerous arterial enhancing foci scattered throughout the liver parenchyma which is suspicious for multifocal HCC. She underwent a liver biopsy which confirmed moderately differentiated HCC. Bone scan showed involvement of anterior right fifth rib and left clavicular head. She was started on Lenvatinib 6/30/20. Repeat CT scan and bone scan 9/16/20 show stable to slightly improved disease.   - Hold Lenvantinib while in the hospital. Messaged primary oncologist on 11/3 (Dr. Childress) and per discussion, plan to resume Lenvatinib upon  discharge and follow-up in clinic in 7-10 days.   - Oncology follow-up scheduled: labs/SENG visit on 11/9, appt w/ Dr. Childress on 11/17.    # Acute hypoxic respiratory failure   # Possible COVID 19 vs CAP vs COPD exacerbation   # Presumed ILD 2/2 chronic tobacco smoking   Requiring 8L of O2 on admission, continues to require 4-5L throughout hospital stay. COVID testing negative. RVP negative. CT PE negative. BNP unremarkable. CXR on 10/29 concerning for superimposed edema vs infection vs COPD exacerbation. She was dx with end stage lung disease at [facility] 4/2018. Had pre-transplant pulm consult here in 1/2020 and felt to have smoking-related ILD and emphysema.   - Agree with w/u and management per primary team. Planning to complete 7d course Ceftriaxone/Azithromycin.   - Pulmonology following, planning to obtain echo. Will follow in clinic.     # E. Coli UTI  - Management per primary team. Planning to complete 7d course Ceftriaxone.     # Pancytopenia   Secondary to underlying malignancy and chemotherapy.   - Transfuse for hgb < 7 plt <10    # Nausea, improved  # Vomiting, improved  # Diarrhea   # Mucositis  She was seen in clinic by Dr. Childress on 10/28 and he was concerned about her weakness, and intermittent nausea/vomiting. He recommended that she go to the hospital. Daughter states that the patient has been increasing nausea/vomiting and resulting in poor nutrition.  - Symptomatic management per primary team.   - Lenvatinib does have 11-41% risk of stomatitis. Continue s/s rinses and MMW prn. Patient reports mouth sores are currently tolerable and not interfering with eating.   - Nutrition following, starting calorie counts 11/2-11/4. Boost TID and magic cups with lunch ordered per primary team.     Oncologic History:  - She has Hep C cirrhosis. Hep C was previously treated with Sovaldi/ribavirin and achieved SVR.  - She was found to have HCC in June 2019 and had TACE/Ablation for S3 lesion on Oct 2019 and  then found to have a new S3 lesion for which she had a MWA on 3/17/2020.  - Bone Scan in Dec 2019 was negative for metastatic disease.   - CTA Chest in October 2019 was negative for metastatic disease.   - A follow up MRI on 5/8/2020 showed no resdual viable cancer in the treatment zones but it found dramatic increase in number and sizes of numerous arterial enhancing foci scattered throughout the liver parenchyma which was very suspicious for multifocal HCC. None of these were diagnostic of HCC so she had liver biopsy which confirmed moderately differentiated HCC.   - As she was not deemed a candidate for any further liver directed therapy, she was referred to medical oncology.  - She has constant pain in the RUQ region/beneath the lower right rib cage since the MWA procedure in March 2020.  - She had a tick bite and she completed a 21 day course of Doxycycline in early June 2020.  - Baseline CT chest abdomen and pelvis on 6/12/2020.  Bone scan showed anterior right fifth rib and left clavicular head metastasis.  - She started lenvatinib on 6/30/2020.  - She was admitted to the hospital from 7/16/2020 till 7/20/2020 for hepatic encephalopathy and noninfectious colitis. She was initially started on antibiotics but later on it was a stopped.   MRI of the brain did not show any intracranial metastasis.She was started on lactulose and rifaximin and her confusion improved.  Lenvatinib was held during the admission.  - She had a repeat CT chest abdomen and pelvis which did not show any pulmonary embolism.  Interval advancement of portal venous thrombus involving the main portal vein and the left intrahepatic portal venous system was seen.  There was also interval development of low-attenuation lesions which were smaller than the arterial enhancing lesions seen on CT scan from June 2020.    - Repeat scans including CT scan and bone scan on 9/16/2020 were stable to slightly improved.  Alpha-fetoprotein was also stable at  "10.6. Lenvatinib continued.     Patient was seen and plan of care was discussed with attending physician Dr. Ruth.    Thank you for the opportunity to partake in this patients plan of care. Please do not hesitate to page with questions. We will continue to follow.     Fiona Kaur PA-C   Hematology/Oncology   Pager: [phone]   ___________________________________________________________________    Subjective & Interval History:    No acute events overnight. Walk test completed yesterday. Overall plan is to discharge today to daughter's home with home O2. Patient is looking forward to leaving the hospital. She is feeling well today and denies SOB or other specific complaints. All questions answered.     Physical Exam:    Blood pressure 98/47, pulse 78, temperature 98  F (36.7  C), temperature source Oral, resp. rate 16, height 1.549 m (5' 1\"), weight 67.7 kg (149 lb 4.8 oz), SpO2 95 %, not currently breastfeeding.    General: up on bedside commode, O2 NC removed, no acute disress  HEENT: sclera anicteric, EOMI, MMM  Neck: supple, normal ROM  Resp: normal respiratory effort on RA  MSK: warm and well-perfused, normal tone  Skin: no rashes on limited exam, no jaundice  Neuro: Alert and interactive, moves all extremities equally, no focal deficits    Labs & Studies: I personally reviewed the following studies:  ROUTINE LABS (Last four results):  CMP  Recent Labs   Lab 11/04/20  0751 11/04/20  0614 11/03/20  0610 11/02/20  1308 11/02/20  0620 11/01/20  0608 10/30/20  0602 10/30/20  0602   NA  --  144 142  --  142 140   < > 145*   POTASSIUM 4.5 5.5* 4.3 4.0 3.0* 3.6   < > 3.9   CHLORIDE  --  110* 108  --  107 107   < > 114*   CO2  --  30 29  --  29 29   < > 27   ANIONGAP  --  3 5  --  5 4   < > 5   GLC  --  184* 94  --  67* 106*   < > 137*   BUN  --  9 9  --  8 7   < > 5*   CR  --  0.51* 0.50*  --  0.53 0.50*   < > 0.60   GFRESTIMATED  --  >90 >90  --  >90 >90   < > >90   GFRESTBLACK  --  >90 >90  --  >90 >90   < > >90   LETY  " --  8.9 8.4*  --  7.9* 7.9*   < > 7.5*   MAG  --  2.1 2.0  --   --   --   --  1.6   PHOS  --  3.8 3.7  --   --   --   --  2.8   PROTTOTAL  --  5.8* 5.9*  --  5.7* 5.6*   < > 5.6*   ALBUMIN  --  2.3* 2.4*  --  2.4* 2.3*   < > 2.4*   BILITOTAL  --  0.6 0.9  --  1.0 0.9   < > 1.1   ALKPHOS  --  176* 157*  --  111 125   < > 81   AST  --  63* 75*  --  52* 41   < > 37   ALT  --  40 45  --  30 29   < > 28    < > = values in this interval not displayed.     CBC  Recent Labs   Lab 11/04/20  0614 11/03/20  0610 11/02/20  0620 11/01/20  0608   WBC 1.4* 1.3* 1.9* 1.7*   RBC 2.64* 2.77* 2.73* 2.70*   HGB 9.8* 10.2* 10.0* 10.0*   HCT 31.2* 32.5* 31.6* 31.2*   * 117* 116* 116*   MCH 37.1* 36.8* 36.6* 37.0*   MCHC 31.4* 31.4* 31.6 32.1   RDW 18.3* 18.1* 17.9* 17.9*   PLT 29* 28* 27* 24*     INR  Recent Labs   Lab 10/29/20  1100   INR 1.19*       Medications list for reference:  Current Facility-Administered Medications   Medication     acetaminophen (TYLENOL) tablet 650 mg     albuterol (PROAIR HFA/PROVENTIL HFA/VENTOLIN HFA) 108 (90 Base) MCG/ACT inhaler 2 puff     benzonatate (TESSALON) capsule 100-200 mg     calcium carbonate-vitamin D (OSCAL w/D) per tablet 1 tablet     cefTRIAXone (ROCEPHIN) 2 g vial to attach to  ml bag for ADULTS or NS 50 ml bag for PEDS     glucose gel 15-30 g    Or     dextrose 50 % injection 25-50 mL    Or     glucagon injection 1 mg     glucose gel 15-30 g    Or     dextrose 50 % injection 25-50 mL    Or     glucagon injection 1 mg     diclofenac (VOLTAREN) 1 % topical gel 4 g     [Held by provider] gabapentin (NEURONTIN) capsule 100 mg     insulin aspart (NovoLOG) injection (RAPID ACTING)     insulin aspart (NovoLOG) injection (RAPID ACTING)     insulin glargine (LANTUS PEN) injection 10 Units     lactulose (CHRONULAC) solution 20 g     levothyroxine (SYNTHROID/LEVOTHROID) tablet 25 mcg     Lidocaine (LIDOCARE) 4 % Patch 1-3 patch     lidocaine (LMX4) cream     lidocaine 1 % 0.1-1 mL      lidocaine patch in PLACE     magic mouthwash suspension (diphenhydrAMINE, lidocaine, aluminum-magnesium & simethicone)     magnesium oxide (MAG-OX) tablet 400 mg     melatonin tablet 3 mg     naloxone (NARCAN) injection 0.1-0.4 mg     ondansetron (ZOFRAN-ODT) ODT tab 4 mg    Or     ondansetron (ZOFRAN) injection 4 mg     oxyCODONE (ROXICODONE) tablet 5 mg     pantoprazole (PROTONIX) EC tablet 40 mg     polyethylene glycol (MIRALAX) Packet 17 g     QUEtiapine (SEROquel) tablet 50 mg     rifaximin (XIFAXAN) tablet 550 mg     senna-docusate (SENOKOT-S/PERICOLACE) 8.6-50 MG per tablet 1 tablet    Or     senna-docusate (SENOKOT-S/PERICOLACE) 8.6-50 MG per tablet 2 tablet     simethicone (MYLICON) chewable tablet 80 mg     sodium chloride (PF) 0.9% PF flush 3 mL     sodium chloride (PF) 0.9% PF flush 3 mL     thiamine (B-1) tablet 100 mg     Vitamin D3 (CHOLECALCIFEROL) tablet 25 mcg

## 2020-11-04 NOTE — PLAN OF CARE
Reason for Admission: Complications of HCC; N/V hypoxia    Status: Stable, likely to discharge to daughters house tomorrow with home 02    RN assumed cares at 0700    Vitals: WDL  Pain: Pt denies pain  Neuro: WDL ex forgetful at times  Cardiac: Tachycardic at times  Respiratory: Pt intermittently removes 02, drops to the mid 80's however is not symptomatic.  Per team this AM, likely has had 02 needs for a while that have not been addressed.   GI/: Pt voiding adequately, no stool this shift.   IV/Drains: Left PIV saline locked.   Activity: Up with a one assist/stand by, walker and gait belt.   Skin: WDL  Labs: WDL    Plan of Care:  RN assumed cares at 0700, Pt alert and oriented, VS stable with some hypotension intermittently.  PIV TKO with abx per plan of care.  Pt appetite good eating most of each meal.  Plan to discharge to daughter's house tomorrow afternoon once abx regimen complete.  No acute incidents this shift.  Continue to monitor and notify MD of any changes.

## 2020-11-04 NOTE — PROGRESS NOTES
11:06am- received call back from Heather at  respiratory they are unable to provide oxygen services to patient due to they are out of network with patients insurance.  11:07pm- called [facility] and spoke with Hilario she stated that the [place] location should be in network with patients insurance and to fax over documents to [phone] and they would work on getting port tank to hospital and send order to [place] location to set patient up with home oxygen.   11:12am- All documents faxed to [facility].  11:13am- spoke with Jeannette MCINTOSH informing her of this update and that I have faxed all documents to [facility] for oxygen setup. She will inform patient.

## 2020-11-05 NOTE — PLAN OF CARE
Occupational Therapy Discharge Summary    Reason for therapy discharge:    Discharged to home.    Progress towards therapy goal(s). See goals on Care Plan in [facility] electronic health record for goal details.  Goals partially met.  Barriers to achieving goals:   discharge from facility.    Therapy recommendation(s):    No further therapy is recommended.

## 2020-11-06 NOTE — PROGRESS NOTES
Attempted to contact the patient x3 for post-hospital call without answer. Will close encounter at this time.    Jacy Allen CMA, [facility]  Post Hospital Discharge Team

## 2020-11-06 NOTE — TELEPHONE ENCOUNTER
Attempted to reach Ms. Albert RE post-hosp smoking cessation counseling follow-up. Patient didn't pick-up; unable to leave message d/t VM being full. Will try again next week.     Kunal Robins, RRT, CTTS  Chronic Pulmonary Disease Specialist  Cardiopulmonary Services   Office: [phone]  Pager: [phone]

## 2020-11-09 NOTE — LETTER
11/9/2020         RE: Dania Albert  [address]  [facility] [address]        Dear Colleague,    Thank you for referring your patient, Dania Albert, to the [facility]. Please see a copy of my visit note below.    Oncology Follow Up Visit: November 9, 2020    Oncologist: Dr Dhruv Childress  PCP: Micaela Gould    Diagnosis: Metastatic Hepatocellular Cancer to   Dania Albert is a 55 yo female with history of long term tobacco use, DM T2, and Hep C cirrhosis. Hep C was previously treated with Sovaldi/ribavirin and achieved SVR. She was found to have HCC in June 2019 and had TACE/Ablation for S3 lesion on Oct 2019 and then found to have a new S3 lesion for which she had a MWA on 3/17/2020.  Bone Scan in Dec 2019 was negative for metastatic disease.   CTA Chest in October 2019 was negative for metastatic disease.  MRI on 5/8/2020 showed no resdual viable cancer in the treatment zones but it found dramatic increase in number and sizes of numerous arterial enhancing foci scattered throughout the liver parenchyma which was very suspicious for multifocal HCC. Liver biopsy confirmed moderately differentiated HCC.  CT chest abdomen and pelvis on 6/12/2020.  Bone scan showed anterior right fifth rib and left clavicular head metastasis.  Treatment:   6/30/2020 began Lenvatinib  7/16-7/20/2020 Hospitalized for hepatic encephalopathy and non infectious colitis  10/29-11/4/2020 hospitalized for acute on chronic respiratory failure- CAP, UTI     Interval History: Ms. Albert and daughter come to clinic for post hospital review. Pt was hospitalized at [facility] after presenting to clinic on 10/28 with weakness, abdominal pain, SOB, and diarrhea. At hospitalization pt was found to have acute on chronic respiratory failure- CAP and E Coli UTI. Pt states she is feeling much better but is still having 3-5 bouts of diarrhea and is using 1 imodium daily- stools start out loose but do have some form.She  is taking fluids well and is eating small meals noting everything tasting spicey but daughter feels she should be eating more. Denies any urgency or burning with urination. Energy is better and admits she does some stretching each day but is not exercising. Still having pain to abdomen- RUQ and some to LUQ but has some cramping just below umbilicus intermittently. Not checking her blood sugars since meter is back at her house in [place] and she is living with daughter at this time- states she is not taking her Lantus or Trulicity( was to be taking Trulicity weekly and 20u Lantus daily).  Pt did quit smoking 3 weeks previous without assistance. Stopped Levatinib with hospitalization on 10/28/2020.   Rest of comprehensive and complete ROS is reviewed and is negative.   Past Medical History:   Diagnosis Date     Alcohol abuse      Bone metastasis (H) 7/15/2020     Chronic hepatitis C without hepatic coma (H) 10/23/2019    SVR     Cirrhosis of liver with ascites (H) 10/23/2019     COPD (chronic obstructive pulmonary disease) (H)      Depressive disorder      Diabetes (H)     Type 1 DM, Takes Insulin      Emphysema lung (H)      H/O esophageal varices      HCC (hepatocellular carcinoma) (H) 10/23/2019     History of blood transfusion     [facility] in 1983     ERICKA (obstructive sleep apnea)      Current Outpatient Medications   Medication     acetaminophen (TYLENOL) 500 MG tablet     albuterol (PROAIR HFA) 108 (90 Base) MCG/ACT inhaler     benzonatate (TESSALON) 100 MG capsule     blood glucose monitoring (ONE TOUCH ULTRA MINI) meter device kit     calcium carbonate-vitamin D (OSCAL W/D) 500-200 MG-UNIT tablet     cholecalciferol (VITAMIN D3) 1000 units (25 mcg) capsule     diclofenac (VOLTAREN) 1 % topical gel     insulin glargine (LANTUS SOLOSTAR) 100 UNIT/ML pen     lactulose (CHRONULAC) 10 GM/15ML solution     Lenvatinib 12 MG, 3 x 4 mg capsules, (LENVIMA) 3 x 4 MG capsule     levothyroxine (SYNTHROID) 25 MCG  tablet     magic mouthwash suspension (diphenhydrAMINE, lidocaine, aluminum-magnesium & simethicone)     magnesium oxide (MAG-OX) 400 MG tablet     melatonin 5 MG tablet     nicotine (NICORETTE) 2 MG gum     ondansetron (ZOFRAN-ODT) 8 MG ODT tab     OneTouch Delica Lancets 33G MISC     order for DME     oxyCODONE (ROXICODONE) 5 MG tablet     oxyCODONE (ROXICODONE) 5 MG tablet     pantoprazole (PROTONIX) 40 MG EC tablet     potassium chloride (KLOR-CON) 20 MEQ packet     QUEtiapine (SEROQUEL) 50 MG tablet     rifaximin (XIFAXAN) 550 MG TABS tablet     sertraline (ZOLOFT) 100 MG tablet     simethicone (MYLICON) 80 MG chewable tablet     thiamine (B-1) 100 MG tablet     TRULICITY 0.75 MG/0.5ML pen     No current facility-administered medications for this visit.      Allergies   Allergen Reactions     Bee Venom Other (See Comments) and Swelling     Hand swelled up badly.       Hydrocodone Other (See Comments)     nausea     Nickel Rash     Wool Fiber Hives and Rash       Physical Exam:/75 (BP Location: Right arm, Patient Position: Chair, Cuff Size: Adult Regular)   Pulse 71   Temp 98.4  F (36.9  C) (Oral)   Wt 69.2 kg (152 lb 8 oz)   SpO2 92%   BMI 28.81 kg/m     ECOG- 1  Constitutional: Alert and in no distress as seated in clinic.   ENT: Eyes bright, No mouth sores noted  Neck: Supple, No adenopathy.  Cardiac: Heart rate and rhythm is regular and strong without murmur  Respiratory: Breathing easy. Lung sounds clear to auscultation. Does have occasional clearing cough noted in visit.   GI: Abdomen is soft,  But very tender to RUQ and less but noted to LUQ and tender to above pubis bone. BS are noted but not overly active.   MS: Muscle tone normal- pt moving easily on own, extremities normal with no edema.   Skin: No suspicious lesions or rashes  Neuro: Sensory grossly WNL, gait normal.   Lymph: Normal ant/post cervical, axillary, supraclavicular nodes  Psych: Mentation appears normal but needed to be  corrected by daughter a few times. and affect is bright. Answers questions when asked.     Laboratory Results:   Results for orders placed or performed in visit on 11/09/20   Comprehensive metabolic panel     Status: Abnormal   Result Value Ref Range    Sodium 138 133 - 144 mmol/L    Potassium 4.6 3.4 - 5.3 mmol/L    Chloride 105 94 - 109 mmol/L    Carbon Dioxide 30 20 - 32 mmol/L    Anion Gap 3 3 - 14 mmol/L    Glucose 192 (H) 70 - 99 mg/dL    Urea Nitrogen 4 (L) 7 - 30 mg/dL    Creatinine 0.48 (L) 0.52 - 1.04 mg/dL    GFR Estimate >90 >60 mL/min/[1.73_m2]    GFR Estimate If Black >90 >60 mL/min/[1.73_m2]    Calcium 8.6 8.5 - 10.1 mg/dL    Bilirubin Total 1.4 (H) 0.2 - 1.3 mg/dL    Albumin 2.8 (L) 3.4 - 5.0 g/dL    Protein Total 7.0 6.8 - 8.8 g/dL    Alkaline Phosphatase 166 (H) 40 - 150 U/L    ALT 57 (H) 0 - 50 U/L    AST 84 (H) 0 - 45 U/L   CBC with platelets differential     Status: Abnormal   Result Value Ref Range    WBC 2.2 (L) 4.0 - 11.0 10e9/L    RBC Count 2.98 (L) 3.8 - 5.2 10e12/L    Hemoglobin 11.1 (L) 11.7 - 15.7 g/dL    Hematocrit 33.6 (L) 35.0 - 47.0 %     (H) 78 - 100 fl    MCH 37.2 (H) 26.5 - 33.0 pg    MCHC 33.0 31.5 - 36.5 g/dL    RDW 17.2 (H) 10.0 - 15.0 %    Platelet Count 41 (LL) 150 - 450 10e9/L    Diff Method Automated Method     % Neutrophils 60.6 %    % Lymphocytes 27.3 %    % Monocytes 9.3 %    % Eosinophils 2.3 %    % Basophils 0.5 %    % Immature Granulocytes 0.0 %    Absolute Neutrophil 1.3 (L) 1.6 - 8.3 10e9/L    Absolute Lymphocytes 0.6 (L) 0.8 - 5.3 10e9/L    Absolute Monocytes 0.2 0.0 - 1.3 10e9/L    Absolute Eosinophils 0.1 0.0 - 0.7 10e9/L    Absolute Basophils 0.0 0.0 - 0.2 10e9/L    Abs Immature Granulocytes 0.0 0 - 0.4 10e9/L   AFP tumor marker     Status: Abnormal   Result Value Ref Range    Alpha Fetoprotein 23.6 (H) 0 - 8 ug/L   Magnesium     Status: None   Result Value Ref Range    Magnesium 1.9 1.6 - 2.3 mg/dL   TSH with free T4 reflex     Status: None   Result  Value Ref Range    TSH 3.76 0.40 - 4.00 mU/L   Protein  random urine with Creat Ratio     Status: None   Result Value Ref Range    Protein Random Urine 0.09 g/L    Protein Total Urine g/gr Creatinine 0.07 0 - 0.2 g/g Cr   Creatinine urine calculation only     Status: None   Result Value Ref Range    Creatinine Urine 130 mg/dL     Assessment and Plan:   Post hospital visit for  acute on chronic respiratory failure- CAP, UTI -Pt was hospitalized at [facility] from 10/29-11/3. Pt now has no symptoms for UTI and antibiotic has been completed. Cough and breathing is improved but may also be improved with discontinuation of use of tobacco over last 3 weeks. No further signs of infection today.   HCC with metastasis-Pt has had her Levatinib on hold since hospitalization. They are asking if there is another medication that may cause less side effects for pt.   Pt is still seeing cytopenias but has had leukopenia and thrombocytopenia since prior to start of Lenvatinib.   Diarrhea- Pt having 3-5 stools daily that have varying consistency. She is using just 1 imodium daily and is said to be taking oral fluids well each day. We are looking for about 3 stools daily and she may need to use additional imodium to prevent fluids loss and stay at 3-4 stools daily- not needing lactulose at this time.   DM Type 2- Pt was to restart weekly Trulicity and decrease daily Lantus to 20 unit(s) nightly at hospital discharge. She reports she has not been taking her meds above but has also not been testing her blood sugars since discharge since meter is at her home in [place] and she is currently staying with daughter. Hgb A1c=5.9 at hospitalization and Lantus had been reduced to 20 from 40 units daily. Pt states she is eating well with glucose check today =192. Pt will restart Trulicity weekly and the Lantus 20 mg nightly and did review signs of hypoglycemia with pt and daughter and best treatment.   This was a 30 min visit with > 50% in  counseling and coordinating care including education and management of concerns.    Yadi Johnson CNP        Again, thank you for allowing me to participate in the care of your patient.        Sincerely,        Yadi Johnson, ADI, APRN CNP

## 2020-11-09 NOTE — Clinical Note
I did not restart the lenvatinib  Though appears that she is stable with her usual cytopenias and issues resolving from hospitalization. You will be seeing her next week. Yadi

## 2020-11-09 NOTE — PROGRESS NOTES
Oncology Follow Up Visit: November 9, 2020    Oncologist: Dr Dhruv Childress  PCP: Micaela Gould    Diagnosis: Metastatic Hepatocellular Cancer to   Dania Albert is a 55 yo female with history of long term tobacco use, DM T2, and Hep C cirrhosis. Hep C was previously treated with Sovaldi/ribavirin and achieved SVR. She was found to have HCC in June 2019 and had TACE/Ablation for S3 lesion on Oct 2019 and then found to have a new S3 lesion for which she had a MWA on 3/17/2020.  Bone Scan in Dec 2019 was negative for metastatic disease.   CTA Chest in October 2019 was negative for metastatic disease.  MRI on 5/8/2020 showed no resdual viable cancer in the treatment zones but it found dramatic increase in number and sizes of numerous arterial enhancing foci scattered throughout the liver parenchyma which was very suspicious for multifocal HCC. Liver biopsy confirmed moderately differentiated HCC.  CT chest abdomen and pelvis on 6/12/2020.  Bone scan showed anterior right fifth rib and left clavicular head metastasis.  Treatment:   6/30/2020 began Lenvatinib  7/16-7/20/2020 Hospitalized for hepatic encephalopathy and non infectious colitis  10/29-11/4/2020 hospitalized for acute on chronic respiratory failure- CAP, UTI     Interval History: Ms. Albert and daughter come to clinic for post hospital review. Pt was hospitalized at [facility] after presenting to clinic on 10/28 with weakness, abdominal pain, SOB, and diarrhea. At hospitalization pt was found to have acute on chronic respiratory failure- CAP and E Coli UTI. Pt states she is feeling much better but is still having 3-5 bouts of diarrhea and is using 1 imodium daily- stools start out loose but do have some form.She is taking fluids well and is eating small meals noting everything tasting spicey but daughter feels she should be eating more. Denies any urgency or burning with urination. Energy is better and admits she does some stretching each day but is not  exercising. Still having pain to abdomen- RUQ and some to LUQ but has some cramping just below umbilicus intermittently. Not checking her blood sugars since meter is back at her house in [place] and she is living with daughter at this time- states she is not taking her Lantus or Trulicity( was to be taking Trulicity weekly and 20u Lantus daily).  Pt did quit smoking 3 weeks previous without assistance. Stopped Levatinib with hospitalization on 10/28/2020.   Rest of comprehensive and complete ROS is reviewed and is negative.   Past Medical History:   Diagnosis Date     Alcohol abuse      Bone metastasis (H) 7/15/2020     Chronic hepatitis C without hepatic coma (H) 10/23/2019    SVR     Cirrhosis of liver with ascites (H) 10/23/2019     COPD (chronic obstructive pulmonary disease) (H)      Depressive disorder      Diabetes (H)     Type 1 DM, Takes Insulin      Emphysema lung (H)      H/O esophageal varices      HCC (hepatocellular carcinoma) (H) 10/23/2019     History of blood transfusion     [facility] in 1983     ERICKA (obstructive sleep apnea)      Current Outpatient Medications   Medication     acetaminophen (TYLENOL) 500 MG tablet     albuterol (PROAIR HFA) 108 (90 Base) MCG/ACT inhaler     benzonatate (TESSALON) 100 MG capsule     blood glucose monitoring (ONE TOUCH ULTRA MINI) meter device kit     calcium carbonate-vitamin D (OSCAL W/D) 500-200 MG-UNIT tablet     cholecalciferol (VITAMIN D3) 1000 units (25 mcg) capsule     diclofenac (VOLTAREN) 1 % topical gel     insulin glargine (LANTUS SOLOSTAR) 100 UNIT/ML pen     lactulose (CHRONULAC) 10 GM/15ML solution     Lenvatinib 12 MG, 3 x 4 mg capsules, (LENVIMA) 3 x 4 MG capsule     levothyroxine (SYNTHROID) 25 MCG tablet     magic mouthwash suspension (diphenhydrAMINE, lidocaine, aluminum-magnesium & simethicone)     magnesium oxide (MAG-OX) 400 MG tablet     melatonin 5 MG tablet     nicotine (NICORETTE) 2 MG gum     ondansetron (ZOFRAN-ODT) 8 MG ODT tab      OneTouch Delica Lancets 33G MISC     order for DME     oxyCODONE (ROXICODONE) 5 MG tablet     oxyCODONE (ROXICODONE) 5 MG tablet     pantoprazole (PROTONIX) 40 MG EC tablet     potassium chloride (KLOR-CON) 20 MEQ packet     QUEtiapine (SEROQUEL) 50 MG tablet     rifaximin (XIFAXAN) 550 MG TABS tablet     sertraline (ZOLOFT) 100 MG tablet     simethicone (MYLICON) 80 MG chewable tablet     thiamine (B-1) 100 MG tablet     TRULICITY 0.75 MG/0.5ML pen     No current facility-administered medications for this visit.      Allergies   Allergen Reactions     Bee Venom Other (See Comments) and Swelling     Hand swelled up badly.       Hydrocodone Other (See Comments)     nausea     Nickel Rash     Wool Fiber Hives and Rash       Physical Exam:/75 (BP Location: Right arm, Patient Position: Chair, Cuff Size: Adult Regular)   Pulse 71   Temp 98.4  F (36.9  C) (Oral)   Wt 69.2 kg (152 lb 8 oz)   SpO2 92%   BMI 28.81 kg/m     ECOG- 1  Constitutional: Alert and in no distress as seated in clinic.   ENT: Eyes bright, No mouth sores noted  Neck: Supple, No adenopathy.  Cardiac: Heart rate and rhythm is regular and strong without murmur  Respiratory: Breathing easy. Lung sounds clear to auscultation. Does have occasional clearing cough noted in visit.   GI: Abdomen is soft,  But very tender to RUQ and less but noted to LUQ and tender to above pubis bone. BS are noted but not overly active.   MS: Muscle tone normal- pt moving easily on own, extremities normal with no edema.   Skin: No suspicious lesions or rashes  Neuro: Sensory grossly WNL, gait normal.   Lymph: Normal ant/post cervical, axillary, supraclavicular nodes  Psych: Mentation appears normal but needed to be corrected by daughter a few times. and affect is bright. Answers questions when asked.     Laboratory Results:   Results for orders placed or performed in visit on 11/09/20   Comprehensive metabolic panel     Status: Abnormal   Result Value Ref Range     Sodium 138 133 - 144 mmol/L    Potassium 4.6 3.4 - 5.3 mmol/L    Chloride 105 94 - 109 mmol/L    Carbon Dioxide 30 20 - 32 mmol/L    Anion Gap 3 3 - 14 mmol/L    Glucose 192 (H) 70 - 99 mg/dL    Urea Nitrogen 4 (L) 7 - 30 mg/dL    Creatinine 0.48 (L) 0.52 - 1.04 mg/dL    GFR Estimate >90 >60 mL/min/[1.73_m2]    GFR Estimate If Black >90 >60 mL/min/[1.73_m2]    Calcium 8.6 8.5 - 10.1 mg/dL    Bilirubin Total 1.4 (H) 0.2 - 1.3 mg/dL    Albumin 2.8 (L) 3.4 - 5.0 g/dL    Protein Total 7.0 6.8 - 8.8 g/dL    Alkaline Phosphatase 166 (H) 40 - 150 U/L    ALT 57 (H) 0 - 50 U/L    AST 84 (H) 0 - 45 U/L   CBC with platelets differential     Status: Abnormal   Result Value Ref Range    WBC 2.2 (L) 4.0 - 11.0 10e9/L    RBC Count 2.98 (L) 3.8 - 5.2 10e12/L    Hemoglobin 11.1 (L) 11.7 - 15.7 g/dL    Hematocrit 33.6 (L) 35.0 - 47.0 %     (H) 78 - 100 fl    MCH 37.2 (H) 26.5 - 33.0 pg    MCHC 33.0 31.5 - 36.5 g/dL    RDW 17.2 (H) 10.0 - 15.0 %    Platelet Count 41 (LL) 150 - 450 10e9/L    Diff Method Automated Method     % Neutrophils 60.6 %    % Lymphocytes 27.3 %    % Monocytes 9.3 %    % Eosinophils 2.3 %    % Basophils 0.5 %    % Immature Granulocytes 0.0 %    Absolute Neutrophil 1.3 (L) 1.6 - 8.3 10e9/L    Absolute Lymphocytes 0.6 (L) 0.8 - 5.3 10e9/L    Absolute Monocytes 0.2 0.0 - 1.3 10e9/L    Absolute Eosinophils 0.1 0.0 - 0.7 10e9/L    Absolute Basophils 0.0 0.0 - 0.2 10e9/L    Abs Immature Granulocytes 0.0 0 - 0.4 10e9/L   AFP tumor marker     Status: Abnormal   Result Value Ref Range    Alpha Fetoprotein 23.6 (H) 0 - 8 ug/L   Magnesium     Status: None   Result Value Ref Range    Magnesium 1.9 1.6 - 2.3 mg/dL   TSH with free T4 reflex     Status: None   Result Value Ref Range    TSH 3.76 0.40 - 4.00 mU/L   Protein  random urine with Creat Ratio     Status: None   Result Value Ref Range    Protein Random Urine 0.09 g/L    Protein Total Urine g/gr Creatinine 0.07 0 - 0.2 g/g Cr   Creatinine urine calculation only      Status: None   Result Value Ref Range    Creatinine Urine 130 mg/dL     Assessment and Plan:   Post hospital visit for  acute on chronic respiratory failure- CAP, UTI -Pt was hospitalized at [facility] from 10/29-11/3. Pt now has no symptoms for UTI and antibiotic has been completed. Cough and breathing is improved but may also be improved with discontinuation of use of tobacco over last 3 weeks. No further signs of infection today.   HCC with metastasis-Pt has had her Levatinib on hold since hospitalization. They are asking if there is another medication that may cause less side effects for pt.   Pt is still seeing cytopenias but has had leukopenia and thrombocytopenia since prior to start of Lenvatinib.   Diarrhea- Pt having 3-5 stools daily that have varying consistency. She is using just 1 imodium daily and is said to be taking oral fluids well each day. We are looking for about 3 stools daily and she may need to use additional imodium to prevent fluids loss and stay at 3-4 stools daily- not needing lactulose at this time.   DM Type 2- Pt was to restart weekly Trulicity and decrease daily Lantus to 20 unit(s) nightly at hospital discharge. She reports she has not been taking her meds above but has also not been testing her blood sugars since discharge since meter is at her home in [place] and she is currently staying with daughter. Hgb A1c=5.9 at hospitalization and Lantus had been reduced to 20 from 40 units daily. Pt states she is eating well with glucose check today =192. Pt will restart Trulicity weekly and the Lantus 20 mg nightly and did review signs of hypoglycemia with pt and daughter and best treatment.   This was a 30 min visit with > 50% in counseling and coordinating care including education and management of concerns.    Yadi Johnson,CNP

## 2020-11-09 NOTE — NURSING NOTE
"Oncology Rooming Note    November 9, 2020 4:12 PM   Dania Albert is a 54 year old female who presents for:    Chief Complaint   Patient presents with     Oncology Clinic Visit     Initial Vitals: /75 (BP Location: Right arm, Patient Position: Chair, Cuff Size: Adult Regular)   Pulse 71   Temp 98.4  F (36.9  C) (Oral)   Wt 69.2 kg (152 lb 8 oz)   SpO2 92%   BMI 28.81 kg/m   Estimated body mass index is 28.81 kg/m  as calculated from the following:    Height as of 10/29/20: 1.549 m (5' 1\").    Weight as of this encounter: 69.2 kg (152 lb 8 oz). Body surface area is 1.73 meters squared.  Moderate Pain (4) Comment: Data Unavailable   No LMP recorded. Patient is postmenopausal.  Allergies reviewed: Yes  Medications reviewed: Yes    Medications: Medication refills not needed today.  Pharmacy name entered into Expert TA:    [id number] - [place], [place] - [address] [facility] - [place], [place] - [address]  [facility] - [place], [place] - [address]  [id number] - [place], [place] - [facility] [address]    Clinical concerns: Yes Yadi was notified.      Oxana Luz CMA              "

## 2020-11-12 NOTE — TELEPHONE ENCOUNTER
Patient is calling to verify what appointments she has for tomorrow and Friday. Reviewed appointments, times and locations and answered all questions that patient had. Patient asked to text her the appointment times, advised I am unable to text that information, but she can go onto [facility] to view appointments. Patient verbalized understanding and had no further questions.    Heather Burgos, RN/[facility]      Additional Information    Negative: [1] Caller is not with the adult (patient) AND [2] reporting urgent symptoms    Negative: Lab result questions    Negative: Medication questions    Negative: Caller can't be reached by phone    Negative: Caller has already spoken to PCP or another triager    Negative: RN needs further essential information from caller in order to complete triage    Negative: Requesting regular office appointment    Negative: [1] Caller requesting NON-URGENT health information AND [2] PCP's office is the best resource    General information question, no triage required and triager able to answer question    Negative: Health Information question, no triage required and triager able to answer question    Protocols used: INFORMATION ONLY CALL-A-

## 2020-11-12 NOTE — PROGRESS NOTES
Infusion Nursing Note:  Dania L Roosevelt presents today for Zometa    Patient seen by provider today: No   present during visit today: Not Applicable.    Note: Pt flagged as PUI by clinic coordinator; Pt escorted to private room and isolation precautions implemented.     Pt moderatly dyspneic upon arrival to infusion bay 3K; wearing a mask. RR 22 Oxygen saturation 87%. OVSS. Denies fevers/chills or body aches. Pt reports he has an on-going dry cough, but that she recently quit smoking and it is difficulty for her to say whether this is worsening. Pt is a poor historian given her liver involvement. 3L Oxgyen applied and sats improved to 94%.     Pt reports she was given home oxygen 'last week' but has not been using it. Pt states she has been staying with her daughter since discharge from the hospital because her  has been under COVID 19 quarantine. Unfortunately, her daughter has now developed a significant dry cough and was COVID tested (pending results).     Writer called pt's daughter Jessica, to obtain a clearer sense of how her mom has been doing over the past few days (was seen by ASPEN Johnson NP on 11/9). Jessica also endorses that her mom has oxygen available but has not been wearing it and that she was around the 88-89 % while in the hospital. States her mom's been able to get around pretty good at her house and no acute concerns or major changes in the past few days.     Obtained order for COVID test from [facility] given symptoms and exposure; Explained to Jessica to continue to monitor her mom for fever/chills, body ache, sinus congestion, sore throat, shortness of breath, and worsening cough. She will remind her dad to have pt use oxygen when she is up and moving around at home. They will watch for COVID results in my-chart and follow up as needed.       Intravenous Access:  Peripheral IV placed.    Treatment Conditions:  Lab Results   Component Value Date     11/09/2020                    Lab Results   Component Value Date    POTASSIUM 4.6 11/09/2020           Lab Results   Component Value Date    MAG 1.9 11/09/2020            Lab Results   Component Value Date    CR 0.48 11/09/2020                   Lab Results   Component Value Date    LETY 8.6 11/09/2020                Lab Results   Component Value Date    BILITOTAL 1.4 11/09/2020           Lab Results   Component Value Date    ALBUMIN 2.8 11/09/2020                    Lab Results   Component Value Date    ALT 57 11/09/2020           Lab Results   Component Value Date    AST 84 11/09/2020         Post Infusion Assessment:  Patient tolerated infusion without incident.  No evidence of extravasations.  Access discontinued per protocol.      Discharge Plan:   Discharge instructions reviewed with: Family.  Copy of AVS reviewed with patient and/or family.  Patient will return as directed for next appointment.  Departure Mode: Wheelchair.    Daria De Jesus RN

## 2020-11-13 NOTE — TELEPHONE ENCOUNTER
Oral Chemotherapy Monitoring Program     Dania called the [facility] Pharmacist Line trying to get a hold of Dr. Childress or his nurse.  She states she has not taken any of her oral medications, including her oral chemotherapy, since she was admitted to the hospital 10/28-11/04.  She would like some direction regarding restarting her medications. She would like for someone from Dr. Childress's office to call her ASAP.    Grace Myhre, PharmD  Hematology/Oncology Pharmacist  [facility]  [phone]

## 2020-11-13 NOTE — PROGRESS NOTES
RN CARE COORDINATION NOTE      Incoming:    Call from Satish in radiology that they are wanting to reschedule the imaging due to pending COVID results. Patient is symptomatic and has close  Contact exposure with her .  Appointment will be rescheduled to Tues or Wed next week.       Isela Dunham MSN, RN, OCN  RN Care Coordinator  [facility]  [phone]

## 2020-11-17 NOTE — NURSING NOTE
"Dania Albert is a 54 year old female who is being evaluated via a billable video visit.      The patient has been notified of following:     \"This video visit will be conducted via a call between you and your physician/provider. We have found that certain health care needs can be provided without the need for an in-person physical exam.  This service lets us provide the care you need with a video conversation.  If a prescription is necessary we can send it directly to your pharmacy.  If lab work is needed we can place an order for that and you can then stop by our lab to have the test done at a later time.    Video visits are billed at different rates depending on your insurance coverage.  Please reach out to your insurance provider with any questions.    If during the course of the call the physician/provider feels a video visit is not appropriate, you will not be charged for this service.\"    Patient has given verbal consent for Video visit? Yes  How would you like to obtain your AVS? MyChart  If you are dropped from the video visit, the video invite should be resent to: Text to cell phone: [phone]  Will anyone else be joining your video visit? Yes, patient's Jessica churchill will be present during video visit.        Video-Visit Details    Type of service:  Video Visit    Originating Location (pt. Location): Home    Distant Location (provider location):  [facility]     Platform used for Video Visit: Well      SYMPTOM QUESTIONNAIRE    Pain: no    Nausea/Vomiting: nausea- zofran    Mouth Sores: resolved    Shortness of Breath: yes- sent home from hospital on 4L O2    Smoking: no    Fever or Chills: no    Hard Stools: yes    Soft Stools: no    Weight Loss: no    Weakness: yes, a little    Burning, numbness or tingling in hands or feet: no    Problems with skin or swelling:  patchy rash scattered on body, very itchy    Memory Loss: yes    Anxiety or Depression: yes    Trouble " Sleeping: no    Patient reports the nurse at the hospital instructed patient not to take any medications without speaking to Dr. Childress. So, patient has not taken any Rxs at all since leaving hospital.  She reports feeling 'so much better' since stopping chemo Rx. She complained of having sores in mouth, diarrhea and severe bone pain when on Rxs.     Bambi Camarillo LPN on 11/17/2020 at 3:52 PM

## 2020-11-17 NOTE — PROGRESS NOTES
Oncology follow up visit:  Date on this visit: 11/17/2020     Dania Albert  is referred by  ref. provider found for an oncology consultation. She requires evaluation for new diagnosis of HCC    Primary Physician: Micaela Gould       History Of Present Illness:  Please see my previous note for details.  I have copied and updated from prior note.    Ms. Albert is a 54 year old female who I initially evaluated in May 2020 for hepatocellular carcinoma .    She has Hep C cirrhosis. Hep C was previously treated with Sovaldi/ribavirin and achieved SVR.  She was found to have HCC in June 2019 and had TACE/Ablation for S3 lesion on Oct 2019 and then found to have a new S3 lesion for which she had a MWA on 3/17/2020.  Bone Scan in Dec 2019 was negative for metastatic disease.   CTA Chest in October 2019 was negative for metastatic disease.    A follow up MRI on 5/8/2020 showed no resdual viable cancer in the treatment zones but it found dramatic increase in number and sizes of numerous arterial enhancing foci scattered throughout the liver parenchyma which was very suspicious for multifocal HCC. None of these were diagnostic of HCC so she had liver biopsy which confirmed moderately differentiated HCC.    As she was not deemed a candidate for any further liver directed therapy, she was referred to medical oncology.    She has constant pain in the RUQ region/beneath the lower right rib cage since the MWA procedure in March 2020.   She had a tick bite and she completed a 21 day course of Doxycycline in early June 2020.    We got a baseline CT chest abdomen and pelvis on 6/12/2020.  Bone scan showed anterior right fifth rib and left clavicular head metastasis.    She started lenvatinib on 6/30/2020.    She was admitted to the hospital from 7/16/2020 till 7/20/2020 for hepatic encephalopathy and noninfectious colitis.   She was initially started on antibiotics but later on it was a stopped.   MRI of the brain did  not show any intracranial metastasis.  She was started on lactulose and rifaximin and her confusion improved.  Lenvatinib was held during the admission.    She had a repeat CT chest abdomen and pelvis which did not show any pulmonary embolism.  Interval advancement of portal venous thrombus involving the main portal vein and the left intrahepatic portal venous system was seen.  There was also interval development of low-attenuation lesions which were smaller than the arterial enhancing lesions seen on CT scan from June 2020.      Repeat scans including CT scan and bone scan on 9/16/2020 were stable to slightly improved.  Alpha-fetoprotein was also stable at 10.6.      We continued Lenvatinib      Interval history.    This is a video visit.  Her daughter Mary is also available throughout the visit.  She was admitted from 10/29/2020 till 11/4/2020 for nausea vomiting and was found to be hypoxic and possibly had community-acquired pneumonia as well as urinary tract infections which were treated.  She was discharged on oxygen.  Hypoxia was felt to be smoking-related ILD and emphysema with possibility of superimposed pneumonia.  There was possibility of hepatopulmonary syndrome but because of the known PFO, it cannot be exclusively diagnosed.  During that admission, lenvatinib was held.  Now that she has been off lenvatinib, she has noticed that she is feeling better.  She has still some nausea for which she takes Zofran.  At times she becomes constipated.  She has not been using lactulose or rifaximin.  She denies any pain currently.  No mouth sores.  Overall energy is better.  She thinks her breathing is at her baseline.  She is not using oxygen.  She has some confusion which is not worse than usual.  She has noticed bruising and swelling in the right arm where she had the blood taken but this is getting better.  No other new swellings.  She has not smoked for about 1 month.    ECOG 2    ROS:  A comprehensive ROS  was otherwise neg'        I reviewed other history in epic as below.    Past Medical/Surgical History:  Past Medical History:   Diagnosis Date     Alcohol abuse      Bone metastasis (H) 7/15/2020     Chronic hepatitis C without hepatic coma (H) 10/23/2019    SVR     Cirrhosis of liver with ascites (H) 10/23/2019     COPD (chronic obstructive pulmonary disease) (H)      Depressive disorder      Diabetes (H)     Type 1 DM, Takes Insulin      Emphysema lung (H)      H/O esophageal varices      HCC (hepatocellular carcinoma) (H) 10/23/2019     History of blood transfusion     [facility] in 1983     ERICKA (obstructive sleep apnea)      Past Surgical History:   Procedure Laterality Date     ABDOMEN SURGERY      Gallbladder Removed      COLONOSCOPY      [place]     CYSTOSCOPY, INJECT BOTOX      detrusor injection     GI SURGERY      Upper Endoscopy at [facility]      HERNIA REPAIR      Patient doesnt remember if [facility]      IR FLUORO 0-1 HOUR  3/17/2020     IR FLUORO 0-1 HOUR  3/17/2020     OPEN REDUCTION INTERNAL FIXATION WRIST Bilateral      Cancer History:   As above    Allergies:  Allergies as of 11/17/2020 - Reviewed 11/17/2020   Allergen Reaction Noted     Bee venom Other (See Comments) and Swelling 07/06/2012     Hydrocodone Other (See Comments) 02/18/2013     Nickel Rash 03/25/2016     Wool fiber Hives and Rash 03/25/2016     Current Medications:  Current Outpatient Medications   Medication Sig Dispense Refill     acetaminophen (TYLENOL) 500 MG tablet Take 500 mg by mouth every 8 hours as needed        albuterol (PROAIR HFA) 108 (90 Base) MCG/ACT inhaler Inhale 2 puffs into the lungs every 6 hours as needed        benzonatate (TESSALON) 100 MG capsule Take 1-2 capsules (100-200 mg) by mouth 3 times daily as needed for cough (Patient not taking: Reported on 11/17/2020) 90 capsule 0     blood glucose monitoring (ONE TOUCH ULTRA MINI) meter device kit Use as directed to test glucose  three times daily. Pharmacy dispense brand based on insurance.  Indications: Diabetes       calcium carbonate-vitamin D (OSCAL W/D) 500-200 MG-UNIT tablet Take 1 tablet by mouth 2 times daily (with meals) (Patient not taking: Reported on 11/17/2020) 60 tablet 3     cholecalciferol (VITAMIN D3) 1000 units (25 mcg) capsule Take 1 capsule (1,000 Units) by mouth daily (Patient not taking: Reported on 11/17/2020) 30 capsule 3     diclofenac (VOLTAREN) 1 % topical gel Place 2 g onto the skin 4 times daily (Patient not taking: Reported on 11/17/2020) 100 g 1     insulin glargine (LANTUS SOLOSTAR) 100 UNIT/ML pen Inject 20 Units Subcutaneous At Bedtime  0     lactulose (CHRONULAC) 10 GM/15ML solution Take 30 mLs (20 g) by mouth daily as needed for constipation (if less than 3 bowel movements in a day)       Lenvatinib 12 MG, 3 x 4 mg capsules, (LENVIMA) 3 x 4 MG capsule Take 3 capsules (12 mg) by mouth daily (Patient not taking: Reported on 11/17/2020) 90 capsule 0     levothyroxine (SYNTHROID) 25 MCG tablet Take 1 tablet (25 mcg) by mouth daily (Patient not taking: Reported on 11/17/2020) 30 tablet 0     magic mouthwash suspension (diphenhydrAMINE, lidocaine, aluminum-magnesium & simethicone) Swish and swallow 10 mLs in mouth every 6 hours as needed for mouth sores (Patient not taking: Reported on 11/17/2020) 120 mL 0     magnesium oxide (MAG-OX) 400 MG tablet Take 1 tablet (400 mg) by mouth 2 times daily (Patient not taking: Reported on 11/17/2020) 60 tablet 1     melatonin 5 MG tablet Take 1 tablet (5 mg) by mouth nightly as needed for sleep (Patient not taking: Reported on 11/17/2020) 30 tablet 1     nicotine (NICORETTE) 2 MG gum Place 1 each (2 mg) inside cheek as needed for smoking cessation (Patient not taking: Reported on 11/17/2020) 60 each 3     ondansetron (ZOFRAN-ODT) 8 MG ODT tab Take 1 tablet (8 mg) by mouth every 8 hours as needed for nausea And take 8mg prior to each dose of Levanitib. (Patient not taking:  Reported on 11/17/2020) 30 tablet 3     OneTouch Delica Lancets 33G MISC Change lancet one time daily as directed.       order for DME Abdominal Binder - small (Patient not taking: Reported on 11/17/2020) 1 each 1     oxyCODONE (ROXICODONE) 5 MG tablet Take 1 tablet (5 mg) by mouth 3 times daily as needed for severe pain (Patient not taking: Reported on 11/17/2020) 10 tablet 0     oxyCODONE (ROXICODONE) 5 MG tablet Take 0.5-1 tablets (2.5-5 mg) by mouth every 6 hours as needed for severe pain (Patient not taking: Reported on 11/17/2020) 30 tablet 0     pantoprazole (PROTONIX) 40 MG EC tablet TAKE ONE TABLET BY MOUTH EVERY DAY       potassium chloride (KLOR-CON) 20 MEQ packet Take 20 mEq by mouth daily 60 packet 1     QUEtiapine (SEROQUEL) 50 MG tablet Take 50 mg by mouth At Bedtime  0     rifaximin (XIFAXAN) 550 MG TABS tablet Take 1 tablet (550 mg) by mouth 2 times daily (Patient not taking: Reported on 11/17/2020) 60 tablet 3     sertraline (ZOLOFT) 100 MG tablet Take 200 mg by mouth daily        simethicone (MYLICON) 80 MG chewable tablet Take 1 tablet (80 mg) by mouth every 6 hours as needed for flatulence or cramping (Patient not taking: Reported on 11/17/2020) 90 tablet 0     thiamine (B-1) 100 MG tablet Take 1 tablet (100 mg) by mouth daily (Patient not taking: Reported on 11/17/2020) 30 tablet 0     TRULICITY 0.75 MG/0.5ML pen INJECT 0.5 ml's SUBCUTANEOUSLY ONCE WEEKLY  2      Family History:  Family History   Problem Relation Age of Onset     Coronary Artery Disease Mother      Coronary Artery Disease Father      No Known Problems Brother      Meniere's disease Sister      Diabetes Sister      No Known Problems Son      No Known Problems Daughter      Diabetes Sister      Liver Disease Sister      Chronic Obstructive Pulmonary Disease Sister    no hx of cancers- 2 kids- healthy    Social History:  Social History     Socioeconomic History     Marital status:      Spouse name: Not on file     Number of  children: Not on file     Years of education: Not on file     Highest education level: Not on file   Occupational History     Not on file   Social Needs     Financial resource strain: Not on file     Food insecurity     Worry: Not on file     Inability: Not on file     Transportation needs     Medical: Not on file     Non-medical: Not on file   Tobacco Use     Smoking status: Former Smoker     Packs/day: 0.30     Years: 40.00     Pack years: 12.00     Types: Cigarettes     Quit date: 10/26/2020     Years since quittin.0     Smokeless tobacco: Never Used   Substance and Sexual Activity     Alcohol use: Not Currently     Comment: Rarely drank alcohol.      Drug use: Not Currently     Sexual activity: Not on file   Lifestyle     Physical activity     Days per week: Not on file     Minutes per session: Not on file     Stress: Not on file   Relationships     Social connections     Talks on phone: Not on file     Gets together: Not on file     Attends Spiritism service: Not on file     Active member of club or organization: Not on file     Attends meetings of clubs or organizations: Not on file     Relationship status: Not on file     Intimate partner violence     Fear of current or ex partner: Not on file     Emotionally abused: Not on file     Physically abused: Not on file     Forced sexual activity: Not on file   Other Topics Concern     Parent/sibling w/ CABG, MI or angioplasty before 65F 55M? Not Asked   Social History Narrative     Not on file     Has been a chronic smoker for >40 years and now smoking 1/3 ppd. She used to drink alcohol in the past. Denies heavy alcohol use. Last alcohol use was long time ago. Lives with .   She also wants her daughter Mary [phone] closely involved in her care.    Physical Exam:  Wt 68.9 kg (152 lb)   BMI 28.72 kg/m      Wt Readings from Last 4 Encounters:   20 68.9 kg (152 lb)   20 69.2 kg (152 lb 8 oz)   20 67.7 kg (149 lb 4.8 oz)   10/28/20  67.5 kg (148 lb 12.8 oz)         Constitutional.  Does not seem to be in any acute distress.  Eyes.  No redness or discharge noted.  Respiratory.  Speaking in full sentences.  Breathing seems comfortable without any accessory use of muscles.    Skin.  Visualized his skin does not show any obvious rashes.  Musculoskeletal.  Range of motion for visualized areas is intact.  Neurological.  She is alert and moving all extremities.  Psychiatric.  She has some confusion at baseline.        The rest of a comprehensive physical examination is deferred due to Public Health Emergency video visit restrictions.    Laboratory/Imaging Studies    Reviewed      ASSESSMENT/PLAN:    HCC in the setting of Hep C cirrhosis and initialy treated with TACE/ablation to S3 lesion in Oct 2019 and then had a new S3 lesion treated with MWA in March 2020. Unfortunately on repeat MRI in May 2020, she has now been found to have multifocal HCC- biopsy proven.  She also has probably bone metastasis with right anterior 5th rib lesion and left clavicular head lesion.     Started Lenvatinib on 6/30/2020 and held during admission for hepatic encephalopathy on 7/16/2020.    It was restarted later in July 2020 and she has been taking that regularly.  Repeat scans including CT scan and bone scan on 9/16/2020 were stable to slightly improved.  Alpha-fetoprotein was also stable at 10.6.      We continued lenvatinib.    She was not able to tolerate lenvatinib very well and has not taken it for the last couple of weeks.    Repeat CT scan which was done today shows significant worsening of the multifocal hepatocellular carcinoma within the liver.  Bone scan is unremarkable.    We discussed the situation in detail.    Because of clear progression, our options going forward include Opdivo and we discussed the rational schedule and potential side effects of it.  Because of her underlying serious liver issues as well as interstitial lung disease, I believe she has a  higher risk of getting complications including liver problems as well as pneumonitis.  It is reasonable to try that if she is willing to have aggressive treatment.    The other option would be to not treat the cancer aggressively and just focus on making sure that she remains comfortable and does not suffer.  In that situation I would strongly recommend hospice.    She had several questions why the liver transplant is not possible and I tried to explain it to the best of my ability.  Her daughter Mary seem to understand things very well but Dania is having a hard time digest this information that liver transplant is not possible.  It is certainly a very hard decision for them to make and she is in a very precarious situation.  We discussed that I would like them to discuss among themselves and decide in the next few days what they want then I would respect their wishes and we will plan follow-up accordingly.    Constipation.  I strongly encouraged her that she restart taking lactulose and rifaximin and she should have a couple of soft bowel movements daily as this would also help with hepatic encephalopathy.      Hypoxia.  This is likely multifactorial from interstitial lung disease from smoking as well as a possibility of hepatopulmonary syndrome.  She is not wearing oxygen during my interview and she does not feel very short of breath but she has had oxygen saturation in her 80s.  I encouraged her to continue to use oxygen as prescribed.    Bone metastasis.  She has evidence of bone metastasis to the left clavicular head and right anterior fifth rib.  She started Zometa on 8/19/2020 and again received on 11/12/2020.  We discussed that if she chooses not to get aggressive anticancer treatment then I would recommend stopping Zometa as well.  Otherwise we will continue it every 3 months.  She should continue to take calcium and vitamin D.  Recent bone scan is good.    Cytopenias.  Likely from underlying  cirrhosis.  At this time continue to observe.    We did not address the following today.    Portal vein thrombus.  She has thrombus in the main and left portal veins.  Because of significant thrombocytopenia, I believe that risk of bleeding would be very high if we start her on systemic anticoagulation so I am not planning to do that.  At this time I am not certain whether she has any symptoms from this or not.  She does have right upper quadrant pain but this could be related to the underlying malignancy.       Discussion regarding health care directive  We discussed that it is important that she completes health care directive which would help in making sure that her wishes are followed about her treatment care in case she is not able to make a decision for herself.  We gave her information regarding that.      We will determine the follow-up after she has decided about future treatment options.    I answered all of her and Mary's questions to their satisfaction.  They will let us know of their decision.      Dhruv Childress MD    Video start time. 3:56 PM  Video stop time. 4:40 PM

## 2020-11-17 NOTE — PROGRESS NOTES
RN CARE COORDINATION NOTE      Outgoing:  Called patient to assess symptoms in order to restart oral chemo.   Unable to leave VM susy to voicemail being full.       Isela Dunham MSN, RN, OCN  RN Care Coordinator  [facility]  [phone]

## 2020-11-17 NOTE — LETTER
11/17/2020         RE: Dania Albert  [address] [address]        Dear Colleague,    Thank you for referring your patient, Dania Albert, to the [facility]. Please see a copy of my visit note below.    Oncology follow up visit:  Date on this visit: 11/17/2020     Dania Albert  is referred by  ref. provider found for an oncology consultation. She requires evaluation for new diagnosis of HCC    Primary Physician: iMcaela Gould A       History Of Present Illness:  Please see my previous note for details.  I have copied and updated from prior note.    Ms. Albert is a 54 year old female who I initially evaluated in May 2020 for hepatocellular carcinoma .    She has Hep C cirrhosis. Hep C was previously treated with Sovaldi/ribavirin and achieved SVR.  She was found to have HCC in June 2019 and had TACE/Ablation for S3 lesion on Oct 2019 and then found to have a new S3 lesion for which she had a MWA on 3/17/2020.  Bone Scan in Dec 2019 was negative for metastatic disease.   CTA Chest in October 2019 was negative for metastatic disease.    A follow up MRI on 5/8/2020 showed no resdual viable cancer in the treatment zones but it found dramatic increase in number and sizes of numerous arterial enhancing foci scattered throughout the liver parenchyma which was very suspicious for multifocal HCC. None of these were diagnostic of HCC so she had liver biopsy which confirmed moderately differentiated HCC.    As she was not deemed a candidate for any further liver directed therapy, she was referred to medical oncology.    She has constant pain in the RUQ region/beneath the lower right rib cage since the MWA procedure in March 2020.   She had a tick bite and she completed a 21 day course of Doxycycline in early June 2020.    We got a baseline CT chest abdomen and pelvis on 6/12/2020.  Bone scan showed anterior right fifth rib and left clavicular head  metastasis.    She started lenvatinib on 6/30/2020.    She was admitted to the hospital from 7/16/2020 till 7/20/2020 for hepatic encephalopathy and noninfectious colitis.   She was initially started on antibiotics but later on it was a stopped.   MRI of the brain did not show any intracranial metastasis.  She was started on lactulose and rifaximin and her confusion improved.  Lenvatinib was held during the admission.    She had a repeat CT chest abdomen and pelvis which did not show any pulmonary embolism.  Interval advancement of portal venous thrombus involving the main portal vein and the left intrahepatic portal venous system was seen.  There was also interval development of low-attenuation lesions which were smaller than the arterial enhancing lesions seen on CT scan from June 2020.      Repeat scans including CT scan and bone scan on 9/16/2020 were stable to slightly improved.  Alpha-fetoprotein was also stable at 10.6.      We continued Lenvatinib      Interval history.    This is a video visit.  Her daughter Mary is also available throughout the visit.  She was admitted from 10/29/2020 till 11/4/2020 for nausea vomiting and was found to be hypoxic and possibly had community-acquired pneumonia as well as urinary tract infections which were treated.  She was discharged on oxygen.  Hypoxia was felt to be smoking-related ILD and emphysema with possibility of superimposed pneumonia.  There was possibility of hepatopulmonary syndrome but because of the known PFO, it cannot be exclusively diagnosed.  During that admission, lenvatinib was held.  Now that she has been off lenvatinib, she has noticed that she is feeling better.  She has still some nausea for which she takes Zofran.  At times she becomes constipated.  She has not been using lactulose or rifaximin.  She denies any pain currently.  No mouth sores.  Overall energy is better.  She thinks her breathing is at her baseline.  She is not using oxygen.  She  has some confusion which is not worse than usual.  She has noticed bruising and swelling in the right arm where she had the blood taken but this is getting better.  No other new swellings.  She has not smoked for about 1 month.    ECOG 2    ROS:  A comprehensive ROS was otherwise neg'        I reviewed other history in epic as below.    Past Medical/Surgical History:  Past Medical History:   Diagnosis Date     Alcohol abuse      Bone metastasis (H) 7/15/2020     Chronic hepatitis C without hepatic coma (H) 10/23/2019    SVR     Cirrhosis of liver with ascites (H) 10/23/2019     COPD (chronic obstructive pulmonary disease) (H)      Depressive disorder      Diabetes (H)     Type 1 DM, Takes Insulin      Emphysema lung (H)      H/O esophageal varices      HCC (hepatocellular carcinoma) (H) 10/23/2019     History of blood transfusion     [facility] in 1983     ERICKA (obstructive sleep apnea)      Past Surgical History:   Procedure Laterality Date     ABDOMEN SURGERY      Gallbladder Removed      COLONOSCOPY      [place]     CYSTOSCOPY, INJECT BOTOX      detrusor injection     GI SURGERY      Upper Endoscopy at [facility]      HERNIA REPAIR      Patient doesnt remember if mesh was used. [facility]      IR FLUORO 0-1 HOUR  3/17/2020     IR FLUORO 0-1 HOUR  3/17/2020     OPEN REDUCTION INTERNAL FIXATION WRIST Bilateral      Cancer History:   As above    Allergies:  Allergies as of 11/17/2020 - Reviewed 11/17/2020   Allergen Reaction Noted     Bee venom Other (See Comments) and Swelling 07/06/2012     Hydrocodone Other (See Comments) 02/18/2013     Nickel Rash 03/25/2016     Wool fiber Hives and Rash 03/25/2016     Current Medications:  Current Outpatient Medications   Medication Sig Dispense Refill     acetaminophen (TYLENOL) 500 MG tablet Take 500 mg by mouth every 8 hours as needed        albuterol (PROAIR HFA) 108 (90 Base) MCG/ACT inhaler Inhale 2 puffs into the lungs every 6 hours as needed         benzonatate (TESSALON) 100 MG capsule Take 1-2 capsules (100-200 mg) by mouth 3 times daily as needed for cough (Patient not taking: Reported on 11/17/2020) 90 capsule 0     blood glucose monitoring (ONE TOUCH ULTRA MINI) meter device kit Use as directed to test glucose three times daily. Pharmacy dispense brand based on insurance.  Indications: Diabetes       calcium carbonate-vitamin D (OSCAL W/D) 500-200 MG-UNIT tablet Take 1 tablet by mouth 2 times daily (with meals) (Patient not taking: Reported on 11/17/2020) 60 tablet 3     cholecalciferol (VITAMIN D3) 1000 units (25 mcg) capsule Take 1 capsule (1,000 Units) by mouth daily (Patient not taking: Reported on 11/17/2020) 30 capsule 3     diclofenac (VOLTAREN) 1 % topical gel Place 2 g onto the skin 4 times daily (Patient not taking: Reported on 11/17/2020) 100 g 1     insulin glargine (LANTUS SOLOSTAR) 100 UNIT/ML pen Inject 20 Units Subcutaneous At Bedtime  0     lactulose (CHRONULAC) 10 GM/15ML solution Take 30 mLs (20 g) by mouth daily as needed for constipation (if less than 3 bowel movements in a day)       Lenvatinib 12 MG, 3 x 4 mg capsules, (LENVIMA) 3 x 4 MG capsule Take 3 capsules (12 mg) by mouth daily (Patient not taking: Reported on 11/17/2020) 90 capsule 0     levothyroxine (SYNTHROID) 25 MCG tablet Take 1 tablet (25 mcg) by mouth daily (Patient not taking: Reported on 11/17/2020) 30 tablet 0     magic mouthwash suspension (diphenhydrAMINE, lidocaine, aluminum-magnesium & simethicone) Swish and swallow 10 mLs in mouth every 6 hours as needed for mouth sores (Patient not taking: Reported on 11/17/2020) 120 mL 0     magnesium oxide (MAG-OX) 400 MG tablet Take 1 tablet (400 mg) by mouth 2 times daily (Patient not taking: Reported on 11/17/2020) 60 tablet 1     melatonin 5 MG tablet Take 1 tablet (5 mg) by mouth nightly as needed for sleep (Patient not taking: Reported on 11/17/2020) 30 tablet 1     nicotine (NICORETTE) 2 MG gum Place 1 each (2 mg)  inside cheek as needed for smoking cessation (Patient not taking: Reported on 11/17/2020) 60 each 3     ondansetron (ZOFRAN-ODT) 8 MG ODT tab Take 1 tablet (8 mg) by mouth every 8 hours as needed for nausea And take 8mg prior to each dose of Levanitib. (Patient not taking: Reported on 11/17/2020) 30 tablet 3     OneTouch Delica Lancets 33G MISC Change lancet one time daily as directed.       order for DME Abdominal Binder - small (Patient not taking: Reported on 11/17/2020) 1 each 1     oxyCODONE (ROXICODONE) 5 MG tablet Take 1 tablet (5 mg) by mouth 3 times daily as needed for severe pain (Patient not taking: Reported on 11/17/2020) 10 tablet 0     oxyCODONE (ROXICODONE) 5 MG tablet Take 0.5-1 tablets (2.5-5 mg) by mouth every 6 hours as needed for severe pain (Patient not taking: Reported on 11/17/2020) 30 tablet 0     pantoprazole (PROTONIX) 40 MG EC tablet TAKE ONE TABLET BY MOUTH EVERY DAY       potassium chloride (KLOR-CON) 20 MEQ packet Take 20 mEq by mouth daily 60 packet 1     QUEtiapine (SEROQUEL) 50 MG tablet Take 50 mg by mouth At Bedtime  0     rifaximin (XIFAXAN) 550 MG TABS tablet Take 1 tablet (550 mg) by mouth 2 times daily (Patient not taking: Reported on 11/17/2020) 60 tablet 3     sertraline (ZOLOFT) 100 MG tablet Take 200 mg by mouth daily        simethicone (MYLICON) 80 MG chewable tablet Take 1 tablet (80 mg) by mouth every 6 hours as needed for flatulence or cramping (Patient not taking: Reported on 11/17/2020) 90 tablet 0     thiamine (B-1) 100 MG tablet Take 1 tablet (100 mg) by mouth daily (Patient not taking: Reported on 11/17/2020) 30 tablet 0     TRULICITY 0.75 MG/0.5ML pen INJECT 0.5 ml's SUBCUTANEOUSLY ONCE WEEKLY  2      Family History:  Family History   Problem Relation Age of Onset     Coronary Artery Disease Mother      Coronary Artery Disease Father      No Known Problems Brother      Meniere's disease Sister      Diabetes Sister      No Known Problems Son      No Known Problems  Daughter      Diabetes Sister      Liver Disease Sister      Chronic Obstructive Pulmonary Disease Sister    no hx of cancers- 2 kids- healthy    Social History:  Social History     Socioeconomic History     Marital status:      Spouse name: Not on file     Number of children: Not on file     Years of education: Not on file     Highest education level: Not on file   Occupational History     Not on file   Social Needs     Financial resource strain: Not on file     Food insecurity     Worry: Not on file     Inability: Not on file     Transportation needs     Medical: Not on file     Non-medical: Not on file   Tobacco Use     Smoking status: Former Smoker     Packs/day: 0.30     Years: 40.00     Pack years: 12.00     Types: Cigarettes     Quit date: 10/26/2020     Years since quittin.0     Smokeless tobacco: Never Used   Substance and Sexual Activity     Alcohol use: Not Currently     Comment: Rarely drank alcohol.      Drug use: Not Currently     Sexual activity: Not on file   Lifestyle     Physical activity     Days per week: Not on file     Minutes per session: Not on file     Stress: Not on file   Relationships     Social connections     Talks on phone: Not on file     Gets together: Not on file     Attends [employer] service: Not on file     Active member of club or organization: Not on file     Attends meetings of clubs or organizations: Not on file     Relationship status: Not on file     Intimate partner violence     Fear of current or ex partner: Not on file     Emotionally abused: Not on file     Physically abused: Not on file     Forced sexual activity: Not on file   Other Topics Concern     Parent/sibling w/ CABG, MI or angioplasty before 65F 55M? Not Asked   Social History Narrative     Not on file     Has been a chronic smoker for >40 years and now smoking 1/3 ppd. She used to drink alcohol in the past. Denies heavy alcohol use. Last alcohol use was long time ago. Lives with .   She also  wants her daughter Mary [phone] closely involved in her care.    Physical Exam:  Wt 68.9 kg (152 lb)   BMI 28.72 kg/m      Wt Readings from Last 4 Encounters:   11/17/20 68.9 kg (152 lb)   11/09/20 69.2 kg (152 lb 8 oz)   11/03/20 67.7 kg (149 lb 4.8 oz)   10/28/20 67.5 kg (148 lb 12.8 oz)         Constitutional.  Does not seem to be in any acute distress.  Eyes.  No redness or discharge noted.  Respiratory.  Speaking in full sentences.  Breathing seems comfortable without any accessory use of muscles.    Skin.  Visualized his skin does not show any obvious rashes.  Musculoskeletal.  Range of motion for visualized areas is intact.  Neurological.  She is alert and moving all extremities.  Psychiatric.  She has some confusion at baseline.        The rest of a comprehensive physical examination is deferred due to Public Health Emergency video visit restrictions.    Laboratory/Imaging Studies    Reviewed      ASSESSMENT/PLAN:    HCC in the setting of Hep C cirrhosis and initialy treated with TACE/ablation to S3 lesion in Oct 2019 and then had a new S3 lesion treated with MWA in March 2020. Unfortunately on repeat MRI in May 2020, she has now been found to have multifocal HCC- biopsy proven.  She also has probably bone metastasis with right anterior 5th rib lesion and left clavicular head lesion.     Started Lenvatinib on 6/30/2020 and held during admission for hepatic encephalopathy on 7/16/2020.    It was restarted later in July 2020 and she has been taking that regularly.  Repeat scans including CT scan and bone scan on 9/16/2020 were stable to slightly improved.  Alpha-fetoprotein was also stable at 10.6.      We continued lenvatinib.    She was not able to tolerate lenvatinib very well and has not taken it for the last couple of weeks.    Repeat CT scan which was done today shows significant worsening of the multifocal hepatocellular carcinoma within the liver.  Bone scan is unremarkable.    We discussed  the situation in detail.    Because of clear progression, our options going forward include Opdivo and we discussed the rational schedule and potential side effects of it.  Because of her underlying serious liver issues as well as interstitial lung disease, I believe she has a higher risk of getting complications including liver problems as well as pneumonitis.  It is reasonable to try that if she is willing to have aggressive treatment.    The other option would be to not treat the cancer aggressively and just focus on making sure that she remains comfortable and does not suffer.  In that situation I would strongly recommend hospice.    She had several questions why the liver transplant is not possible and I tried to explain it to the best of my ability.  Her daughter Mary seem to understand things very well but Dania is having a hard time digest this information that liver transplant is not possible.  It is certainly a very hard decision for them to make and she is in a very precarious situation.  We discussed that I would like them to discuss among themselves and decide in the next few days what they want then I would respect their wishes and we will plan follow-up accordingly.    Constipation.  I strongly encouraged her that she restart taking lactulose and rifaximin and she should have a couple of soft bowel movements daily as this would also help with hepatic encephalopathy.      Hypoxia.  This is likely multifactorial from interstitial lung disease from smoking as well as a possibility of hepatopulmonary syndrome.  She is not wearing oxygen during my interview and she does not feel very short of breath but she has had oxygen saturation in her 80s.  I encouraged her to continue to use oxygen as prescribed.    Bone metastasis.  She has evidence of bone metastasis to the left clavicular head and right anterior fifth rib.  She started Zometa on 8/19/2020 and again received on 11/12/2020.  We discussed that if  she chooses not to get aggressive anticancer treatment then I would recommend stopping Zometa as well.  Otherwise we will continue it every 3 months.  She should continue to take calcium and vitamin D.  Recent bone scan is good.    Cytopenias.  Likely from underlying cirrhosis.  At this time continue to observe.    We did not address the following today.    Portal vein thrombus.  She has thrombus in the main and left portal veins.  Because of significant thrombocytopenia, I believe that risk of bleeding would be very high if we start her on systemic anticoagulation so I am not planning to do that.  At this time I am not certain whether she has any symptoms from this or not.  She does have right upper quadrant pain but this could be related to the underlying malignancy.       Discussion regarding health care directive  We discussed that it is important that she completes health care directive which would help in making sure that her wishes are followed about her treatment care in case she is not able to make a decision for herself.  We gave her information regarding that.      We will determine the follow-up after she has decided about future treatment options.    I answered all of her and Mary's questions to their satisfaction.  They will let us know of their decision.      Dhruv Childress MD    Video start time. 3:56 PM  Video stop time. 4:40 PM        Again, thank you for allowing me to participate in the care of your patient.        Sincerely,        Dhruv Childress MD

## 2020-11-17 NOTE — PATIENT INSTRUCTIONS
Please discuss and let us know about your decision about trying Opdivo and we will determine the follow up accordingly    Take lactulose and rifaximin as prescribed.    Use oxygen as prescribed.

## 2020-11-22 NOTE — PROGRESS NOTES
ORAL CHEMOTHERAPY DISCONTINUATION       Primary Oncologist:  Dr. Childress  Primary Oncology Clinic: [facility]  Cancer Diagnosis:  Hepatocellular carcinoma  Therapy History:  Lenvatinib  Started 6/30/2020  Held 7/16-7/20 during admission for bacterial infection  Held by patient for several weeks ~10/15  Therapy Ended On:  11/17/2020  Reason For Discontinuation: unacceptable toxicity    Additional Notes:  Patient will be transitioning to infusion therapy due to intolerable toxicities.Thank you for the opportunity to be a part of this patient's oral chemotherapy. The oncology pharmacy will no longer be following this patient for oral chemotherapy. If there are any questions or the plan changes, feel free to contact us.    Sofia Grande  Pharmacy Intern  [facility]  [phone]

## 2020-12-01 NOTE — PROGRESS NOTES
RN CARE COORDINATION NOTE      Outgoing:    Called Jessica, pt's daughter, to discuss her MyChart message regarding hospice.     Dania does not want to enroll into hospice. Her family is prepared to take care of her at her home vs daughters home. Both patient and her  have negative experiences with hospice (hospice nurse reportedly diverted a family members pain medication while on hospice) and are strongly against hospice. Jessica reports her father is in denial that patient is at end of life and wants her to get a second opinion because he doesn't believe she has cancer. She is helping him to work through this.     Discussed hospice vs palliative care services. Patient is scheduled for a follow up with palliative care on 12/10. Discussed a POLST and recommended they request one to be signed. Discussed talking to the county to let them know patient is not enrolling in  Hospice but is expected to pass away at home. Advised on ways to get DME and items that would be helpful.   Discussed what the progression of HCC could look like and what to expect.     Jessica reports she is planning to take patient and family on a vacation to [place]. They have rented a house to spend a week.     Answered all questions to her stated satisfaction. Encouraged Jessica to call with any additional questions or concerns.        Isela Dunham MSN, RN, OCN  RN Care Coordinator  [facility]  [phone]

## 2020-12-07 NOTE — PROGRESS NOTES
"Dania Albert is a 55 year old female who is being evaluated via a billable video visit.      The patient has been notified of following:     \"This video visit will be conducted via a call between you and your physician/provider. We have found that certain health care needs can be provided without the need for an in-person physical exam.  This service lets us provide the care you need with a video conversation.  If a prescription is necessary we can send it directly to your pharmacy.  If lab work is needed we can place an order for that and you can then stop by our lab to have the test done at a later time.    Video visits are billed at different rates depending on your insurance coverage.  Please reach out to your insurance provider with any questions.    If during the course of the call the physician/provider feels a video visit is not appropriate, you will not be charged for this service.\"    Patient has given verbal consent for Video visit? Yes  How would you like to obtain your AVS? MyChart  If you are dropped from the video visit, the video invite should be resent to: Text to cell phone: [phone]  Will anyone else be joining your video visit? No      CORY Villalobos    Video-Visit Details    Type of service:  Video Visit    Video Start Time: 4:23 PM  Video End Time: 5:40 PM    Originating Location (pt. Location): Home    Distant Location (provider location):  [facility]     Platform used for Video Visit: Luis Enrique Liang MD          Palliative Care Outpatient Clinic Progress Note    Patient Name:  Dania Albert  Primary Provider:  Micaela Gould    Chief Complaint:   Interim History:  Dania Albert 55 year old female returns to be seen by palliative care today.      Dania Albert is a 55 yo female with history of long term tobacco use, T2DM and cirrhosis 2/2 previously treated Hepatitis C. Diagnosed with HCC in June 2019 and had TACE/Ablation in Oct " "2019 and then found to have a new lesion for which she had a MWA on 3/17/2020.  MRI on 5/8/2020 showed no resdual viable cancer in the treatment zones but it found dramatic increase in number and sizes of numerous arterial enhancing foci scattered throughout the liver parenchyma which was very suspicious for multifocal HCC. Liver biopsy confirmed moderately differentiated HCC. Bone scan showed metastases in right 5th rib and left calvicular head.  Started on levatininb.    Since last visit, hospitalizaed 10/29-11/4 with N/V and diarrhea, found to be hypoxic.  She was treated for possible CAP/UTI, although supposition was that hypoxia was at least in part chronic from underlying ILD/ESLD. She had missed some levatinib infusions.    At a recent visit with Oncology, she had a follow up CT scan showing significant worsening of the multifocal hepatocellular carcinoma within the liver.  Bone scan was unremarkable.  At that visit, her oncologist laid out options going forward including Opdivo.  Because of her underlying serious liver issues as well as interstitial lung disease, they were concerned that she has a higher risk of getting complications including liver problems as well as pneumonitis.  She and her daughter planned to discuss whether she wanted to pursue that route vs. a comfort focused route. Opted against more cancer directed therapy and instead want to focus on comfort.      Patient does not want to consider hospice because \"I want Jessica (daughter) to take care of me.\" Also wondering if you have to give up \"all your medical rights\" when you enroll in hospice.    Currently having difficulty with abdominal pain - worsening over days, has some associated nausea and worsening abdominal distention.  Went to [facility] ED yesterday in hopes of a large volume paracentesis (hadn't had one in the past).  CT CAP demonstrated moderate ascites, hepatomegaly and no large volume ascites amenable to removal.  Diagnostic tap - no " SBP.  Discharged with some oxycodone, has #12 pills.    Doesn't know how to manage pain - next available appointment with PCP .     Coping:  Having difficulty - daughter is supporting her.    Social History:  Pertinent changes to social history/social situation since last visit: Living part time with her daughter, part time with her .  Key support resources: DaughterJessica  Advance Directive Status:  On file, daughter HCA  Social History     Tobacco Use     Smoking status: Former Smoker     Packs/day: 0.30     Years: 40.00     Pack years: 12.00     Types: Cigarettes     Quit date: 10/26/2020     Years since quittin.1     Smokeless tobacco: Never Used   Substance Use Topics     Alcohol use: Not Currently     Comment: Rarely drank alcohol.      Drug use: Not Currently         Allergies   Allergen Reactions     Bee Venom Other (See Comments) and Swelling     Hand swelled up badly.       Hydrocodone Other (See Comments)     nausea     Nickel Rash     Wool Fiber Hives and Rash     Current Outpatient Medications   Medication Sig Dispense Refill     acetaminophen (TYLENOL) 500 MG tablet Take 500 mg by mouth every 8 hours as needed        albuterol (PROAIR HFA) 108 (90 Base) MCG/ACT inhaler Inhale 2 puffs into the lungs every 6 hours as needed        benzonatate (TESSALON) 100 MG capsule Take 1-2 capsules (100-200 mg) by mouth 3 times daily as needed for cough (Patient not taking: Reported on 2020) 90 capsule 0     blood glucose monitoring (ONE TOUCH ULTRA MINI) meter device kit Use as directed to test glucose three times daily. Pharmacy dispense brand based on insurance.  Indications: Diabetes       calcium carbonate-vitamin D (OSCAL W/D) 500-200 MG-UNIT tablet Take 1 tablet by mouth 2 times daily (with meals) (Patient not taking: Reported on 2020) 60 tablet 3     cholecalciferol (VITAMIN D3) 1000 units (25 mcg) capsule Take 1 capsule (1,000 Units) by mouth daily (Patient not taking: Reported  on 11/17/2020) 30 capsule 3     diclofenac (VOLTAREN) 1 % topical gel Place 2 g onto the skin 4 times daily (Patient not taking: Reported on 11/17/2020) 100 g 1     insulin glargine (LANTUS SOLOSTAR) 100 UNIT/ML pen Inject 20 Units Subcutaneous At Bedtime  0     lactulose (CHRONULAC) 10 GM/15ML solution Take 30 mLs (20 g) by mouth daily as needed for constipation (if less than 3 bowel movements in a day)       Lenvatinib 12 MG, 3 x 4 mg capsules, (LENVIMA) 3 x 4 MG capsule Take 3 capsules (12 mg) by mouth daily (Patient not taking: Reported on 11/17/2020) 90 capsule 0     levothyroxine (SYNTHROID) 25 MCG tablet Take 1 tablet (25 mcg) by mouth daily (Patient not taking: Reported on 11/17/2020) 30 tablet 0     magic mouthwash suspension (diphenhydrAMINE, lidocaine, aluminum-magnesium & simethicone) Swish and swallow 10 mLs in mouth every 6 hours as needed for mouth sores (Patient not taking: Reported on 11/17/2020) 120 mL 0     magnesium oxide (MAG-OX) 400 MG tablet Take 1 tablet (400 mg) by mouth 2 times daily (Patient not taking: Reported on 11/17/2020) 60 tablet 1     melatonin 5 MG tablet Take 1 tablet (5 mg) by mouth nightly as needed for sleep (Patient not taking: Reported on 11/17/2020) 30 tablet 1     nicotine (NICORETTE) 2 MG gum Place 1 each (2 mg) inside cheek as needed for smoking cessation (Patient not taking: Reported on 11/17/2020) 60 each 3     ondansetron (ZOFRAN-ODT) 8 MG ODT tab Take 1 tablet (8 mg) by mouth every 8 hours as needed for nausea And take 8mg prior to each dose of Levanitib. (Patient not taking: Reported on 11/17/2020) 30 tablet 3     OneTouch Delica Lancets 33G MISC Change lancet one time daily as directed.       order for DME Abdominal Binder - small (Patient not taking: Reported on 11/17/2020) 1 each 1     oxyCODONE (ROXICODONE) 5 MG tablet Take 1 tablet (5 mg) by mouth 3 times daily as needed for severe pain (Patient not taking: Reported on 11/17/2020) 10 tablet 0     oxyCODONE  (ROXICODONE) 5 MG tablet Take 0.5-1 tablets (2.5-5 mg) by mouth every 6 hours as needed for severe pain (Patient not taking: Reported on 11/17/2020) 30 tablet 0     pantoprazole (PROTONIX) 40 MG EC tablet TAKE ONE TABLET BY MOUTH EVERY DAY       potassium chloride (KLOR-CON) 20 MEQ packet Take 20 mEq by mouth daily 60 packet 1     QUEtiapine (SEROQUEL) 50 MG tablet Take 50 mg by mouth At Bedtime  0     rifaximin (XIFAXAN) 550 MG TABS tablet Take 1 tablet (550 mg) by mouth 2 times daily (Patient not taking: Reported on 11/17/2020) 60 tablet 3     sertraline (ZOLOFT) 100 MG tablet Take 200 mg by mouth daily        simethicone (MYLICON) 80 MG chewable tablet Take 1 tablet (80 mg) by mouth every 6 hours as needed for flatulence or cramping (Patient not taking: Reported on 11/17/2020) 90 tablet 0     thiamine (B-1) 100 MG tablet Take 1 tablet (100 mg) by mouth daily (Patient not taking: Reported on 11/17/2020) 30 tablet 0     TRULICITY 0.75 MG/0.5ML pen INJECT 0.5 ml's SUBCUTANEOUSLY ONCE WEEKLY  2     Past Medical History:   Diagnosis Date     Alcohol abuse      Bone metastasis (H) 7/15/2020     Chronic hepatitis C without hepatic coma (H) 10/23/2019    SVR     Cirrhosis of liver with ascites (H) 10/23/2019     COPD (chronic obstructive pulmonary disease) (H)      Depressive disorder      Diabetes (H)     Type 1 DM, Takes Insulin      Emphysema lung (H)      H/O esophageal varices      HCC (hepatocellular carcinoma) (H) 10/23/2019     History of blood transfusion     [facility]     ERICKA (obstructive sleep apnea)      Past Surgical History:   Procedure Laterality Date     ABDOMEN SURGERY      Gallbladder Removed      COLONOSCOPY      [place]     CYSTOSCOPY, INJECT BOTOX      detrusor injection     GI SURGERY      Upper Endoscopy at [facility]      HERNIA REPAIR      Patient doesnt remember if mesh was used. [facility]      IR FLUORO 0-1 HOUR  3/17/2020     IR FLUORO 0-1 HOUR  3/17/2020     OPEN  REDUCTION INTERNAL FIXATION WRIST Bilateral        Review of Systems:   ROS: 10 point ROS neg other than the symptoms noted above in the HPI and pertinents here:  Palliative Symptom Review (0=no symptom/no concern, 1=mild, 2=moderate, 3=severe):      Pain: 3      Fatigue: 1      Nausea: 1      Constipation: 0      Diarrhea: 0      Depressive Symptoms: 0      Anxiety: 0      Drowsiness: 1      Poor Appetite: 1      Shortness of Breath: 1      Insomnia: 1      Other: Itching 2      Overall (0 good/no concerns, 3 very poor):  2     Gen patient sitting in chair next to daughter - fidgety, scratching, words sometimes slurred, tangential questions  Head NCAT.  Eyes anicteric without injection  Face symmetric, eyes conjugate  Mouth pink, moist appearing  Lungs unlabored, no cough, speaking full sentences  Skin no rashes or lesions evident on face/neck  Neuro Face symmetric, eyes conjugate; speech slurred, tangential.  Neuropsych exam abnormal with tangential questions, confusion, interruptions    Key Data Reviewed:  LABS:  mild elevation in liver enzymes, normal renal function, albumin low  IMAGIN/17 CT CAP:  Innumerable lesions throughout the liver, increased in size and number since last scan.  No metastatic disease in the chest.     bone scan:   1. No scintigraphic evidence of osseous metastatic disease.  2. Unchanged uptake to healing fracture of the anterior right fifth  rib.     Impression & Recommendations & Counsel[age] y/o woman with multifocal HCC who has opted to forego additional chemotherapy thought likely to produce substantial toxicity.  However, she is not interested in hospice enrollment. She has worsening abdominal pain likely 2/2 tumor burden, no indication for paracentesis.    1.  Provided information about hospice, attempted to dispel some misconceptions.    2.  Increase oxycodone to 5-10mg po q4 hours as needed, prescribed today.  Her daughter will check in with pharmacy in [place]  tomorrow to ensure we can prescribe [place].    3.  Hydroxyzine for pruritis likely 2/2 underlying liver disease.    4.  F/u with me in clinic 12/17.    5.  Spoke with primary care provider, Dr. Micaela Gould, to update her on patient's current situation.  Will follow up after 12/17 appointment.    6.  Encourage hospice consideration - at next visit will suggest a referral for informational visit.     Attending attestation:   Patient seen and evaluated with Dr. Liang and I agree with findings and recs in this note.  Dania very distressed at pain, distention, and itching, as well as having hepatic encephalopathy.  Given her level of distress, symptom needs, I think hospice would be most helpful as they could follow her closely, but she had many reasons she didn't want to consider this today.  We will plan to follow for pain management, and given the fact they are in [place], I think having PCP or local palliative care would be best plan if she is not willing to have care with hospice.  She was very focused on getting a paracentesis to relieve the abdominal distention, but based on what they tell me of the attempt in the ER and the imaging, there may not be drainable fluid there.  Will plan to follow-up closely and notify PCP for help coordinating care.   Thank you for involving us in the patient's care.   Lisbet Marsh MD / Palliative Medicine / Pager [phone] / After-Hours Answering Service [phone] / Main Palliative Clinic - [facility] [phone] / [facility] Inpatient Team Consult Pager [phone] (answered 8am-430pm M-F)

## 2020-12-09 PROBLEM — R10.84 ABDOMINAL PAIN, GENERALIZED: Status: ACTIVE | Noted: 2020-01-01
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
## 2020-12-09 NOTE — DISCHARGE INSTRUCTIONS
TODAY'S VISIT:  - We are happy that your pain symptoms are improved, and they they did not find evidence of infection on your abdominal fluid, but you were still noted to have some lab abnormalities that we thought you should remain in the ED to evaluate/manage.   - We understand you want to leave at this time, despite our discussion of the potential risks, you are able to express understanding of those risks and appeared to have decision-making capacity at this time.  - You should discuss all imaging/radiology tests and laboratory tests that were performed during this visit with your usual providers to ensure you continue to improve and do not need any further evaluation, testing or management.     Please make an appointment to follow up with Hepatology clinic as soon as possible to establish care with a hepatologist (liver specialist).  - Please call your Primary Care team to discuss and arrange a follow-up appointment.     FOLLOW-UP:  Please make an appointment to follow up with:  - Hepatology (Liver) Clinic   [facility]   [address], [address]  [address] [place] [address]   Appointments: [phone]   - If you do not have a primary care provider, you can be seen in follow-up and establish care with one of our providers by calling of the the clinics below:  --- [facility] (phone: [phone])  --- Primary Care / [facility] (phone: [phone])   - Have your provider review the results from today's visit with you again to make sure no further follow-up or additional testing is needed based on those results.     OTHER INSTRUCTIONS:  - Do your best to stay hydrated.    RETURN TO THE EMERGENCY DEPARTMENT  Return to the Emergency Department immediately for any new or worsening symptoms or any concerns.     Remember that you can always come back to the Emergency Department if you are not able to see your regular doctor  in the amount of time listed above, if you get any new symptoms, or if there is anything that worries you.

## 2020-12-09 NOTE — ED TRIAGE NOTES
Dania comes to the ED today for evaluation of 2 day history of increased pain from abdominal distention.  She is requesting a paracentesis.

## 2020-12-09 NOTE — ED NOTES
"SIGN-OUT:  - Assumed care of this patient from Dr. Mata  - Pending at shift change: Pericentesis labs  - Tentative plan from original EM Physician: F/U peritoneal Cell count. If > 250, admit to IM for further evaluation/management. If < 250, discharge with outpatient F/U w/ Hepatology, OK for short course of oxycodone if needed for pain (see initial ED provider's note).     UPDATES / REASSESSMENT:  - Results: Reviewed results from prior to arrival, added on some labs. No elevated cell count, discussed w/ IM attending who does not think this represents SBP and thinks can be discharged as previously planned. They did not have concerns for patients WBC, which has been improving, given patient hasn't had fever, etc.   - Reassessment: Pt initially was a bit tired on my first assessment of her, but cleared during ED course. Normal mental status and alertness at time of discharge.  - Reviewed findings (labs, CT, etc.) with patient. Recommended waiting for remaining labs, electrolyte replacement, etc., patient declines. Tried to mitigate concerns/troubleshoot any concerns to get her to stay and she declines. Reviewed the R/B/A, potential for missed/delayed diagnosis, and clearly discussed potential risk of death/permanent disabilitiy of leaving at this time. Patient able to express understanding, and able to repeat these risks back to us on \"teach back\"/verification. She was awake and alert at the time of this discussion, able to express understanding and ask appropriate questions, oriented, no apparent clinical intoxication, and appears to have decision making capacity at this time.   - She does agree to F/U, and understands she can return at any time even if no changes and changes her mind. Also reviewed to especially immediately return w/ any new/worsening symptoms or any concerns. She will take Rx's for possible PNA (though could be fibrosis), and oxycodone for pain (discussed w/ ED Pharmacist). Safety instructions " reviewed. She will F/U with PCP for these.     DISPOSITION:  - Discharge to home at patients request (AMA), w/ plan for close outpatient F/U with hepatology and PCP.   - strict return/safety precautions, medication/Rx and safety instructions. She expressed understanding and agreement with this plan. All questions answered to the best of our ability at this time.                Patito Allison MD  12/10/20 0359

## 2020-12-10 NOTE — CONSULTS
Consult and Procedure Service - Procedure Note    Attending: Sekou Molina MD   Indication: Concern for SBP  Risk Assessment: Average risk  Pre-procedure diagnosis: Ascites  Post-procedure diagnosis: Ascites  Procedure name: Diagnostic paracentesis    The risks and benefits of the procedure were explained to the patient who expressed understanding and opted to proceed.  Consent was obtained and placed in the chart.  A time out was performed.  An area of ascites was located with ultrasound and marked in the left lower quadrant; the area was prepped and draped in the usual sterile fashion.  4 ml of 1% lidocaine was instilled with a 25 gauge needle and ascites located under real-time guidance. 7 ml of clear yellow colored fluid was removed and sent for analysis and the area dressed.     Patient tolerated the procedure well. Please contact the Consult and Procedure Service if any complications or concerns arise.     Sekou Molina MD   DOS:  12/09/20

## 2020-12-10 NOTE — LETTER
"12/10/2020       RE: Dania Albert  [address]  [facility] [address]     Dear Colleague,    Thank you for referring your patient, Dania Albert, to the [facility] at [facility]. Please see a copy of my visit note below.    Dania Albert is a 55 year old female who is being evaluated via a billable video visit.      The patient has been notified of following:     \"This video visit will be conducted via a call between you and your physician/provider. We have found that certain health care needs can be provided without the need for an in-person physical exam.  This service lets us provide the care you need with a video conversation.  If a prescription is necessary we can send it directly to your pharmacy.  If lab work is needed we can place an order for that and you can then stop by our lab to have the test done at a later time.    Video visits are billed at different rates depending on your insurance coverage.  Please reach out to your insurance provider with any questions.    If during the course of the call the physician/provider feels a video visit is not appropriate, you will not be charged for this service.\"    Patient has given verbal consent for Video visit? Yes  How would you like to obtain your AVS? MyChart  If you are dropped from the video visit, the video invite should be resent to: Text to cell phone: [phone]  Will anyone else be joining your video visit? No    CORY Villalobos    Video-Visit Details    Type of service:  Video Visit    Video Start Time: 4:23 PM  Video End Time: 5:40 PM    Originating Location (pt. Location): Home    Distant Location (provider location):  [facility]     Platform used for Video Visit: Luis Enrique Liang MD      Palliative Care Outpatient Clinic Progress Note    Patient Name:  Dania Albert  Primary Provider:  Micaela Gould    Chief Complaint:   Interim " "History:  Dania Albert 55 year old female returns to be seen by palliative care today.      Dania Albert is a 53 yo female with history of long term tobacco use, T2DM and cirrhosis 2/2 previously treated Hepatitis C. Diagnosed with HCC in June 2019 and had TACE/Ablation in Oct 2019 and then found to have a new lesion for which she had a MWA on 3/17/2020.  MRI on 5/8/2020 showed no resdual viable cancer in the treatment zones but it found dramatic increase in number and sizes of numerous arterial enhancing foci scattered throughout the liver parenchyma which was very suspicious for multifocal HCC. Liver biopsy confirmed moderately differentiated HCC. Bone scan showed metastases in right 5th rib and left calvicular head.  Started on levatininb.    Since last visit, hospitalizaed 10/29-11/4 with N/V and diarrhea, found to be hypoxic.  She was treated for possible CAP/UTI, although supposition was that hypoxia was at least in part chronic from underlying ILD/ESLD. She had missed some levatinib infusions.    At a recent visit with Oncology, she had a follow up CT scan showing significant worsening of the multifocal hepatocellular carcinoma within the liver.  Bone scan was unremarkable.  At that visit, her oncologist laid out options going forward including Opdivo.  Because of her underlying serious liver issues as well as interstitial lung disease, they were concerned that she has a higher risk of getting complications including liver problems as well as pneumonitis.  She and her daughter planned to discuss whether she wanted to pursue that route vs. a comfort focused route. Opted against more cancer directed therapy and instead want to focus on comfort.      Patient does not want to consider hospice because \"I want Jessica (daughter) to take care of me.\" Also wondering if you have to give up \"all your medical rights\" when you enroll in hospice.    Currently having difficulty with abdominal pain - worsening over " days, has some associated nausea and worsening abdominal distention.  Went to [facility] ED yesterday in hopes of a large volume paracentesis (hadn't had one in the past).  CT CAP demonstrated moderate ascites, hepatomegaly and no large volume ascites amenable to removal.  Diagnostic tap - no SBP.  Discharged with some oxycodone, has #12 pills.    Doesn't know how to manage pain - next available appointment with PCP .     Coping:  Having difficulty - daughter is supporting her.    Social History:  Pertinent changes to social history/social situation since last visit: Living part time with her daughter, part time with her .  Key support resources: Jessica Brady  Advance Directive Status:  On file, daughter HCA  Social History     Tobacco Use     Smoking status: Former Smoker     Packs/day: 0.30     Years: 40.00     Pack years: 12.00     Types: Cigarettes     Quit date: 10/26/2020     Years since quittin.1     Smokeless tobacco: Never Used   Substance Use Topics     Alcohol use: Not Currently     Comment: Rarely drank alcohol.      Drug use: Not Currently         Allergies   Allergen Reactions     Bee Venom Other (See Comments) and Swelling     Hand swelled up badly.       Hydrocodone Other (See Comments)     nausea     Nickel Rash     Wool Fiber Hives and Rash     Current Outpatient Medications   Medication Sig Dispense Refill     acetaminophen (TYLENOL) 500 MG tablet Take 500 mg by mouth every 8 hours as needed        albuterol (PROAIR HFA) 108 (90 Base) MCG/ACT inhaler Inhale 2 puffs into the lungs every 6 hours as needed        benzonatate (TESSALON) 100 MG capsule Take 1-2 capsules (100-200 mg) by mouth 3 times daily as needed for cough (Patient not taking: Reported on 2020) 90 capsule 0     blood glucose monitoring (ONE TOUCH ULTRA MINI) meter device kit Use as directed to test glucose three times daily. Pharmacy dispense brand based on insurance.  Indications: Diabetes       calcium  carbonate-vitamin D (OSCAL W/D) 500-200 MG-UNIT tablet Take 1 tablet by mouth 2 times daily (with meals) (Patient not taking: Reported on 11/17/2020) 60 tablet 3     cholecalciferol (VITAMIN D3) 1000 units (25 mcg) capsule Take 1 capsule (1,000 Units) by mouth daily (Patient not taking: Reported on 11/17/2020) 30 capsule 3     diclofenac (VOLTAREN) 1 % topical gel Place 2 g onto the skin 4 times daily (Patient not taking: Reported on 11/17/2020) 100 g 1     insulin glargine (LANTUS SOLOSTAR) 100 UNIT/ML pen Inject 20 Units Subcutaneous At Bedtime  0     lactulose (CHRONULAC) 10 GM/15ML solution Take 30 mLs (20 g) by mouth daily as needed for constipation (if less than 3 bowel movements in a day)       Lenvatinib 12 MG, 3 x 4 mg capsules, (LENVIMA) 3 x 4 MG capsule Take 3 capsules (12 mg) by mouth daily (Patient not taking: Reported on 11/17/2020) 90 capsule 0     levothyroxine (SYNTHROID) 25 MCG tablet Take 1 tablet (25 mcg) by mouth daily (Patient not taking: Reported on 11/17/2020) 30 tablet 0     magic mouthwash suspension (diphenhydrAMINE, lidocaine, aluminum-magnesium & simethicone) Swish and swallow 10 mLs in mouth every 6 hours as needed for mouth sores (Patient not taking: Reported on 11/17/2020) 120 mL 0     magnesium oxide (MAG-OX) 400 MG tablet Take 1 tablet (400 mg) by mouth 2 times daily (Patient not taking: Reported on 11/17/2020) 60 tablet 1     melatonin 5 MG tablet Take 1 tablet (5 mg) by mouth nightly as needed for sleep (Patient not taking: Reported on 11/17/2020) 30 tablet 1     nicotine (NICORETTE) 2 MG gum Place 1 each (2 mg) inside cheek as needed for smoking cessation (Patient not taking: Reported on 11/17/2020) 60 each 3     ondansetron (ZOFRAN-ODT) 8 MG ODT tab Take 1 tablet (8 mg) by mouth every 8 hours as needed for nausea And take 8mg prior to each dose of Levanitib. (Patient not taking: Reported on 11/17/2020) 30 tablet 3     OneTouch Delica Lancets 33G MISC Change lancet one time daily  as directed.       order for DME Abdominal Binder - small (Patient not taking: Reported on 11/17/2020) 1 each 1     oxyCODONE (ROXICODONE) 5 MG tablet Take 1 tablet (5 mg) by mouth 3 times daily as needed for severe pain (Patient not taking: Reported on 11/17/2020) 10 tablet 0     oxyCODONE (ROXICODONE) 5 MG tablet Take 0.5-1 tablets (2.5-5 mg) by mouth every 6 hours as needed for severe pain (Patient not taking: Reported on 11/17/2020) 30 tablet 0     pantoprazole (PROTONIX) 40 MG EC tablet TAKE ONE TABLET BY MOUTH EVERY DAY       potassium chloride (KLOR-CON) 20 MEQ packet Take 20 mEq by mouth daily 60 packet 1     QUEtiapine (SEROQUEL) 50 MG tablet Take 50 mg by mouth At Bedtime  0     rifaximin (XIFAXAN) 550 MG TABS tablet Take 1 tablet (550 mg) by mouth 2 times daily (Patient not taking: Reported on 11/17/2020) 60 tablet 3     sertraline (ZOLOFT) 100 MG tablet Take 200 mg by mouth daily        simethicone (MYLICON) 80 MG chewable tablet Take 1 tablet (80 mg) by mouth every 6 hours as needed for flatulence or cramping (Patient not taking: Reported on 11/17/2020) 90 tablet 0     thiamine (B-1) 100 MG tablet Take 1 tablet (100 mg) by mouth daily (Patient not taking: Reported on 11/17/2020) 30 tablet 0     TRULICITY 0.75 MG/0.5ML pen INJECT 0.5 ml's SUBCUTANEOUSLY ONCE WEEKLY  2     Past Medical History:   Diagnosis Date     Alcohol abuse      Bone metastasis (H) 7/15/2020     Chronic hepatitis C without hepatic coma (H) 10/23/2019    SVR     Cirrhosis of liver with ascites (H) 10/23/2019     COPD (chronic obstructive pulmonary disease) (H)      Depressive disorder      Diabetes (H)     Type 1 DM, Takes Insulin      Emphysema lung (H)      H/O esophageal varices      HCC (hepatocellular carcinoma) (H) 10/23/2019     History of blood transfusion     [facility] in [facility]     ERICKA (obstructive sleep apnea)      Past Surgical History:   Procedure Laterality Date     ABDOMEN SURGERY      Gallbladder Removed       COLONOSCOPY      [place]     CYSTOSCOPY, INJECT BOTOX      detrusor injection     GI SURGERY      Upper Endoscopy at [facility]      HERNIA REPAIR      Patient doesnt remember if mesh was used. [facility]      IR FLUORO 0-1 HOUR  3/17/2020     IR FLUORO 0-1 HOUR  3/17/2020     OPEN REDUCTION INTERNAL FIXATION WRIST Bilateral        Review of Systems:   ROS: 10 point ROS neg other than the symptoms noted above in the HPI and pertinents here:  Palliative Symptom Review (0=no symptom/no concern, 1=mild, 2=moderate, 3=severe):      Pain: 3      Fatigue: 1      Nausea: 1      Constipation: 0      Diarrhea: 0      Depressive Symptoms: 0      Anxiety: 0      Drowsiness: 1      Poor Appetite: 1      Shortness of Breath: 1      Insomnia: 1      Other: Itching 2      Overall (0 good/no concerns, 3 very poor):  2     Gen patient sitting in chair next to daughter - fidgety, scratching, words sometimes slurred, tangential questions  Head NCAT.  Eyes anicteric without injection  Face symmetric, eyes conjugate  Mouth pink, moist appearing  Lungs unlabored, no cough, speaking full sentences  Skin no rashes or lesions evident on face/neck  Neuro Face symmetric, eyes conjugate; speech slurred, tangential.  Neuropsych exam abnormal with tangential questions, confusion, interruptions    Key Data Reviewed:  LABS:  mild elevation in liver enzymes, normal renal function, albumin low  IMAGIN/17 CT CAP:  Innumerable lesions throughout the liver, increased in size and number since last scan.  No metastatic disease in the chest.     bone scan:   1. No scintigraphic evidence of osseous metastatic disease.  2. Unchanged uptake to healing fracture of the anterior right fifth  rib.     Impression & Recommendations & Counselin y/o woman with multifocal HCC who has opted to forego additional chemotherapy thought likely to produce substantial toxicity.  However, she is not interested in hospice enrollment. She has  worsening abdominal pain likely 2/2 tumor burden, no indication for paracentesis.    1.  Provided information about hospice, attempted to dispel some misconceptions.    2.  Increase oxycodone to 5-10mg po q4 hours as needed, prescribed today.  Her daughter will check in with pharmacy in [place] tomorrow to ensure we can prescribe across state lines.    3.  Hydroxyzine for pruritis likely 2/2 underlying liver disease.    4.  F/u with me in clinic 12/17.    5.  Spoke with primary care provider, Dr. Micaela Gould, to update her on patient's current situation.  Will follow up after 12/17 appointment.    6.  Encourage hospice consideration - at next visit will suggest a referral for informational visit.     Attending attestation:   Patient seen and evaluated with Dr. Linag and I agree with findings and recs in this note.  Dania very distressed at pain, distention, and itching, as well as having hepatic encephalopathy.  Given her level of distress, symptom needs, I think hospice would be most helpful as they could follow her closely, but she had many reasons she didn't want to consider this today.  We will plan to follow for pain management, and given the fact they are in [place], I think having PCP or local palliative care would be best plan if she is not willing to have care with hospice.  She was very focused on getting a paracentesis to relieve the abdominal distention, but based on what they tell me of the attempt in the ER and the imaging, there may not be drainable fluid there.  Will plan to follow-up closely and notify PCP for help coordinating care.   Thank you for involving us in the patient's care.   Lisbet Marsh MD / Palliative Medicine / Pager [phone] / After-Hours Answering Service [phone] / Main Palliative Clinic - [facility] [phone] / [facility] Inpatient Team Consult Pager [phone] (answered 8am-430pm M-F)

## 2020-12-10 NOTE — ED NOTES
Called from Rob Velasquez in a Pharmacy in [place]. Pt showed up with two prescription of oxycodon #12  Prescribed by Dr Allison. Reviewed Epic chart-one prescription written for oxycodon  #12 by Dr Allison, this was informed and instructed to fill just one prescription.     Kristan Alfaro MD  12/10/20 [id number]

## 2020-12-11 NOTE — PATIENT INSTRUCTIONS
Thank you for seeing the Palliative Care Clinic today.      1. We sent a prescription for more oxycodone for pain and hydroxyzine for itching to [facility] in [place].  Please call tomorrow crys at [phone] if the prescription doesn't go through.    2.  We will try to reach Dr. Gould to let her know what is going on and see how we can work together to help you with your symptoms.    3.  Someone will call to schedule a follow up appointment with us for next Thursday, December 17th, at 245pm.         You can reach the Palliative Care Team during business hours at the following numbers:   -For the [facility], call [phone]  -To reach the palliative RN for questions or refills, call [phone]       To reach the Palliative Care Provider on-call After-hours or on holidays and weekends, call: [phone].  Please note that we are not able to provide pain medication refills on evenings or weekends.

## 2020-12-15 NOTE — PROGRESS NOTES
"Dania Albert is a 55 year old female who is being evaluated via a billable video visit.      The patient has been notified of following:     \"This video visit will be conducted via a call between you and your physician/provider. We have found that certain health care needs can be provided without the need for an in-person physical exam.  This service lets us provide the care you need with a video conversation.  If a prescription is necessary we can send it directly to your pharmacy.  If lab work is needed we can place an order for that and you can then stop by our lab to have the test done at a later time.    Video visits are billed at different rates depending on your insurance coverage.  Please reach out to your insurance provider with any questions.    If during the course of the call the physician/provider feels a video visit is not appropriate, you will not be charged for this service.\"    Patient has given verbal consent for Video visit? Yes  How would you like to obtain your AVS? MyChart  If you are dropped from the video visit, the video invite should be resent to: Text to cell phone: [phone]  Will anyone else be joining your video visit? No        Video-Visit Details    Type of service:  Video Visit    Video Start Time: 2:56 PM  Video End Time: 3:50pm    Originating Location (pt. Location): Home    Distant Location (provider location):  [facility]     Platform used for Video Visit: MD Johanna Meraz CMA on 12/17/2020 at 2:21 PM        Palliative Care Outpatient Clinic Progress Note    Patient Name:  Dania Albert  Primary Provider:  Micaela Gould    Chief Complaint: 56 y/o woman seen in palliative care for symptom management and advanced care planning in setting of multifocal hepatocellular carcinoma.    Interim History:  Dania Albert 55 year old female returns to be seen by palliative care today.  Last seen one week ago with " "increasing symptoms including abdominal pain/distention and worsening encephalopathy.  Seen with her daughter - they had been to ED the day before with hopes of therapeutic paracentesis (never had that befoee), but US did not show any ascites amenable to tap (moderate but pockets), and a diagnostic tap was done - no SBP.  At last week's visit, we did discuss hospice, but Dania wanted her daughter to care for her and had a fixed belief that hospice meant \"other people taking care of me.\"  We refilled oxycodone she was taking prn pain, connected with her PCP who had not seen her for some time, and then planned close follow up as she couldn't get in to see her PCP until next week.    It's been a hard week.  She has been taking oxycodone 2mg po qAM, 2mg po every afternoon.  \"It doesn't seem like they really help anymore.\" Just makes her sleepy.  Went to the ED yesterday - had 3L of fluid taken out of her abdomen.  Maybe feels a little better, but it is filling up again.    Has having more nausea - vomited once today.    Coping:  Living with her daughter.      Social History:  Pertinent changes to social history/social situation since last visit: None.  Family still hoping for a reunion trip in [place] for a week in January () - rented a house, planning to fly.  Key support resources: daughterJessica.  Per patient,  is \"in denial\"  Advance Directive Status:  [place] ACD in chart, daughter Jessica is HCA    Social History     Tobacco Use     Smoking status: Former Smoker     Packs/day: 0.30     Years: 40.00     Pack years: 12.00     Types: Cigarettes     Quit date: 10/26/2020     Years since quittin.1     Smokeless tobacco: Never Used   Substance Use Topics     Alcohol use: Not Currently     Comment: Rarely drank alcohol.      Drug use: Not Currently         Allergies   Allergen Reactions     Bee Venom Other (See Comments) and Swelling     Hand swelled up badly.       Hydrocodone Other (See Comments)     nausea "     Nickel Rash     Wool Fiber Hives and Rash     Current Outpatient Medications   Medication Sig Dispense Refill     acetaminophen (TYLENOL) 500 MG tablet Take 500 mg by mouth every 8 hours as needed        albuterol (PROAIR HFA) 108 (90 Base) MCG/ACT inhaler Inhale 2 puffs into the lungs every 6 hours as needed        amoxicillin-clavulanate (AUGMENTIN) 875-125 MG tablet Take 1 tablet by mouth 2 times daily for 7 days 14 tablet 0     benzonatate (TESSALON) 100 MG capsule Take 1-2 capsules (100-200 mg) by mouth 3 times daily as needed for cough (Patient not taking: Reported on 11/17/2020) 90 capsule 0     blood glucose monitoring (ONE TOUCH ULTRA MINI) meter device kit Use as directed to test glucose three times daily. Pharmacy dispense brand based on insurance.  Indications: Diabetes       calcium carbonate-vitamin D (OSCAL W/D) 500-200 MG-UNIT tablet Take 1 tablet by mouth 2 times daily (with meals) (Patient not taking: Reported on 11/17/2020) 60 tablet 3     cholecalciferol (VITAMIN D3) 1000 units (25 mcg) capsule Take 1 capsule (1,000 Units) by mouth daily (Patient not taking: Reported on 11/17/2020) 30 capsule 3     diclofenac (VOLTAREN) 1 % topical gel Place 2 g onto the skin 4 times daily (Patient not taking: Reported on 11/17/2020) 100 g 1     hydrOXYzine (ATARAX) 25 MG tablet Take 1 tablet (25 mg) by mouth every 6 hours as needed for itching 90 tablet 3     insulin glargine (LANTUS SOLOSTAR) 100 UNIT/ML pen Inject 20 Units Subcutaneous At Bedtime  0     lactulose (CHRONULAC) 10 GM/15ML solution Take 30 mLs (20 g) by mouth daily as needed for constipation (if less than 3 bowel movements in a day)       Lenvatinib 12 MG, 3 x 4 mg capsules, (LENVIMA) 3 x 4 MG capsule Take 3 capsules (12 mg) by mouth daily (Patient not taking: Reported on 11/17/2020) 90 capsule 0     levothyroxine (SYNTHROID) 25 MCG tablet Take 1 tablet (25 mcg) by mouth daily (Patient not taking: Reported on 11/17/2020) 30 tablet 0     magic  mouthwash suspension (diphenhydrAMINE, lidocaine, aluminum-magnesium & simethicone) Swish and swallow 10 mLs in mouth every 6 hours as needed for mouth sores (Patient not taking: Reported on 11/17/2020) 120 mL 0     magnesium oxide (MAG-OX) 400 MG tablet Take 1 tablet (400 mg) by mouth 2 times daily (Patient not taking: Reported on 11/17/2020) 60 tablet 1     melatonin 5 MG tablet Take 1 tablet (5 mg) by mouth nightly as needed for sleep 30 tablet 1     nicotine (NICORETTE) 2 MG gum Place 1 each (2 mg) inside cheek as needed for smoking cessation (Patient not taking: Reported on 11/17/2020) 60 each 3     ondansetron (ZOFRAN-ODT) 8 MG ODT tab Take 1 tablet (8 mg) by mouth every 8 hours as needed for nausea And take 8mg prior to each dose of Levanitib. (Patient not taking: Reported on 11/17/2020) 30 tablet 3     OneTouch Delica Lancets 33G MISC Change lancet one time daily as directed.       order for DME Abdominal Binder - small (Patient not taking: Reported on 11/17/2020) 1 each 1     oxyCODONE (ROXICODONE) 5 MG tablet Take 1-2 tablets (5-10 mg) by mouth every 4 hours as needed for pain 90 tablet 0     pantoprazole (PROTONIX) 40 MG EC tablet TAKE ONE TABLET BY MOUTH EVERY DAY       potassium chloride (KLOR-CON) 20 MEQ packet Take 20 mEq by mouth daily 60 packet 1     QUEtiapine (SEROQUEL) 50 MG tablet Take 50 mg by mouth At Bedtime  0     rifaximin (XIFAXAN) 550 MG TABS tablet Take 1 tablet (550 mg) by mouth 2 times daily (Patient not taking: Reported on 11/17/2020) 60 tablet 3     sertraline (ZOLOFT) 100 MG tablet Take 200 mg by mouth daily        simethicone (MYLICON) 80 MG chewable tablet Take 1 tablet (80 mg) by mouth every 6 hours as needed for flatulence or cramping (Patient not taking: Reported on 11/17/2020) 90 tablet 0     thiamine (B-1) 100 MG tablet Take 1 tablet (100 mg) by mouth daily (Patient not taking: Reported on 11/17/2020) 30 tablet 0     TRULICITY 0.75 MG/0.5ML pen INJECT 0.5 ml's SUBCUTANEOUSLY  ONCE WEEKLY  2     Past Medical History:   Diagnosis Date     Alcohol abuse      Bone metastasis (H) 7/15/2020     Chronic hepatitis C without hepatic coma (H) 10/23/2019    SVR     Cirrhosis of liver with ascites (H) 10/23/2019     COPD (chronic obstructive pulmonary disease) (H)      Depressive disorder      Diabetes (H)     Type 1 DM, Takes Insulin      Emphysema lung (H)      H/O esophageal varices      HCC (hepatocellular carcinoma) (H) 10/23/2019     History of blood transfusion     [facility] in 1983     ERICKA (obstructive sleep apnea)      Past Surgical History:   Procedure Laterality Date     ABDOMEN SURGERY      Gallbladder Removed      COLONOSCOPY      [place]     CYSTOSCOPY, INJECT BOTOX      detrusor injection     GI SURGERY      Upper Endoscopy at [facility]      HERNIA REPAIR      Patient doesnt remember [facility]      IR FLUORO 0-1 HOUR  3/17/2020     IR FLUORO 0-1 HOUR  3/17/2020     OPEN REDUCTION INTERNAL FIXATION WRIST Bilateral        Review of Systems:   ROS: 10 point ROS neg other than the symptoms noted above in the HPI and pertinents here:  Palliative Symptom Review (0=no symptom/no concern, 1=mild, 2=moderate, 3=severe):      Pain: 2      Fatigue: 1      Nausea: 1      Constipation: 0      Diarrhea: 1      Depressive Symptoms: 0      Anxiety: 0      Drowsiness: 1      Poor Appetite: 1      Shortness of Breath: 0      Insomnia: 1      Other: itching - improved with hydroxyzine      Overall (0 good/no concerns, 3 very poor):  2    PE  Gen patient sitting at desk with her daughter at her side.  Periodically grimacing and grabbing her stomach - asks sister to push on her abdomen below her right ribcage.    Head NCAT.  Eyes anicteric without injection  Face symmetric, eyes conjugate  Mouth pink, moist appearing  Lungs unlabored, no cough, speaking full sentences  Skin no rashes or lesions evident on face/neck  Neuro Face symmetric, eyes conjugate; speech a bit  dysarthric, unchanged from previous. No asterixis  Neuropsych alert, able to describe ED visit accurately, tracking a bit better than last visit although continues to interject with non sequiturs.      Key Data Reviewed:  LABS:  mild elevation in liver enzymes, normal renal function, albumin low, WBC 2.2, PLTS low    IMAGIN/17 CT CAP:  Innumerable lesions throughout the liver, increased in size and number since last scan.  No metastatic disease in the chest.      bone scan:   1. No scintigraphic evidence of osseous metastatic disease.  2. Unchanged uptake to healing fracture of the anterior right fifth  rib.       Impression & Recommendations & Counselin y/o woman with multifocal HCC no longer a candidate for disease directed therapy with abdominal pain, itching and encephalopathy.  She lives in [place] and is currently staying with her daughter.  She meets criteria for hospice and has a hospice philosophy (wants to live with family, not keep coming back to the hospital, manage symtpoms and enjoy the time she has remaining) but does not want to enroll in hospice as she believes this means someone other than Jessica will need to take care of her (Jessica notes she will care for her mom at home). Does not want to discuss further.    1.  Will rotate to hydromorphone - tolerated and found pain relief in ED with that.Dilaudid 2mg q6 hours as needed    2.  For nausea will trial olanzapine ODT 5 at night and 2.5-5 prn daily     3.  F/u in clinic on  prior to planned trip to [place] to ensure symptoms managed, has medication needed.    4.  With her permission, I will talk with her primary care provider tomorrow to provide update of visit.  Encouraged her to talk with PCP ivette following up with gastroenterology there for possible scheduled paracentesis v. Indwelling drain.  Let them know we could also schedule that here in IR if they prefer.    Ligia Liang MD, *[id number]  Palliative Medicine Fellow  Patient staffed  with Naga Ortez MD, Staff, Palliative Medicine      Attending Note:  Patient seen and evaluated with Dr Liang and I agree with/confirm their findings/recs in this note.      Thank you for involving us in the patient's care.   Naga Ortez MD / Palliative Medicine / Text me via [facility].

## 2020-12-17 NOTE — PATIENT INSTRUCTIONS
Thank you for coming to your virtual visit in the Palliative Care Clinic today.      1. We will switch your pain medication to hydromorphone (Dilaudid).  You can dispose of the oxycodone as we talked about.    2.  We will also prescribe olanzapine (Zyprexa) dissolving tabs.  Take one each night.  You can take an additional 1/2 to 1 during the day for nausea if you need it.  It may make you drowsy.    3.  Talk with Dr. Gould on Monday about maybe trying some Lasix and Spironolactone, although it may just be that you need periodic taps of the fluid in your abdomen.  Talk with her about following up with Dr. Cast.  We could organize that here, but it may be easier for you to get that care from your doctors closer to home.    Return in clinic 1/14 for a follow-up.   The clinic will call to schedule the appointment.     You can reach the Palliative Care Team during business hours at the following numbers:   -For the [facility], call [phone]  -To reach the palliative RN for questions or refills, call [phone]       To reach the Palliative Care Provider on-call after-hours or on holidays and weekends, call: [phone].  Please note that we are not able to provide pain medication refills on evenings or weekends.

## 2020-12-17 NOTE — LETTER
"12/17/2020       RE: Dania Albert  [address]  [place] [address]     Dear Colleague,    Thank you for referring your patient, Dania Albert, to the [facility] at [facility]. Please see a copy of my visit note below.    Dania Albert is a 55 year old female who is being evaluated via a billable video visit.      The patient has been notified of following:     \"This video visit will be conducted via a call between you and your physician/provider. We have found that certain health care needs can be provided without the need for an in-person physical exam.  This service lets us provide the care you need with a video conversation.  If a prescription is necessary we can send it directly to your pharmacy.  If lab work is needed we can place an order for that and you can then stop by our lab to have the test done at a later time.    Video visits are billed at different rates depending on your insurance coverage.  Please reach out to your insurance provider with any questions.    If during the course of the call the physician/provider feels a video visit is not appropriate, you will not be charged for this service.\"    Patient has given verbal consent for Video visit? Yes  How would you like to obtain your AVS? MyChart  If you are dropped from the video visit, the video invite should be resent to: Text to cell phone: [phone]  Will anyone else be joining your video visit? No      Video-Visit Details    Type of service:  Video Visit    Video Start Time: 2:56 PM  Video End Time: 3:50pm    Originating Location (pt. Location): Home    Distant Location (provider location):  [facility]     Platform used for Video Visit: Luis Enrique Dunham CMA on 12/17/2020 at 2:21 PM      Palliative Care Outpatient Clinic Progress Note    Patient Name:  Dania Albert  Primary Provider:  Micaela Gould    Chief Complaint: 54 y/o " "woman seen in palliative care for symptom management and advanced care planning in setting of multifocal hepatocellular carcinoma.    Interim History:  Dania Albert 55 year old female returns to be seen by palliative care today.  Last seen one week ago with increasing symptoms including abdominal pain/distention and worsening encephalopathy.  Seen with her daughter - they had been to ED the day before with hopes of therapeutic paracentesis (never had that befoee), but US did not show any ascites amenable to tap (moderate but pockets), and a diagnostic tap was done - no SBP.  At last week's visit, we did discuss hospice, but Dania wanted her daughter to care for her and had a fixed belief that hospice meant \"other people taking care of me.\"  We refilled oxycodone she was taking prn pain, connected with her PCP who had not seen her for some time, and then planned close follow up as she couldn't get in to see her PCP until next week.    It's been a hard week.  She has been taking oxycodone 2mg po qAM, 2mg po every afternoon.  \"It doesn't seem like they really help anymore.\" Just makes her sleepy.  Went to the ED yesterday - had 3L of fluid taken out of her abdomen.  Maybe feels a little better, but it is filling up again.    Has having more nausea - vomited once today.    Coping:  Living with her daughter.      Social History:  Pertinent changes to social history/social situation since last visit: None.  Family still hoping for a reunion trip in [place] for a week in January () - rented a house, planning to fly.  Key support resources: daughter, Jessica.  Per patient,  is \"in denial\"  Advance Directive Status:  [place] ACD in chart, daughter Jessica is HCA    Social History     Tobacco Use     Smoking status: Former Smoker     Packs/day: 0.30     Years: 40.00     Pack years: 12.00     Types: Cigarettes     Quit date: 10/26/2020     Years since quittin.1     Smokeless tobacco: Never Used   Substance Use " Topics     Alcohol use: Not Currently     Comment: Rarely drank alcohol.      Drug use: Not Currently         Allergies   Allergen Reactions     Bee Venom Other (See Comments) and Swelling     Hand swelled up badly.       Hydrocodone Other (See Comments)     nausea     Nickel Rash     Wool Fiber Hives and Rash     Current Outpatient Medications   Medication Sig Dispense Refill     acetaminophen (TYLENOL) 500 MG tablet Take 500 mg by mouth every 8 hours as needed        albuterol (PROAIR HFA) 108 (90 Base) MCG/ACT inhaler Inhale 2 puffs into the lungs every 6 hours as needed        amoxicillin-clavulanate (AUGMENTIN) 875-125 MG tablet Take 1 tablet by mouth 2 times daily for 7 days 14 tablet 0     benzonatate (TESSALON) 100 MG capsule Take 1-2 capsules (100-200 mg) by mouth 3 times daily as needed for cough (Patient not taking: Reported on 11/17/2020) 90 capsule 0     blood glucose monitoring (ONE TOUCH ULTRA MINI) meter device kit Use as directed to test glucose three times daily. Pharmacy dispense brand based on insurance.  Indications: Diabetes       calcium carbonate-vitamin D (OSCAL W/D) 500-200 MG-UNIT tablet Take 1 tablet by mouth 2 times daily (with meals) (Patient not taking: Reported on 11/17/2020) 60 tablet 3     cholecalciferol (VITAMIN D3) 1000 units (25 mcg) capsule Take 1 capsule (1,000 Units) by mouth daily (Patient not taking: Reported on 11/17/2020) 30 capsule 3     diclofenac (VOLTAREN) 1 % topical gel Place 2 g onto the skin 4 times daily (Patient not taking: Reported on 11/17/2020) 100 g 1     hydrOXYzine (ATARAX) 25 MG tablet Take 1 tablet (25 mg) by mouth every 6 hours as needed for itching 90 tablet 3     insulin glargine (LANTUS SOLOSTAR) 100 UNIT/ML pen Inject 20 Units Subcutaneous At Bedtime  0     lactulose (CHRONULAC) 10 GM/15ML solution Take 30 mLs (20 g) by mouth daily as needed for constipation (if less than 3 bowel movements in a day)       Lenvatinib 12 MG, 3 x 4 mg capsules,  (LENVIMA) 3 x 4 MG capsule Take 3 capsules (12 mg) by mouth daily (Patient not taking: Reported on 11/17/2020) 90 capsule 0     levothyroxine (SYNTHROID) 25 MCG tablet Take 1 tablet (25 mcg) by mouth daily (Patient not taking: Reported on 11/17/2020) 30 tablet 0     magic mouthwash suspension (diphenhydrAMINE, lidocaine, aluminum-magnesium & simethicone) Swish and swallow 10 mLs in mouth every 6 hours as needed for mouth sores (Patient not taking: Reported on 11/17/2020) 120 mL 0     magnesium oxide (MAG-OX) 400 MG tablet Take 1 tablet (400 mg) by mouth 2 times daily (Patient not taking: Reported on 11/17/2020) 60 tablet 1     melatonin 5 MG tablet Take 1 tablet (5 mg) by mouth nightly as needed for sleep 30 tablet 1     nicotine (NICORETTE) 2 MG gum Place 1 each (2 mg) inside cheek as needed for smoking cessation (Patient not taking: Reported on 11/17/2020) 60 each 3     ondansetron (ZOFRAN-ODT) 8 MG ODT tab Take 1 tablet (8 mg) by mouth every 8 hours as needed for nausea And take 8mg prior to each dose of Levanitib. (Patient not taking: Reported on 11/17/2020) 30 tablet 3     OneTouch Delica Lancets 33G MISC Change lancet one time daily as directed.       order for DME Abdominal Binder - small (Patient not taking: Reported on 11/17/2020) 1 each 1     oxyCODONE (ROXICODONE) 5 MG tablet Take 1-2 tablets (5-10 mg) by mouth every 4 hours as needed for pain 90 tablet 0     pantoprazole (PROTONIX) 40 MG EC tablet TAKE ONE TABLET BY MOUTH EVERY DAY       potassium chloride (KLOR-CON) 20 MEQ packet Take 20 mEq by mouth daily 60 packet 1     QUEtiapine (SEROQUEL) 50 MG tablet Take 50 mg by mouth At Bedtime  0     rifaximin (XIFAXAN) 550 MG TABS tablet Take 1 tablet (550 mg) by mouth 2 times daily (Patient not taking: Reported on 11/17/2020) 60 tablet 3     sertraline (ZOLOFT) 100 MG tablet Take 200 mg by mouth daily        simethicone (MYLICON) 80 MG chewable tablet Take 1 tablet (80 mg) by mouth every 6 hours as needed  for flatulence or cramping (Patient not taking: Reported on 11/17/2020) 90 tablet 0     thiamine (B-1) 100 MG tablet Take 1 tablet (100 mg) by mouth daily (Patient not taking: Reported on 11/17/2020) 30 tablet 0     TRULICITY 0.75 MG/0.5ML pen INJECT 0.5 ml's SUBCUTANEOUSLY ONCE WEEKLY  2     Past Medical History:   Diagnosis Date     Alcohol abuse      Bone metastasis (H) 7/15/2020     Chronic hepatitis C without hepatic coma (H) 10/23/2019    SVR     Cirrhosis of liver with ascites (H) 10/23/2019     COPD (chronic obstructive pulmonary disease) (H)      Depressive disorder      Diabetes (H)     Type 1 DM, Takes Insulin      Emphysema lung (H)      H/O esophageal varices      HCC (hepatocellular carcinoma) (H) 10/23/2019     History of blood transfusion     [facility]     ERICKA (obstructive sleep apnea)      Past Surgical History:   Procedure Laterality Date     ABDOMEN SURGERY      Gallbladder Removed      COLONOSCOPY      [facility] [place]     CYSTOSCOPY, INJECT BOTOX      detrusor injection     GI SURGERY      Upper Endoscopy at [facility]      [facility]ERNIA REPA[facility]      [facility] doesnt [facility]      IR FLUORO 0-1 HOUR  3/17/2020     IR FLUORO 0-1 HOUR  3/17/2020     OPEN REDUCTION INTERNAL FIXATION WRIST Bilateral        Review of Systems:   ROS: 10 point ROS neg other than the symptoms noted above in the HPI and pertinents here:  Palliative Symptom Review (0=no symptom/no concern, 1=mild, 2=moderate, 3=severe):      Pain: 2      Fatigue: 1      Nausea: 1      Constipation: 0      Diarrhea: 1      Depressive Symptoms: 0      Anxiety: 0      Drowsiness: 1      Poor Appetite: 1      Shortness of Breath: 0      Insomnia: 1      Other: itching - improved with hydroxyzine      Overall (0 good/no concerns, 3 very poor):  2    PE  Gen patient sitting at desk with her daughter at her side.  Periodically grimacing and grabbing her stomach - asks sister to push on her abdomen below her  right ribcage.    Head NCAT.  Eyes anicteric without injection  Face symmetric, eyes conjugate  Mouth pink, moist appearing  Lungs unlabored, no cough, speaking full sentences  Skin no rashes or lesions evident on face/neck  Neuro Face symmetric, eyes conjugate; speech a bit dysarthric, unchanged from previous. No asterixis  Neuropsych alert, able to describe ED visit accurately, tracking a bit better than last visit although continues to interject with non sequiturs.    Key Data Reviewed:  LABS:  mild elevation in liver enzymes, normal renal function, albumin low, WBC 2.2, PLTS low    IMAGIN/17 CT CAP:  Innumerable lesions throughout the liver, increased in size and number since last scan.  No metastatic disease in the chest.      bone scan:   1. No scintigraphic evidence of osseous metastatic disease.  2. Unchanged uptake to healing fracture of the anterior right fifth  rib.     Impression & Recommendations & Counselin y/o woman with multifocal HCC no longer a candidate for disease directed therapy with abdominal pain, itching and encephalopathy.  She lives in [place] and is currently staying with her daughter.  She meets criteria for hospice and has a hospice philosophy (wants to live with family, not keep coming back to the hospital, manage symtpoms and enjoy the time she has remaining) but does not want to enroll in hospice as she believes this means someone other than Jessica will need to take care of her (Jessica notes she will care for her mom at home). Does not want to discuss further.    1.  Will rotate to hydromorphone - tolerated and found pain relief in ED with that.Dilaudid 2mg q6 hours as needed    2.  For nausea will trial olanzapine ODT 5 at night and 2.5-5 prn daily     3.  F/u in clinic on  prior to planned trip to [place] to ensure symptoms managed, has medication needed.    4.  With her permission, I will talk with her primary care provider tomorrow to provide update of visit.   Encouraged her to talk with PCP ivette following up with gastroenterology there for possible scheduled paracentesis v. Indwelling drain.  Let them know we could also schedule that here in IR if they prefer.    Ligia Liang MD, *[phone]  Palliative Medicine Fellow  Patient staffed with Naga Ortez MD, Staff, Palliative Medicine      Attending Note:  Patient seen and evaluated with Dr Liang and I agree with/confirm their findings/recs in this note.      Thank you for involving us in the patient's care.   Naga Ortez MD / Palliative Medicine / Text me via [facility].

## 2021-02-03 ENCOUNTER — PATIENT OUTREACH (OUTPATIENT)
Dept: ONCOLOGY | Facility: CLINIC | Age: 56
End: 2021-02-03

## 2021-02-03 NOTE — PROGRESS NOTES
NOTIFICATION OF A  PATIENT  EMAIL THIS COMPLETED INFORMATION TO THE APPROPRIATE ENTITY:    [facility]: [email or web address]   [facility]:  [email or web address]   [facility]: [email or web address]   [facility]:  [email or web address]     PATIENT INFORMATION   Last name: Roosevelt First name: Dania   Medical Record Number: [id number] County of Death: [place]   YOB: 1965 Date of Death: 2021   PARENT (FOR PATIENTS UNDER 18) OR LEGAL GUARDIAN (IF APPLICABLE):   Relationship to  patient:   Last name: First name:   Date of Birth: Telephone Number:   Address:     City: State: Zip Code:   SURVIVING SPOUSE INFORMATION (IF THERE IS A SURVIVING SPOUSE)   Last name:MARIA T RAWLS First name:   Date of Birth: Telephone number:   Address:     City: State: Zip Code:   PERSON THAT INFORMED US OF PATIENT'S DEATH   Last name:[facility] verified [place] [employer]    First name:   Relationship to  patient: Telephone number:

## 2022-03-03 NOTE — PROGRESS NOTES
RN CARE COORDINATION NOTE      Incoming:    Spoke to Jessica, pt's daughter, who wants to clarify that patient can have her Zometa today. She was recently hospitalized and they wanted to make sure she would not be turned away due to not being on chemo for a few weeks.   Patient is able to have zometa today. Jessica is unable to bring her to clinic, she will update her and they will all if the appointment needs to be rescheduled.       Isela Dunham MSN, RN, OCN  RN Care Coordinator  [facility]  [phone]     1

## 2022-05-24 NOTE — NURSING NOTE
"Oncology Rooming Note    August 13, 2020 10:52 AM   Dania Albert is a 54 year old female who presents for:    Chief Complaint   Patient presents with     Blood Draw     Labs drawn via PIV by RN in lab. VS taken.      Oncology Clinic Visit     Pt is here for a rtn for HCC     Initial Vitals: Blood Pressure 129/86   Pulse 87   Temperature 96.8  F (36  C) (Tympanic)   Respiration 16   Weight 75.2 kg (165 lb 12.8 oz)   Oxygen Saturation 92%   Body Mass Index 31.33 kg/m   Estimated body mass index is 31.33 kg/m  as calculated from the following:    Height as of 7/16/20: 1.549 m (5' 1\").    Weight as of this encounter: 75.2 kg (165 lb 12.8 oz). Body surface area is 1.8 meters squared.  No Pain (0) Comment: Data Unavailable   No LMP recorded. Patient is postmenopausal.  Allergies reviewed: Yes  Medications reviewed: Yes    Medications: Medication refills not needed today.  Pharmacy name entered into [facility]:    [id number] - [facility] [address] [facility]  [facility] - [place], [place] - [address]  [facility] - [place], [place] - [address]  [facility] [id number] [place] [facility] [place], [place] - [address] [place] [address]    Clinical concerns: none       Radha Franklin MA            "
Chief Complaint   Patient presents with     Blood Draw     Labs drawn via PIV by RN in lab. VS taken.      Labs drawn via peripheral IV. Vital signs taken. Checked into next appointment.   Doreen Reyes RN    
English

## 2022-08-17 NOTE — CONSULTS
GASTROENTEROLOGY CONSULTATION      Date of Admission:  7/16/2020           Reason for Consultation:   We were asked by Dr. Devine to evaluate this patient with altered mental status and colitis, concern for ulcerative colitis.           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 CT scan of the abdomen and pelvis notable for thickening from the rectum to the terminal ileum, question of underlying inflammatory or infectious colitis. While there is thickening, the etiology is unclear. It may be edema related to her portal hypertension and liver disease. Certainly infectious colitis is a possibility, and she is currently undergoing stool testing to rule out infectious etiology. Inflammatory etiology (such as ulcerative colitis) is a possibility, but less likely with her lack of bloody diarrhea and previous normal colon biopsies during episodes of diarrhea similar to this. Lenvima has been associated with diarrhea in up to 70% of patients, and colitis in ~3% based on FDA labeling, and could explain her symptoms. While her diarrhea has been longstanding, if there is a question of recent worsening could consider holding or changing therapy based on the input of Oncology.    As best I can tell from chart review and discussion with the patient's daughter, she does not have a diagnosis of ulcerative colitis. She was given a hand out after an ED visit for abdominal pain and diarrhea that said she may have IBD, but had a subsequent colonoscopy which appeared normal and had negative biopsies. As such, no role at this time for IBD directed therapy.       Recommendations  - strict I/O's with good documentation of bowel movements to confirm true diarrhea (vs incontinence),  consistency and appearance of BMs  - agree with infectious stool studies, will follow-up results  - if negative, start Imodium  - no role for IBD directed therapy (patient does not have based on record review)  - could consider holding Lenvima (if OK with Oncology) if diarrhea is felt to have worsened since starting (unclear after discussion with patient/daughter)  - if patient continues to have diarrhea of unclear etiology, we can consider flex sig over the weekend or early next week  - RUQ U/S with dopplers for evaluation of periportal vasculature (due to advancement of PV thrombus from prior scan)  - consider hepatic encephalopathy as cause of AMS and treatment with lactulose    Gastroenterology outpatient follow up recommendations: TBD    Thank you for involving us in this patient's care. Please do not hesitate to contact the GI service with any questions or concerns.     Pt care plan discussed with Dr. Vieira, GI staff physician.    Jonny Santacruz MD  -------------------------------------------------------------------------------------------------------------------           History of Present Illness:   Dania Albert is a 54 year old female with a history of HCV cirrhosis complicated by esophageal varices (no hx of bleeding), metastatic HCC diagnosed in 2019 (status post TACE and MWA) on Lenvima, and chronic diarrhea who was admitted with altered mental status. History is taken primarily from the daughter, as the patient does not respond to questioning.    Altered mental status start 1-2 weeks ago, although her memory has been affected for much longer than that. She has known HCV cirrhosis with grade I varices, and more recently diagnosed HCC diagnosed in 2019. She is status post TACE and MWA at [facility]. Currently on Lenvima, started a month or two ago per the daughter. Unclear if the time frame of AMS mirrors that of starting the Lenvima. Her diarrhea has been here for years, similarly unclear whether  it has worsened recently. The patient and daughter deny and hematochezia or melena. No nausea or vomiting. She has had terminal ileal inflammation seen on prior CT scans. During a previous presentation to the ED for RUQ pain and diarrhea, was given a hand-out re: ulcerative colitis, and patient subsequently told daughter that she had this. However, according to chart review, she had subsequent colonoscopy which showed normal appearing colon and negative biopsies of the TI and colon. From a hepatology standpoint, she follows with Dr. Issa in the outpatient setting, most recently phone visit on 6/5, at which her functional status was felt to be good and she was capable of performing the visit herself, which is a big change from her current status.     Labs here notable for thrombocytopenia, leukopenia, normal hemoglobin, ammonia 53 (50 ULN), mild TBili elevation, hypoalbuminemia, otherwise normal CMP. INR 1.26. CT findings as above and detailed below.          Past Medical History:   Reviewed and edited as appropriate  Past Medical History:   Diagnosis Date     Alcohol abuse      Bone metastasis (H) 7/15/2020     Chronic hepatitis C without hepatic coma (H) 10/23/2019    SVR     Cirrhosis of liver with ascites (H) 10/23/2019     COPD (chronic obstructive pulmonary disease) (H)      Depressive disorder      Diabetes (H)     Type 1 DM, Takes Insulin      Emphysema lung (H)      H/O esophageal varices      HCC (hepatocellular carcinoma) (H) 10/23/2019     History of blood transfusion     [facility] in [facility]     ERICKA (obstructive sleep apnea)             Past Surgical History:   Reviewed and edited as appropriate   Past Surgical History:   Procedure Laterality Date     ABDOMEN SURGERY      Gallbladder Removed      COLONOSCOPY      [place]     CYSTOSCOPY, INJECT BOTOX      detrusor injection     GI SURGERY      Upper Endoscopy at [facility]      HERNIA REPAIR      Patient doesnt remember if mesh was used. [facility]      IR FLUORO 0-1 HOUR  3/17/2020     IR FLUORO 0-1 HOUR  3/17/2020     OPEN REDUCTION INTERNAL FIXATION WRIST Bilateral             Previous Endoscopy:   No results found for this or any previous visit.         Social History:   Reviewed and edited as appropriate  Social History     Socioeconomic History     Marital status:      Spouse name: Not on file     Number of children: Not on file     Years of education: Not on file     Highest education level: Not on file   Occupational History     Not on file   Social Needs     Financial resource strain: Not on file     Food insecurity     Worry: Not on file     Inability: Not on file     Transportation needs     Medical: Not on file     Non-medical: Not on file   Tobacco Use     Smoking status: Current Every Day Smoker     Packs/day: 0.30     Years: 40.00     Pack years: 12.00     Types: Cigarettes     Smokeless tobacco: Never Used   Substance and Sexual Activity     Alcohol use: Not Currently     Comment: Rarely drank alcohol.      Drug use: Not Currently     Sexual activity: Not on file   Lifestyle     Physical activity     Days per week: Not on file     Minutes per session: Not on file     Stress: Not on file   Relationships     Social connections     Talks on phone: Not on file     Gets together: Not on file     Attends Orthodox service: Not on file     Active member of club or organization: Not on file     Attends meetings of clubs or organizations: Not on file     Relationship status: Not on file     Intimate partner violence     Fear of current or ex partner: Not on file     Emotionally abused: Not on file     Physically abused: Not on file     Forced sexual activity: Not on file   Other Topics Concern     Parent/sibling w/ CABG, MI or angioplasty before 65F 55M? Not Asked   Social History Narrative     Not on file            Family History:   Reviewed and edited as appropriate  Family History   Problem Relation Age of Onset     Coronary  Artery Disease Mother      Coronary Artery Disease Father      No Known Problems Brother      Meniere's disease Sister      Diabetes Sister      No Known Problems Son      No Known Problems Daughter      Diabetes Sister      Liver Disease Sister      Chronic Obstructive Pulmonary Disease Sister               Allergies:   Reviewed and edited as appropriate     Allergies   Allergen Reactions     Bee Venom Other (See Comments) and Swelling     Hand swelled up badly.       Hydrocodone Other (See Comments)     nausea     Nickel Rash     Wool Fiber Hives and Rash            Medications:     Current Facility-Administered Medications   Medication     acetaminophen (TYLENOL) tablet 1,000 mg     albuterol (PROAIR HFA/PROVENTIL HFA/VENTOLIN HFA) 108 (90 Base) MCG/ACT inhaler 2 puff     calcium carbonate-vitamin D (OSCAL w/D) per tablet 1 tablet     glucose gel 15-30 g    Or     dextrose 50 % injection 25-50 mL    Or     glucagon injection 1 mg     insulin aspart (NovoLOG) injection (RAPID ACTING)     insulin aspart (NovoLOG) injection (RAPID ACTING)     insulin glargine (LANTUS PEN) injection 20 Units     [Held by provider] Lenvatinib 12 MG (3 x 4 mg capsules) (LENVIMA) capsule 12 mg     lidocaine (LMX4) cream     lidocaine 1 % 0.1-1 mL     melatonin tablet 1 mg     naloxone (NARCAN) injection 0.1-0.4 mg     nicotine (NICODERM CQ) 14 MG/24HR 24 hr patch 1 patch     nicotine (NICORETTE) gum 2 mg     nicotine Patch in Place     ondansetron (ZOFRAN-ODT) ODT tab 4 mg    Or     ondansetron (ZOFRAN) injection 4 mg     oxyCODONE (ROXICODONE) tablet 5-10 mg     pantoprazole (PROTONIX) EC tablet 40 mg     piperacillin-tazobactam (ZOSYN) 3.375 g vial to attach to  mL bag     prochlorperazine (COMPAZINE) tablet 10 mg     QUEtiapine (SEROquel) tablet 50 mg     sertraline (ZOLOFT) tablet 25 mg     sodium chloride (PF) 0.9% PF flush 3 mL     sodium chloride (PF) 0.9% PF flush 3 mL     Vitamin D3 (CHOLECALCIFEROL) tablet 25 mcg          "    Review of Systems:     A complete 10 point review of systems was performed and is negative except as noted in the HPI           Physical Exam:   BP (!) 158/79 (BP Location: Left arm)   Pulse 65   Temp 96.7  F (35.9  C) (Oral)   Resp 18   Ht 1.549 m (5' 1\")   Wt 79.2 kg (174 lb 9.7 oz)   SpO2 93%   BMI 32.99 kg/m    Wt:   Wt Readings from Last 2 Encounters:   07/16/20 79.2 kg (174 lb 9.7 oz)   06/29/20 79.2 kg (174 lb 11.2 oz)      Constitutional: Sleeping in bed, arousable but minimally responsive to questions, difficulty focusing  Eyes: Sclera anicteric  Ears/nose/mouth/throat: Moist mucus membranes  Neck: supple  CV: bilateral pitting edema  Respiratory: Breathing comfortably on room air  Abd: Tender in RUQ and LUQ, no rebound, no peritoneal signs  Skin: warm, perfused, no jaundice  Neuro: Altered, no localizing neurologic abnormalities, would not corroborate in asterixis evaluation  MSK: No gross deformities         Data:   Labs and imaging below were independently reviewed and interpreted    BMP  Recent Labs   Lab 07/17/20  0840 07/16/20  1349    138   POTASSIUM 3.6 3.9   CHLORIDE 108 106   LETY 8.5 8.3*   CO2 25 27   BUN 10 12   CR 0.62 0.56   * 143*     CBC  Recent Labs   Lab 07/17/20  0840 07/16/20  1349   WBC 1.6* 2.3*   RBC 3.61* 3.74*   HGB 12.5 12.9   HCT 37.8 38.2   * 102*   MCH 34.6* 34.5*   MCHC 33.1 33.8   RDW 15.3* 15.1*   PLT 27* 33*     INR  Recent Labs   Lab 07/16/20  1349   INR 1.26*     LFTs  Recent Labs   Lab 07/17/20  0840 07/16/20  1349   ALKPHOS 119 226*   AST 40 45   ALT 36 35   BILITOTAL 1.6* 1.0   PROTTOTAL 6.8 7.5   ALBUMIN 2.9* 3.2*      PANC  Recent Labs   Lab 07/16/20  1936   LIPASE 85       Imaging:   CT:  IMPRESSION:   1.  Interval advancement of portal venous thrombus involving the main portal vein and left intrahepatic portal venous system as detailed above.     2.  Interval development of mild bowel wall thickening involving the rectum and throughout " the entirety of the colon as well as the distal small bowel. Findings can be seen with a diffuse infectious or inflammatory etiology. Underlying colitis could have   this appearance.     3.  Interval development of low-attenuation lesions and prior areas of enhancing lesions involving the liver as detailed above. These low-attenuation lesions are smaller than the arterial enhancing lesions on the study from June of 2020 and likely   reflect posttreatment change.     4.  Stable splenomegaly without ascites.     5.  Underlying unchanged peripheral interstitial fibrotic changes in the lower lungs.     No

## 2023-04-03 PROBLEM — C79.51 MALIGNANT NEOPLASM METASTATIC TO BONE (H): Status: ACTIVE | Noted: 2020-01-01

## 2023-10-29 NOTE — PLAN OF CARE
Assumed cares 0700 to 1500. A&O x4 with forgetfulness. Able to make needs known. VSS on 4LPM via nc. Pt was sating 85% on RA this AM, as pt is not always compliant with O2 needs; MD aware. BG this AM 67. Pt asymptomatic. 26 CHO of orange juice given with recheck 108. Ambulates independently to bathroom with good UO and 5 loose BM this shift, per pt. K+ replaced this shift; recheck 4.0. Pt reports feeling like she is getting a cold sore on outside of lip; new orders for Abreva. Magic mouthwash given x1 for inner mouth sores. Fair appetite at meals; supplemental Boost provided. Assistance and encouragement with meal orders. On calorie counts.  Echo done today. Continue with POC.    Patient

## (undated) RX ORDER — ONDANSETRON 2 MG/ML
INJECTION INTRAMUSCULAR; INTRAVENOUS
Status: DISPENSED
Start: 2020-01-01

## (undated) RX ORDER — CEFAZOLIN SODIUM 1 G/3ML
INJECTION, POWDER, FOR SOLUTION INTRAMUSCULAR; INTRAVENOUS
Status: DISPENSED
Start: 2020-01-01

## (undated) RX ORDER — FENTANYL CITRATE 50 UG/ML
INJECTION, SOLUTION INTRAMUSCULAR; INTRAVENOUS
Status: DISPENSED
Start: 2020-01-01

## (undated) RX ORDER — SODIUM CHLORIDE 9 MG/ML
INJECTION, SOLUTION INTRAVENOUS
Status: DISPENSED
Start: 2020-01-01

## (undated) RX ORDER — HYDROMORPHONE HYDROCHLORIDE 1 MG/ML
INJECTION, SOLUTION INTRAMUSCULAR; INTRAVENOUS; SUBCUTANEOUS
Status: DISPENSED
Start: 2020-01-01

## (undated) RX ORDER — ATROPINE SULFATE 0.4 MG/ML
AMPUL (ML) INJECTION
Status: DISPENSED
Start: 2019-12-11

## (undated) RX ORDER — OXYCODONE HYDROCHLORIDE 5 MG/1
TABLET ORAL
Status: DISPENSED
Start: 2020-01-01

## (undated) RX ORDER — METOPROLOL TARTRATE 1 MG/ML
INJECTION, SOLUTION INTRAVENOUS
Status: DISPENSED
Start: 2019-12-11

## (undated) RX ORDER — LIDOCAINE HYDROCHLORIDE 10 MG/ML
INJECTION, SOLUTION EPIDURAL; INFILTRATION; INTRACAUDAL; PERINEURAL
Status: DISPENSED
Start: 2020-01-01

## (undated) RX ORDER — DOBUTAMINE HYDROCHLORIDE 200 MG/100ML
INJECTION INTRAVENOUS
Status: DISPENSED
Start: 2019-12-11